# Patient Record
Sex: MALE | Race: WHITE | NOT HISPANIC OR LATINO | Employment: OTHER | URBAN - METROPOLITAN AREA
[De-identification: names, ages, dates, MRNs, and addresses within clinical notes are randomized per-mention and may not be internally consistent; named-entity substitution may affect disease eponyms.]

---

## 2017-02-06 ENCOUNTER — HOSPITAL ENCOUNTER (EMERGENCY)
Facility: HOSPITAL | Age: 82
Discharge: HOME | End: 2017-02-06
Attending: EMERGENCY MEDICINE | Admitting: EMERGENCY MEDICINE
Payer: MEDICARE

## 2017-02-06 VITALS
DIASTOLIC BLOOD PRESSURE: 65 MMHG | BODY MASS INDEX: 27.47 KG/M2 | HEIGHT: 67 IN | TEMPERATURE: 98.5 F | WEIGHT: 175 LBS | SYSTOLIC BLOOD PRESSURE: 129 MMHG | RESPIRATION RATE: 20 BRPM | HEART RATE: 82 BPM | OXYGEN SATURATION: 97 %

## 2017-02-06 DIAGNOSIS — J32.9 SINUSITIS: Primary | ICD-10-CM

## 2017-02-06 LAB
FLUAV AG SPEC QL IA: NEGATIVE
FLUAV AG SPEC QL: ABNORMAL
FLUBV AG SPEC QL IA: NEGATIVE
FLUBV AG SPEC QL: ABNORMAL
RSV B RNA SPEC QL NAA+PROBE: DETECTED

## 2017-02-06 PROCEDURE — 99283 EMERGENCY DEPT VISIT LOW MDM: CPT

## 2017-02-06 PROCEDURE — 87798 DETECT AGENT NOS DNA AMP: CPT | Performed by: EMERGENCY MEDICINE

## 2017-02-06 PROCEDURE — 87400 INFLUENZA A/B EACH AG IA: CPT | Performed by: EMERGENCY MEDICINE

## 2017-02-06 RX ORDER — AZITHROMYCIN 250 MG/1
TABLET, FILM COATED ORAL
Qty: 6 TABLET | Refills: 0 | Status: SHIPPED | OUTPATIENT
Start: 2017-02-06 | End: 2018-11-16

## 2017-02-06 RX ORDER — ATORVASTATIN CALCIUM 10 MG/1
10 TABLET, FILM COATED ORAL DAILY
COMMUNITY
End: 2019-04-04 | Stop reason: SDUPTHER

## 2017-02-06 RX ORDER — ACETAMINOPHEN 325 MG/1
650 TABLET ORAL ONCE
Status: DISCONTINUED | OUTPATIENT
Start: 2017-02-06 | End: 2017-02-06 | Stop reason: HOSPADM

## 2017-10-14 ENCOUNTER — LAB REQUISITION (OUTPATIENT)
Dept: LAB | Facility: HOSPITAL | Age: 82
End: 2017-10-14
Payer: MEDICARE

## 2017-10-14 DIAGNOSIS — Z13.220 ENCOUNTER FOR SCREENING FOR LIPOID DISORDERS: ICD-10-CM

## 2017-10-14 LAB — CHOLEST SERPL-MCNC: 152 MG/DL (ref 50–200)

## 2017-10-14 PROCEDURE — 82465 ASSAY BLD/SERUM CHOLESTEROL: CPT | Performed by: PREVENTIVE MEDICINE

## 2018-11-16 ENCOUNTER — HOSPITAL ENCOUNTER (INPATIENT)
Facility: HOSPITAL | Age: 83
LOS: 1 days | Discharge: HOME | DRG: 092 | End: 2018-11-17
Attending: EMERGENCY MEDICINE | Admitting: INTERNAL MEDICINE
Payer: MEDICARE

## 2018-11-16 ENCOUNTER — APPOINTMENT (EMERGENCY)
Dept: RADIOLOGY | Facility: HOSPITAL | Age: 83
DRG: 092 | End: 2018-11-16
Payer: MEDICARE

## 2018-11-16 ENCOUNTER — APPOINTMENT (EMERGENCY)
Dept: CT IMAGING | Facility: HOSPITAL | Age: 83
DRG: 092 | End: 2018-11-16
Payer: MEDICARE

## 2018-11-16 DIAGNOSIS — R26.2 AMBULATORY DYSFUNCTION: ICD-10-CM

## 2018-11-16 DIAGNOSIS — R45.851 SUICIDAL IDEATION: ICD-10-CM

## 2018-11-16 DIAGNOSIS — R45.89 DEPRESSED MOOD: ICD-10-CM

## 2018-11-16 DIAGNOSIS — R91.1 PULMONARY NODULE: ICD-10-CM

## 2018-11-16 DIAGNOSIS — R26.81 UNSTEADINESS: Primary | ICD-10-CM

## 2018-11-16 PROBLEM — R27.0 ATAXIA: Status: ACTIVE | Noted: 2018-11-16

## 2018-11-16 PROBLEM — R29.6 FREQUENT FALLS: Status: ACTIVE | Noted: 2018-11-16

## 2018-11-16 LAB
ALBUMIN SERPL BCP-MCNC: 3.5 G/DL (ref 3.5–5)
ALP SERPL-CCNC: 64 U/L (ref 46–116)
ALT SERPL W P-5'-P-CCNC: 39 U/L (ref 12–78)
ANION GAP SERPL CALCULATED.3IONS-SCNC: 12 MMOL/L (ref 4–13)
APAP SERPL-MCNC: <2 UG/ML (ref 10–30)
APTT PPP: 42 SECONDS (ref 26–38)
AST SERPL W P-5'-P-CCNC: 34 U/L (ref 5–45)
BASOPHILS # BLD AUTO: 0.03 THOUSANDS/ΜL (ref 0–0.1)
BASOPHILS NFR BLD AUTO: 1 % (ref 0–1)
BILIRUB SERPL-MCNC: 0.4 MG/DL (ref 0.2–1)
BUN SERPL-MCNC: 22 MG/DL (ref 5–25)
CALCIUM SERPL-MCNC: 8.9 MG/DL (ref 8.3–10.1)
CHLORIDE SERPL-SCNC: 105 MMOL/L (ref 100–108)
CO2 SERPL-SCNC: 26 MMOL/L (ref 21–32)
CREAT SERPL-MCNC: 1.28 MG/DL (ref 0.6–1.3)
EOSINOPHIL # BLD AUTO: 0.18 THOUSAND/ΜL (ref 0–0.61)
EOSINOPHIL NFR BLD AUTO: 5 % (ref 0–6)
ERYTHROCYTE [DISTWIDTH] IN BLOOD BY AUTOMATED COUNT: 12.4 % (ref 11.6–15.1)
ETHANOL SERPL-MCNC: <3 MG/DL (ref 0–3)
GFR SERPL CREATININE-BSD FRML MDRD: 52 ML/MIN/1.73SQ M
GLUCOSE SERPL-MCNC: 109 MG/DL (ref 65–140)
GLUCOSE SERPL-MCNC: 117 MG/DL (ref 65–140)
HCT VFR BLD AUTO: 38.5 % (ref 36.5–49.3)
HGB BLD-MCNC: 12.7 G/DL (ref 12–17)
IMM GRANULOCYTES # BLD AUTO: 0 THOUSAND/UL (ref 0–0.2)
IMM GRANULOCYTES NFR BLD AUTO: 0 % (ref 0–2)
INR PPP: 1.25 (ref 0.86–1.17)
LYMPHOCYTES # BLD AUTO: 1.16 THOUSANDS/ΜL (ref 0.6–4.47)
LYMPHOCYTES NFR BLD AUTO: 29 % (ref 14–44)
MCH RBC QN AUTO: 30.2 PG (ref 26.8–34.3)
MCHC RBC AUTO-ENTMCNC: 33 G/DL (ref 31.4–37.4)
MCV RBC AUTO: 92 FL (ref 82–98)
MONOCYTES # BLD AUTO: 0.53 THOUSAND/ΜL (ref 0.17–1.22)
MONOCYTES NFR BLD AUTO: 13 % (ref 4–12)
NEUTROPHILS # BLD AUTO: 2.09 THOUSANDS/ΜL (ref 1.85–7.62)
NEUTS SEG NFR BLD AUTO: 52 % (ref 43–75)
NRBC BLD AUTO-RTO: 0 /100 WBCS
PLATELET # BLD AUTO: 74 THOUSANDS/UL (ref 149–390)
PMV BLD AUTO: 9.4 FL (ref 8.9–12.7)
POTASSIUM SERPL-SCNC: 4 MMOL/L (ref 3.5–5.3)
PROT SERPL-MCNC: 7.2 G/DL (ref 6.4–8.2)
PROTHROMBIN TIME: 15 SECONDS (ref 11.8–14.2)
RBC # BLD AUTO: 4.2 MILLION/UL (ref 3.88–5.62)
SALICYLATES SERPL-MCNC: <3 MG/DL (ref 3–20)
SODIUM SERPL-SCNC: 143 MMOL/L (ref 136–145)
WBC # BLD AUTO: 3.99 THOUSAND/UL (ref 4.31–10.16)

## 2018-11-16 PROCEDURE — 80329 ANALGESICS NON-OPIOID 1 OR 2: CPT | Performed by: EMERGENCY MEDICINE

## 2018-11-16 PROCEDURE — 82948 REAGENT STRIP/BLOOD GLUCOSE: CPT

## 2018-11-16 PROCEDURE — 36415 COLL VENOUS BLD VENIPUNCTURE: CPT | Performed by: EMERGENCY MEDICINE

## 2018-11-16 PROCEDURE — 99223 1ST HOSP IP/OBS HIGH 75: CPT | Performed by: INTERNAL MEDICINE

## 2018-11-16 PROCEDURE — 1123F ACP DISCUSS/DSCN MKR DOCD: CPT | Performed by: INTERNAL MEDICINE

## 2018-11-16 PROCEDURE — 93005 ELECTROCARDIOGRAM TRACING: CPT

## 2018-11-16 PROCEDURE — 80320 DRUG SCREEN QUANTALCOHOLS: CPT | Performed by: EMERGENCY MEDICINE

## 2018-11-16 PROCEDURE — 85730 THROMBOPLASTIN TIME PARTIAL: CPT | Performed by: EMERGENCY MEDICINE

## 2018-11-16 PROCEDURE — 85025 COMPLETE CBC W/AUTO DIFF WBC: CPT | Performed by: EMERGENCY MEDICINE

## 2018-11-16 PROCEDURE — 71046 X-RAY EXAM CHEST 2 VIEWS: CPT

## 2018-11-16 PROCEDURE — 85610 PROTHROMBIN TIME: CPT | Performed by: EMERGENCY MEDICINE

## 2018-11-16 PROCEDURE — 70496 CT ANGIOGRAPHY HEAD: CPT

## 2018-11-16 PROCEDURE — 99285 EMERGENCY DEPT VISIT HI MDM: CPT

## 2018-11-16 PROCEDURE — 70498 CT ANGIOGRAPHY NECK: CPT

## 2018-11-16 PROCEDURE — 80053 COMPREHEN METABOLIC PANEL: CPT | Performed by: EMERGENCY MEDICINE

## 2018-11-16 RX ORDER — ACETAMINOPHEN 325 MG/1
650 TABLET ORAL EVERY 4 HOURS PRN
Status: DISCONTINUED | OUTPATIENT
Start: 2018-11-16 | End: 2018-11-17 | Stop reason: HOSPADM

## 2018-11-16 RX ORDER — ASPIRIN 81 MG/1
324 TABLET, CHEWABLE ORAL ONCE
Status: COMPLETED | OUTPATIENT
Start: 2018-11-16 | End: 2018-11-16

## 2018-11-16 RX ORDER — ONDANSETRON 2 MG/ML
4 INJECTION INTRAMUSCULAR; INTRAVENOUS EVERY 6 HOURS PRN
Status: DISCONTINUED | OUTPATIENT
Start: 2018-11-16 | End: 2018-11-17 | Stop reason: HOSPADM

## 2018-11-16 RX ORDER — ASPIRIN 81 MG/1
81 TABLET, CHEWABLE ORAL DAILY
Status: DISCONTINUED | OUTPATIENT
Start: 2018-11-17 | End: 2018-11-17 | Stop reason: HOSPADM

## 2018-11-16 RX ORDER — ATORVASTATIN CALCIUM 10 MG/1
10 TABLET, FILM COATED ORAL
Status: DISCONTINUED | OUTPATIENT
Start: 2018-11-17 | End: 2018-11-17 | Stop reason: HOSPADM

## 2018-11-16 RX ADMIN — IOHEXOL 100 ML: 350 INJECTION, SOLUTION INTRAVENOUS at 20:10

## 2018-11-16 RX ADMIN — ASPIRIN 81 MG 324 MG: 81 TABLET ORAL at 20:40

## 2018-11-17 ENCOUNTER — APPOINTMENT (INPATIENT)
Dept: MRI IMAGING | Facility: HOSPITAL | Age: 83
DRG: 092 | End: 2018-11-17
Payer: MEDICARE

## 2018-11-17 VITALS
WEIGHT: 167.77 LBS | SYSTOLIC BLOOD PRESSURE: 105 MMHG | BODY MASS INDEX: 26.33 KG/M2 | HEART RATE: 67 BPM | TEMPERATURE: 97.9 F | DIASTOLIC BLOOD PRESSURE: 57 MMHG | OXYGEN SATURATION: 94 % | HEIGHT: 67 IN | RESPIRATION RATE: 18 BRPM

## 2018-11-17 PROBLEM — F32.A DEPRESSION (EMOTION): Status: ACTIVE | Noted: 2018-11-16

## 2018-11-17 PROBLEM — R27.0 ATAXIA: Status: RESOLVED | Noted: 2018-11-16 | Resolved: 2018-11-17

## 2018-11-17 PROBLEM — R26.2 AMBULATORY DYSFUNCTION: Status: RESOLVED | Noted: 2018-11-16 | Resolved: 2018-11-17

## 2018-11-17 LAB
ANION GAP SERPL CALCULATED.3IONS-SCNC: 12 MMOL/L (ref 4–13)
ATRIAL RATE: 83 BPM
BASOPHILS # BLD AUTO: 0.02 THOUSANDS/ΜL (ref 0–0.1)
BASOPHILS NFR BLD AUTO: 1 % (ref 0–1)
BUN SERPL-MCNC: 21 MG/DL (ref 5–25)
CALCIUM SERPL-MCNC: 8.9 MG/DL (ref 8.3–10.1)
CHLORIDE SERPL-SCNC: 107 MMOL/L (ref 100–108)
CHOLEST SERPL-MCNC: 126 MG/DL (ref 50–200)
CO2 SERPL-SCNC: 24 MMOL/L (ref 21–32)
CREAT SERPL-MCNC: 1.17 MG/DL (ref 0.6–1.3)
EOSINOPHIL # BLD AUTO: 0.17 THOUSAND/ΜL (ref 0–0.61)
EOSINOPHIL NFR BLD AUTO: 5 % (ref 0–6)
ERYTHROCYTE [DISTWIDTH] IN BLOOD BY AUTOMATED COUNT: 12.3 % (ref 11.6–15.1)
EST. AVERAGE GLUCOSE BLD GHB EST-MCNC: 137 MG/DL
GFR SERPL CREATININE-BSD FRML MDRD: 58 ML/MIN/1.73SQ M
GLUCOSE SERPL-MCNC: 127 MG/DL (ref 65–140)
HBA1C MFR BLD: 6.4 % (ref 4.2–6.3)
HCT VFR BLD AUTO: 36.3 % (ref 36.5–49.3)
HDLC SERPL-MCNC: 38 MG/DL (ref 40–60)
HGB BLD-MCNC: 12.2 G/DL (ref 12–17)
IMM GRANULOCYTES # BLD AUTO: 0.01 THOUSAND/UL (ref 0–0.2)
IMM GRANULOCYTES NFR BLD AUTO: 0 % (ref 0–2)
LDLC SERPL CALC-MCNC: 73 MG/DL (ref 0–100)
LYMPHOCYTES # BLD AUTO: 0.71 THOUSANDS/ΜL (ref 0.6–4.47)
LYMPHOCYTES NFR BLD AUTO: 22 % (ref 14–44)
MCH RBC QN AUTO: 31 PG (ref 26.8–34.3)
MCHC RBC AUTO-ENTMCNC: 33.6 G/DL (ref 31.4–37.4)
MCV RBC AUTO: 92 FL (ref 82–98)
MONOCYTES # BLD AUTO: 0.41 THOUSAND/ΜL (ref 0.17–1.22)
MONOCYTES NFR BLD AUTO: 13 % (ref 4–12)
NEUTROPHILS # BLD AUTO: 1.92 THOUSANDS/ΜL (ref 1.85–7.62)
NEUTS SEG NFR BLD AUTO: 59 % (ref 43–75)
NRBC BLD AUTO-RTO: 0 /100 WBCS
P AXIS: 65 DEGREES
PLATELET # BLD AUTO: 67 THOUSANDS/UL (ref 149–390)
PLATELET # BLD AUTO: 73 THOUSANDS/UL (ref 149–390)
PMV BLD AUTO: 10.4 FL (ref 8.9–12.7)
PMV BLD AUTO: 9.7 FL (ref 8.9–12.7)
POTASSIUM SERPL-SCNC: 3.9 MMOL/L (ref 3.5–5.3)
PR INTERVAL: 162 MS
QRS AXIS: -18 DEGREES
QRSD INTERVAL: 86 MS
QT INTERVAL: 380 MS
QTC INTERVAL: 446 MS
RBC # BLD AUTO: 3.93 MILLION/UL (ref 3.88–5.62)
SODIUM SERPL-SCNC: 143 MMOL/L (ref 136–145)
T WAVE AXIS: 35 DEGREES
TRIGL SERPL-MCNC: 77 MG/DL
VENTRICULAR RATE: 83 BPM
WBC # BLD AUTO: 3.24 THOUSAND/UL (ref 4.31–10.16)

## 2018-11-17 PROCEDURE — 93010 ELECTROCARDIOGRAM REPORT: CPT | Performed by: INTERNAL MEDICINE

## 2018-11-17 PROCEDURE — 80048 BASIC METABOLIC PNL TOTAL CA: CPT | Performed by: NURSE PRACTITIONER

## 2018-11-17 PROCEDURE — 85049 AUTOMATED PLATELET COUNT: CPT | Performed by: NURSE PRACTITIONER

## 2018-11-17 PROCEDURE — 85025 COMPLETE CBC W/AUTO DIFF WBC: CPT | Performed by: NURSE PRACTITIONER

## 2018-11-17 PROCEDURE — 99221 1ST HOSP IP/OBS SF/LOW 40: CPT | Performed by: PSYCHIATRY & NEUROLOGY

## 2018-11-17 PROCEDURE — 99223 1ST HOSP IP/OBS HIGH 75: CPT | Performed by: PSYCHIATRY & NEUROLOGY

## 2018-11-17 PROCEDURE — 83036 HEMOGLOBIN GLYCOSYLATED A1C: CPT | Performed by: NURSE PRACTITIONER

## 2018-11-17 PROCEDURE — 99239 HOSP IP/OBS DSCHRG MGMT >30: CPT | Performed by: INTERNAL MEDICINE

## 2018-11-17 PROCEDURE — 80061 LIPID PANEL: CPT | Performed by: NURSE PRACTITIONER

## 2018-11-17 RX ORDER — ASPIRIN 81 MG/1
81 TABLET, CHEWABLE ORAL DAILY
Qty: 30 TABLET | Refills: 0 | Status: SHIPPED | OUTPATIENT
Start: 2018-11-18 | End: 2019-01-10 | Stop reason: ALTCHOICE

## 2018-11-17 RX ADMIN — ENOXAPARIN SODIUM 40 MG: 40 INJECTION SUBCUTANEOUS at 08:57

## 2018-11-17 RX ADMIN — ASPIRIN 81 MG 81 MG: 81 TABLET ORAL at 08:57

## 2018-11-17 NOTE — H&P
H&P- Britt Rater 1935, 81 y o  male MRN: [id number]    Unit/Bed#: [address] [place] [address] Encounter: [id number]    Primary Care Provider: Lennie Marrero MD   Date and time admitted to hospital: 2018  7:33 PM        * Ataxia   Assessment & Plan    CTA of head and neck shows no evidence of acute intracranial hemorrhage  Moderate atherosclerotic plaque bilateral cervical ICA and carotid siphon  No significant hemodynamics stenosis, aneurysm, or dissection  Monitor neuro checks  Initiate aspirin 81 milligrams p o  Daily  Inpatient consult to Neurology  [facility] Hypertension   Assessment & Plan    BP controlled off medication  Will continue to monitor BP per nursing unit  Ambulatory dysfunction   Assessment & Plan    Out of bed with assist   PT OT consult  Call bell in reach  Pt lives alone and may require STR  Frequent falls   Assessment & Plan    Patient fell 3 times today  Complains of feeling dizzy and unsteady  Out of bed with assist   Will consult PT/OT  Depression (emotion)   Assessment & Plan    Patient's wife  1 year ago yesterday  Patient very tearful  Will consult Psychiatry  Pulmonary nodule   Assessment & Plan    CT of chest shows 6 millimeter left upper lobe lung nodule  Patient unaware of when and by whom it was discovered  Radiology recommending non emergent outpatient CT to follow up  VTE Prophylaxis: Enoxaparin (Lovenox)  / sequential compression device   Code Status: Full code  POLST: There is no POLST form on file for this patient (pre-hospital)  Discussion with family: No family present    Anticipated Length of Stay:  Patient will be admitted on an Inpatient basis with an anticipated length of stay of  > 2 midnights  Justification for Hospital Stay: Close neurological monitoring  Total Time for Visit, including Counseling / Coordination of Care: 30 minutes    Greater than 50% of this total time spent on direct patient counseling and coordination of care  Chief Complaint:   Ataxia    History of Present Illness:    Marsha Simons is a 81 y o  male with history of HTN who presents with c/o weakness, dizziness,  and unsteady gait  Pt admits to falling 3 times today  Patient states he felt like he was beat up today and the room was spinning  Denies hitting head or loss of consciousness  He feels like he over did it as he recently moved and had to do a lot of physical activity  Patient is very tearful during interview as yesterday was the 1 year anniversary of the death of his wife  Denies suicidal ideations  WBC count 3 99, platelets 74  CTA of head and neck shows moderate atherosclerotic plaque in the bilateral cervical ICA and carotid siphon, fluid in esophagus and thoracic inlet suggesting reflux and aspiration risk, and 6 millimeter low left upper lobe lung nodule  Review of Systems:    Review of Systems   Constitutional: Positive for fatigue  Negative for appetite change, chills and fever  Respiratory: Negative for apnea, cough and shortness of breath  Cardiovascular: Negative for chest pain, palpitations and leg swelling  Gastrointestinal: Negative for abdominal distention, abdominal pain, constipation, diarrhea, nausea and vomiting  Genitourinary: Negative for dysuria  Musculoskeletal: Positive for gait problem  Neurological: Positive for dizziness and weakness  Negative for seizures, syncope, facial asymmetry, light-headedness, numbness and headaches  Psychiatric/Behavioral: Negative for agitation, confusion and suicidal ideas  The patient is not nervous/anxious  All other systems reviewed and are negative        Past Medical and Surgical History:     Past Medical History:   Diagnosis Date    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 Authorizing Provider   atorvastatin (LIPITOR) 10 mg tablet Take 10 mg by mouth daily    Historical Provider, MD     I have reviewed home medications with patient personally  Allergies: No Known Allergies    Social History:     Marital Status:    Occupation: Retired  Patient Pre-hospital Living Situation: Lives alone  Patient Pre-hospital Level of Mobility: Independent  Patient Pre-hospital Diet Restrictions: None  Substance Use History:   History   Alcohol Use No     History   Smoking Status    Former Smoker   Smokeless Tobacco    Former User    Quit date: 1/1/1976     History   Drug Use No       Family History:    non-contributory    Physical Exam:     Vitals:   Blood Pressure: 117/58 (11/16/18 2240)  Pulse: 56 (11/16/18 2240)  Temperature: (!) 97 3 °F (36 3 °C) (11/16/18 2240)  Temp Source: Oral (11/16/18 2240)  Respirations: 20 (11/16/18 2240)  Height: 5' 7" (170 2 cm) (11/16/18 2242)  Weight - Scale: 76 kg (167 lb 8 8 oz) (11/16/18 2242)  SpO2: 97 % (11/16/18 2240)    Physical Exam   Constitutional: He is oriented to person, place, and time  He appears well-developed and well-nourished  He is cooperative  No distress  HENT:   Head: Normocephalic and atraumatic  Eyes: Pupils are equal, round, and reactive to light  EOM are normal    Neck: Normal range of motion  Neck supple  Cardiovascular: Normal rate, regular rhythm, normal heart sounds and intact distal pulses  Exam reveals no gallop and no friction rub  No murmur heard  Pulmonary/Chest: Effort normal and breath sounds normal  No respiratory distress  He has no wheezes  He has no rales  Abdominal: Soft  Bowel sounds are normal  He exhibits no distension  There is no tenderness  Musculoskeletal: Normal range of motion  He exhibits no edema  Neurological: He is alert and oriented to person, place, and time  He has normal strength   No cranial nerve deficit or sensory deficit  GCS eye subscore is 4  GCS verbal subscore is 5  GCS motor subscore is 6  Skin: Skin is warm and dry  Psychiatric: His speech is normal and behavior is normal  Judgment and thought content normal  Cognition and memory are normal  He exhibits a depressed mood  Nursing note and vitals reviewed  Additional Data:     Lab Results: I have personally reviewed pertinent reports  Results from last 7 days  Lab Units 11/17/18  0029 11/16/18 1950   WBC Thousand/uL  --  3 99*   HEMOGLOBIN g/dL  --  12 7   HEMATOCRIT %  --  38 5   PLATELETS Thousands/uL 73* 74*   NEUTROS PCT %  --  52   LYMPHS PCT %  --  29   MONOS PCT %  --  13*   EOS PCT %  --  5       Results from last 7 days  Lab Units 11/16/18  1950   SODIUM mmol/L 143   POTASSIUM mmol/L 4 0   CHLORIDE mmol/L 105   CO2 mmol/L 26   BUN mg/dL 22   CREATININE mg/dL 1 28   ANION GAP mmol/L 12   CALCIUM mg/dL 8 9   ALBUMIN g/dL 3 5   TOTAL BILIRUBIN mg/dL 0 40   ALK PHOS U/L 64   ALT U/L 39   AST U/L 34   GLUCOSE RANDOM mg/dL 117       Results from last 7 days  Lab Units 11/16/18  1950   INR  1 25*       Results from last 7 days  Lab Units 11/16/18  1948   POC GLUCOSE mg/dl 109               Imaging: I have personally reviewed pertinent reports  CTA head and neck with and without contrast   Final Result by Kami Engel MD (11/16 2035)      No evidence of acute intracranial hemorrhage  Microangiopathy  Moderate atherosclerotic plaque bilateral cervical ICA and carotid siphon  No significant hemodynamic stenosis, aneurysm or dissection  Fluid in the esophagus at the thoracic inlet suggesting reflux and aspirational risk  6 mm left upper lobe lung nodule near the apex  Mediastinal adenopathy   Follow-up outpatient nonemergent unenhanced CT chest       Based on current Fleischner Society 2017 Guidelines on incidental pulmonary nodule, follow-up outpatient nonemergent unenhanced CT chest                         Workstation performed: [id number]         XR chest 2 views   ED Interpretation by Miguel Galindo MD (11/16 2025)   No acute pulmonary pathology      Final Result by Gaby Daily MD (11/16 2023)      No acute cardiopulmonary disease  Workstation performed: [id number]         MRI Inpatient Order    (Results Pending)       EKG, Pathology, and Other Studies Reviewed on Admission:   · EKG: SR    Allscripts / Epic Records Reviewed: Yes     ** Please Note: This note has been constructed using a voice recognition system   **

## 2018-11-17 NOTE — UTILIZATION REVIEW
Initial Clinical Review    Admission: Date/Time/Statement: 11/16/2018 @ 2310  11/16/18 2310  Inpatient Admission Once     Transfer Service: General Medicine       Question Answer Comment   Admitting Physician Orville Renteria S    Level of Care Med Surg    Estimated length of stay More than 2 Midnights    Certification I certify that inpatient services are medically necessary for this patient for a duration of greater than two midnights  See H&P and MD Progress Notes for additional information about the patient's course of treatment  ED: Date/Time/Mode of Arrival:   ED Arrival Information     Expected Arrival Acuity Means of Arrival Escorted By Service Admission Type    - 11/16/2018 19:31 Urgent Walk-In Spouse General Medicine Urgent    Arrival Complaint    change in mental status          Chief Complaint:   Chief Complaint   Patient presents with    Fall     Pt reports frequent falls and leg cramping since 630pm  He states it is difficult to maneuver around  He felt dizzy and unsteady at home  He reports it is worse when he gets up  History of Illness:   Wilfredo Queen is a 81 y o  male with history of HTN who presents with c/o weakness, dizziness,  and unsteady gait  Pt admits to falling 3 times today  Patient states he felt like he was beat up today and the room was spinning  Denies hitting head or loss of consciousness  He feels like he over did it as he recently moved and had to do a lot of physical activity  Patient is very tearful during interview as yesterday was the 1 year anniversary of the death of his wife      ED Vital Signs:   ED Triage Vitals [11/16/18 1935]   Temperature Pulse Respirations Blood Pressure SpO2   98 3 °F (36 8 °C) 68 22 163/72 98 %      Temp Source Heart Rate Source Patient Position - Orthostatic VS BP Location FiO2 (%)   Temporal -- -- -- --      Pain Score       --        Wt Readings from Last 1 Encounters:   11/16/18 74 8 kg (165 lb)     GCS 15    Abnormal Labs/Diagnostic Test Results:   WBC 3 99  Hgl 12 7 / Hct 38 5  Platelets 74  SODIUM mmol/L 143   POTASSIUM mmol/L 4 0   CHLORIDE mmol/L 105   CO2 mmol/L 26   BUN mg/dL 22   CREATININE mg/dL 1 28   ANION GAP mmol/L 12   CALCIUM mg/dL 8 9   ALBUMIN g/dL 3 5   TOTAL BILIRUBIN mg/dL 0 40   ALK PHOS U/L 64   ALT U/L 39   AST U/L 34   GLUCOSE RANDOM mg/dL 117   INR 1 25    CTa head/neck:    No evidence of acute intracranial hemorrhage        Microangiopathy        Moderate atherosclerotic plaque bilateral cervical ICA and carotid siphon  No significant hemodynamic stenosis, aneurysm or dissection          Fluid in the esophagus at the thoracic inlet suggesting reflux and aspirational risk        6 mm left upper lobe lung nodule near the apex  Mediastinal adenopathy  Follow-up outpatient nonemergent unenhanced CT chest      Chest X:  No acute cardiopulmonary disease  ECG: SR    MRI: pending    ED Treatment:   Medication Administration from 11/16/2018 1931 to 11/16/2018 2223       Date/Time Order Dose Route Action Action by Comments     11/16/2018 2010 iohexol (OMNIPAQUE) 350 MG/ML injection (MULTI-DOSE) 100 mL 100 mL Intravenous Given Moe Avila      11/16/2018 2040 aspirin chewable tablet 324 mg 324 mg Oral Given Micaela Nguyen RN           Past Medical/Surgical History:   Past Medical History:   Diagnosis Date    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     Hyperlipidemia     Hypertension     Shingles        Admitting Diagnosis: Altered mental status [R41 82]    Age/Sex: 81 y o  male    Assessment/Plan:   * Ataxia   Assessment & Plan     CTA of head and neck shows no evidence of acute intracranial hemorrhage  Moderate atherosclerotic plaque bilateral cervical ICA and carotid siphon  No significant hemodynamics stenosis, aneurysm, or dissection  Monitor neuro checks  Initiate aspirin 81 milligrams p o  Daily  Inpatient consult to Neurology  MRI in a m         Hypertension   Assessment & Plan     BP controlled off medication  Will continue to monitor BP per nursing unit       Ambulatory dysfunction   Assessment & Plan     Out of bed with assist   PT OT consult  Call bell in reach  Pt lives alone and may require STR        Frequent falls   Assessment & Plan     Patient fell 3 times today  Complains of feeling dizzy and unsteady  Out of bed with assist   Will consult PT/OT       Depression (emotion)   Assessment & Plan     Patient's wife  1 year ago yesterday  Patient very tearful  Will consult Psychiatry       Pulmonary nodule   Assessment & Plan     CT of chest shows 6 millimeter left upper lobe lung nodule  Patient unaware of when and by whom it was discovered  Radiology recommending non emergent outpatient CT to follow up          VTE Prophylaxis: Enoxaparin (Lovenox)  / sequential compression device   Anticipated Length of Stay:  Patient will be admitted on an Inpatient basis with an anticipated length of stay of  > 2 midnights  Justification for Hospital Stay: Close neurological monitoring       Admission Orders:  Scheduled Meds:  Current Facility-Administered Medications:  acetaminophen 650 mg Oral Q4H PRN ADALBERTO Johnson   aspirin 81 mg Oral Daily ADALBERTO Arias   atorvastatin 10 mg Oral Daily With [employer] ADALBERTO Yu   enoxaparin 40 mg Subcutaneous Daily ADALBERTO Arias   ondansetron 4 mg Intravenous Q6H PRN ADALBERTO Johnson     Dysphagia Eval > Regular diet  Up with assistance  Neuro checks q1hx4, q2hx4 then q4h  Consult PT/OT  Consult neurology  Consult psych

## 2018-11-17 NOTE — ASSESSMENT & PLAN NOTE
Patient fell 3 times today  Complains of feeling dizzy and unsteady  Out of bed with assist   Will consult PT/OT

## 2018-11-17 NOTE — ED PROVIDER NOTES
History  Chief Complaint   Patient presents with    Fall     Pt reports frequent falls and leg cramping since 630pm  He states it is difficult to maneuver around  He felt dizzy and unsteady at home  He reports it is worse when he gets up  81year old male presents for evaluation of unsteadiness beginning at 6:30 pm this evening  Patient states that he had gotten up to get a cup of coffee and dropped it  He states that he fell while trying to get back to the couch and is having trouble ambulating due to these symptoms  He denies any injuries during the fall  No head strike or LOC  Patient reports severe depression with suicidal ideation stating that he thought about jumping out of the window but that as he has a 1 story home, he thought he would only break his leg  Patient's wife had passed away approximately a year ago and yesterday was their anniversary  He states that he had been feeling to weak and tired to engage in his normal activities  History provided by:  Patient  Dizziness   Quality:  Imbalance  Severity:  Severe  Onset quality:  Sudden  Duration:  1 hour  Timing:  Constant  Progression:  Unchanged  Chronicity:  New  Context: standing up    Context: not with head movement and not with loss of consciousness    Relieved by:  Nothing  Worsened by: Movement  Ineffective treatments:  None tried  Associated symptoms: weakness    Associated symptoms: no chest pain, no diarrhea, no headaches, no nausea, no palpitations, no shortness of breath and no vomiting    Weakness:     Severity:  Moderate (generalized)    Duration:  2 days    Onset quality:  Gradual    Timing:  Constant    Progression:  Worsening    Chronicity:  New  Risk factors: no hx of stroke, no hx of vertigo and no new medications        Prior to Admission Medications   Prescriptions Last Dose Informant Patient Reported?  Taking?   atorvastatin (LIPITOR) 10 mg tablet   Yes No   Sig: Take 10 mg by mouth daily      Facility-Administered Medications: None       Past Medical History:   Diagnosis Date    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     Hyperlipidemia     Hypertension     Shingles        Past Surgical History:   Procedure Laterality Date    CHOLECYSTECTOMY      ROTATOR CUFF REPAIR         No family history on file  I have reviewed and agree with the history as documented  Social History   Substance Use Topics    Smoking status: Former Smoker    Smokeless tobacco: Never Used    Alcohol use No        Review of Systems   Constitutional: Positive for fatigue  Negative for appetite change, diaphoresis and fever  HENT: Negative for congestion, rhinorrhea and sore throat  Respiratory: Negative for cough, chest tightness and shortness of breath  Cardiovascular: Negative for chest pain, palpitations and leg swelling  Gastrointestinal: Negative for abdominal pain, constipation, diarrhea, nausea and vomiting  Genitourinary: Negative for difficulty urinating, dysuria, frequency and hematuria  Musculoskeletal: Negative for myalgias, neck pain and neck stiffness  Skin: Negative for pallor  Neurological: Positive for dizziness and weakness  Negative for syncope and headaches  Psychiatric/Behavioral: Positive for dysphoric mood and suicidal ideas  All other systems reviewed and are negative  Physical Exam  Physical Exam   Constitutional: He appears well-developed and well-nourished  Non-toxic appearance  No distress  HENT:   Head: Normocephalic and atraumatic  Eyes: Pupils are equal, round, and reactive to light  EOM are normal    Neck: Normal range of motion  No tracheal deviation present  No thyromegaly present  Cardiovascular: Normal rate, regular rhythm, normal heart sounds and intact distal pulses  Pulmonary/Chest: Effort normal and breath sounds normal    Abdominal: Soft  Bowel sounds are normal  He exhibits no distension  There is no tenderness  Lymphadenopathy:     He has no cervical adenopathy  Neurological: He displays tremor (mild, bilateral upper and lower extremities)  No cranial nerve deficit or sensory deficit  He exhibits normal muscle tone  Gait (hesitant, unsteady) abnormal  GCS eye subscore is 4  GCS verbal subscore is 5  GCS motor subscore is 6    4/5 strength throughout   Skin: Skin is warm and dry  He is not diaphoretic  Nursing note and vitals reviewed        Vital Signs  ED Triage Vitals [11/16/18 1935]   Temperature Pulse Respirations Blood Pressure SpO2   98 3 °F (36 8 °C) 68 22 163/72 98 %      Temp Source Heart Rate Source Patient Position - Orthostatic VS BP Location FiO2 (%)   Temporal -- -- -- --      Pain Score       --           Vitals:    11/16/18 1935 11/16/18 2030   BP: 163/72 134/63   Pulse: 68 67       Visual Acuity  Visual Acuity      Most Recent Value   L Pupil Size (mm)  3   R Pupil Size (mm)  3          ED Medications  Medications   iohexol (OMNIPAQUE) 350 MG/ML injection (MULTI-DOSE) 100 mL (100 mL Intravenous Given 11/16/18 2010)   aspirin chewable tablet 324 mg (324 mg Oral Given 11/16/18 2040)       Diagnostic Studies  Results Reviewed     Procedure Component Value Units Date/Time    Acetaminophen level [[id number]]  (Abnormal) Collected:  11/16/18 1950    Lab Status:  Final result Specimen:  Blood from Line, Venous Updated:  11/16/18 2132     Acetaminophen Level <2 (L) ug/mL     Ethanol [[id number]]  (Normal) Collected:  11/16/18 1950    Lab Status:  Final result Specimen:  Blood from Line, Venous Updated:  11/16/18 2114     Ethanol Lvl <3 mg/dL     Comprehensive metabolic panel [[id number]] Collected:  11/16/18 1950    Lab Status:  Final result Specimen:  Blood from Line, Venous Updated:  11/16/18 2104     Sodium 143 mmol/L      Potassium 4 0 mmol/L      Chloride 105 mmol/L      CO2 26 mmol/L      ANION GAP 12 mmol/L      BUN 22 mg/dL      Creatinine 1 28 mg/dL      Glucose 117 mg/dL      Calcium 8 9 mg/dL      AST 34 U/L      ALT 39 U/L      Alkaline Phosphatase 64 U/L Total Protein 7 2 g/dL      Albumin 3 5 g/dL      Total Bilirubin 0 40 mg/dL      eGFR 52 ml/min/1 73sq m     Narrative:         National Kidney Disease Education Program recommendations are as follows:  GFR calculation is accurate only with a steady state creatinine  Chronic Kidney disease less than 60 ml/min/1 73 sq  meters  Kidney failure less than 15 ml/min/1 73 sq  meters      Salicylate level [[id number]]  (Abnormal) Collected:  11/16/18 1950    Lab Status:  Final result Specimen:  Blood from Line, Venous Updated:  21/97/88 6441     Salicylate Lvl <3 (L) mg/dL     APTT [[id number]]  (Abnormal) Collected:  11/16/18 1950    Lab Status:  Final result Specimen:  Blood from Line, Venous Updated:  11/16/18 2048     PTT 42 (H) seconds     CBC and differential [[id number]]  (Abnormal) Collected:  11/16/18 1950    Lab Status:  Final result Specimen:  Blood from Line, Venous Updated:  11/16/18 2045     WBC 3 99 (L) Thousand/uL      RBC 4 20 Million/uL      Hemoglobin 12 7 g/dL      Hematocrit 38 5 %      MCV 92 fL      MCH 30 2 pg      MCHC 33 0 g/dL      RDW 12 4 %      MPV 9 4 fL      Platelets 74 (L) Thousands/uL      nRBC 0 /100 WBCs      Neutrophils Relative 52 %      Immat GRANS % 0 %      Lymphocytes Relative 29 %      Monocytes Relative 13 (H) %      Eosinophils Relative 5 %      Basophils Relative 1 %      Neutrophils Absolute 2 09 Thousands/µL      Immature Grans Absolute 0 00 Thousand/uL      Lymphocytes Absolute 1 16 Thousands/µL      Monocytes Absolute 0 53 Thousand/µL      Eosinophils Absolute 0 18 Thousand/µL      Basophils Absolute 0 03 Thousands/µL     Narrative:        No Clots    Protime-INR [[id number]]  (Abnormal) Collected:  11/16/18 1950    Lab Status:  Final result Specimen:  Blood from Line, Venous Updated:  11/16/18 2039     Protime 15 0 (H) seconds      INR 1 25 (H)    Fingerstick Glucose (POCT) [[id number]]  (Normal) Collected:  11/16/18 1948    Lab Status:  Final result Updated:  11/16/18 1952 POC Glucose 109 mg/dl                  CTA head and neck with and without contrast   Final Result by Magdalena Wade MD (11/16 2035)      No evidence of acute intracranial hemorrhage  Microangiopathy  Moderate atherosclerotic plaque bilateral cervical ICA and carotid siphon  No significant hemodynamic stenosis, aneurysm or dissection  Fluid in the esophagus at the thoracic inlet suggesting reflux and aspirational risk  6 mm left upper lobe lung nodule near the apex  Mediastinal adenopathy  Follow-up outpatient nonemergent unenhanced CT chest       Based on current Fleischner Society 2017 Guidelines on incidental pulmonary nodule, follow-up outpatient nonemergent unenhanced CT chest                         Workstation performed: [id number]         XR chest 2 views   ED Interpretation by Trinity Hopkins MD (11/16 2025)   No acute pulmonary pathology      Final Result by Dulce Quintana MD (11/16 2023)      No acute cardiopulmonary disease  Workstation performed: [id number]                    Procedures  ECG 12 Lead Documentation  Date/Time: 11/16/2018 7:38 PM  Performed by: Gertrudis Johnston by: Layton Miller     Indications / Diagnosis:  Unsteadiness  ECG reviewed by me, the ED Provider: yes    Patient location:  ED  Previous ECG:     Previous ECG:  Unavailable  Interpretation:     Interpretation: normal    Rate:     ECG rate:  83    ECG rate assessment: normal    Rhythm:     Rhythm: sinus rhythm    Ectopy:     Ectopy: none    QRS:     QRS axis:  Normal    QRS intervals:  Normal  Conduction:     Conduction: normal    ST segments:     ST segments:  Normal  T waves:     T waves: normal             Phone Contacts  ED Phone Contact    ED Course  ED Course as of Nov 16 2134 Fri Nov 16, 2018 2031 Discussed case with Dr Ray Cruz from Neurology  Patient with low NIHSS and nondisabling symptoms  Unclear if potential for basilar pathology; however, no large lesions on CTA   Risk exceeds potential benefit of tPA at this time  Will start aspirin therapy and admission for MRI  Stroke Assessment     Row Name 11/16/18 1944             NIH Stroke Scale    Interval Baseline      Level of Consciousness (1a ) 0      LOC Questions (1b ) 0      LOC Commands (1c ) 0      Best Gaze (2 ) 0      Visual (3 ) 0      Facial Palsy (4 ) 0      Motor Arm, Left (5a ) 0      Motor Arm, Right (5b ) 0      Motor Leg, Left (6a ) 0      Motor Leg, Right (6b ) 0      Limb Ataxia (7 ) 0      Sensory (8 ) 0      Best Language (9 ) 0      Dysarthria (10 ) 0      Extinction and Inattention (11 ) (Formerly Neglect) 0      Total 0                First Filed Value   TPA Decision  Patient not a TPA candidate  Patient is not a candidate options  Symptoms resolved/clearly non disabling  MDM  Number of Diagnoses or Management Options  Ambulatory dysfunction: new and requires workup  Depressed mood: new and requires workup  Pulmonary nodule: new and requires workup  Suicidal ideation: new and requires workup  Unsteadiness: new and requires workup  Diagnosis management comments: 81year old male presents for evaluation of sudden onset of unsteadiness at 6:30 pm  Patient nonfocal neurologic exam; however, unsteady gait  No nystagmus  Patient brought to CT urgently with no ICH  CTA negative for large occlusion  Discussed case with neurology  Due to low NIHSS, likely risk exceeds benefit with tPA  Patient started on aspirin therapy  Admission for MRI  Patient had also admitted to suicidal ideation during his evaluation and will require psychiatric evaluation during his admission          Amount and/or Complexity of Data Reviewed  Clinical lab tests: ordered and reviewed  Tests in the radiology section of CPT®: ordered and reviewed  Discuss the patient with other providers: yes  Independent visualization of images, tracings, or specimens: yes    Patient Progress  Patient progress: stable    CritCare Time    Disposition  Final diagnoses:   Depressed mood   Ambulatory dysfunction   Suicidal ideation   Pulmonary nodule - 6 mm left upper lobe   Unsteadiness     Time reflects when diagnosis was documented in both MDM as applicable and the Disposition within this note     Time User Action Codes Description Comment    11/16/2018  7:59 PM Leonila Corns [F32 9] Depressed mood     11/16/2018  7:59 PM Deboraha Cornea Add [R26 2] Ambulatory dysfunction     11/16/2018  7:59 PM Deboraha Cornea Add [R45 851] Suicidal ideation     11/16/2018  9:22 PM Deboraha Cornea Add [R91 1] Pulmonary nodule     11/16/2018  9:23 PM Deboraha Cornea Modify [R91 1] Pulmonary nodule 6 mm left upper lobe    11/16/2018  9:23 PM Deboraha Cornea Modify [F32 9] Depressed mood     11/16/2018  9:23 PM Deboraha Cornea Modify [R26 2] Ambulatory dysfunction     11/16/2018  9:23 PM Garrison Meek J Add [R26 81] Unsteadiness     11/16/2018  9:23 PM Deboraha Cornea Modify [R26 2] Ambulatory dysfunction     11/16/2018  9:23 PM Deboraha Cornea Modify [R91 1] Pulmonary nodule 6 mm left upper lobe    11/16/2018  9:23 PM Garrison Meek J Modify [R91 1] Pulmonary nodule 6 mm left upper lobe    11/16/2018  9:23 PM Mendez Kinney Cousins Modify [R26 81] Unsteadiness       ED Disposition     ED Disposition Condition Comment    Admit  Case was discussed with MAURICIO and the patient's admission status was agreed to be Admission Status: observation status to the service of Dr Stuart Goldsmith   Follow-up Information    None         Patient's Medications   Discharge Prescriptions    No medications on file     No discharge procedures on file      ED Provider  Electronically Signed by           Leann Hardin MD  11/16/18 7741

## 2018-11-17 NOTE — CONSULTS
Consultation - Bertha Bela Cherokee 81 y o  male MRN: [id number]  Unit/Bed#: 2 [address] 201-02 Encounter: [id number]    Assessment/Plan     Assessment:  Unspecified mood    Plan:   1  No indication for initiation of medications  2  Antidepressant initiation carries risk for patient have low WBC and platelets  3  Refer to outpatient psychotherapy on discharge  Psychiatry singing off  Risks, benefits and possible side effects of Medications:   Risks, benefits, and possible side effects of medications explained to patient and patient verbalizes understanding  Chief Complaint: "I was weak"    History of Present Illness   Physician Requesting Consult: Indiana Owen MD  Reason for Consult / Principal Problem: depression    Patient is a 81 y o  male presents with recent falls at home  Psychiatry is consulted for depression evaluation  Patient reports yesterday was 2 years death anniversary of his wife  Patient reports doing counseling after her death 2 years ago  He is reports depression but denies endorsing suicidal or homicidal ideations  Patient not meeting criteria for medication initiation  Discussed the importance of psychotherapy  Patient not interested in psychotherapy at this time  Inpatient consult to Psychiatry  Consult performed by: [facility], [address] [place] [address] ordered by: Sera Hernandez, [address]:  sleep: no  appetite changes: no  weight changes: no  energy/anergy: yes  interest/pleasure/anhedonia: no  somatic symptoms: no  anxiety/panic: yes  katie: no  guilty/hopeless: no  self injurious behavior/risky behavior: no    Historical Information   Past Psychiatric History:   No prior inpatient psychiatry admissions  Currently in treatment with none  Past Suicide attempts: no  Past Violent behavior: no  Past Psychiatric medication trial: no    Substance Abuse History:  denies    Family Psychiatric History:   none    Social History  Lives alone    Sister lives close- good support       Past Medical History:   Diagnosis Date    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     Hyperlipidemia     Hypertension     Shingles        Medical Review Of Systems:  Review of Systems    Meds/Allergies   all current active meds have been reviewed  No Known Allergies    Objective   Vital signs in last 24 hours:  Temp:  [97 3 °F (36 3 °C)-98 3 °F (36 8 °C)] 97 9 °F (36 6 °C)  HR:  [56-68] 67  Resp:  [18-31] 18  BP: (105-163)/(57-72) 105/57      Intake/Output Summary (Last 24 hours) at 11/17/18 1316  Last data filed at 11/17/18 0800   Gross per 24 hour   Intake                0 ml   Output              300 ml   Net             -300 ml       Mental Status Evaluation:  Appearance:  casually dressed   Behavior:  guarded   Speech:  normal volume   Mood:  anxious   Affect:  normal   Language: naming objects   Thought Process:  normal   Thought Content:  normal   Perceptual Disturbances: None   Risk Potential: Suicidal Ideations none, Homicidal Ideations none and Potential for Aggression No   Sensorium:  person, place and time/date   Cognition:  grossly intact   Consciousness:  alert    Attention: attention span and concentration were age appropriate   Intellect: normal   Fund of Knowledge: awareness of current events: fair   Insight:  fair   Judgment: fair   Muscle Strength and Tone: arm(s): bilateral   Gait/Station: in bed   Motor Activity: no abnormal movements     Lab Results: reviewed

## 2018-11-17 NOTE — ASSESSMENT & PLAN NOTE
CT of chest shows 6 millimeter left upper lobe lung nodule  Patient unaware of when and by whom it was discovered  Radiology recommending non emergent outpatient CT to follow up

## 2018-11-17 NOTE — PROGRESS NOTES
Progress Note - Vanleer Retort 1935, 81 y o  male MRN: [id number]    Unit/Bed#: [address] [place] [address] Encounter: [id number]    Primary Care Provider: Angelica Oliva MD   Date and time admitted to hospital: 2018  7:33 PM        Frequent falls   Assessment & Plan    Patient fell 3 times today  Complains of feeling dizzy and unsteady  Out of bed with assist   Will consult PT/OT  Pulmonary nodule   Assessment & Plan    CT of chest shows 6 millimeter left upper lobe lung nodule  Patient unaware of when and by whom it was discovered  Radiology recommending non emergent outpatient CT to follow up  Depression (emotion)   Assessment & Plan    Patient's wife  1 year ago yesterday  Patient very tearful  Will consult Psychiatry  Ambulatory dysfunction   Assessment & Plan    This might be due to ataxia, will wait for MRI of brain at this time  Out of bed with assist     PT OT evaluation and recommendations  Pt lives alone and may require STR  Hypertension   Assessment & Plan    BP controlled off medication  Will continue to monitor BP per nursing unit  * Ataxia   Assessment & Plan    CTA of head and neck shows no evidence of acute intracranial hemorrhage  Moderate atherosclerotic plaque bilateral cervical ICA and carotid siphon  No significant hemodynamics stenosis, aneurysm, or dissection  Monitor neuro checks  Initiate aspirin 81 milligrams p o  Daily  Inpatient consult to Neurology  MRI in recommended for cerebellar lesion  VTE Pharmacologic Prophylaxis:   Pharmacologic: Enoxaparin (Lovenox)  Mechanical VTE Prophylaxis in Place: Yes    Patient Centered Rounds: I have performed bedside rounds with nursing staff today  Discussions with Specialists or Other Care Team Provider: neurology    Education and Discussions with Family / Patient: patient     Time Spent for Care: 45 minutes    More than 50% of total time spent on counseling and coordination of care as described above  Current Length of Stay: 1 day(s)    Current Patient Status: Inpatient   Certification Statement: The patient will continue to require additional inpatient hospital stay due to ataxia    Discharge Plan: need MRI of brain, PT/OT evaluation    Code Status: Level 1 - Full Code      Subjective:     Objective:     Vitals:   Temp (24hrs), Av °F (36 7 °C), Min:97 3 °F (36 3 °C), Max:98 3 °F (36 8 °C)    Temp:  [97 3 °F (36 3 °C)-98 3 °F (36 8 °C)] 97 9 °F (36 6 °C)  HR:  [56-68] 67  Resp:  [18-31] 18  BP: (105-163)/(57-72) 105/57  SpO2:  [94 %-98 %] 94 %  Body mass index is 26 28 kg/m²  Input and Output Summary (last 24 hours): Intake/Output Summary (Last 24 hours) at 18 1223  Last data filed at 18 0800   Gross per 24 hour   Intake                0 ml   Output              300 ml   Net             -300 ml       Physical Exam:     Physical Exam   Constitutional: He is oriented to person, place, and time  He appears well-developed  HENT:   Head: Normocephalic and atraumatic  Right Ear: External ear normal    Left Ear: External ear normal    Nose: Nose normal    Mouth/Throat: Oropharynx is clear and moist  No oropharyngeal exudate  Eyes: Pupils are equal, round, and reactive to light  Conjunctivae and EOM are normal  Right eye exhibits no discharge  Left eye exhibits no discharge  No scleral icterus  Neck: Normal range of motion  Neck supple  JVD present  No tracheal deviation present  No thyromegaly present  Cardiovascular: Normal rate, regular rhythm and intact distal pulses  Exam reveals friction rub  Exam reveals no gallop  No murmur heard  Pulmonary/Chest: He is in respiratory distress  He has wheezes  He has rales  He exhibits no tenderness  Abdominal: Bowel sounds are normal  He exhibits no distension  There is no tenderness  There is no guarding  Musculoskeletal: Normal range of motion  He exhibits edema  He exhibits no tenderness or deformity  Neurological: He is alert and oriented to person, place, and time  He has normal reflexes  He displays normal reflexes  No cranial nerve deficit  He exhibits normal muscle tone  Coordination normal    Skin: No rash noted  No erythema  No pallor  Psychiatric: He has a normal mood and affect  His behavior is normal  Judgment and thought content normal    Nursing note and vitals reviewed  GEN: alert and oriented x 3, no acute distress  HEENT:MM, pink and moist, no lymphadenopathy  CHEST:Celar breath sounds, no focal deficits  CVS:S1, S2, RRR  ABD:Soft, nt, nd, bs+  EXT:No edema, gait abnormality, DTR 2+ in all limbs  CNS: no focal deficits  Additional Data:     Labs:      Results from last 7 days  Lab Units 11/17/18  0450   WBC Thousand/uL 3 24*   HEMOGLOBIN g/dL 12 2   HEMATOCRIT % 36 3*   PLATELETS Thousands/uL 67*   NEUTROS PCT % 59   LYMPHS PCT % 22   MONOS PCT % 13*   EOS PCT % 5       Results from last 7 days  Lab Units 11/17/18  0450 11/16/18  1950   POTASSIUM mmol/L 3 9 4 0   CHLORIDE mmol/L 107 105   CO2 mmol/L 24 26   BUN mg/dL 21 22   CREATININE mg/dL 1 17 1 28   CALCIUM mg/dL 8 9 8 9   ALK PHOS U/L  --  64   ALT U/L  --  39   AST U/L  --  34       Results from last 7 days  Lab Units 11/16/18  1950   INR  1 25*       * I Have Reviewed All Lab Data Listed Above  * Additional Pertinent Lab Tests Reviewed:  Jeff[facility] 67 Admission Reviewed    Imaging:    Imaging Reports Reviewed Today Include:    Imaging Personally Reviewed by Myself Includes:      Recent Cultures (last 7 days):           Last 24 Hours Medication List:     Current Facility-Administered Medications:  acetaminophen 650 mg Oral Q4H PRN ADALBERTO Oswald   aspirin 81 mg Oral Daily ADALBERTO Arias   atorvastatin 10 mg Oral Daily With [employer] ADALBERTO Yu   enoxaparin 40 mg Subcutaneous Daily ADALBERTO Arias   ondansetron 4 mg Intravenous Q6H PRN ADALBERTO Oswald        Today, Patient Was Seen By: Jorge Marshall MD    ** Please Note: Dictation voice to text software may have been used in the creation of this document   **

## 2018-11-17 NOTE — DISCHARGE SUMMARY
Discharge Summary - Dwight Sheldon 81 y o  male MRN: [id number]    Unit/Bed#: [address] [place] [address] 201-02 Encounter: [id number]    Admission Date:   Admission Orders     Ordered        11/16/18 2310  Inpatient Admission  Once         11/16/18 2155  Place in Observation (expected length of stay for this patient is less than two midnights)  Once         11/16/18 2130  Place in Observation (expected length of stay for this patient is less than two midnights)  Once               Admitting Diagnosis: Suicidal ideation [R45 851]  Altered mental status [R41 82]  Pulmonary nodule [R91 1]  Unsteadiness [R26 81]  Depressed mood [F32 9]  Ambulatory dysfunction [R26 2]    HPI:  "Dwight Sheldon is a 81 y o  male with history of HTN who presents with c/o weakness, dizziness,  and unsteady gait  Pt admits to falling 3 times today  Patient states he felt like he was beat up today and the room was spinning  Denies hitting head or loss of consciousness  He feels like he over did it as he recently moved and had to do a lot of physical activity  Patient is very tearful during interview as yesterday was the 1 year anniversary of the death of his wife  Denies suicidal ideations  WBC count 3 99, platelets 74  CTA of head and neck shows moderate atherosclerotic plaque in the bilateral cervical ICA and carotid siphon, fluid in esophagus and thoracic inlet suggesting reflux and aspiration risk, and 6 millimeter low left upper lobe lung nodule " From admission noted    Procedures Performed:   Orders Placed This Encounter   Procedures    ED ECG Documentation Only       Summary of Hospital Course:  Patient has frequent falls, he falls about 3 times before admission  He was feeling dizzy and unsteady on his feet  He needed help to move out of bed  He was working to move out of his house since his wife's death recently  He has moved all major furniture and equipment by himself, however, he still has some more items he needed move   He says he has been working to exhaustion  Work up including physical exam at admission has gait issues and not able to get up without assistance  He was noted to have gait issues at home according to sister as well  Upon neurology evaluation, patient did display gait issues and did not show neurological evidence at that time  He was recommended to get MRI of the Brain to see if he may have cerebellar lesions or abnormalities not picked up by with CT  Patient insists he wanted to go home without MRI  He understands the consequences of not following directions  He also understand he will return to ED if there is any change in the mental status and would be following up with neurology outpatient  Sister at bedside informed me she will take care of him  He was given strict instruction to relax and drink plenty of fluids when working again, always be careful  Significant Findings, Care, Treatment and Services Provided: as above    Complications: none    Discharge Diagnosis: ataxia    Resolved Problems  Date Reviewed: 11/17/2018          Resolved    Ambulatory dysfunction 11/17/2018     Resolved by  Mathew Pratt MD    * (Principal)Ataxia 11/17/2018     Resolved by  Mathew Pratt MD          Condition at Discharge: stable         Discharge instructions/Information to patient and family:   See after visit summary for information provided to patient and family  Provisions for Follow-Up Care:  See after visit summary for information related to follow-up care and any pertinent home health orders  PCP: Osiel Morrison MD    Disposition: Home    Planned Readmission: No    Discharge Statement   I spent 45 minutes discharging the patient  This time was spent on the day of discharge  I had direct contact with the patient on the day of discharge  Additional documentation is required if more than 30 minutes were spent on discharge       Discharge Medications:  See after visit summary for reconciled discharge medications provided to patient and family

## 2018-11-17 NOTE — ASSESSMENT & PLAN NOTE
This might be due to ataxia, will wait for MRI of brain at this time  Out of bed with assist     PT OT evaluation and recommendations  Pt lives alone and may require STR

## 2018-11-17 NOTE — ASSESSMENT & PLAN NOTE
CTA of head and neck shows no evidence of acute intracranial hemorrhage  Moderate atherosclerotic plaque bilateral cervical ICA and carotid siphon  No significant hemodynamics stenosis, aneurysm, or dissection  Monitor neuro checks  Initiate aspirin 81 milligrams p o  Daily  Inpatient consult to Neurology  MRI in recommended for cerebellar lesion

## 2018-11-17 NOTE — ASSESSMENT & PLAN NOTE
CTA of head and neck shows no evidence of acute intracranial hemorrhage  Moderate atherosclerotic plaque bilateral cervical ICA and carotid siphon  No significant hemodynamics stenosis, aneurysm, or dissection  Monitor neuro checks  Initiate aspirin 81 milligrams p o  Daily  Inpatient consult to Neurology  MRI in [facility]

## 2018-11-17 NOTE — CONSULTS
Consultation - Neurology   Jose Daniel Jain 81 y o  male MRN: [id number]  Unit/Bed#: [address] [place] [address] 201-02 Encounter: [id number]      Assessment/Plan   Assessment:  Unsteady gait  No additional focality is noted on his neuro exam other than minor vibratory sensory discrepancy between the feet  The patient postulate, that he had just overdone it over the past couple of weeks moving out of his home into a trailer  This may well be correct, coupled with the stress associated with 1 year anniversary of his wife's death  We cannot entirely exclude a small cerebellar infarction, but he has no associated headache, nystagmus, dysarthria, or limb ataxia  Further he has only minimal risk factors for stroke including age and dyslipidemia  No history of hypertension or diabetes  DX could include a peripheral vestibulopathy, but less likely    Plan:  MRI of the brain to exclude cerebellar infarct  Continue on aspirin and statin for the time being  If MRI is negative, he need not be maintained on aspirin or higher dose statin  Physical therapy  Only if his MRI confirms acute ischemia would he require further cardiac workup including echo and telemetry    Physician Requesting Consult: Abhay Nieves MD  Reason for Consult / Principal Problem:  Gait ataxia    HPI: Jose Daniel Jain is a 81y o  year old male who presents with unsteadiness  He reports that over the past couple of weeks he has been working daily moving his belongings from his house in to a trailer home  Reports for 5 days he has noted some mild unsteadiness, but this seemed to accelerate on the day of admission where he lost his balance, needing to catch himself on the wall to keep from falling on 2 occasions  He denied any lateralized weakness or numbness, no diplopia or dysarthria  He did report 1 episode of an actual spinning sensation, not recurrent       Inpatient consult to Neurology  Consult performed by: Jose Carlos Garcia ordered by: Nichelle Baca, FLY          Review of Systems   Constitutional: Positive for activity change  Negative for chills, diaphoresis and fever  HENT: Negative for congestion, drooling, ear pain, hearing loss, sinus pain, sinus pressure, sore throat, trouble swallowing and voice change  Eyes: Negative  Respiratory: Negative  Cardiovascular: Negative  Gastrointestinal: Negative for constipation, diarrhea, nausea and vomiting  Reports not eating or drinking well recently   Endocrine: Negative  Genitourinary: Negative  Musculoskeletal: Positive for gait problem  Negative for arthralgias, joint swelling, neck pain and neck stiffness  Skin: Negative  Allergic/Immunologic: Negative  Neurological: Positive for dizziness and numbness  Negative for tremors, seizures, syncope, speech difficulty, weakness, light-headedness and headaches  Reports some numbness in the left great toe   Hematological: Negative  Psychiatric/Behavioral: Positive for dysphoric mood and sleep disturbance  Reports he is still significantly depressed over the loss of his wife       Historical Information   Past Medical History:   Diagnosis Date    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     Hyperlipidemia     Hypertension     Shingles      Past Surgical History:   Procedure Laterality Date    CHOLECYSTECTOMY      ROTATOR CUFF REPAIR       Social History   History   Alcohol Use No     History   Drug Use No     History   Smoking Status    Former Smoker   Smokeless Tobacco    Former User    Quit date: 1/1/1976     Family History: non-contributory    Review of previous medical records was  completed       Meds/Allergies   current meds:   Current Facility-Administered Medications   Medication Dose Route Frequency    acetaminophen (TYLENOL) tablet 650 mg  650 mg Oral Q4H PRN    aspirin chewable tablet 81 mg  81 mg Oral Daily    atorvastatin (LIPITOR) tablet 10 mg  10 mg Oral Daily With Dinner    enoxaparin (LOVENOX) subcutaneous injection 40 mg  40 mg Subcutaneous Daily    ondansetron (ZOFRAN) injection 4 mg  4 mg Intravenous Q6H PRN    and PTA meds:   Prior to Admission Medications   Prescriptions Last Dose Informant Patient Reported? Taking?   atorvastatin (LIPITOR) 10 mg tablet   Yes No   Sig: Take 10 mg by mouth daily      Facility-Administered Medications: None       No Known Allergies    Objective   Vitals:Blood pressure 105/57, pulse 67, temperature 97 9 °F (36 6 °C), temperature source Oral, resp  rate 18, height 5' 7" (1 702 m), weight 76 1 kg (167 lb 12 3 oz), SpO2 94 %  ,Body mass index is 26 28 kg/m²  No intake or output data in the 24 hours ending 11/17/18 0754    Invasive Devices: Invasive Devices     Peripheral Intravenous Line            Peripheral IV 11/16/18 Right Antecubital less than 1 day                Physical Exam   Constitutional: He appears well-developed and well-nourished  No distress  HENT:   Head: Normocephalic and atraumatic  Mouth/Throat: No oropharyngeal exudate  Eyes: Conjunctivae are normal  Right eye exhibits no discharge  Left eye exhibits no discharge  No scleral icterus  Neck: Normal range of motion  Cardiovascular: Normal rate and regular rhythm  Exam reveals no friction rub  No murmur heard  Pulmonary/Chest: Effort normal and breath sounds normal  No respiratory distress  He has no wheezes  He has no rales  Abdominal: Soft  Bowel sounds are normal  He exhibits no distension  There is no tenderness  Musculoskeletal: Normal range of motion  He exhibits no edema or deformity  Lymphadenopathy:     He has no cervical adenopathy  Neurological: He has an abnormal Tandem Gait Test  He has a normal Finger-Nose-Finger Test and a normal Heel to [employer]    Reflex Scores:       Achilles reflexes are 1+ on the right side and 1+ on the left side  Skin: Skin is warm and dry  No rash noted  He is not diaphoretic  No erythema  No pallor     Psychiatric:   Occasionally tearful Vitals reviewed  Neurologic Exam     Mental Status   Patient is awake and alert  There is no evident difficulty with language, memory, orientation, or higher cognitive function  Cranial Nerves   Pupils are equal and reactive to light  Visual fields are full  Extraocular movements were intact  There was no pathologic nystagmus  Facial sensation and movements are symmetric  There is no jaw, lingual, palatal weakness  Head turning and shoulder shrugs were full  Hearing is grossly intact  Motor Exam   Right arm pronator drift: absent  Left arm pronator drift: absentPatient has normal  strength and tone in all proximal and distal muscle groups  Sensory Exam   Right arm vibration: normal  Left arm vibration: normal  Right leg vibration: decreased from ankle  Left leg vibration: normal  Pinprick normal      Gait, Coordination, and Reflexes     Gait  Gait: wide-based    Coordination   Finger to nose coordination: normal  Heel to shin coordination: normal  Tandem walking coordination: abnormal    Reflexes   Reflexes 2+ except as noted  Right achilles: 1+  Left achilles: 1+  Right plantar: normal  Left plantar: normalGait was somewhat wide-based and cautious, with loss of balance on turning  Lab Results: I have personally reviewed pertinent reports  Imaging Studies: I have personally reviewed pertinent films in PACS  EKG, Pathology, and Other Studies: I have personally reviewed pertinent reports          Code Status: Level 1 - Full Code  Advance Directive and Living Will:      Power of :    POLST:

## 2018-12-10 PROBLEM — N40.0 BENIGN PROSTATIC HYPERPLASIA WITHOUT LOWER URINARY TRACT SYMPTOMS: Status: ACTIVE | Noted: 2018-12-10

## 2018-12-10 PROBLEM — E78.00 PURE HYPERCHOLESTEROLEMIA: Status: ACTIVE | Noted: 2018-12-10

## 2018-12-10 PROBLEM — I65.23 BILATERAL CAROTID ARTERY STENOSIS: Status: ACTIVE | Noted: 2018-12-10

## 2018-12-10 PROBLEM — E78.5 HYPERLIPIDEMIA: Status: ACTIVE | Noted: 2018-12-10

## 2018-12-10 PROBLEM — E11.9 TYPE 2 DIABETES MELLITUS WITHOUT COMPLICATION, WITHOUT LONG-TERM CURRENT USE OF INSULIN (HCC): Status: ACTIVE | Noted: 2018-12-10

## 2019-01-10 ENCOUNTER — OFFICE VISIT (OUTPATIENT)
Dept: FAMILY MEDICINE CLINIC | Facility: HOSPITAL | Age: 84
End: 2019-01-10
Payer: MEDICARE

## 2019-01-10 VITALS
HEIGHT: 66 IN | DIASTOLIC BLOOD PRESSURE: 80 MMHG | HEART RATE: 69 BPM | SYSTOLIC BLOOD PRESSURE: 130 MMHG | OXYGEN SATURATION: 98 % | WEIGHT: 169.5 LBS | BODY MASS INDEX: 27.24 KG/M2

## 2019-01-10 DIAGNOSIS — N40.0 BENIGN PROSTATIC HYPERPLASIA WITHOUT LOWER URINARY TRACT SYMPTOMS: ICD-10-CM

## 2019-01-10 DIAGNOSIS — R73.01 IMPAIRED FASTING GLUCOSE: ICD-10-CM

## 2019-01-10 DIAGNOSIS — L98.9 SKIN LESION OF LEFT LEG: ICD-10-CM

## 2019-01-10 DIAGNOSIS — R91.1 PULMONARY NODULE: Primary | ICD-10-CM

## 2019-01-10 DIAGNOSIS — F32.A DEPRESSION, UNSPECIFIED DEPRESSION TYPE: ICD-10-CM

## 2019-01-10 DIAGNOSIS — I65.23 BILATERAL CAROTID ARTERY STENOSIS: ICD-10-CM

## 2019-01-10 DIAGNOSIS — R01.1 CARDIAC MURMUR: ICD-10-CM

## 2019-01-10 PROBLEM — R29.6 FREQUENT FALLS: Status: RESOLVED | Noted: 2018-11-16 | Resolved: 2019-01-10

## 2019-01-10 PROBLEM — E11.9 TYPE 2 DIABETES MELLITUS WITHOUT COMPLICATION, WITHOUT LONG-TERM CURRENT USE OF INSULIN (HCC): Status: RESOLVED | Noted: 2018-12-10 | Resolved: 2019-01-10

## 2019-01-10 PROBLEM — E78.00 PURE HYPERCHOLESTEROLEMIA: Status: RESOLVED | Noted: 2018-12-10 | Resolved: 2019-01-10

## 2019-01-10 PROBLEM — M65.341 TRIGGER RING FINGER OF RIGHT HAND: Status: ACTIVE | Noted: 2019-01-10

## 2019-01-10 PROCEDURE — 99204 OFFICE O/P NEW MOD 45 MIN: CPT | Performed by: INTERNAL MEDICINE

## 2019-01-10 NOTE — PROGRESS NOTES
Assessment/Plan:       Diagnoses and all orders for this visit:    Pulmonary nodule  -     CT chest wo contrast; Future    Depression, unspecified depression type    Bilateral carotid artery stenosis  -     VAS carotid complete study; Future    Benign prostatic hyperplasia without lower urinary tract symptoms    Skin lesion of left leg  -     Ambulatory referral to Dermatology; Future    Cardiac murmur  -     Echo complete with contrast if indicated; Future    Impaired fasting glucose  -     Hemoglobin A1C; Future    Other orders  -     KRILL OIL PO; Take 1 capsule by mouth daily  -     Liniments (BLUE-EMU SUPER STRENGTH EX); Apply 1 application topically          All of the above diagnoses have been assessed  Additional COMMENTS/PLAN:  Patient does have I think some depression related to the death of his wife  I have offered antidepressant at this time he like to hold off  We can consider this in the future  I gave him a Dermatology referral for his left leg lesion  The in a carotid ultrasound of pelvis carotid occlusive disease    A get a CT scan of the chest to follow up on his pulmonary nodule  The patient is her daughter [facility]     Patient will get an echocardiogram to evaluate his systolic murmur  I will see him back in 3 months with a Medicare wellness exam at that time  Subjective:      Patient ID: Leida Aquino is a 81 y o  male  HPI     Pt seen after discharge from the hospital  Pt does admit to doing being depressed  Also has a lesion on left leg that needs to be checked  Also has a pulm nodule that had been followed at [facility] before  Did smoke remotely  The following portions of the patient's history were revised and updated as appropriate: Problem list, allergies, med list, FH, SH, Past medical and surgical histories      Current Outpatient Prescriptions   Medication Sig Dispense Refill    atorvastatin (LIPITOR) 10 mg tablet Take 10 mg by mouth daily      KRILL OIL PO Take 1 capsule by mouth daily      Liniments (BLUE-EMU SUPER STRENGTH EX) Apply 1 application topically       No current facility-administered medications for this visit  Review of Systems   Genitourinary: Positive for difficulty urinating  Bph sxs  Musculoskeletal:        Left leg lesion         Objective:    /80 (BP Location: Left arm, Patient Position: Sitting, Cuff Size: Standard)   Pulse 69   Ht 5' 6" (1 676 m)   Wt 76 9 kg (169 lb 8 oz)   SpO2 98%   BMI 27 36 kg/m²      Physical Exam   Neck: No thyromegaly present  Bruit Right   Cardiovascular: Normal rate and regular rhythm  Murmur heard  Pulmonary/Chest: Effort normal and breath sounds normal    Abdominal: Soft  Bowel sounds are normal    Musculoskeletal: He exhibits no edema  Skin:   Skin lesion left leg   Vitals reviewed  No visits with results within 2 Week(s) from this visit     Latest known visit with results is:   Admission on 11/16/2018, Discharged on 11/17/2018   Component Date Value Ref Range Status    WBC 11/16/2018 3 99* 4 31 - 10 16 Thousand/uL Final    RBC 11/16/2018 4 20  3 88 - 5 62 Million/uL Final    Hemoglobin 11/16/2018 12 7  12 0 - 17 0 g/dL Final    Hematocrit 11/16/2018 38 5  36 5 - 49 3 % Final    MCV 11/16/2018 92  82 - 98 fL Final    MCH 11/16/2018 30 2  26 8 - 34 3 pg Final    MCHC 11/16/2018 33 0  31 4 - 37 4 g/dL Final    RDW 11/16/2018 12 4  11 6 - 15 1 % Final    MPV 11/16/2018 9 4  8 9 - 12 7 fL Final    Platelets 05/43/2121 74* 149 - 390 Thousands/uL Final    Manual Review of Smear Performed    nRBC 11/16/2018 0  /100 WBCs Final    Neutrophils Relative 11/16/2018 52  43 - 75 % Final    Immat GRANS % 11/16/2018 0  0 - 2 % Final    Lymphocytes Relative 11/16/2018 29  14 - 44 % Final    Monocytes Relative 11/16/2018 13* 4 - 12 % Final    Eosinophils Relative 11/16/2018 5  0 - 6 % Final    Basophils Relative 11/16/2018 1  0 - 1 % Final    Neutrophils Absolute 11/16/2018 2 09  1 85 - 7 62 Thousands/µL Final    Immature Grans Absolute 11/16/2018 0 00  0 00 - 0 20 Thousand/uL Final    Lymphocytes Absolute 11/16/2018 1 16  0 60 - 4 47 Thousands/µL Final    Monocytes Absolute 11/16/2018 0 53  0 17 - 1 22 Thousand/µL Final    Eosinophils Absolute 11/16/2018 0 18  0 00 - 0 61 Thousand/µL Final    Basophils Absolute 11/16/2018 0 03  0 00 - 0 10 Thousands/µL Final    Protime 11/16/2018 15 0* 11 8 - 14 2 seconds Final    INR 11/16/2018 1 25* 0 86 - 1 17 Final    PTT 11/16/2018 42* 26 - 38 seconds Final    Therapeutic Heparin Range =  60-90 seconds    Sodium 11/16/2018 143  136 - 145 mmol/L Final    Potassium 11/16/2018 4 0  3 5 - 5 3 mmol/L Final    Chloride 11/16/2018 105  100 - 108 mmol/L Final    CO2 11/16/2018 26  21 - 32 mmol/L Final    ANION GAP 11/16/2018 12  4 - 13 mmol/L Final    BUN 11/16/2018 22  5 - 25 mg/dL Final    Creatinine 11/16/2018 1 28  0 60 - 1 30 mg/dL Final    Standardized to IDMS reference method    Glucose 11/16/2018 117  65 - 140 mg/dL Final      If the patient is fasting, the ADA then defines impaired fasting glucose as > 100 mg/dL and diabetes as > or equal to 123 mg/dL  Specimen collection should occur prior to Sulfasalazine administration due to the potential for falsely depressed results  Specimen collection should occur prior to Sulfapyridine administration due to the potential for falsely elevated results   Calcium 11/16/2018 8 9  8 3 - 10 1 mg/dL Final    AST 11/16/2018 34  5 - 45 U/L Final      Specimen collection should occur prior to Sulfasalazine administration due to the potential for falsely depressed results   ALT 11/16/2018 39  12 - 78 U/L Final      Specimen collection should occur prior to Sulfasalazine administration due to the potential for falsely depressed results       Alkaline Phosphatase 11/16/2018 64  46 - 116 U/L Final    Total Protein 11/16/2018 7 2  6 4 - 8 2 g/dL Final    Albumin 11/16/2018 3 5  3 5 - 5 0 g/dL Final    Total Bilirubin 11/16/2018 0 40  0 20 - 1 00 mg/dL Final    eGFR 11/16/2018 52  ml/min/1 73sq m Final    Ethanol Lvl 11/16/2018 <3  0 - 3 mg/dL Final    Salicylate Lvl 72/64/5400 <3* 3 - 20 mg/dL Final    Acetaminophen Level 11/16/2018 <2* 10 - 30 ug/mL Final    POC Glucose 11/16/2018 109  65 - 140 mg/dl Final    Platelets 50/85/3877 73* 149 - 390 Thousands/uL Final    MPV 11/17/2018 9 7  8 9 - 12 7 fL Final    Cholesterol 11/17/2018 126  50 - 200 mg/dL Final      Cholesterol:       Desirable         <200 mg/dl       Borderline         200-239 mg/dl       High              >239           Triglycerides 11/17/2018 77  <=150 mg/dL Final      Triglyceride:     Normal          <150 mg/dl     Borderline High 150-199 mg/dl     High            200-499 mg/dl        Very High       >499 mg/dl    Specimen collection should occur prior to N-Acetylcysteine or Metamizole administration due to the potential for falsely depressed results   HDL, Direct 11/17/2018 38* 40 - 60 mg/dL Final      HDL Cholesterol:       High    >60 mg/dL       Low     <41 mg/dL  Specimen collection should occur prior to Metamizole administration due to the potential for falsley depressed results   LDL Calculated 11/17/2018 73  0 - 100 mg/dL Final      LDL Cholesterol:     Optimal           <100 mg/dl     Near Optimal      100-129 mg/dl     Above Optimal       Borderline High 130-159 mg/dl       High            160-189 mg/dl       Very High       >189 mg/dl         This screening LDL is a calculated result  It does not have the accuracy of the Direct Measured LDL in the monitoring of patients with hyperlipidemia and/or statin therapy  Direct Measure LDL (VRP352) must be ordered separately in these patients      Hemoglobin A1C 11/17/2018 6 4* 4 2 - 6 3 % Final    EAG 11/17/2018 137  mg/dl Final    Sodium 11/17/2018 143  136 - 145 mmol/L Final    Potassium 11/17/2018 3 9  3 5 - 5 3 mmol/L Final    Chloride 11/17/2018 107  100 - 108 mmol/L Final    CO2 11/17/2018 24  21 - 32 mmol/L Final    ANION GAP 11/17/2018 12  4 - 13 mmol/L Final    BUN 11/17/2018 21  5 - 25 mg/dL Final    Creatinine 11/17/2018 1 17  0 60 - 1 30 mg/dL Final    Standardized to IDMS reference method    Glucose 11/17/2018 127  65 - 140 mg/dL Final      If the patient is fasting, the ADA then defines impaired fasting glucose as > 100 mg/dL and diabetes as > or equal to 123 mg/dL  Specimen collection should occur prior to Sulfasalazine administration due to the potential for falsely depressed results  Specimen collection should occur prior to Sulfapyridine administration due to the potential for falsely elevated results      Calcium 11/17/2018 8 9  8 3 - 10 1 mg/dL Final    eGFR 11/17/2018 58  ml/min/1 73sq m Final    WBC 11/17/2018 3 24* 4 31 - 10 16 Thousand/uL Final    RBC 11/17/2018 3 93  3 88 - 5 62 Million/uL Final    Hemoglobin 11/17/2018 12 2  12 0 - 17 0 g/dL Final    Hematocrit 11/17/2018 36 3* 36 5 - 49 3 % Final    MCV 11/17/2018 92  82 - 98 fL Final    MCH 11/17/2018 31 0  26 8 - 34 3 pg Final    MCHC 11/17/2018 33 6  31 4 - 37 4 g/dL Final    RDW 11/17/2018 12 3  11 6 - 15 1 % Final    MPV 11/17/2018 10 4  8 9 - 12 7 fL Final    Platelets 59/64/5748 67* 149 - 390 Thousands/uL Final    Manual Review of Smear Performed    nRBC 11/17/2018 0  /100 WBCs Final    Neutrophils Relative 11/17/2018 59  43 - 75 % Final    Immat GRANS % 11/17/2018 0  0 - 2 % Final    Lymphocytes Relative 11/17/2018 22  14 - 44 % Final    Monocytes Relative 11/17/2018 13* 4 - 12 % Final    Eosinophils Relative 11/17/2018 5  0 - 6 % Final    Basophils Relative 11/17/2018 1  0 - 1 % Final    Neutrophils Absolute 11/17/2018 1 92  1 85 - 7 62 Thousands/µL Final    Immature Grans Absolute 11/17/2018 0 01  0 00 - 0 20 Thousand/uL Final    Lymphocytes Absolute 11/17/2018 0 71  0 60 - 4 47 Thousands/µL Final    Monocytes Absolute 11/17/2018 0 41 0 17 - 1 22 Thousand/µL Final    Eosinophils Absolute 11/17/2018 0 17  0 00 - 0 61 Thousand/µL Final    Basophils Absolute 11/17/2018 0 02  0 00 - 0 10 Thousands/µL Final    Ventricular Rate 11/16/2018 83  BPM Final    Atrial Rate 11/16/2018 83  BPM Final    LA Interval 11/16/2018 162  ms Final    QRSD Interval 11/16/2018 86  ms Final    QT Interval 11/16/2018 380  ms Final    QTC Interval 11/16/2018 446  ms Final    P Axis 11/16/2018 65  degrees Final    QRS Axis 11/16/2018 -18  degrees Final    T Wave Axis 11/16/2018 35  degrees Final         Sj Abbasi MD

## 2019-01-11 ENCOUNTER — APPOINTMENT (OUTPATIENT)
Dept: LAB | Facility: HOSPITAL | Age: 84
End: 2019-01-11
Attending: INTERNAL MEDICINE
Payer: MEDICARE

## 2019-01-11 DIAGNOSIS — R73.01 IMPAIRED FASTING GLUCOSE: ICD-10-CM

## 2019-01-11 LAB
EST. AVERAGE GLUCOSE BLD GHB EST-MCNC: 146 MG/DL
HBA1C MFR BLD: 6.7 % (ref 4.2–6.3)

## 2019-01-11 PROCEDURE — 36415 COLL VENOUS BLD VENIPUNCTURE: CPT

## 2019-01-11 PROCEDURE — 83036 HEMOGLOBIN GLYCOSYLATED A1C: CPT

## 2019-01-14 ENCOUNTER — HOSPITAL ENCOUNTER (OUTPATIENT)
Dept: NON INVASIVE DIAGNOSTICS | Facility: HOSPITAL | Age: 84
Discharge: HOME | End: 2019-01-14
Attending: INTERNAL MEDICINE
Payer: MEDICARE

## 2019-01-14 DIAGNOSIS — I65.23 BILATERAL CAROTID ARTERY STENOSIS: ICD-10-CM

## 2019-01-14 PROCEDURE — 93880 EXTRACRANIAL BILAT STUDY: CPT

## 2019-01-14 PROCEDURE — 93880 EXTRACRANIAL BILAT STUDY: CPT | Performed by: INTERNAL MEDICINE

## 2019-01-16 ENCOUNTER — HOSPITAL ENCOUNTER (OUTPATIENT)
Dept: CT IMAGING | Facility: HOSPITAL | Age: 84
Discharge: HOME | End: 2019-01-16
Attending: INTERNAL MEDICINE
Payer: MEDICARE

## 2019-01-16 DIAGNOSIS — R91.1 PULMONARY NODULE: ICD-10-CM

## 2019-01-16 PROCEDURE — 71250 CT THORAX DX C-: CPT

## 2019-02-04 ENCOUNTER — HOSPITAL ENCOUNTER (OUTPATIENT)
Dept: NON INVASIVE DIAGNOSTICS | Facility: HOSPITAL | Age: 84
Discharge: HOME | End: 2019-02-04
Attending: INTERNAL MEDICINE
Payer: MEDICARE

## 2019-02-04 DIAGNOSIS — R01.1 CARDIAC MURMUR: ICD-10-CM

## 2019-02-04 PROCEDURE — 93306 TTE W/DOPPLER COMPLETE: CPT

## 2019-02-04 PROCEDURE — 93306 TTE W/DOPPLER COMPLETE: CPT | Performed by: INTERNAL MEDICINE

## 2019-03-05 ENCOUNTER — OFFICE VISIT (OUTPATIENT)
Dept: FAMILY MEDICINE CLINIC | Facility: HOSPITAL | Age: 84
End: 2019-03-05
Payer: MEDICARE

## 2019-03-05 VITALS
DIASTOLIC BLOOD PRESSURE: 70 MMHG | HEART RATE: 61 BPM | HEIGHT: 66 IN | WEIGHT: 171 LBS | SYSTOLIC BLOOD PRESSURE: 120 MMHG | BODY MASS INDEX: 27.48 KG/M2

## 2019-03-05 DIAGNOSIS — L30.9 ECZEMA, UNSPECIFIED TYPE: ICD-10-CM

## 2019-03-05 DIAGNOSIS — M18.12 ARTHRITIS OF CARPOMETACARPAL (CMC) JOINT OF LEFT THUMB: Primary | ICD-10-CM

## 2019-03-05 PROCEDURE — 20605 DRAIN/INJ JOINT/BURSA W/O US: CPT

## 2019-03-05 PROCEDURE — 99213 OFFICE O/P EST LOW 20 MIN: CPT | Performed by: INTERNAL MEDICINE

## 2019-03-05 RX ORDER — METHYLPREDNISOLONE ACETATE 40 MG/ML
10 INJECTION, SUSPENSION INTRA-ARTICULAR; INTRALESIONAL; INTRAMUSCULAR; SOFT TISSUE ONCE
Status: COMPLETED | OUTPATIENT
Start: 2019-03-05 | End: 2019-03-05

## 2019-03-05 RX ADMIN — METHYLPREDNISOLONE ACETATE 10 MG: 40 INJECTION, SUSPENSION INTRA-ARTICULAR; INTRALESIONAL; INTRAMUSCULAR; SOFT TISSUE at 11:48

## 2019-03-05 NOTE — PROGRESS NOTES
Assessment/Plan:       Diagnoses and all orders for this visit:    Arthritis of carpometacarpal [place]) joint of left thumb  -     hydrocortisone 2 5 % cream; Apply topically daily Use in ear on Q-tip  -     Small joint arthrocentesis; Future  -     methylPREDNISolone acetate (DEPO-MEDROL) injection 10 mg    Eczema, unspecified type          All of the above diagnoses have been assessed  Additional COMMENTS/PLAN: has apt next month      Subjective:      Patient ID: Nolvia Sims is a 81 y o  male  HPI     Here with 2 days of arthritis pain in the base of the left thumb  No injury to the hand  No h/o gout  Also c/o itching of the ears    The following portions of the patient's history were revised and updated as appropriate: Problem list, allergies, med list, FH, SH, Past medical and surgical histories  Current Outpatient Medications   Medication Sig Dispense Refill    atorvastatin (LIPITOR) 10 mg tablet Take 10 mg by mouth daily      KRILL OIL PO Take 1 capsule by mouth daily      Liniments (BLUE-EMU SUPER STRENGTH EX) Apply 1 application topically      hydrocortisone 2 5 % cream Apply topically daily Use in ear on Q-tip 15 g 3     Current Facility-Administered Medications   Medication Dose Route Frequency Provider Last Rate Last Dose    methylPREDNISolone acetate (DEPO-MEDROL) injection 10 mg  10 mg Intra-articular Once Britany Cartagena MD           Review of Systems      Objective:    /70 (BP Location: Left arm, Patient Position: Sitting, Cuff Size: Standard)   Pulse 61   Ht 5' 6" (1 676 m)   Wt 77 6 kg (171 lb)   BMI 27 60 kg/m²      Physical Exam   HENT:   Both ears have eczema  Musculoskeletal:   Patient has base of thumb arthritis  Under sterile conditions and xylocaine  I injected 10 mg of Depo-Medrol into the [facility] joint  Vitals reviewed  No visits with results within 2 Week(s) from this visit     Latest known visit with results is:   Appointment on 01/11/2019   Component Date Value Ref Range Status    Hemoglobin A1C 01/11/2019 6 7* 4 2 - 6 3 % Final    EAG 01/11/2019 146  mg/dl Final         Tenisha Cesar MD

## 2019-04-04 ENCOUNTER — OFFICE VISIT (OUTPATIENT)
Dept: FAMILY MEDICINE CLINIC | Facility: HOSPITAL | Age: 84
End: 2019-04-04
Payer: MEDICARE

## 2019-04-04 VITALS
OXYGEN SATURATION: 95 % | WEIGHT: 171.5 LBS | SYSTOLIC BLOOD PRESSURE: 125 MMHG | BODY MASS INDEX: 27.56 KG/M2 | HEART RATE: 76 BPM | DIASTOLIC BLOOD PRESSURE: 70 MMHG | HEIGHT: 66 IN

## 2019-04-04 DIAGNOSIS — E78.00 PURE HYPERCHOLESTEROLEMIA: Primary | ICD-10-CM

## 2019-04-04 DIAGNOSIS — F32.A DEPRESSION, UNSPECIFIED DEPRESSION TYPE: ICD-10-CM

## 2019-04-04 DIAGNOSIS — R73.01 IMPAIRED FASTING GLUCOSE: ICD-10-CM

## 2019-04-04 DIAGNOSIS — M65.341 TRIGGER RING FINGER OF RIGHT HAND: ICD-10-CM

## 2019-04-04 PROBLEM — R01.1 CARDIAC MURMUR: Status: RESOLVED | Noted: 2019-01-10 | Resolved: 2019-04-04

## 2019-04-04 PROBLEM — I35.0 MODERATE AORTIC STENOSIS: Status: ACTIVE | Noted: 2019-04-04

## 2019-04-04 PROCEDURE — 99214 OFFICE O/P EST MOD 30 MIN: CPT | Performed by: INTERNAL MEDICINE

## 2019-04-04 PROCEDURE — G0439 PPPS, SUBSEQ VISIT: HCPCS | Performed by: INTERNAL MEDICINE

## 2019-04-04 RX ORDER — ATORVASTATIN CALCIUM 10 MG/1
10 TABLET, FILM COATED ORAL DAILY
Qty: 90 TABLET | Refills: 3 | Status: SHIPPED | OUTPATIENT
Start: 2019-04-04 | End: 2020-03-31

## 2019-04-08 ENCOUNTER — HOSPITAL ENCOUNTER (EMERGENCY)
Facility: HOSPITAL | Age: 84
Discharge: HOME | End: 2019-04-08
Attending: EMERGENCY MEDICINE
Payer: MEDICARE

## 2019-04-08 VITALS
DIASTOLIC BLOOD PRESSURE: 59 MMHG | SYSTOLIC BLOOD PRESSURE: 115 MMHG | TEMPERATURE: 98.7 F | WEIGHT: 165 LBS | RESPIRATION RATE: 18 BRPM | HEART RATE: 81 BPM | BODY MASS INDEX: 26.52 KG/M2 | OXYGEN SATURATION: 96 % | HEIGHT: 66 IN

## 2019-04-08 DIAGNOSIS — J06.9 URI (UPPER RESPIRATORY INFECTION): Primary | ICD-10-CM

## 2019-04-08 PROCEDURE — 99283 EMERGENCY DEPT VISIT LOW MDM: CPT | Performed by: PHYSICIAN ASSISTANT

## 2019-04-08 PROCEDURE — 99283 EMERGENCY DEPT VISIT LOW MDM: CPT

## 2019-04-08 RX ORDER — BENZONATATE 100 MG/1
100 CAPSULE ORAL 3 TIMES DAILY PRN
Qty: 20 CAPSULE | Refills: 0 | Status: SHIPPED | OUTPATIENT
Start: 2019-04-08 | End: 2019-05-10 | Stop reason: ALTCHOICE

## 2019-04-09 ENCOUNTER — HOSPITAL ENCOUNTER (EMERGENCY)
Facility: HOSPITAL | Age: 84
Discharge: HOME | End: 2019-04-09
Attending: EMERGENCY MEDICINE | Admitting: EMERGENCY MEDICINE
Payer: MEDICARE

## 2019-04-09 ENCOUNTER — APPOINTMENT (EMERGENCY)
Dept: CT IMAGING | Facility: HOSPITAL | Age: 84
End: 2019-04-09
Payer: MEDICARE

## 2019-04-09 ENCOUNTER — APPOINTMENT (EMERGENCY)
Dept: RADIOLOGY | Facility: HOSPITAL | Age: 84
End: 2019-04-09
Payer: MEDICARE

## 2019-04-09 VITALS
DIASTOLIC BLOOD PRESSURE: 63 MMHG | TEMPERATURE: 99.8 F | RESPIRATION RATE: 15 BRPM | BODY MASS INDEX: 26.5 KG/M2 | HEART RATE: 68 BPM | OXYGEN SATURATION: 95 % | HEIGHT: 66 IN | SYSTOLIC BLOOD PRESSURE: 133 MMHG | WEIGHT: 164.9 LBS

## 2019-04-09 DIAGNOSIS — B34.9 VIRAL SYNDROME: ICD-10-CM

## 2019-04-09 DIAGNOSIS — J01.90 ACUTE SINUSITIS: Primary | ICD-10-CM

## 2019-04-09 LAB
ALBUMIN SERPL BCP-MCNC: 3.6 G/DL (ref 3.5–5)
ALP SERPL-CCNC: 67 U/L (ref 46–116)
ALT SERPL W P-5'-P-CCNC: 43 U/L (ref 12–78)
ANION GAP SERPL CALCULATED.3IONS-SCNC: 12 MMOL/L (ref 4–13)
APPEARANCE CSF: CLEAR
AST SERPL W P-5'-P-CCNC: 24 U/L (ref 5–45)
BACTERIA UR QL AUTO: ABNORMAL /HPF
BASOPHILS # BLD AUTO: 0.03 THOUSANDS/ΜL (ref 0–0.1)
BASOPHILS NFR BLD AUTO: 0 % (ref 0–1)
BILIRUB DIRECT SERPL-MCNC: 0.34 MG/DL (ref 0–0.2)
BILIRUB SERPL-MCNC: 1 MG/DL (ref 0.2–1)
BILIRUB UR QL STRIP: NEGATIVE
BUN SERPL-MCNC: 18 MG/DL (ref 5–25)
CALCIUM SERPL-MCNC: 9.5 MG/DL (ref 8.3–10.1)
CHLORIDE SERPL-SCNC: 98 MMOL/L (ref 100–108)
CLARITY UR: CLEAR
CO2 SERPL-SCNC: 25 MMOL/L (ref 21–32)
COLOR UR: YELLOW
CREAT SERPL-MCNC: 1.21 MG/DL (ref 0.6–1.3)
EOSINOPHIL # BLD AUTO: 0.07 THOUSAND/ΜL (ref 0–0.61)
EOSINOPHIL NFR BLD AUTO: 1 % (ref 0–6)
ERYTHROCYTE [DISTWIDTH] IN BLOOD BY AUTOMATED COUNT: 12.8 % (ref 11.6–15.1)
GFR SERPL CREATININE-BSD FRML MDRD: 55 ML/MIN/1.73SQ M
GLUCOSE CSF-MCNC: 86 MG/DL (ref 50–80)
GLUCOSE SERPL-MCNC: 124 MG/DL (ref 65–140)
GLUCOSE UR STRIP-MCNC: NEGATIVE MG/DL
GRAM STN SPEC: NORMAL
HCT VFR BLD AUTO: 40.1 % (ref 36.5–49.3)
HGB BLD-MCNC: 13.7 G/DL (ref 12–17)
HGB UR QL STRIP.AUTO: ABNORMAL
IMM GRANULOCYTES # BLD AUTO: 0.03 THOUSAND/UL (ref 0–0.2)
IMM GRANULOCYTES NFR BLD AUTO: 0 % (ref 0–2)
KETONES UR STRIP-MCNC: NEGATIVE MG/DL
LACTATE SERPL-SCNC: 1.5 MMOL/L (ref 0.5–2)
LEUKOCYTE ESTERASE UR QL STRIP: NEGATIVE
LYMPHOCYTES # BLD AUTO: 1.04 THOUSANDS/ΜL (ref 0.6–4.47)
LYMPHOCYTES NFR BLD AUTO: 16 % (ref 14–44)
LYMPHOCYTES NFR CSF MANUAL: 100 %
MAGNESIUM SERPL-MCNC: 1.7 MG/DL (ref 1.6–2.6)
MCH RBC QN AUTO: 31.2 PG (ref 26.8–34.3)
MCHC RBC AUTO-ENTMCNC: 34.2 G/DL (ref 31.4–37.4)
MCV RBC AUTO: 91 FL (ref 82–98)
MONOCYTES # BLD AUTO: 0.96 THOUSAND/ΜL (ref 0.17–1.22)
MONOCYTES NFR BLD AUTO: 14 % (ref 4–12)
NEUTROPHILS # BLD AUTO: 4.54 THOUSANDS/ΜL (ref 1.85–7.62)
NEUTS SEG NFR BLD AUTO: 69 % (ref 43–75)
NITRITE UR QL STRIP: NEGATIVE
NON-SQ EPI CELLS URNS QL MICRO: ABNORMAL /HPF
NRBC BLD AUTO-RTO: 0 /100 WBCS
PH UR STRIP.AUTO: 6 [PH]
PLATELET # BLD AUTO: 94 THOUSANDS/UL (ref 149–390)
PMV BLD AUTO: 9.4 FL (ref 8.9–12.7)
POTASSIUM SERPL-SCNC: 4 MMOL/L (ref 3.5–5.3)
PROCALCITONIN SERPL-MCNC: 0.17 NG/ML
PROT CSF-MCNC: 45 MG/DL (ref 15–45)
PROT SERPL-MCNC: 7.6 G/DL (ref 6.4–8.2)
PROT UR STRIP-MCNC: NEGATIVE MG/DL
RBC # BLD AUTO: 4.39 MILLION/UL (ref 3.88–5.62)
RBC # CSF MANUAL: 1804 UL (ref 0–10)
RBC # CSF MANUAL: 5 UL (ref 0–10)
RBC #/AREA URNS AUTO: ABNORMAL /HPF
SODIUM SERPL-SCNC: 135 MMOL/L (ref 136–145)
SP GR UR STRIP.AUTO: 1.02 (ref 1–1.03)
TOTAL CELLS COUNTED BLD: NO
TOTAL CELLS COUNTED SPEC: 3
TROPONIN I SERPL-MCNC: <0.02 NG/ML
TUBE # CSF: 4
UROBILINOGEN UR QL STRIP.AUTO: 1 E.U./DL
WBC # BLD AUTO: 6.67 THOUSAND/UL (ref 4.31–10.16)
WBC # CSF AUTO: 1 /UL (ref 0–5)
WBC #/AREA URNS AUTO: ABNORMAL /HPF

## 2019-04-09 PROCEDURE — 84484 ASSAY OF TROPONIN QUANT: CPT | Performed by: PHYSICIAN ASSISTANT

## 2019-04-09 PROCEDURE — 80048 BASIC METABOLIC PNL TOTAL CA: CPT | Performed by: PHYSICIAN ASSISTANT

## 2019-04-09 PROCEDURE — 96361 HYDRATE IV INFUSION ADD-ON: CPT

## 2019-04-09 PROCEDURE — 87070 CULTURE OTHR SPECIMN AEROBIC: CPT | Performed by: PHYSICIAN ASSISTANT

## 2019-04-09 PROCEDURE — 36415 COLL VENOUS BLD VENIPUNCTURE: CPT | Performed by: PHYSICIAN ASSISTANT

## 2019-04-09 PROCEDURE — 62270 DX LMBR SPI PNXR: CPT | Performed by: PHYSICIAN ASSISTANT

## 2019-04-09 PROCEDURE — 83735 ASSAY OF MAGNESIUM: CPT | Performed by: PHYSICIAN ASSISTANT

## 2019-04-09 PROCEDURE — 84157 ASSAY OF PROTEIN OTHER: CPT | Performed by: PHYSICIAN ASSISTANT

## 2019-04-09 PROCEDURE — 81001 URINALYSIS AUTO W/SCOPE: CPT | Performed by: PHYSICIAN ASSISTANT

## 2019-04-09 PROCEDURE — 83605 ASSAY OF LACTIC ACID: CPT | Performed by: PHYSICIAN ASSISTANT

## 2019-04-09 PROCEDURE — 71046 X-RAY EXAM CHEST 2 VIEWS: CPT

## 2019-04-09 PROCEDURE — 99284 EMERGENCY DEPT VISIT MOD MDM: CPT | Performed by: PHYSICIAN ASSISTANT

## 2019-04-09 PROCEDURE — 70450 CT HEAD/BRAIN W/O DYE: CPT

## 2019-04-09 PROCEDURE — 85025 COMPLETE CBC W/AUTO DIFF WBC: CPT | Performed by: PHYSICIAN ASSISTANT

## 2019-04-09 PROCEDURE — 84145 PROCALCITONIN (PCT): CPT | Performed by: PHYSICIAN ASSISTANT

## 2019-04-09 PROCEDURE — 89050 BODY FLUID CELL COUNT: CPT | Performed by: PHYSICIAN ASSISTANT

## 2019-04-09 PROCEDURE — 87483 CNS DNA AMP PROBE TYPE 12-25: CPT | Performed by: PHYSICIAN ASSISTANT

## 2019-04-09 PROCEDURE — 82945 GLUCOSE OTHER FLUID: CPT | Performed by: PHYSICIAN ASSISTANT

## 2019-04-09 PROCEDURE — 80076 HEPATIC FUNCTION PANEL: CPT | Performed by: PHYSICIAN ASSISTANT

## 2019-04-09 PROCEDURE — 87040 BLOOD CULTURE FOR BACTERIA: CPT | Performed by: PHYSICIAN ASSISTANT

## 2019-04-09 PROCEDURE — 89051 BODY FLUID CELL COUNT: CPT | Performed by: PHYSICIAN ASSISTANT

## 2019-04-09 PROCEDURE — 96374 THER/PROPH/DIAG INJ IV PUSH: CPT

## 2019-04-09 PROCEDURE — 99284 EMERGENCY DEPT VISIT MOD MDM: CPT

## 2019-04-09 RX ORDER — AMOXICILLIN 875 MG/1
875 TABLET, COATED ORAL 2 TIMES DAILY
Qty: 10 TABLET | Refills: 0 | Status: SHIPPED | OUTPATIENT
Start: 2019-04-09 | End: 2019-04-14

## 2019-04-09 RX ORDER — LIDOCAINE HYDROCHLORIDE 10 MG/ML
10 INJECTION, SOLUTION EPIDURAL; INFILTRATION; INTRACAUDAL; PERINEURAL ONCE
Status: COMPLETED | OUTPATIENT
Start: 2019-04-09 | End: 2019-04-09

## 2019-04-09 RX ORDER — AMOXICILLIN 250 MG/1
500 CAPSULE ORAL ONCE
Status: COMPLETED | OUTPATIENT
Start: 2019-04-09 | End: 2019-04-09

## 2019-04-09 RX ORDER — AMOXICILLIN 875 MG/1
875 TABLET, COATED ORAL ONCE
Status: DISCONTINUED | OUTPATIENT
Start: 2019-04-09 | End: 2019-04-09

## 2019-04-09 RX ORDER — KETOROLAC TROMETHAMINE 30 MG/ML
7.5 INJECTION, SOLUTION INTRAMUSCULAR; INTRAVENOUS ONCE
Status: COMPLETED | OUTPATIENT
Start: 2019-04-09 | End: 2019-04-09

## 2019-04-09 RX ADMIN — AMOXICILLIN 500 MG: 250 CAPSULE ORAL at 23:05

## 2019-04-09 RX ADMIN — KETOROLAC TROMETHAMINE 7.5 MG: 30 INJECTION, SOLUTION INTRAMUSCULAR; INTRAVENOUS at 19:12

## 2019-04-09 RX ADMIN — SODIUM CHLORIDE 1000 ML: 0.9 INJECTION, SOLUTION INTRAVENOUS at 17:49

## 2019-04-09 RX ADMIN — LIDOCAINE HYDROCHLORIDE 10 ML: 10 INJECTION, SOLUTION EPIDURAL; INFILTRATION; INTRACAUDAL; PERINEURAL at 20:02

## 2019-04-10 LAB
C GATTII+NEOFOR DNA CSF QL NAA+NON-PROBE: NOT DETECTED
CMV DNA CSF QL NAA+NON-PROBE: NOT DETECTED
E COLI K1 DNA CSF QL NAA+NON-PROBE: NOT DETECTED
EV RNA CSF QL NAA+NON-PROBE: NOT DETECTED
GP B STREP DNA CSF QL NAA+NON-PROBE: NOT DETECTED
HAEM INFLU DNA CSF QL NAA+NON-PROBE: NOT DETECTED
HHV6 DNA CSF QL NAA+NON-PROBE: NOT DETECTED
HSV1 DNA CSF QL NAA+NON-PROBE: NOT DETECTED
HSV2 DNA CSF QL NAA+NON-PROBE: NOT DETECTED
L MONOCYTOG DNA CSF QL NAA+NON-PROBE: NOT DETECTED
N MEN DNA CSF QL NAA+NON-PROBE: NOT DETECTED
PARECHOVIRUS A RNA CSF QL NAA+NON-PROBE: NOT DETECTED
S PNEUM DNA CSF QL NAA+NON-PROBE: NOT DETECTED
VZV DNA CSF QL NAA+NON-PROBE: NOT DETECTED

## 2019-04-11 ENCOUNTER — VBI (OUTPATIENT)
Dept: ADMINISTRATIVE | Facility: OTHER | Age: 84
End: 2019-04-11

## 2019-04-13 LAB — BACTERIA CSF CULT: NO GROWTH

## 2019-04-14 LAB
BACTERIA BLD CULT: NORMAL
BACTERIA BLD CULT: NORMAL

## 2019-04-16 ENCOUNTER — TELEPHONE (OUTPATIENT)
Dept: FAMILY MEDICINE CLINIC | Facility: HOSPITAL | Age: 84
End: 2019-04-16

## 2019-04-16 DIAGNOSIS — J01.81 OTHER ACUTE RECURRENT SINUSITIS: Primary | ICD-10-CM

## 2019-04-16 RX ORDER — CEFUROXIME AXETIL 500 MG/1
500 TABLET ORAL EVERY 12 HOURS SCHEDULED
Qty: 14 TABLET | Refills: 0 | Status: CANCELLED | OUTPATIENT
Start: 2019-04-16 | End: 2019-04-23

## 2019-05-08 ENCOUNTER — OFFICE VISIT (OUTPATIENT)
Dept: URGENT CARE | Facility: CLINIC | Age: 84
End: 2019-05-08
Payer: MEDICARE

## 2019-05-08 VITALS
DIASTOLIC BLOOD PRESSURE: 62 MMHG | OXYGEN SATURATION: 98 % | HEART RATE: 80 BPM | RESPIRATION RATE: 18 BRPM | SYSTOLIC BLOOD PRESSURE: 116 MMHG | TEMPERATURE: 97.3 F

## 2019-05-08 DIAGNOSIS — F43.21 ADJUSTMENT DISORDER WITH DEPRESSED MOOD: Primary | ICD-10-CM

## 2019-05-08 PROCEDURE — G0463 HOSPITAL OUTPT CLINIC VISIT: HCPCS | Performed by: FAMILY MEDICINE

## 2019-05-08 PROCEDURE — 99203 OFFICE O/P NEW LOW 30 MIN: CPT | Performed by: FAMILY MEDICINE

## 2019-05-10 ENCOUNTER — OFFICE VISIT (OUTPATIENT)
Dept: FAMILY MEDICINE CLINIC | Facility: HOSPITAL | Age: 84
End: 2019-05-10
Payer: MEDICARE

## 2019-05-10 ENCOUNTER — LAB (OUTPATIENT)
Dept: LAB | Facility: HOSPITAL | Age: 84
End: 2019-05-10
Attending: INTERNAL MEDICINE
Payer: MEDICARE

## 2019-05-10 VITALS
HEIGHT: 66 IN | BODY MASS INDEX: 27.32 KG/M2 | SYSTOLIC BLOOD PRESSURE: 122 MMHG | WEIGHT: 170 LBS | HEART RATE: 48 BPM | OXYGEN SATURATION: 97 % | DIASTOLIC BLOOD PRESSURE: 60 MMHG

## 2019-05-10 DIAGNOSIS — R06.00 DOE (DYSPNEA ON EXERTION): ICD-10-CM

## 2019-05-10 DIAGNOSIS — E78.00 PURE HYPERCHOLESTEROLEMIA: ICD-10-CM

## 2019-05-10 DIAGNOSIS — E11.9 CONTROLLED TYPE 2 DIABETES MELLITUS WITHOUT COMPLICATION, WITHOUT LONG-TERM CURRENT USE OF INSULIN (HCC): Primary | ICD-10-CM

## 2019-05-10 DIAGNOSIS — E11.9 CONTROLLED TYPE 2 DIABETES MELLITUS WITHOUT COMPLICATION, WITHOUT LONG-TERM CURRENT USE OF INSULIN (HCC): ICD-10-CM

## 2019-05-10 DIAGNOSIS — I49.9 IRREGULARLY IRREGULAR PULSE RHYTHM: ICD-10-CM

## 2019-05-10 DIAGNOSIS — I35.0 MODERATE AORTIC STENOSIS: ICD-10-CM

## 2019-05-10 PROBLEM — R73.01 IMPAIRED FASTING GLUCOSE: Status: RESOLVED | Noted: 2019-01-10 | Resolved: 2019-05-10

## 2019-05-10 LAB
EST. AVERAGE GLUCOSE BLD GHB EST-MCNC: 151 MG/DL
HBA1C MFR BLD: 6.9 % (ref 4.2–6.3)

## 2019-05-10 PROCEDURE — 83036 HEMOGLOBIN GLYCOSYLATED A1C: CPT

## 2019-05-10 PROCEDURE — 36415 COLL VENOUS BLD VENIPUNCTURE: CPT

## 2019-05-10 PROCEDURE — 93000 ELECTROCARDIOGRAM COMPLETE: CPT | Performed by: INTERNAL MEDICINE

## 2019-05-10 PROCEDURE — 99214 OFFICE O/P EST MOD 30 MIN: CPT | Performed by: INTERNAL MEDICINE

## 2019-05-17 ENCOUNTER — OFFICE VISIT (OUTPATIENT)
Dept: FAMILY MEDICINE CLINIC | Facility: HOSPITAL | Age: 84
End: 2019-05-17
Payer: MEDICARE

## 2019-05-17 VITALS
HEIGHT: 66 IN | DIASTOLIC BLOOD PRESSURE: 78 MMHG | WEIGHT: 168.5 LBS | OXYGEN SATURATION: 97 % | HEART RATE: 72 BPM | BODY MASS INDEX: 27.08 KG/M2 | SYSTOLIC BLOOD PRESSURE: 130 MMHG

## 2019-05-17 DIAGNOSIS — R07.89 OTHER CHEST PAIN: ICD-10-CM

## 2019-05-17 DIAGNOSIS — E11.9 CONTROLLED TYPE 2 DIABETES MELLITUS WITHOUT COMPLICATION, WITHOUT LONG-TERM CURRENT USE OF INSULIN (HCC): Primary | ICD-10-CM

## 2019-05-17 PROBLEM — R06.00 DOE (DYSPNEA ON EXERTION): Status: RESOLVED | Noted: 2019-05-10 | Resolved: 2019-05-17

## 2019-05-17 PROCEDURE — 99213 OFFICE O/P EST LOW 20 MIN: CPT | Performed by: INTERNAL MEDICINE

## 2019-05-17 RX ORDER — ASPIRIN 81 MG/1
81 TABLET ORAL DAILY
Start: 2019-05-17 | End: 2020-11-09 | Stop reason: ALTCHOICE

## 2019-05-21 ENCOUNTER — HOSPITAL ENCOUNTER (OUTPATIENT)
Dept: NON INVASIVE DIAGNOSTICS | Facility: CLINIC | Age: 84
Discharge: HOME | End: 2019-05-21
Payer: MEDICARE

## 2019-05-21 DIAGNOSIS — R06.00 DOE (DYSPNEA ON EXERTION): ICD-10-CM

## 2019-05-21 PROCEDURE — A9502 TC99M TETROFOSMIN: HCPCS

## 2019-05-21 PROCEDURE — 93017 CV STRESS TEST TRACING ONLY: CPT

## 2019-05-21 PROCEDURE — 93016 CV STRESS TEST SUPVJ ONLY: CPT | Performed by: INTERNAL MEDICINE

## 2019-05-21 PROCEDURE — 78452 HT MUSCLE IMAGE SPECT MULT: CPT

## 2019-05-21 PROCEDURE — 93018 CV STRESS TEST I&R ONLY: CPT | Performed by: INTERNAL MEDICINE

## 2019-05-21 PROCEDURE — 78452 HT MUSCLE IMAGE SPECT MULT: CPT | Performed by: INTERNAL MEDICINE

## 2019-05-21 RX ADMIN — REGADENOSON 0.4 MG: 0.08 INJECTION, SOLUTION INTRAVENOUS at 10:59

## 2019-05-22 ENCOUNTER — TELEPHONE (OUTPATIENT)
Dept: FAMILY MEDICINE CLINIC | Facility: HOSPITAL | Age: 84
End: 2019-05-22

## 2019-05-22 LAB
CHEST PAIN STATEMENT: NORMAL
MAX DIASTOLIC BP: 64 MMHG
MAX HEART RATE: 85 BPM
MAX PREDICTED HEART RATE: 137 BPM
MAX. SYSTOLIC BP: 122 MMHG
PROTOCOL NAME: NORMAL
REASON FOR TERMINATION: NORMAL
TARGET HR FORMULA: NORMAL
TEST INDICATION: NORMAL
TIME IN EXERCISE PHASE: NORMAL

## 2019-05-23 ENCOUNTER — DOCUMENTATION (OUTPATIENT)
Dept: FAMILY MEDICINE CLINIC | Facility: HOSPITAL | Age: 84
End: 2019-05-23

## 2019-05-23 DIAGNOSIS — E11.9 CONTROLLED TYPE 2 DIABETES MELLITUS WITHOUT COMPLICATION, WITHOUT LONG-TERM CURRENT USE OF INSULIN (HCC): Primary | ICD-10-CM

## 2019-05-23 RX ORDER — GLIMEPIRIDE 2 MG/1
2 TABLET ORAL
Qty: 30 TABLET | Refills: 3 | Status: SHIPPED | OUTPATIENT
Start: 2019-05-23 | End: 2019-06-10

## 2019-05-31 DIAGNOSIS — E11.9 CONTROLLED TYPE 2 DIABETES MELLITUS WITHOUT COMPLICATION, WITHOUT LONG-TERM CURRENT USE OF INSULIN (HCC): Primary | ICD-10-CM

## 2019-06-03 LAB
LEFT EYE DIABETIC RETINOPATHY: NORMAL
RIGHT EYE DIABETIC RETINOPATHY: NORMAL

## 2019-06-10 ENCOUNTER — OFFICE VISIT (OUTPATIENT)
Dept: FAMILY MEDICINE CLINIC | Facility: HOSPITAL | Age: 84
End: 2019-06-10
Payer: MEDICARE

## 2019-06-10 VITALS
HEIGHT: 66 IN | WEIGHT: 173 LBS | SYSTOLIC BLOOD PRESSURE: 138 MMHG | DIASTOLIC BLOOD PRESSURE: 80 MMHG | BODY MASS INDEX: 27.8 KG/M2 | HEART RATE: 63 BPM

## 2019-06-10 DIAGNOSIS — E11.9 CONTROLLED TYPE 2 DIABETES MELLITUS WITHOUT COMPLICATION, WITHOUT LONG-TERM CURRENT USE OF INSULIN (HCC): ICD-10-CM

## 2019-06-10 PROCEDURE — 99213 OFFICE O/P EST LOW 20 MIN: CPT | Performed by: INTERNAL MEDICINE

## 2019-06-13 ENCOUNTER — OFFICE VISIT (OUTPATIENT)
Dept: OBGYN CLINIC | Facility: CLINIC | Age: 84
End: 2019-06-13
Payer: MEDICARE

## 2019-06-13 VITALS
BODY MASS INDEX: 27.8 KG/M2 | WEIGHT: 173 LBS | SYSTOLIC BLOOD PRESSURE: 122 MMHG | HEART RATE: 80 BPM | HEIGHT: 66 IN | DIASTOLIC BLOOD PRESSURE: 72 MMHG

## 2019-06-13 DIAGNOSIS — M65.341 TRIGGER RING FINGER OF RIGHT HAND: ICD-10-CM

## 2019-06-13 DIAGNOSIS — M65.331 TRIGGER MIDDLE FINGER OF RIGHT HAND: Primary | ICD-10-CM

## 2019-06-13 PROCEDURE — 99203 OFFICE O/P NEW LOW 30 MIN: CPT | Performed by: ORTHOPAEDIC SURGERY

## 2019-06-13 PROCEDURE — 20550 NJX 1 TENDON SHEATH/LIGAMENT: CPT | Performed by: ORTHOPAEDIC SURGERY

## 2019-06-13 RX ORDER — BETAMETHASONE SODIUM PHOSPHATE AND BETAMETHASONE ACETATE 3; 3 MG/ML; MG/ML
3 INJECTION, SUSPENSION INTRA-ARTICULAR; INTRALESIONAL; INTRAMUSCULAR; SOFT TISSUE
Status: COMPLETED | OUTPATIENT
Start: 2019-06-13 | End: 2019-06-13

## 2019-06-13 RX ORDER — LIDOCAINE HYDROCHLORIDE 10 MG/ML
1 INJECTION, SOLUTION INFILTRATION; PERINEURAL
Status: COMPLETED | OUTPATIENT
Start: 2019-06-13 | End: 2019-06-13

## 2019-06-13 RX ADMIN — BETAMETHASONE SODIUM PHOSPHATE AND BETAMETHASONE ACETATE 3 MG: 3; 3 INJECTION, SUSPENSION INTRA-ARTICULAR; INTRALESIONAL; INTRAMUSCULAR; SOFT TISSUE at 09:40

## 2019-06-13 RX ADMIN — BETAMETHASONE SODIUM PHOSPHATE AND BETAMETHASONE ACETATE 3 MG: 3; 3 INJECTION, SUSPENSION INTRA-ARTICULAR; INTRALESIONAL; INTRAMUSCULAR; SOFT TISSUE at 09:41

## 2019-06-13 RX ADMIN — LIDOCAINE HYDROCHLORIDE 1 ML: 10 INJECTION, SOLUTION INFILTRATION; PERINEURAL at 09:40

## 2019-06-13 RX ADMIN — LIDOCAINE HYDROCHLORIDE 1 ML: 10 INJECTION, SOLUTION INFILTRATION; PERINEURAL at 09:41

## 2019-06-25 ENCOUNTER — OFFICE VISIT (OUTPATIENT)
Dept: FAMILY MEDICINE CLINIC | Facility: HOSPITAL | Age: 84
End: 2019-06-25
Payer: MEDICARE

## 2019-06-25 VITALS
BODY MASS INDEX: 27.32 KG/M2 | HEIGHT: 66 IN | HEART RATE: 76 BPM | SYSTOLIC BLOOD PRESSURE: 120 MMHG | WEIGHT: 170 LBS | DIASTOLIC BLOOD PRESSURE: 60 MMHG

## 2019-06-25 DIAGNOSIS — E11.9 CONTROLLED TYPE 2 DIABETES MELLITUS WITHOUT COMPLICATION, WITHOUT LONG-TERM CURRENT USE OF INSULIN (HCC): Primary | ICD-10-CM

## 2019-06-25 PROBLEM — R07.89 OTHER CHEST PAIN: Status: RESOLVED | Noted: 2019-05-17 | Resolved: 2019-06-25

## 2019-06-25 PROCEDURE — 99213 OFFICE O/P EST LOW 20 MIN: CPT | Performed by: INTERNAL MEDICINE

## 2019-06-25 RX ORDER — GLIMEPIRIDE 2 MG/1
1 TABLET ORAL
Start: 2019-06-25 | End: 2019-08-26

## 2019-07-01 ENCOUNTER — TELEPHONE (OUTPATIENT)
Dept: FAMILY MEDICINE CLINIC | Facility: HOSPITAL | Age: 84
End: 2019-07-01

## 2019-07-01 DIAGNOSIS — E11.9 CONTROLLED TYPE 2 DIABETES MELLITUS WITHOUT COMPLICATION, WITHOUT LONG-TERM CURRENT USE OF INSULIN (HCC): ICD-10-CM

## 2019-07-11 ENCOUNTER — OFFICE VISIT (OUTPATIENT)
Dept: GASTROENTEROLOGY | Facility: CLINIC | Age: 84
End: 2019-07-11
Payer: MEDICARE

## 2019-07-11 VITALS
BODY MASS INDEX: 27.48 KG/M2 | DIASTOLIC BLOOD PRESSURE: 68 MMHG | HEART RATE: 62 BPM | SYSTOLIC BLOOD PRESSURE: 116 MMHG | HEIGHT: 66 IN | WEIGHT: 171 LBS

## 2019-07-11 DIAGNOSIS — F32.A DEPRESSION, UNSPECIFIED DEPRESSION TYPE: ICD-10-CM

## 2019-07-11 DIAGNOSIS — K60.0 ACUTE POSTERIOR ANAL FISSURE: Primary | ICD-10-CM

## 2019-07-11 DIAGNOSIS — L30.9 DERMATITIS: ICD-10-CM

## 2019-07-11 PROCEDURE — 99214 OFFICE O/P EST MOD 30 MIN: CPT | Performed by: INTERNAL MEDICINE

## 2019-07-11 PROCEDURE — 1123F ACP DISCUSS/DSCN MKR DOCD: CPT | Performed by: INTERNAL MEDICINE

## 2019-07-11 NOTE — PROGRESS NOTES
[facility] - Outpatient Follow-up Note  Favio Freitas 81 y o  male MRN: [id number]  Encounter: [id number]    ASSESSMENT AND PLAN:      1  Acute posterior anal fissure  -- patient with 2 week onset of anal discomfort particularly with defecation  He has some anal leakage  He had sphincterotomy in the past for a nonhealing anal fissure  -advise nitroglycerin ointment 0 125% pea-sized amount apply to the anal area 4 times a day- script written   -Sitz baths or warm bath twice a day  -Metamucil 1 tbsp with 8 oz of water or juice daily so that there is no straining at the stool    -if not improved will refer to colorectal surgery for further evaluation either [facility] or [facility] depending on patient's prep for    2  Dermatitis    Gentle washing no stringent  If this gets worse we could:  Some Lotrisone her hydrocortisone cream      3  Depression, unspecified depression type    Still grieving after loss of wife - 3 years ago        Followup Appointment:  SYDNIE hooper n   ______________________________________________________________________    Chief Complaint   Patient presents with    Fissure for past 5 months     HPI:  Patient comes to the office for follow-up visit because of issues with anal pain  The patient had an anal fissure over year ago  He was treated conservatively but had ongoing symptoms  He eventually underwent a sphincterotomy and this helped the situation immensely  Was well until about 2 weeks ago he began having problems again  He did have some straining at the stool at the onset of this  He has trouble with hygiene now we and leaking and he feels as though it is very uncomfortable with defecation  Have a colonoscopy in January of this year and a sigmoid polyp was removed  Otherwise exam was essentially unremarkable    He has very scant bleeding with a bowel movement on occasion    Historical Information   Past Medical History:   Diagnosis Date    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     Hyperlipidemia     Hypertension     Shingles      Past Surgical History:   Procedure Laterality Date    CHOLECYSTECTOMY      ROTATOR CUFF REPAIR       Social History     Substance and Sexual Activity   Alcohol Use No     Social History     Substance and Sexual Activity   Drug Use No     Social History     Tobacco Use   Smoking Status Former Smoker   Smokeless Tobacco Former User    Quit date: 1/1/1976     Family History   Problem Relation Age of Onset    Depression Mother     Hyperlipidemia Mother     Substance Abuse Neg Hx     Colon cancer Neg Hx     Colon polyps Neg Hx          Current Outpatient Medications:     aspirin (ECOTRIN LOW STRENGTH) 81 mg EC tablet    atorvastatin (LIPITOR) 10 mg tablet    glimepiride (AMARYL) 2 mg tablet    glucose blood (TRUE METRIX BLOOD GLUCOSE TEST) test strip    Liniments (BLUE-EMU SUPER STRENGTH EX)    MEGARED OMEGA-3 KRILL OIL PO    Saw [place], Serenoa repens, (SAW PALMETTO PO)  Allergies   Allergen Reactions    Metformin Diarrhea       10 Point REVIEW OF SYSTEMS   General positive for fatigue  Respiratory positive for shortness of breath with exertion  GI please see HPI also positive for heartburn  Psychiatric positive for depression  Rest her the review is negative    PHYSICAL EXAM:    Blood pressure 116/68, pulse 62, height 5' 6" (1 676 m), weight 77 6 kg (171 lb)  Body mass index is 27 6 kg/m²  General Appearance:  Alert, cooperative, no distress  HEENT:  Normocephalic, atraumatic, anicteric      Neck: Supple, symmetrical, trachea midline  Lungs: Clear to auscultation bilaterally; no rales, rhonchi or wheezing; respirations unlabored   Heart: Regular rate and rhythm; 9-7/2 systolic murmur at the base,   Abdomen:   Soft, non-tender, non-distended; normal bowel sounds; no masses, no organomegaly   Rectal:   posterior anal fissure, some irritation around the skin  Extremities:  No cyanosis, clubbing or edema Skin:  No jaundice, rashes, or lesions   Lymph nodes: No palpable cervical lymphadenopathy     Lab Results:   Lab Results   Component Value Date    WBC 6 67 04/09/2019    HGB 13 7 04/09/2019    HCT 40 1 04/09/2019    MCV 91 04/09/2019    PLT 94 (L) 04/09/2019     Lab Results   Component Value Date     04/07/2014    K 4 0 04/09/2019    CL 98 (L) 04/09/2019    CO2 25 04/09/2019    ANIONGAP 8 04/07/2014    BUN 18 04/09/2019    CREATININE 1 21 04/09/2019    GLUCOSE 150 (H) 04/07/2014    CALCIUM 9 5 04/09/2019    AST 24 04/09/2019    ALT 43 04/09/2019    ALKPHOS 67 04/09/2019    PROT 7 0 04/07/2014    BILITOT 0 4 04/07/2014    EGFR 55 04/09/2019     d

## 2019-07-11 NOTE — PATIENT INSTRUCTIONS
Stephy Glez 81 y o  male MRN: [id number]  Encounter: [id number]    ASSESSMENT AND PLAN:      1  Acute posterior anal fissure  -- patient with 2 week onset of anal discomfort particularly with defecation  He has some anal leakage  He had sphincterotomy in the past for a nonhealing anal fissure  -advise nitroglycerin ointment 0 125% pea-sized amount apply to the anal area 4 times a day- script written   -Sitz baths or warm bath twice a day  -Metamucil 1 tbsp with 8 oz of water or juice daily so that there is no straining at the stool    -if not improved will refer to colorectal surgery for further evaluation either [facility] or [facility] depending on patient's prep for    2  Dermatitis    Gentle washing no stringent  If this gets worse we could:  Some Lotrisone her hydrocortisone cream      3  Depression, unspecified depression type    Still grieving after loss of wife - 3 years ago        Followup Appointment:  SYDNIE hooper

## 2019-07-11 NOTE — LETTER
July 11, 2019     MD Vida Morgan  [address] [address]    Patient: Nehemias Armas   YOB: 1935   Date of Visit: 7/11/2019       Dear Dr Vogel Look:    Thank you for referring Alis Bess to me for evaluation  Below are my notes for this consultation  If you have questions, please do not hesitate to call me  I look forward to following your patient along with you  Sincerely,        Madison Del Angel MD        CC: No Recipients  Madison Del Angel MD  7/11/2019  7:06 PM  Incomplete  [address] [facility] - Outpatient Follow-up Note  Nehemias Armas 81 y o  male MRN: [id number]  Encounter: [id number]    ASSESSMENT AND PLAN:      1  Acute posterior anal fissure  -- patient with 2 week onset of anal discomfort particularly with defecation  He has some anal leakage  He had sphincterotomy in the past for a nonhealing anal fissure  -advise nitroglycerin ointment 0 125% pea-sized amount apply to the anal area 4 times a day- script written   -Sitz baths or warm bath twice a day  -Metamucil 1 tbsp with 8 oz of water or juice daily so that there is no straining at the stool    -if not improved will refer to colorectal surgery for further evaluation either [facility] depending on patient's prep for    2  Dermatitis    Gentle washing no stringent  If this gets worse we could:  Some Lotrisone her hydrocortisone cream      3  Depression, unspecified depression type    Still grieving after loss of wife - 3 years ago        Followup Appointment:  P sandie n   ______________________________________________________________________    Chief Complaint   Patient presents with    Fissure for past 5 months     HPI:  Patient comes to the office for follow-up visit because of issues with anal pain  The patient had an anal fissure over year ago  He was treated conservatively but had ongoing symptoms    He eventually underwent a sphincterotomy and this helped the situation immensely  Was well until about 2 weeks ago he began having problems again  He did have some straining at the stool at the onset of this  He has trouble with hygiene now we and leaking and he feels as though it is very uncomfortable with defecation  Have a colonoscopy in January of this year and a sigmoid polyp was removed  Otherwise exam was essentially unremarkable  He has very scant bleeding with a bowel movement on occasion    Historical Information   Past Medical History:   Diagnosis Date    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     Hyperlipidemia     Hypertension     Shingles      Past Surgical History:   Procedure Laterality Date    CHOLECYSTECTOMY      ROTATOR CUFF REPAIR       Social History     Substance and Sexual Activity   Alcohol Use No     Social History     Substance and Sexual Activity   Drug Use No     Social History     Tobacco Use   Smoking Status Former Smoker   Smokeless Tobacco Former User    Quit date: 1/1/1976     Family History   Problem Relation Age of Onset    Depression Mother     Hyperlipidemia Mother     Substance Abuse Neg Hx     Colon cancer Neg Hx     Colon polyps Neg Hx          Current Outpatient Medications:     aspirin (ECOTRIN LOW STRENGTH) 81 mg EC tablet    atorvastatin (LIPITOR) 10 mg tablet    glimepiride (AMARYL) 2 mg tablet    glucose blood (TRUE METRIX BLOOD GLUCOSE TEST) test strip    Liniments (BLUE-EMU SUPER STRENGTH EX)    MEGARED OMEGA-3 KRILL OIL PO    Saw [place], Serenoa repens, (SAW PALMETTO PO)  Allergies   Allergen Reactions    Metformin Diarrhea       10 Point REVIEW OF SYSTEMS   General positive for fatigue  Respiratory positive for shortness of breath with exertion  GI please see HPI also positive for heartburn  Psychiatric positive for depression  Rest her the review is negative    PHYSICAL EXAM:    Blood pressure 116/68, pulse 62, height 5' 6" (1 676 m), weight 77 6 kg (171 lb)   Body mass index is 27 6 kg/m²  General Appearance:  Alert, cooperative, no distress  HEENT:  Normocephalic, atraumatic, anicteric  Neck: Supple, symmetrical, trachea midline  Lungs: Clear to auscultation bilaterally; no rales, rhonchi or wheezing; respirations unlabored   Heart: Regular rate and rhythm; 2-4/6 systolic murmur at the base,   Abdomen:   Soft, non-tender, non-distended; normal bowel sounds; no masses, no organomegaly   Rectal:    posterior anal fissure, some irritation around the skin  Extremities:  No cyanosis, clubbing or edema   Skin:  No jaundice, rashes, or lesions   Lymph nodes: No palpable cervical lymphadenopathy     Lab Results:   Lab Results   Component Value Date    WBC 6 67 04/09/2019    HGB 13 7 04/09/2019    HCT 40 1 04/09/2019    MCV 91 04/09/2019    PLT 94 (L) 04/09/2019     Lab Results   Component Value Date     04/07/2014    K 4 0 04/09/2019    CL 98 (L) 04/09/2019    CO2 25 04/09/2019    ANIONGAP 8 04/07/2014    BUN 18 04/09/2019    CREATININE 1 21 04/09/2019    GLUCOSE 150 (H) 04/07/2014    CALCIUM 9 5 04/09/2019    AST 24 04/09/2019    ALT 43 04/09/2019    ALKPHOS 67 04/09/2019    PROT 7 0 04/07/2014    BILITOT 0 4 04/07/2014    EGFR 55 04/09/2019     bibi Jones MD  7/11/2019  5:08 PM  Sign at close encounter  [facility] - Outpatient Follow-up Note  Siddhartha Mar 81 y o  male MRN: [id number]  Encounter: [id number]    ASSESSMENT AND PLAN:      1  Acute posterior anal fissure  -- patient with 2 week onset of anal discomfort particularly with defecation  He has some anal leakage    He had sphincterotomy in the past for a nonhealing anal fissure  -advise nitroglycerin ointment 0 125% pea-sized amount apply to the anal area 4 times a day- script written   -Sitz baths or warm bath twice a day  -Metamucil 1 tbsp with 8 oz of water or juice daily so that there is no straining at the stool    -if not improved will refer to colorectal surgery for further evaluation either [facility] or [facility] depending on patient's prep for    2  Dermatitis    Gentle washing no stringent  If this gets worse we could:  Some Lotrisone her hydrocortisone cream      3  Depression, unspecified depression type    Still grieving after loss of wife - 3 years ago        Followup Appointment:  SYDNIE hooper n   ______________________________________________________________________    Chief Complaint   Patient presents with    Fissure for past 5 months     HPI:  Patient comes to the office for follow-up visit because of issues with anal pain  The patient had an anal fissure over year ago  He was treated conservatively but had ongoing symptoms  He eventually underwent a sphincterotomy and this helped the situation immensely  Was well until about 2 weeks ago he began having problems again  He did have some straining at the stool at the onset of this  He has trouble with hygiene now we and leaking and he feels as though it is very uncomfortable with defecation  Have a colonoscopy in January of this year and a sigmoid polyp was removed  Otherwise exam was essentially unremarkable    He has very scant bleeding with a bowel movement on occasion    Historical Information   Past Medical History:   Diagnosis Date    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     Hyperlipidemia     Hypertension     Shingles      Past Surgical History:   Procedure Laterality Date    CHOLECYSTECTOMY      ROTATOR CUFF REPAIR       Social History     Substance and Sexual Activity   Alcohol Use No     Social History     Substance and Sexual Activity   Drug Use No     Social History     Tobacco Use   Smoking Status Former Smoker   Smokeless Tobacco Former User    Quit date: 1/1/1976     Family History   Problem Relation Age of Onset    Depression Mother     Hyperlipidemia Mother     Substance Abuse Neg Hx     Colon cancer Neg Hx     Colon polyps Neg Hx          Current Outpatient Medications:    aspirin (ECOTRIN LOW STRENGTH) 81 mg EC tablet    atorvastatin (LIPITOR) 10 mg tablet    glimepiride (AMARYL) 2 mg tablet    glucose blood (TRUE METRIX BLOOD GLUCOSE TEST) test strip    Liniments (BLUE-EMU SUPER STRENGTH EX)    MEGARED OMEGA-3 KRILL OIL PO    Saw [place], Serenoa repens, (SAW PALMETTO PO)  Allergies   Allergen Reactions    Metformin Diarrhea       10 Point REVIEW OF SYSTEMS IS OTHERWISE NEGATIVE  PHYSICAL EXAM:    Blood pressure 116/68, pulse 62, height 5' 6" (1 676 m), weight 77 6 kg (171 lb)  Body mass index is 27 6 kg/m²  General Appearance:  Alert, cooperative, no distress  HEENT:  Normocephalic, atraumatic, anicteric  Neck: Supple, symmetrical, trachea midline  Lungs: Clear to auscultation bilaterally; no rales, rhonchi or wheezing; respirations unlabored   Heart: Regular rate and rhythm; 7-5/2 systolic murmur at the base,   Abdomen:   Soft, non-tender, non-distended; normal bowel sounds; no masses, no organomegaly   Rectal:    posterior anal fissure, some irritation around the skin  Extremities:  No cyanosis, clubbing or edema   Skin:  No jaundice, rashes, or lesions   Lymph nodes: No palpable cervical lymphadenopathy     Lab Results:   Lab Results   Component Value Date    WBC 6 67 04/09/2019    HGB 13 7 04/09/2019    HCT 40 1 04/09/2019    MCV 91 04/09/2019    PLT 94 (L) 04/09/2019     Lab Results   Component Value Date     04/07/2014    K 4 0 04/09/2019    CL 98 (L) 04/09/2019    CO2 25 04/09/2019    ANIONGAP 8 04/07/2014    BUN 18 04/09/2019    CREATININE 1 21 04/09/2019    GLUCOSE 150 (H) 04/07/2014    CALCIUM 9 5 04/09/2019    AST 24 04/09/2019    ALT 43 04/09/2019    ALKPHOS 67 04/09/2019    PROT 7 0 04/07/2014    BILITOT 0 4 04/07/2014    EGFR 55 04/09/2019     No results found for: IRON, TIBC, FERRITIN  No results found for: LIPASE    Radiology Results:   No results found

## 2019-07-15 ENCOUNTER — TELEPHONE (OUTPATIENT)
Dept: GASTROENTEROLOGY | Facility: CLINIC | Age: 84
End: 2019-07-15

## 2019-07-15 NOTE — TELEPHONE ENCOUNTER
I spoke with patient, he reports great relief since starting the Nitro  Yes her reports HA's, but he's used it now about 12 times and the headache's seem to be improving  I recommended he use a glove to apply, he could also try using a lesser amount than the pea size  He used it last at 8 am this morning and he reports his headache resolved quickly  He feels the benefits out weigh the headaches, so for now he will continue to take

## 2019-08-06 ENCOUNTER — TELEPHONE (OUTPATIENT)
Dept: GASTROENTEROLOGY | Facility: CLINIC | Age: 84
End: 2019-08-06

## 2019-08-06 NOTE — TELEPHONE ENCOUNTER
Pt also seen by Dr Muriel Cyr in the past, routing to [facility]

## 2019-08-06 NOTE — TELEPHONE ENCOUNTER
Pt states he went to [facility] to refill Nitroglycerine ointment and mentioned to the pharmacist that it helped but wasn't quite doing the trick  Pharmacist told him to call the Dr to see if he could get a stronger compound  Pt reports he feels pain form the fissure "like he's passing a bowling ball w/ barbed wire" and is now all out of ointment; asks if Dr Norma Lima can order him a stronger compound? Dr Norma Lima on Vac/Pt advised will review w/ another provider

## 2019-08-06 NOTE — TELEPHONE ENCOUNTER
Spoke with patient  States for the $45 00 he needs to spend for the ointment he is not sure it is worth buying it again  He again described the bowling ball with barbed wire  He is doing the sitz baths as per DRE and using stool softeners  He is not sure he wants to come back here again  Per DRE's note referral to colo rectal surgeon was being considered as next step  Patient is willing to do this but somewhat upset that Dr Rob Antunez is retired and he did not like the other surgeon he saw for this after that  I placed call to Dr Vega Older office that I had patient to refer

## 2019-08-06 NOTE — TELEPHONE ENCOUNTER
Continue current dose of nitroglycerin  Follow up with Dr Jose Hernández in office at her earliest convenience    If has persistent fissure may need to see colorectal surgery

## 2019-08-06 NOTE — TELEPHONE ENCOUNTER
When I spoke with patient he prefers going [facility] for [employer] as he is not familiar with [facility]

## 2019-08-07 PROBLEM — K60.2 ANAL FISSURE: Status: ACTIVE | Noted: 2019-08-07

## 2019-08-18 DIAGNOSIS — E11.9 CONTROLLED TYPE 2 DIABETES MELLITUS WITHOUT COMPLICATION, WITHOUT LONG-TERM CURRENT USE OF INSULIN (HCC): ICD-10-CM

## 2019-08-18 RX ORDER — GLIMEPIRIDE 2 MG/1
TABLET ORAL
Qty: 90 TABLET | Refills: 3 | Status: SHIPPED | OUTPATIENT
Start: 2019-08-18 | End: 2019-08-26 | Stop reason: SDUPTHER

## 2019-08-23 ENCOUNTER — APPOINTMENT (OUTPATIENT)
Dept: LAB | Facility: HOSPITAL | Age: 84
End: 2019-08-23
Attending: INTERNAL MEDICINE
Payer: MEDICARE

## 2019-08-23 DIAGNOSIS — E11.9 CONTROLLED TYPE 2 DIABETES MELLITUS WITHOUT COMPLICATION, WITHOUT LONG-TERM CURRENT USE OF INSULIN (HCC): ICD-10-CM

## 2019-08-23 LAB
EST. AVERAGE GLUCOSE BLD GHB EST-MCNC: 120 MG/DL
HBA1C MFR BLD: 5.8 % (ref 4.2–6.3)

## 2019-08-23 PROCEDURE — 83036 HEMOGLOBIN GLYCOSYLATED A1C: CPT

## 2019-08-23 PROCEDURE — 36415 COLL VENOUS BLD VENIPUNCTURE: CPT

## 2019-08-26 ENCOUNTER — OFFICE VISIT (OUTPATIENT)
Dept: FAMILY MEDICINE CLINIC | Facility: HOSPITAL | Age: 84
End: 2019-08-26
Payer: MEDICARE

## 2019-08-26 VITALS
BODY MASS INDEX: 27.77 KG/M2 | HEART RATE: 65 BPM | DIASTOLIC BLOOD PRESSURE: 70 MMHG | SYSTOLIC BLOOD PRESSURE: 122 MMHG | HEIGHT: 66 IN | WEIGHT: 172.8 LBS

## 2019-08-26 DIAGNOSIS — E78.00 PURE HYPERCHOLESTEROLEMIA: ICD-10-CM

## 2019-08-26 DIAGNOSIS — F32.9 REACTIVE DEPRESSION: ICD-10-CM

## 2019-08-26 DIAGNOSIS — E11.9 CONTROLLED TYPE 2 DIABETES MELLITUS WITHOUT COMPLICATION, WITHOUT LONG-TERM CURRENT USE OF INSULIN (HCC): Primary | ICD-10-CM

## 2019-08-26 DIAGNOSIS — R13.14 PHARYNGOESOPHAGEAL DYSPHAGIA: ICD-10-CM

## 2019-08-26 PROBLEM — F32.A DEPRESSION (EMOTION): Status: RESOLVED | Noted: 2018-11-16 | Resolved: 2019-08-26

## 2019-08-26 PROCEDURE — 99213 OFFICE O/P EST LOW 20 MIN: CPT | Performed by: INTERNAL MEDICINE

## 2019-08-26 NOTE — PROGRESS NOTES
Assessment/Plan:       Diagnoses and all orders for this visit:    Controlled type 2 diabetes mellitus without complication, without long-term current use of insulin (HCC)  -     Hemoglobin A1C; Future  -     CBC; Future  -     Comprehensive metabolic panel; Future  -     Microalbumin / creatinine urine ratio  -     Lipid Panel with Direct LDL reflex; Future    Reactive depression    Pharyngoesophageal dysphagia  -     FL barium swallow; Future    Pure hypercholesterolemia          All of the above diagnoses have been assessed  Additional COMMENTS/PLAN: doing well    Stop Amaryl  See how he does off of diabetic treatment  Will get esophagram for this dysphagia  When he is back in 3 months full both lower be done attention is hemoglobin A1c off the amaryl      Subjective:      Patient ID: Lewis Hendrickson is a 81 y o  male  HPI     DM-this is stable  BGm done daily  About 110  Only taking 1mg of amaryl   Rectal fissure-seeing rectal surgeon  Has occaisional dysphagia with solid foods  This has been for over 3 years  The following portions of the patient's history were revised and updated as appropriate: Problem list, allergies, med list, FH, SH, Past medical and surgical histories      Current Outpatient Medications   Medication Sig Dispense Refill    aspirin (ECOTRIN LOW STRENGTH) 81 mg EC tablet Take 1 tablet (81 mg total) by mouth daily      atorvastatin (LIPITOR) 10 mg tablet Take 1 tablet (10 mg total) by mouth daily 90 tablet 3    glucose blood (TRUE METRIX BLOOD GLUCOSE TEST) test strip Test 2 times daily 100 each 5    glucose blood (TRUE METRIX BLOOD GLUCOSE TEST) test strip Use one new strip daily to test - vary times of testing  DX: E11 9  True Metrix 100 each 5    Liniments (BLUE-EMU SUPER STRENGTH EX) Apply 1 application topically      MEGARED OMEGA-3 KRILL OIL PO Take by mouth      nitroglycerin (NITRO-BID) 2 % ointment Nitroglycerin Ointment 0 125% To Rectum Sig:  Apply Pea sized amount inside rectum 4 times a day 30 g 1     No current facility-administered medications for this visit  Review of Systems   All other systems reviewed and are negative  Objective:    /70 (BP Location: Left arm, Patient Position: Sitting, Cuff Size: Standard)   Pulse 65   Ht 5' 6" (1 676 m)   Wt 78 4 kg (172 lb 12 8 oz)   BMI 27 89 kg/m²     BP Readings from Last 3 Encounters:   08/26/19 122/70   07/11/19 116/68   06/25/19 120/60                  Wt Readings from Last 3 Encounters:   08/26/19 78 4 kg (172 lb 12 8 oz)   08/07/19 78 5 kg (173 lb)   07/11/19 77 6 kg (171 lb)         Physical Exam   Constitutional: He appears well-developed and well-nourished  Neck: No thyromegaly present  Cardiovascular: Normal rate  Pulmonary/Chest: Effort normal and breath sounds normal    Musculoskeletal: He exhibits no edema  Vitals reviewed          Appointment on 08/23/2019   Component Date Value Ref Range Status    Hemoglobin A1C 08/23/2019 5 8  4 2 - 6 3 % Final    EAG 08/23/2019 120  mg/dl Final         David Franks MD

## 2019-09-05 ENCOUNTER — TELEPHONE (OUTPATIENT)
Dept: FAMILY MEDICINE CLINIC | Facility: HOSPITAL | Age: 84
End: 2019-09-05

## 2019-09-05 ENCOUNTER — HOSPITAL ENCOUNTER (OUTPATIENT)
Dept: RADIOLOGY | Facility: HOSPITAL | Age: 84
Discharge: HOME | End: 2019-09-05
Attending: INTERNAL MEDICINE
Payer: MEDICARE

## 2019-09-05 DIAGNOSIS — R13.14 PHARYNGOESOPHAGEAL DYSPHAGIA: ICD-10-CM

## 2019-09-05 PROCEDURE — 74220 X-RAY XM ESOPHAGUS 1CNTRST: CPT

## 2019-09-06 ENCOUNTER — TELEPHONE (OUTPATIENT)
Dept: FAMILY MEDICINE CLINIC | Facility: HOSPITAL | Age: 84
End: 2019-09-06

## 2019-09-23 ENCOUNTER — OFFICE VISIT (OUTPATIENT)
Dept: OBGYN CLINIC | Facility: CLINIC | Age: 84
End: 2019-09-23
Payer: MEDICARE

## 2019-09-23 VITALS
BODY MASS INDEX: 27.64 KG/M2 | WEIGHT: 172 LBS | HEIGHT: 66 IN | DIASTOLIC BLOOD PRESSURE: 68 MMHG | SYSTOLIC BLOOD PRESSURE: 110 MMHG

## 2019-09-23 DIAGNOSIS — M65.341 TRIGGER RING FINGER OF RIGHT HAND: ICD-10-CM

## 2019-09-23 DIAGNOSIS — M18.0 ARTHRITIS OF CARPOMETACARPAL (CMC) JOINT OF BOTH THUMBS: Primary | ICD-10-CM

## 2019-09-23 PROCEDURE — 20550 NJX 1 TENDON SHEATH/LIGAMENT: CPT | Performed by: ORTHOPAEDIC SURGERY

## 2019-09-23 PROCEDURE — 20600 DRAIN/INJ JOINT/BURSA W/O US: CPT | Performed by: ORTHOPAEDIC SURGERY

## 2019-09-23 PROCEDURE — 99214 OFFICE O/P EST MOD 30 MIN: CPT | Performed by: ORTHOPAEDIC SURGERY

## 2019-09-23 RX ORDER — BETAMETHASONE SODIUM PHOSPHATE AND BETAMETHASONE ACETATE 3; 3 MG/ML; MG/ML
3 INJECTION, SUSPENSION INTRA-ARTICULAR; INTRALESIONAL; INTRAMUSCULAR; SOFT TISSUE
Status: COMPLETED | OUTPATIENT
Start: 2019-09-23 | End: 2019-09-23

## 2019-09-23 RX ORDER — LIDOCAINE HYDROCHLORIDE 10 MG/ML
1 INJECTION, SOLUTION INFILTRATION; PERINEURAL
Status: COMPLETED | OUTPATIENT
Start: 2019-09-23 | End: 2019-09-23

## 2019-09-23 RX ORDER — LIDOCAINE HYDROCHLORIDE 10 MG/ML
0.5 INJECTION, SOLUTION INFILTRATION; PERINEURAL
Status: COMPLETED | OUTPATIENT
Start: 2019-09-23 | End: 2019-09-23

## 2019-09-23 RX ORDER — BETAMETHASONE SODIUM PHOSPHATE AND BETAMETHASONE ACETATE 3; 3 MG/ML; MG/ML
6 INJECTION, SUSPENSION INTRA-ARTICULAR; INTRALESIONAL; INTRAMUSCULAR; SOFT TISSUE
Status: COMPLETED | OUTPATIENT
Start: 2019-09-23 | End: 2019-09-23

## 2019-09-23 RX ADMIN — BETAMETHASONE SODIUM PHOSPHATE AND BETAMETHASONE ACETATE 3 MG: 3; 3 INJECTION, SUSPENSION INTRA-ARTICULAR; INTRALESIONAL; INTRAMUSCULAR; SOFT TISSUE at 11:25

## 2019-09-23 RX ADMIN — LIDOCAINE HYDROCHLORIDE 1 ML: 10 INJECTION, SOLUTION INFILTRATION; PERINEURAL at 11:26

## 2019-09-23 RX ADMIN — BETAMETHASONE SODIUM PHOSPHATE AND BETAMETHASONE ACETATE 6 MG: 3; 3 INJECTION, SUSPENSION INTRA-ARTICULAR; INTRALESIONAL; INTRAMUSCULAR; SOFT TISSUE at 11:26

## 2019-09-23 RX ADMIN — LIDOCAINE HYDROCHLORIDE 0.5 ML: 10 INJECTION, SOLUTION INFILTRATION; PERINEURAL at 11:25

## 2019-09-23 NOTE — PATIENT INSTRUCTIONS
What is it [facility] Arthritis? In a normal joint, cartilage covers the end of the bones and serves as a shock absorber to allow smooth, pain-free movement  In osteoarthritis (OA, or degenerative arthritis) the cartilage layer wears out, resulting in direct contact between the bones and producing pain and deformity  One of the most common joints to develop OA in the hand is the base of the thumb  The thumb basal joint, also called the carpometacarpal Dare) joint, is a specialized saddle shaped joint that is formed by a small bone of the wrist (trapezium) and the first bone of the thumb (metacarpal)  The saddle shaped joint allows the thumb to have a wide range of motions, including up, down, across the palm, and the ability to pinch (see Figure 1)  Who gets it? OA at the base of the thumb is more commonly seen in women over the age of 36  The exact cause is unknown, but genetics, previous injuries such as fractures or dislocations, and generalized joint laxity may predispose towards development of this type of arthritis  What are the symptoms and signs? The most common symptom is pain at the base of the thumb  The pain can be aggravated by activities that require pinching, such as opening jars, turning door knobs or keys, and writing  Severity can also progress to pain at rest and pain at night  In more severe cases, progressive destruction and mal-alignment of the joint occurs, and a bump develops at the base of the thumb as the metacarpal moves out of the saddle joint  This shift in the joint can cause limited motion and weakness, making pinch difficult (see Figure 2)  The next joint above the [facility] may compensate by loosening, causing it to bend further back (hyperextension)  How is the diagnosis made? The diagnosis is made by history and physical evaluation  Pressure and movement such as twisting will produce pain at the joint  A grinding sensation may also be present at the joint (see Figure 3)  X-rays are used to confirm the diagnosis, although symptom severity often does not correlate with x-ray findings  What are the treatment options? Less severe thumb arthritis will usually respond to non-surgical care  Arthritis medication, splinting and limited cortisone injections may help alleviate pain  A hand therapist might provide a variety of rigid and non-rigid splints which can be used while sleeping or during activities  Patients with advanced disease or who fail non-surgical treatment may be candidates for surgical reconstruction  A variety of surgical techniques are available that can successfully reduce or eliminate pain  Surgical procedures include removal of arthritic bone and joint reconstruction (arthroplasty), joint fusion, bone realignment, and even arthroscopy in select cases  A consultation with your hand surgeon can help decide the best option for you (see Figure 4)  © 2012 [employer] Hand  www handcare  org      What is it TRIGGER FINGER? Stenosing tenosynovitis, commonly known as trigger finger or trigger thumb, involves the pulleys and tendons in the hand that bend the fingers  The tendons work like long ropes connecting the muscles of the forearm with the bones of the fingers and thumb  In the finger, the pulleys are a series of rings that form a tunnel through which the tendons must glide, much like the guides on a fishing santhosh through which the line (or tendon) must pass  These pulleys hold the tendons close against the bone  The tendons and the tunnel have a slick lining that allows easy gliding of the tendon through the pulleys (see Figure 1)  Trigger finger/thumb occurs when the pulley at the base of the finger becomes too thick and constricting around the tendon, making it hard for the tendon to move freely through the pulley  Sometimes the tendon develops a nodule (knot) or swelling of its lining   Because of the increased resistance to the gliding of the tendon through the  pulley, one may feel pain, popping, or a catching feeling in the finger or thumb (see Figure 2)  When the tendon catches, it produces irritation and more swelling  This causes a vicious cycle of triggering, irritation, and swelling  Sometimes the finger becomes stuck or locked, and is hard to straighten or bend  What causes it? Causes for this condition are not always clear  Some trigger fingers are associated with medical conditions such as rheumatoid arthritis, gout, and diabetes  Local trauma to the palm/base of the finger may be a factor on occasion, but in most cases there is not a clear cause  Signs and symptoms   Trigger finger/thumb may start with discomfort felt at the base of the finger or thumb, where they join the palm  This area is often tender to local pressure  A nodule may sometimes be found in this area  When the finger begins to trigger or lock, the patient may think the  problem is at the middle knuckle of the finger or the tip knuckle of the thumb, since the tendon that is sticking is the one that moves these joints  Treatment  The goal of treatment in trigger finger/thumb is to eliminate the catching or locking and allow full movement of the finger or thumb without discomfort  Swelling around the flexor tendon and tendon sheath must be reduced to allow smooth gliding of the tendon  The wearing of a splint or taking an oral anti-inflammatory medication may sometimes  help  Treatment may also include changing activities to reduce swelling  An injection of steroid into the area around the tendon and pulley is often effective in relieving the trigger finger/thumb  If non-surgical forms of treatment do not relieve the symptoms, surgery may be recommended  This surgery is performed as an outpatient, usually with simple local anesthesia   The goal of surgery is to open the pulley at the base of the finger so that the tendon can glide more freely (see Figure 3)  Active motion of the finger generally begins immediately after surgery  Normal use of the hand can usually be resumed once comfort permits  Some patients may feel tenderness, discomfort, and swelling about the area of their surgery longer than others  Occasionally, hand therapy is required after surgery to regain  better use  © 2012 [employer] Hand  www handcare  org

## 2019-09-23 NOTE — PROGRESS NOTES
ASSESSMENT/PLAN:    Assessment:   Bilateral thumb CMC arthritis  Right ring trigger finger    Plan:   Corticosteroid injection bilateral thumb CMC arthritis as outlined below  Corticosteroid injection right ring trigger finger as outlined below  Comfort cool braces bilaterally  Hand therapy for home exercise program  Follow up in 3 months    To Do Next Visit:  Re-evaluation of current symptoms    General Discussions:     [facility] Arthritis: The anatomy and physiology of carpometacarpal joint arthritis was discussed with the patient today in the office  Deterioration of the articular cartilage eventually leads to hypermobility at the thumb [facility] joint, resulting in joint subluxation, osteophyte formation, cystic changes within the trapezium and base of the first metacarpal, as well as subchondral sclerosis  Eventually, pain, limited mobility, and compensatory hyperextension at the metacarpophalangeal joint may develop  While normal activity and usage of the thumb joint may provide a painful experience to the patient, this typically does not result in damage to the thumb or hand  Treatment options include resting thumb spica splints to decreased joint edema, pain, and inflammation  Therapy exercises to strengthen the thenar musculature may relieve pain, but do not alter the overall continued development of osteoarthritis  Oral medications, topical medications, corticosteroid injections may decrease pain and increase overall function  Eventually, approximately 5% of patients may require surgical intervention  Trigger FInger: The anatomy and physiology of trigger finger was discussed with the patient today in the office  Edema and increased contact pressure within the flexor tendons at the A1 pulley can cause pain, crepitation, and limitation of function    Treatment options include resting MP blocking splints to decrease edema, oral anti-inflammatory medications, home or formal therapy exercises, up to 2 steroid injections within the tendon sheath, or surgical release  While majority of patients do respond to conservative treatment, up to 20% may require surgical release      _____________________________________________________  CHIEF COMPLAINT:  Chief Complaint   Patient presents with    Right Hand - Pain         SUBJECTIVE:  Samaria Bolaños is a 81 y o  male who presents for evaluation of right ring finger locking and catching  He previously had long finger locking and catching that improved with corticosteroid injection  He also has pain in bilateral base of the thumb that is worse with opening jars and lifting objects  Denies numbness or tingling      PAST MEDICAL HISTORY:  Past Medical History:   Diagnosis Date    Aortic stenosis     Bacterial infection due to Helicobacter pylori     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     Colon polyp     Depression (emotion) 11/16/2018    GERD (gastroesophageal reflux disease)     Hyperlipidemia     Hypertension     Post herpetic neuralgia     Shingles        PAST SURGICAL HISTORY:  Past Surgical History:   Procedure Laterality Date    ANAL SPHINCTEROTOMY      BASAL CELL CARCINOMA EXCISION      CHOLECYSTECTOMY      OPEN REDUCTION SHOULDER DISLOCATION      ROTATOR CUFF REPAIR         FAMILY HISTORY:  Family History   Problem Relation Age of Onset    Depression Mother     Hyperlipidemia Mother     Substance Abuse Neg Hx     Colon cancer Neg Hx     Colon polyps Neg Hx        SOCIAL HISTORY:  Social History     Tobacco Use    Smoking status: Former Smoker    Smokeless tobacco: Former User     Quit date: 1/1/1976   Substance Use Topics    Alcohol use: No    Drug use: No       MEDICATIONS:    Current Outpatient Medications:     aspirin (ECOTRIN LOW STRENGTH) 81 mg EC tablet, Take 1 tablet (81 mg total) by mouth daily, Disp: , Rfl:     atorvastatin (LIPITOR) 10 mg tablet, Take 1 tablet (10 mg total) by mouth daily, Disp: 90 tablet, Rfl: 3    [address] OIL PO, Take by mouth, Disp: , Rfl:     nitroglycerin (NITRO-BID) 2 % ointment, Nitroglycerin Ointment 0 125% To Rectum Sig:  Apply Pea sized amount inside rectum 4 times a day, Disp: 30 g, Rfl: 1    glucose blood (TRUE METRIX BLOOD GLUCOSE TEST) test strip, Test 2 times daily (Patient not taking: Reported on 9/23/2019), Disp: 100 each, Rfl: 5    glucose blood (TRUE METRIX BLOOD GLUCOSE TEST) test strip, Use one new strip daily to test - vary times of testing  DX: E11 9  True Metrix (Patient not taking: Reported on 9/23/2019), Disp: 100 each, Rfl: 5    Liniments (BLUE-EMU SUPER STRENGTH EX), Apply 1 application topically, Disp: , Rfl:     ALLERGIES:  Allergies   Allergen Reactions    Metformin Diarrhea       REVIEW OF SYSTEMS:  Pertinent items are noted in HPI  A comprehensive review of systems was negative  LABS:  HgA1c:   Lab Results   Component Value Date    HGBA1C 5 8 08/23/2019     BMP:   Lab Results   Component Value Date    GLUCOSE 150 (H) 04/07/2014    CALCIUM 9 5 04/09/2019     04/07/2014    K 4 0 04/09/2019    CO2 25 04/09/2019    CL 98 (L) 04/09/2019    BUN 18 04/09/2019    CREATININE 1 21 04/09/2019       _____________________________________________________  PHYSICAL EXAMINATION:  Vital signs: /68   Ht 5' 6" (1 676 m)   Wt 78 kg (172 lb)   BMI 27 76 kg/m²   General: well developed and well nourished, alert, oriented times 3 and appears comfortable  Psychiatric: Normal  HEENT: Trachea Midline, No torticollis  Cardiovascular: No discernable arrhythmia  Pulmonary: No wheezing or stridor  Skin: No masses, erythema, lacerations, fluctation, ulcerations  Neurovascular: Sensation Intact to the Median, Ulnar, Radial Nerve, Motor Intact to the Median, Ulnar, Radial Nerve and Pulses Intact    MUSCULOSKELETAL EXAMINATION:  Extremities:    RUE: TTP right ring A1 pulley region with palpable trigger nodule   TTP thumb CMC joint, positive grind, no thumb MP hyperextension    LUE: TTP thumb CMC joint, positive grind test, no thumb MP hyperextension      _____________________________________________________  STUDIES REVIEWED:  No Studies to review      PROCEDURES PERFORMED:  Small joint arthrocentesis: R thumb CMC  Date/Time: 9/23/2019 11:25 AM  Consent given by: patient  Site marked: site marked  Supporting Documentation  Indications: pain   Procedure Details  Location: thumb - R thumb CMC  Needle size: 25 G  Ultrasound guidance: no  Approach: volar  Medications administered: 0 5 mL lidocaine 1 %; 3 mg betamethasone acetate-betamethasone sodium phosphate 6 (3-3) mg/mL    Patient tolerance: patient tolerated the procedure well with no immediate complications  Dressing:  Sterile dressing applied    Small joint arthrocentesis: L thumb CMC  Date/Time: 9/23/2019 11:25 AM  Consent given by: patient  Site marked: site marked  Supporting Documentation  Indications: pain   Procedure Details  Location: thumb - L thumb CMC  Needle size: 25 G  Ultrasound guidance: no  Approach: volar  Medications administered: 0 5 mL lidocaine 1 %; 3 mg betamethasone acetate-betamethasone sodium phosphate 6 (3-3) mg/mL    Patient tolerance: patient tolerated the procedure well with no immediate complications  Dressing:  Sterile dressing applied    Hand/upper extremity injection  Date/Time: 9/23/2019 11:26 AM  Consent given by: patient  Site marked: site marked  Supporting Documentation  Indications: therapeutic   Procedure Details  Condition:trigger finger Location: ring finger -   Needle size: 25 G  Ultrasound guidance: no  Approach: volar  Medications administered: 1 mL lidocaine 1 %; 6 mg betamethasone acetate-betamethasone sodium phosphate 6 (3-3) mg/mL    Patient tolerance: patient tolerated the procedure well with no immediate complications  Dressing:  Sterile dressing applied             I interviewed, took the history and examined the patient  I discuss the case with the resident and reviewed the resident's note    I supervised the resident and I agree with the resident management plan as it was presented to me  I was present in the clinic and examined the patient

## 2019-10-10 ENCOUNTER — IMMUNIZATIONS (OUTPATIENT)
Dept: FAMILY MEDICINE CLINIC | Facility: HOSPITAL | Age: 84
End: 2019-10-10
Payer: MEDICARE

## 2019-10-10 DIAGNOSIS — Z23 ENCOUNTER FOR IMMUNIZATION: ICD-10-CM

## 2019-10-10 PROCEDURE — 90662 IIV NO PRSV INCREASED AG IM: CPT

## 2019-10-10 PROCEDURE — G0008 ADMIN INFLUENZA VIRUS VAC: HCPCS

## 2019-11-04 ENCOUNTER — OFFICE VISIT (OUTPATIENT)
Dept: FAMILY MEDICINE CLINIC | Facility: HOSPITAL | Age: 84
End: 2019-11-04
Payer: MEDICARE

## 2019-11-04 VITALS
HEART RATE: 75 BPM | DIASTOLIC BLOOD PRESSURE: 78 MMHG | WEIGHT: 173.4 LBS | BODY MASS INDEX: 27.87 KG/M2 | SYSTOLIC BLOOD PRESSURE: 130 MMHG | HEIGHT: 66 IN

## 2019-11-04 DIAGNOSIS — M54.2 CERVICALGIA: Primary | ICD-10-CM

## 2019-11-04 PROCEDURE — 99213 OFFICE O/P EST LOW 20 MIN: CPT | Performed by: INTERNAL MEDICINE

## 2019-11-04 RX ORDER — METHOCARBAMOL 500 MG/1
500 TABLET, FILM COATED ORAL 2 TIMES DAILY PRN
Qty: 30 TABLET | Refills: 0 | Status: SHIPPED | OUTPATIENT
Start: 2019-11-04 | End: 2020-03-05 | Stop reason: ALTCHOICE

## 2019-11-04 RX ORDER — DICLOFENAC POTASSIUM 50 MG/1
50 TABLET, FILM COATED ORAL 2 TIMES DAILY PRN
Qty: 30 TABLET | Refills: 0 | Status: SHIPPED | OUTPATIENT
Start: 2019-11-04 | End: 2020-03-05 | Stop reason: ALTCHOICE

## 2019-11-04 NOTE — PROGRESS NOTES
Assessment/Plan:       Diagnoses and all orders for this visit:    Cervicalgia  -     Ambulatory referral to Physical Therapy; Future  -     diclofenac potassium (CATAFLAM) 50 mg tablet; Take 1 tablet (50 mg total) by mouth 2 (two) times a day as needed (Neck spasm)  -     methocarbamol (ROBAXIN) 500 mg tablet; Take 1 tablet (500 mg total) by mouth 2 (two) times a day as needed for muscle spasms          All of the above diagnoses have been assessed  Additional COMMENTS/PLAN: needs PT      Subjective:      Patient ID: Del Bhat is a 81 y o  male  HPI     For last 10 days patient has had significant cervical pain  Woke up that way  Has no this is not a chronic problem  It goes up the back of his head into his upper thoracic area  Her no discomfort in the arms  The following portions of the patient's history were revised and updated as appropriate: Problem list, allergies, med list, FH, SH, Past medical and surgical histories  Current Outpatient Medications   Medication Sig Dispense Refill    aspirin (ECOTRIN LOW STRENGTH) 81 mg EC tablet Take 1 tablet (81 mg total) by mouth daily      atorvastatin (LIPITOR) 10 mg tablet Take 1 tablet (10 mg total) by mouth daily 90 tablet 3    diclofenac potassium (CATAFLAM) 50 mg tablet Take 1 tablet (50 mg total) by mouth 2 (two) times a day as needed (Neck spasm) 30 tablet 0    methocarbamol (ROBAXIN) 500 mg tablet Take 1 tablet (500 mg total) by mouth 2 (two) times a day as needed for muscle spasms 30 tablet 0     No current facility-administered medications for this visit  Review of Systems   All other systems reviewed and are negative          Objective:    /78 (BP Location: Left arm, Patient Position: Sitting, Cuff Size: Standard)   Pulse 75   Ht 5' 6" (1 676 m)   Wt 78 7 kg (173 lb 6 4 oz)   BMI 27 99 kg/m²     BP Readings from Last 3 Encounters:   11/04/19 130/78   09/23/19 110/68   08/26/19 122/70                  Wt Readings from Last 3 Encounters:   11/04/19 78 7 kg (173 lb 6 4 oz)   10/28/19 77 6 kg (171 lb)   09/23/19 78 kg (172 lb)         Physical Exam   Constitutional: He appears well-developed and well-nourished  He appears distressed  Neck:   Examination reveals significant cervical muscle spasm  There is significant right decreased range of motion to the right greater than the left  No focal neurologic deficits  Vitals reviewed  No visits with results within 2 Week(s) from this visit     Latest known visit with results is:   Appointment on 08/23/2019   Component Date Value Ref Range Status    Hemoglobin A1C 08/23/2019 5 8  4 2 - 6 3 % Final    EAG 08/23/2019 120  mg/dl Final         Ryanne Vela MD

## 2019-11-22 ENCOUNTER — APPOINTMENT (OUTPATIENT)
Dept: LAB | Facility: HOSPITAL | Age: 84
End: 2019-11-22
Attending: INTERNAL MEDICINE
Payer: MEDICARE

## 2019-11-22 DIAGNOSIS — R73.01 IMPAIRED FASTING GLUCOSE: ICD-10-CM

## 2019-11-22 DIAGNOSIS — E11.9 CONTROLLED TYPE 2 DIABETES MELLITUS WITHOUT COMPLICATION, WITHOUT LONG-TERM CURRENT USE OF INSULIN (HCC): ICD-10-CM

## 2019-11-22 LAB
ALBUMIN SERPL BCP-MCNC: 3.6 G/DL (ref 3.5–5)
ALP SERPL-CCNC: 58 U/L (ref 46–116)
ALT SERPL W P-5'-P-CCNC: 37 U/L (ref 12–78)
ANION GAP SERPL CALCULATED.3IONS-SCNC: 5 MMOL/L (ref 4–13)
AST SERPL W P-5'-P-CCNC: 28 U/L (ref 5–45)
BILIRUB SERPL-MCNC: 0.91 MG/DL (ref 0.2–1)
BUN SERPL-MCNC: 25 MG/DL (ref 5–25)
CALCIUM SERPL-MCNC: 9.2 MG/DL (ref 8.3–10.1)
CHLORIDE SERPL-SCNC: 110 MMOL/L (ref 100–108)
CHOLEST SERPL-MCNC: 157 MG/DL (ref 50–200)
CO2 SERPL-SCNC: 28 MMOL/L (ref 21–32)
CREAT SERPL-MCNC: 1.27 MG/DL (ref 0.6–1.3)
CREAT UR-MCNC: 195 MG/DL
ERYTHROCYTE [DISTWIDTH] IN BLOOD BY AUTOMATED COUNT: 12.8 % (ref 11.6–15.1)
EST. AVERAGE GLUCOSE BLD GHB EST-MCNC: 151 MG/DL
GFR SERPL CREATININE-BSD FRML MDRD: 52 ML/MIN/1.73SQ M
GLUCOSE P FAST SERPL-MCNC: 134 MG/DL (ref 65–99)
HBA1C MFR BLD: 6.9 % (ref 4.2–6.3)
HCT VFR BLD AUTO: 38.6 % (ref 36.5–49.3)
HDLC SERPL-MCNC: 47 MG/DL
HGB BLD-MCNC: 12.5 G/DL (ref 12–17)
LDLC SERPL CALC-MCNC: 89 MG/DL (ref 0–100)
MCH RBC QN AUTO: 30.7 PG (ref 26.8–34.3)
MCHC RBC AUTO-ENTMCNC: 32.4 G/DL (ref 31.4–37.4)
MCV RBC AUTO: 95 FL (ref 82–98)
MICROALBUMIN UR-MCNC: 11.3 MG/L (ref 0–20)
MICROALBUMIN/CREAT 24H UR: 6 MG/G CREATININE (ref 0–30)
PLATELET # BLD AUTO: 75 THOUSANDS/UL (ref 149–390)
PMV BLD AUTO: 10.1 FL (ref 8.9–12.7)
POTASSIUM SERPL-SCNC: 3.9 MMOL/L (ref 3.5–5.3)
PROT SERPL-MCNC: 7.1 G/DL (ref 6.4–8.2)
RBC # BLD AUTO: 4.07 MILLION/UL (ref 3.88–5.62)
SODIUM SERPL-SCNC: 143 MMOL/L (ref 136–145)
TRIGL SERPL-MCNC: 104 MG/DL
WBC # BLD AUTO: 3.67 THOUSAND/UL (ref 4.31–10.16)

## 2019-11-22 PROCEDURE — 82570 ASSAY OF URINE CREATININE: CPT | Performed by: INTERNAL MEDICINE

## 2019-11-22 PROCEDURE — 80061 LIPID PANEL: CPT

## 2019-11-22 PROCEDURE — 85027 COMPLETE CBC AUTOMATED: CPT

## 2019-11-22 PROCEDURE — 36415 COLL VENOUS BLD VENIPUNCTURE: CPT

## 2019-11-22 PROCEDURE — 82043 UR ALBUMIN QUANTITATIVE: CPT | Performed by: INTERNAL MEDICINE

## 2019-11-22 PROCEDURE — 83036 HEMOGLOBIN GLYCOSYLATED A1C: CPT

## 2019-11-22 PROCEDURE — 80053 COMPREHEN METABOLIC PANEL: CPT

## 2019-11-25 ENCOUNTER — OFFICE VISIT (OUTPATIENT)
Dept: FAMILY MEDICINE CLINIC | Facility: HOSPITAL | Age: 84
End: 2019-11-25
Payer: MEDICARE

## 2019-11-25 VITALS
WEIGHT: 171.4 LBS | DIASTOLIC BLOOD PRESSURE: 72 MMHG | SYSTOLIC BLOOD PRESSURE: 120 MMHG | BODY MASS INDEX: 27.55 KG/M2 | HEIGHT: 66 IN | HEART RATE: 75 BPM

## 2019-11-25 DIAGNOSIS — E11.9 CONTROLLED TYPE 2 DIABETES MELLITUS WITHOUT COMPLICATION, WITHOUT LONG-TERM CURRENT USE OF INSULIN (HCC): Primary | ICD-10-CM

## 2019-11-25 DIAGNOSIS — F32.9 REACTIVE DEPRESSION: ICD-10-CM

## 2019-11-25 PROBLEM — K60.2 ANAL FISSURE: Status: RESOLVED | Noted: 2019-08-07 | Resolved: 2019-11-25

## 2019-11-25 PROCEDURE — 99213 OFFICE O/P EST LOW 20 MIN: CPT | Performed by: INTERNAL MEDICINE

## 2019-11-25 RX ORDER — ESCITALOPRAM OXALATE 10 MG/1
10 TABLET ORAL DAILY
Qty: 30 TABLET | Refills: 3 | Status: SHIPPED | OUTPATIENT
Start: 2019-11-25 | End: 2020-03-05 | Stop reason: ALTCHOICE

## 2019-11-25 NOTE — PROGRESS NOTES
Assessment/Plan:       Diagnoses and all orders for this visit:    Controlled type 2 diabetes mellitus without complication, without long-term current use of insulin (HCC)    Reactive depression  -     escitalopram (LEXAPRO) 10 mg tablet; Take 1 tablet (10 mg total) by mouth daily          All of the above diagnoses have been assessed  Additional COMMENTS/PLAN: will see back in 6 weeks with attn to depression on lexapro      Subjective:      Patient ID: Ros Garcia is a 81 y o  male  HPI     DM-this is good with diet-does not want to go on meds  Depression-still active over wife  Cries a lot  Is willing to try med  The following portions of the patient's history were revised and updated as appropriate: Problem list, allergies, med list, FH, SH, Past medical and surgical histories  Current Outpatient Medications   Medication Sig Dispense Refill    aspirin (ECOTRIN LOW STRENGTH) 81 mg EC tablet Take 1 tablet (81 mg total) by mouth daily      atorvastatin (LIPITOR) 10 mg tablet Take 1 tablet (10 mg total) by mouth daily 90 tablet 3    diclofenac potassium (CATAFLAM) 50 mg tablet Take 1 tablet (50 mg total) by mouth 2 (two) times a day as needed (Neck spasm) 30 tablet 0    methocarbamol (ROBAXIN) 500 mg tablet Take 1 tablet (500 mg total) by mouth 2 (two) times a day as needed for muscle spasms 30 tablet 0    escitalopram (LEXAPRO) 10 mg tablet Take 1 tablet (10 mg total) by mouth daily 30 tablet 3     No current facility-administered medications for this visit          Review of Systems      Objective:    /72 (BP Location: Left arm, Patient Position: Sitting, Cuff Size: Standard)   Pulse 75   Ht 5' 6" (1 676 m)   Wt 77 7 kg (171 lb 6 4 oz)   BMI 27 66 kg/m²     BP Readings from Last 3 Encounters:   11/25/19 120/72   11/04/19 130/78   09/23/19 110/68                  Wt Readings from Last 3 Encounters:   11/25/19 77 7 kg (171 lb 6 4 oz)   11/04/19 78 7 kg (173 lb 6 4 oz)   10/28/19 77 6 kg (171 lb)         Physical Exam   Constitutional: He appears well-developed and well-nourished  Skin:   Seborrheic keratosis  Vitals reviewed  Appointment on 11/22/2019   Component Date Value Ref Range Status    WBC 11/22/2019 3 67* 4 31 - 10 16 Thousand/uL Final    RBC 11/22/2019 4 07  3 88 - 5 62 Million/uL Final    Hemoglobin 11/22/2019 12 5  12 0 - 17 0 g/dL Final    Hematocrit 11/22/2019 38 6  36 5 - 49 3 % Final    MCV 11/22/2019 95  82 - 98 fL Final    MCH 11/22/2019 30 7  26 8 - 34 3 pg Final    MCHC 11/22/2019 32 4  31 4 - 37 4 g/dL Final    RDW 11/22/2019 12 8  11 6 - 15 1 % Final    Platelets 23/75/2009 75* 149 - 390 Thousands/uL Final    Manual Review of Smear Performed    MPV 11/22/2019 10 1  8 9 - 12 7 fL Final    Sodium 11/22/2019 143  136 - 145 mmol/L Final    Potassium 11/22/2019 3 9  3 5 - 5 3 mmol/L Final    Chloride 11/22/2019 110* 100 - 108 mmol/L Final    CO2 11/22/2019 28  21 - 32 mmol/L Final    ANION GAP 11/22/2019 5  4 - 13 mmol/L Final    BUN 11/22/2019 25  5 - 25 mg/dL Final    Creatinine 11/22/2019 1 27  0 60 - 1 30 mg/dL Final    Standardized to IDMS reference method    Glucose, Fasting 11/22/2019 134* 65 - 99 mg/dL Final      Specimen collection should occur prior to Sulfasalazine administration due to the potential for falsely depressed results  Specimen collection should occur prior to Sulfapyridine administration due to the potential for falsely elevated results   Calcium 11/22/2019 9 2  8 3 - 10 1 mg/dL Final    AST 11/22/2019 28  5 - 45 U/L Final      Specimen collection should occur prior to Sulfasalazine administration due to the potential for falsely depressed results   ALT 11/22/2019 37  12 - 78 U/L Final      Specimen collection should occur prior to Sulfasalazine and/or Sulfapyridine administration due to the potential for falsely depressed results       Alkaline Phosphatase 11/22/2019 58  46 - 116 U/L Final    Total Protein 11/22/2019 7 1  6 4 - 8 2 g/dL Final    Albumin 11/22/2019 3 6  3 5 - 5 0 g/dL Final    Total Bilirubin 11/22/2019 0 91  0 20 - 1 00 mg/dL Final    eGFR 11/22/2019 52  ml/min/1 73sq m Final    Cholesterol 11/22/2019 157  50 - 200 mg/dL Final      Cholesterol:       Desirable         <200 mg/dl       Borderline         200-239 mg/dl       High              >239           Triglycerides 11/22/2019 104  <=150 mg/dL Final      Triglyceride:     Normal          <150 mg/dl     Borderline High 150-199 mg/dl     High            200-499 mg/dl        Very High       >499 mg/dl    Specimen collection should occur prior to N-Acetylcysteine or Metamizole administration due to the potential for falsely depressed results   HDL, Direct 11/22/2019 47  >=40 mg/dL Final      HDL Cholesterol:       Low     <41 mg/dL  Specimen collection should occur prior to Metamizole administration due to the potential for falsley depressed results   LDL Calculated 11/22/2019 89  0 - 100 mg/dL Final      LDL Cholesterol:     Optimal           <100 mg/dl     Near Optimal      100-129 mg/dl     Above Optimal       Borderline High 130-159 mg/dl       High            160-189 mg/dl       Very High       >189 mg/dl         This screening LDL is a calculated result  It does not have the accuracy of the Direct Measured LDL in the monitoring of patients with hyperlipidemia and/or statin therapy  Direct Measure LDL (SKE502) must be ordered separately in these patients      Hemoglobin A1C 11/22/2019 6 9* 4 2 - 6 3 % Final    EAG 11/22/2019 151  mg/dl Final         Adilson Leblanc MD

## 2019-12-05 ENCOUNTER — TELEPHONE (OUTPATIENT)
Dept: FAMILY MEDICINE CLINIC | Facility: HOSPITAL | Age: 84
End: 2019-12-05

## 2019-12-05 DIAGNOSIS — E11.9 CONTROLLED TYPE 2 DIABETES MELLITUS WITHOUT COMPLICATION, WITHOUT LONG-TERM CURRENT USE OF INSULIN (HCC): Primary | ICD-10-CM

## 2019-12-05 NOTE — TELEPHONE ENCOUNTER
Patient does not want to try another medication  He made an appointment for March and I entered the blood work order  He will get A1C done prior to appt

## 2020-01-01 ENCOUNTER — OFFICE VISIT (OUTPATIENT)
Dept: OBGYN CLINIC | Facility: CLINIC | Age: 85
End: 2020-01-01
Payer: MEDICARE

## 2020-01-01 VITALS
SYSTOLIC BLOOD PRESSURE: 118 MMHG | WEIGHT: 175.4 LBS | DIASTOLIC BLOOD PRESSURE: 70 MMHG | BODY MASS INDEX: 28.19 KG/M2 | HEIGHT: 66 IN

## 2020-01-01 DIAGNOSIS — M65.322 TRIGGER FINGER, LEFT INDEX FINGER: ICD-10-CM

## 2020-01-01 DIAGNOSIS — M18.0 ARTHRITIS OF CARPOMETACARPAL (CMC) JOINT OF BOTH THUMBS: Primary | ICD-10-CM

## 2020-01-01 PROCEDURE — 99214 OFFICE O/P EST MOD 30 MIN: CPT | Performed by: ORTHOPAEDIC SURGERY

## 2020-01-01 PROCEDURE — 20600 DRAIN/INJ JOINT/BURSA W/O US: CPT | Performed by: ORTHOPAEDIC SURGERY

## 2020-01-01 PROCEDURE — 20550 NJX 1 TENDON SHEATH/LIGAMENT: CPT | Performed by: ORTHOPAEDIC SURGERY

## 2020-01-01 RX ORDER — LIDOCAINE HYDROCHLORIDE 10 MG/ML
1 INJECTION, SOLUTION INFILTRATION; PERINEURAL
Status: COMPLETED | OUTPATIENT
Start: 2020-01-01 | End: 2020-01-01

## 2020-01-01 RX ORDER — BETAMETHASONE SODIUM PHOSPHATE AND BETAMETHASONE ACETATE 3; 3 MG/ML; MG/ML
3 INJECTION, SUSPENSION INTRA-ARTICULAR; INTRALESIONAL; INTRAMUSCULAR; SOFT TISSUE
Status: COMPLETED | OUTPATIENT
Start: 2020-01-01 | End: 2020-01-01

## 2020-01-01 RX ADMIN — BETAMETHASONE SODIUM PHOSPHATE AND BETAMETHASONE ACETATE 3 MG: 3; 3 INJECTION, SUSPENSION INTRA-ARTICULAR; INTRALESIONAL; INTRAMUSCULAR; SOFT TISSUE at 13:11

## 2020-01-01 RX ADMIN — BETAMETHASONE SODIUM PHOSPHATE AND BETAMETHASONE ACETATE 3 MG: 3; 3 INJECTION, SUSPENSION INTRA-ARTICULAR; INTRALESIONAL; INTRAMUSCULAR; SOFT TISSUE at 13:10

## 2020-01-01 RX ADMIN — LIDOCAINE HYDROCHLORIDE 1 ML: 10 INJECTION, SOLUTION INFILTRATION; PERINEURAL at 13:11

## 2020-01-01 RX ADMIN — LIDOCAINE HYDROCHLORIDE 1 ML: 10 INJECTION, SOLUTION INFILTRATION; PERINEURAL at 13:10

## 2020-01-06 ENCOUNTER — OFFICE VISIT (OUTPATIENT)
Dept: OBGYN CLINIC | Facility: CLINIC | Age: 85
End: 2020-01-06
Payer: MEDICARE

## 2020-01-06 VITALS
DIASTOLIC BLOOD PRESSURE: 72 MMHG | BODY MASS INDEX: 28.45 KG/M2 | SYSTOLIC BLOOD PRESSURE: 136 MMHG | WEIGHT: 177 LBS | HEIGHT: 66 IN

## 2020-01-06 DIAGNOSIS — M18.10 ARTHRITIS OF CARPOMETACARPAL (CMC) JOINT OF THUMB: Primary | ICD-10-CM

## 2020-01-06 PROCEDURE — 99213 OFFICE O/P EST LOW 20 MIN: CPT | Performed by: ORTHOPAEDIC SURGERY

## 2020-01-06 PROCEDURE — 20600 DRAIN/INJ JOINT/BURSA W/O US: CPT | Performed by: ORTHOPAEDIC SURGERY

## 2020-01-06 RX ORDER — METHYLPREDNISOLONE ACETATE 40 MG/ML
0.05 INJECTION, SUSPENSION INTRA-ARTICULAR; INTRALESIONAL; INTRAMUSCULAR; SOFT TISSUE
Status: COMPLETED | OUTPATIENT
Start: 2020-01-06 | End: 2020-01-06

## 2020-01-06 RX ORDER — LIDOCAINE HYDROCHLORIDE 10 MG/ML
0.5 INJECTION, SOLUTION INFILTRATION; PERINEURAL
Status: COMPLETED | OUTPATIENT
Start: 2020-01-06 | End: 2020-01-06

## 2020-01-06 RX ADMIN — METHYLPREDNISOLONE ACETATE 0.05 ML: 40 INJECTION, SUSPENSION INTRA-ARTICULAR; INTRALESIONAL; INTRAMUSCULAR; SOFT TISSUE at 11:09

## 2020-01-06 RX ADMIN — LIDOCAINE HYDROCHLORIDE 0.5 ML: 10 INJECTION, SOLUTION INFILTRATION; PERINEURAL at 11:09

## 2020-01-16 PROBLEM — F32.A DEPRESSION (EMOTION): Status: ACTIVE | Noted: 2018-11-16

## 2020-03-02 ENCOUNTER — APPOINTMENT (OUTPATIENT)
Dept: LAB | Facility: HOSPITAL | Age: 85
End: 2020-03-02
Attending: INTERNAL MEDICINE
Payer: MEDICARE

## 2020-03-02 DIAGNOSIS — E11.9 CONTROLLED TYPE 2 DIABETES MELLITUS WITHOUT COMPLICATION, WITHOUT LONG-TERM CURRENT USE OF INSULIN (HCC): ICD-10-CM

## 2020-03-02 LAB
EST. AVERAGE GLUCOSE BLD GHB EST-MCNC: 126 MG/DL
HBA1C MFR BLD: 6 %

## 2020-03-02 PROCEDURE — 36415 COLL VENOUS BLD VENIPUNCTURE: CPT

## 2020-03-02 PROCEDURE — 83036 HEMOGLOBIN GLYCOSYLATED A1C: CPT

## 2020-03-04 RX ORDER — CALCIUM CITRATE/VITAMIN D3 200MG-6.25
TABLET ORAL
COMMUNITY
Start: 2019-12-20 | End: 2020-03-05 | Stop reason: SDUPTHER

## 2020-03-05 ENCOUNTER — OFFICE VISIT (OUTPATIENT)
Dept: FAMILY MEDICINE CLINIC | Facility: HOSPITAL | Age: 85
End: 2020-03-05
Payer: MEDICARE

## 2020-03-05 ENCOUNTER — HOSPITAL ENCOUNTER (OUTPATIENT)
Dept: RADIOLOGY | Facility: HOSPITAL | Age: 85
Discharge: HOME | End: 2020-03-05
Attending: INTERNAL MEDICINE
Payer: MEDICARE

## 2020-03-05 VITALS
HEART RATE: 65 BPM | BODY MASS INDEX: 27.8 KG/M2 | DIASTOLIC BLOOD PRESSURE: 78 MMHG | WEIGHT: 173 LBS | HEIGHT: 66 IN | SYSTOLIC BLOOD PRESSURE: 122 MMHG

## 2020-03-05 DIAGNOSIS — E11.9 CONTROLLED TYPE 2 DIABETES MELLITUS WITHOUT COMPLICATION, WITHOUT LONG-TERM CURRENT USE OF INSULIN (HCC): Primary | ICD-10-CM

## 2020-03-05 DIAGNOSIS — E78.00 PURE HYPERCHOLESTEROLEMIA: ICD-10-CM

## 2020-03-05 DIAGNOSIS — B35.6 TINEA CRURIS: ICD-10-CM

## 2020-03-05 DIAGNOSIS — M17.11 ARTHROPATHY OF RIGHT KNEE: ICD-10-CM

## 2020-03-05 PROBLEM — R91.1 PULMONARY NODULE: Status: RESOLVED | Noted: 2018-11-16 | Resolved: 2020-03-05

## 2020-03-05 PROBLEM — L98.9 SKIN LESION OF LEFT LEG: Status: RESOLVED | Noted: 2019-01-10 | Resolved: 2020-03-05

## 2020-03-05 PROCEDURE — 1036F TOBACCO NON-USER: CPT | Performed by: INTERNAL MEDICINE

## 2020-03-05 PROCEDURE — 3078F DIAST BP <80 MM HG: CPT | Performed by: INTERNAL MEDICINE

## 2020-03-05 PROCEDURE — 3008F BODY MASS INDEX DOCD: CPT | Performed by: INTERNAL MEDICINE

## 2020-03-05 PROCEDURE — 2022F DILAT RTA XM EVC RTNOPTHY: CPT | Performed by: INTERNAL MEDICINE

## 2020-03-05 PROCEDURE — 4040F PNEUMOC VAC/ADMIN/RCVD: CPT | Performed by: INTERNAL MEDICINE

## 2020-03-05 PROCEDURE — 73562 X-RAY EXAM OF KNEE 3: CPT

## 2020-03-05 PROCEDURE — 3044F HG A1C LEVEL LT 7.0%: CPT | Performed by: INTERNAL MEDICINE

## 2020-03-05 PROCEDURE — 3074F SYST BP LT 130 MM HG: CPT | Performed by: INTERNAL MEDICINE

## 2020-03-05 PROCEDURE — 99214 OFFICE O/P EST MOD 30 MIN: CPT | Performed by: INTERNAL MEDICINE

## 2020-03-05 PROCEDURE — 1160F RVW MEDS BY RX/DR IN RCRD: CPT | Performed by: INTERNAL MEDICINE

## 2020-03-05 RX ORDER — CALCIUM CITRATE/VITAMIN D3 200MG-6.25
TABLET ORAL
Qty: 100 EACH | Refills: 5 | Status: SHIPPED | OUTPATIENT
Start: 2020-03-05 | End: 2020-03-06 | Stop reason: SDUPTHER

## 2020-03-05 RX ORDER — KETOCONAZOLE 20 MG/G
CREAM TOPICAL DAILY
Qty: 15 G | Refills: 4 | Status: SHIPPED | OUTPATIENT
Start: 2020-03-05 | End: 2020-11-09 | Stop reason: ALTCHOICE

## 2020-03-05 NOTE — PROGRESS NOTES
BMI Counseling: Body mass index is 27 92 kg/m²  The BMI is above normal  Nutrition recommendations include reducing portion sizes

## 2020-03-05 NOTE — PROGRESS NOTES
Assessment/Plan:       Diagnoses and all orders for this visit:    Controlled type 2 diabetes mellitus without complication, without long-term current use of insulin (HCC)  -     TRUE METRIX BLOOD GLUCOSE TEST test strip; Use two new strip daily DX:E11 9    Pure hypercholesterolemia    Other orders  -     Discontinue: TRUE METRIX BLOOD GLUCOSE TEST test strip; USE ONE NEW STRIP DAILY TO TEST - VARY TIMES OF TESTING DX: E11 9 TRUE METRIX          All of the above diagnoses have been assessed  Additional COMMENTS/PLAN: seem compensated  Subjective:      Patient ID: Wanita Gaucher is a 81 y o  male  HPI     Patient taken Amaryl about 3 to 4 times a week pending the results of his sugar  Hemoglobin A1c has been excellent  Will actually hold the medicine completely for the next 3 months and reassess  Hyperlipidemia- The patient is compliant with diet and taking meds  The patient understands the efficacy of the treatment in vascular prevention  Depression-this is still active  Looking for alt living arrangement     The following portions of the patient's history were revised and updated as appropriate: Problem list, allergies, med list, FH, SH, Past medical and surgical histories  Current Outpatient Medications   Medication Sig Dispense Refill    aspirin (ECOTRIN LOW STRENGTH) 81 mg EC tablet Take 1 tablet (81 mg total) by mouth daily      atorvastatin (LIPITOR) 10 mg tablet Take 1 tablet (10 mg total) by mouth daily 90 tablet 3    TRUE METRIX BLOOD GLUCOSE TEST test strip Use two new strip daily DX:E11 9 100 each 5     No current facility-administered medications for this visit  Review of Systems   Musculoskeletal:        Having right knee pain  Did give out  He is using a cane  All other systems reviewed and are negative          Objective:    /80 (BP Location: Left arm, Patient Position: Sitting, Cuff Size: Standard)   Pulse 65   Ht 5' 6" (1 676 m)   Wt 78 5 kg (173 lb) BMI 27 92 kg/m²     BP Readings from Last 3 Encounters:   03/05/20 140/80   01/06/20 136/72   11/25/19 120/72                  Wt Readings from Last 3 Encounters:   03/05/20 78 5 kg (173 lb)   01/06/20 80 3 kg (177 lb)   11/25/19 77 7 kg (171 lb 6 4 oz)         Physical Exam   Constitutional: He appears well-developed and well-nourished  Neck: No JVD present  Cardiovascular: Normal rate and regular rhythm  Pulses:       Dorsalis pedis pulses are 1+ on the right side, and 1+ on the left side  Posterior tibial pulses are 1+ on the right side, and 1+ on the left side  Pulmonary/Chest: Effort normal and breath sounds normal    Musculoskeletal: He exhibits no edema  DJD changes of the R knee with some instability   Feet:   Right Foot:   Skin Integrity: Negative for ulcer, skin breakdown, erythema, warmth, callus or dry skin  Left Foot:   Skin Integrity: Negative for ulcer, skin breakdown, erythema, warmth, callus or dry skin  Vitals reviewed  Patient's shoes and socks removed  Right Foot/Ankle   Right Foot Inspection  Skin Exam: skin normal and skin intact no dry skin, no warmth, no callus, no erythema, no maceration, no abnormal color, no pre-ulcer, no ulcer and no callus                          Toe Exam: ROM and strength within normal limits  Sensory       Monofilament testing: intact  Vascular    The right DP pulse is 1+  The right PT pulse is 1+  Left Foot/Ankle  Left Foot Inspection  Skin Exam: skin normal and skin intactno dry skin, no warmth, no erythema, no maceration, normal color, no pre-ulcer, no ulcer and no callus                         Toe Exam: ROM and strength within normal limits                   Sensory       Monofilament: intact  Vascular    The left DP pulse is 1+  The left PT pulse is 1+  Assign Risk Category:  ; ;        Risk: 0      Appointment on 03/02/2020   Component Date Value Ref Range Status    Hemoglobin A1C 03/02/2020 6 0* Normal 3 8-5 6%;  PreDiabetic 5 7-6 4%;  Diabetic >=6 5%; Glycemic control for adults with diabetes <7 0% % Final    EAG 03/02/2020 126  mg/dl Final         Argelia Boone MD

## 2020-03-06 DIAGNOSIS — E11.9 CONTROLLED TYPE 2 DIABETES MELLITUS WITHOUT COMPLICATION, WITHOUT LONG-TERM CURRENT USE OF INSULIN (HCC): ICD-10-CM

## 2020-03-06 RX ORDER — CALCIUM CITRATE/VITAMIN D3 200MG-6.25
TABLET ORAL
Qty: 100 EACH | Refills: 5 | Status: SHIPPED | OUTPATIENT
Start: 2020-03-06 | End: 2021-01-01 | Stop reason: SDUPTHER

## 2020-03-10 NOTE — RESULT ENCOUNTER NOTE
Call    Knee xray shows degenerative arthritis, not severe  If desired could see ortho for injections or discuss further with dr Jori Nova next visit

## 2020-03-31 DIAGNOSIS — E78.00 PURE HYPERCHOLESTEROLEMIA: ICD-10-CM

## 2020-03-31 RX ORDER — ATORVASTATIN CALCIUM 10 MG/1
TABLET, FILM COATED ORAL
Qty: 90 TABLET | Refills: 3 | Status: SHIPPED | OUTPATIENT
Start: 2020-03-31 | End: 2021-01-01 | Stop reason: SDUPTHER

## 2020-04-20 ENCOUNTER — OFFICE VISIT (OUTPATIENT)
Dept: OBGYN CLINIC | Facility: CLINIC | Age: 85
End: 2020-04-20
Payer: MEDICARE

## 2020-04-20 VITALS
BODY MASS INDEX: 28.12 KG/M2 | DIASTOLIC BLOOD PRESSURE: 68 MMHG | WEIGHT: 175 LBS | HEIGHT: 66 IN | SYSTOLIC BLOOD PRESSURE: 122 MMHG

## 2020-04-20 DIAGNOSIS — M18.0 ARTHRITIS OF CARPOMETACARPAL (CMC) JOINT OF BOTH THUMBS: Primary | ICD-10-CM

## 2020-04-20 DIAGNOSIS — R20.2 PARESTHESIA OF HAND, BILATERAL: ICD-10-CM

## 2020-04-20 DIAGNOSIS — M65.322 TRIGGER FINGER, LEFT INDEX FINGER: ICD-10-CM

## 2020-04-20 DIAGNOSIS — G56.03 CARPAL TUNNEL SYNDROME, BILATERAL: ICD-10-CM

## 2020-04-20 PROCEDURE — 3074F SYST BP LT 130 MM HG: CPT | Performed by: ORTHOPAEDIC SURGERY

## 2020-04-20 PROCEDURE — 1160F RVW MEDS BY RX/DR IN RCRD: CPT | Performed by: ORTHOPAEDIC SURGERY

## 2020-04-20 PROCEDURE — 4040F PNEUMOC VAC/ADMIN/RCVD: CPT | Performed by: ORTHOPAEDIC SURGERY

## 2020-04-20 PROCEDURE — 3044F HG A1C LEVEL LT 7.0%: CPT | Performed by: ORTHOPAEDIC SURGERY

## 2020-04-20 PROCEDURE — 20550 NJX 1 TENDON SHEATH/LIGAMENT: CPT | Performed by: ORTHOPAEDIC SURGERY

## 2020-04-20 PROCEDURE — 99214 OFFICE O/P EST MOD 30 MIN: CPT | Performed by: ORTHOPAEDIC SURGERY

## 2020-04-20 PROCEDURE — 3008F BODY MASS INDEX DOCD: CPT | Performed by: ORTHOPAEDIC SURGERY

## 2020-04-20 PROCEDURE — 2022F DILAT RTA XM EVC RTNOPTHY: CPT | Performed by: ORTHOPAEDIC SURGERY

## 2020-04-20 PROCEDURE — 20600 DRAIN/INJ JOINT/BURSA W/O US: CPT | Performed by: ORTHOPAEDIC SURGERY

## 2020-04-20 PROCEDURE — 1036F TOBACCO NON-USER: CPT | Performed by: ORTHOPAEDIC SURGERY

## 2020-04-20 PROCEDURE — 3078F DIAST BP <80 MM HG: CPT | Performed by: ORTHOPAEDIC SURGERY

## 2020-04-20 RX ORDER — BETAMETHASONE SODIUM PHOSPHATE AND BETAMETHASONE ACETATE 3; 3 MG/ML; MG/ML
3 INJECTION, SUSPENSION INTRA-ARTICULAR; INTRALESIONAL; INTRAMUSCULAR; SOFT TISSUE
Status: COMPLETED | OUTPATIENT
Start: 2020-04-20 | End: 2020-04-20

## 2020-04-20 RX ORDER — BETAMETHASONE SODIUM PHOSPHATE AND BETAMETHASONE ACETATE 3; 3 MG/ML; MG/ML
6 INJECTION, SUSPENSION INTRA-ARTICULAR; INTRALESIONAL; INTRAMUSCULAR; SOFT TISSUE
Status: COMPLETED | OUTPATIENT
Start: 2020-04-20 | End: 2020-04-20

## 2020-04-20 RX ORDER — LIDOCAINE HYDROCHLORIDE 10 MG/ML
1 INJECTION, SOLUTION INFILTRATION; PERINEURAL
Status: COMPLETED | OUTPATIENT
Start: 2020-04-20 | End: 2020-04-20

## 2020-04-20 RX ORDER — LIDOCAINE HYDROCHLORIDE 10 MG/ML
0.5 INJECTION, SOLUTION INFILTRATION; PERINEURAL
Status: COMPLETED | OUTPATIENT
Start: 2020-04-20 | End: 2020-04-20

## 2020-04-20 RX ADMIN — LIDOCAINE HYDROCHLORIDE 1 ML: 10 INJECTION, SOLUTION INFILTRATION; PERINEURAL at 09:48

## 2020-04-20 RX ADMIN — LIDOCAINE HYDROCHLORIDE 0.5 ML: 10 INJECTION, SOLUTION INFILTRATION; PERINEURAL at 09:49

## 2020-04-20 RX ADMIN — BETAMETHASONE SODIUM PHOSPHATE AND BETAMETHASONE ACETATE 3 MG: 3; 3 INJECTION, SUSPENSION INTRA-ARTICULAR; INTRALESIONAL; INTRAMUSCULAR; SOFT TISSUE at 09:49

## 2020-04-20 RX ADMIN — BETAMETHASONE SODIUM PHOSPHATE AND BETAMETHASONE ACETATE 6 MG: 3; 3 INJECTION, SUSPENSION INTRA-ARTICULAR; INTRALESIONAL; INTRAMUSCULAR; SOFT TISSUE at 09:48

## 2020-06-09 ENCOUNTER — OFFICE VISIT (OUTPATIENT)
Dept: FAMILY MEDICINE CLINIC | Facility: HOSPITAL | Age: 85
End: 2020-06-09
Payer: MEDICARE

## 2020-06-09 VITALS
DIASTOLIC BLOOD PRESSURE: 80 MMHG | HEIGHT: 66 IN | HEART RATE: 83 BPM | WEIGHT: 171 LBS | BODY MASS INDEX: 27.48 KG/M2 | SYSTOLIC BLOOD PRESSURE: 130 MMHG | TEMPERATURE: 99.3 F

## 2020-06-09 DIAGNOSIS — R26.9 NEUROLOGIC GAIT DYSFUNCTION: Primary | ICD-10-CM

## 2020-06-09 DIAGNOSIS — R07.9 RECURRENT CHEST PAIN: ICD-10-CM

## 2020-06-09 DIAGNOSIS — R27.0 ATAXIA: ICD-10-CM

## 2020-06-09 PROCEDURE — 99214 OFFICE O/P EST MOD 30 MIN: CPT | Performed by: INTERNAL MEDICINE

## 2020-06-09 PROCEDURE — 4040F PNEUMOC VAC/ADMIN/RCVD: CPT | Performed by: INTERNAL MEDICINE

## 2020-06-09 PROCEDURE — 2022F DILAT RTA XM EVC RTNOPTHY: CPT | Performed by: INTERNAL MEDICINE

## 2020-06-09 PROCEDURE — 1160F RVW MEDS BY RX/DR IN RCRD: CPT | Performed by: INTERNAL MEDICINE

## 2020-06-09 PROCEDURE — 3075F SYST BP GE 130 - 139MM HG: CPT | Performed by: INTERNAL MEDICINE

## 2020-06-09 PROCEDURE — 3008F BODY MASS INDEX DOCD: CPT | Performed by: INTERNAL MEDICINE

## 2020-06-09 PROCEDURE — 3079F DIAST BP 80-89 MM HG: CPT | Performed by: INTERNAL MEDICINE

## 2020-06-09 PROCEDURE — 1036F TOBACCO NON-USER: CPT | Performed by: INTERNAL MEDICINE

## 2020-06-09 PROCEDURE — 3044F HG A1C LEVEL LT 7.0%: CPT | Performed by: INTERNAL MEDICINE

## 2020-06-10 ENCOUNTER — APPOINTMENT (OUTPATIENT)
Dept: LAB | Facility: HOSPITAL | Age: 85
End: 2020-06-10
Attending: INTERNAL MEDICINE
Payer: MEDICARE

## 2020-06-10 DIAGNOSIS — R26.9 NEUROLOGIC GAIT DYSFUNCTION: ICD-10-CM

## 2020-06-10 LAB
ALBUMIN SERPL BCP-MCNC: 3.8 G/DL (ref 3.5–5)
ALP SERPL-CCNC: 60 U/L (ref 46–116)
ALT SERPL W P-5'-P-CCNC: 38 U/L (ref 12–78)
ANION GAP SERPL CALCULATED.3IONS-SCNC: 5 MMOL/L (ref 4–13)
AST SERPL W P-5'-P-CCNC: 31 U/L (ref 5–45)
BILIRUB SERPL-MCNC: 1.12 MG/DL (ref 0.2–1)
BUN SERPL-MCNC: 15 MG/DL (ref 5–25)
CALCIUM SERPL-MCNC: 9 MG/DL (ref 8.3–10.1)
CHLORIDE SERPL-SCNC: 107 MMOL/L (ref 100–108)
CO2 SERPL-SCNC: 27 MMOL/L (ref 21–32)
CREAT SERPL-MCNC: 1.13 MG/DL (ref 0.6–1.3)
ERYTHROCYTE [DISTWIDTH] IN BLOOD BY AUTOMATED COUNT: 12.4 % (ref 11.6–15.1)
ERYTHROCYTE [SEDIMENTATION RATE] IN BLOOD: 29 MM/HOUR (ref 0–10)
GFR SERPL CREATININE-BSD FRML MDRD: 59 ML/MIN/1.73SQ M
GLUCOSE P FAST SERPL-MCNC: 145 MG/DL (ref 65–99)
HCT VFR BLD AUTO: 39.7 % (ref 36.5–49.3)
HGB BLD-MCNC: 13.3 G/DL (ref 12–17)
MCH RBC QN AUTO: 31 PG (ref 26.8–34.3)
MCHC RBC AUTO-ENTMCNC: 33.5 G/DL (ref 31.4–37.4)
MCV RBC AUTO: 93 FL (ref 82–98)
PLATELET # BLD AUTO: 74 THOUSANDS/UL (ref 149–390)
PMV BLD AUTO: 10 FL (ref 8.9–12.7)
POTASSIUM SERPL-SCNC: 3.9 MMOL/L (ref 3.5–5.3)
PROT SERPL-MCNC: 7.4 G/DL (ref 6.4–8.2)
RBC # BLD AUTO: 4.29 MILLION/UL (ref 3.88–5.62)
SODIUM SERPL-SCNC: 139 MMOL/L (ref 136–145)
WBC # BLD AUTO: 4.38 THOUSAND/UL (ref 4.31–10.16)

## 2020-06-10 PROCEDURE — 85652 RBC SED RATE AUTOMATED: CPT

## 2020-06-10 PROCEDURE — 85027 COMPLETE CBC AUTOMATED: CPT

## 2020-06-10 PROCEDURE — 80053 COMPREHEN METABOLIC PANEL: CPT

## 2020-06-10 PROCEDURE — 36415 COLL VENOUS BLD VENIPUNCTURE: CPT

## 2020-06-11 ENCOUNTER — EVALUATION (OUTPATIENT)
Dept: PHYSICAL THERAPY | Facility: CLINIC | Age: 85
End: 2020-06-11
Payer: MEDICARE

## 2020-06-11 ENCOUNTER — HOSPITAL ENCOUNTER (OUTPATIENT)
Dept: RADIOLOGY | Facility: HOSPITAL | Age: 85
Discharge: HOME | End: 2020-06-11
Attending: INTERNAL MEDICINE
Payer: MEDICARE

## 2020-06-11 DIAGNOSIS — R26.9 NEUROLOGIC GAIT DYSFUNCTION: ICD-10-CM

## 2020-06-11 DIAGNOSIS — R27.0 ATAXIA: ICD-10-CM

## 2020-06-11 DIAGNOSIS — R07.9 RECURRENT CHEST PAIN: ICD-10-CM

## 2020-06-11 PROCEDURE — 71046 X-RAY EXAM CHEST 2 VIEWS: CPT

## 2020-06-11 PROCEDURE — 97163 PT EVAL HIGH COMPLEX 45 MIN: CPT

## 2020-06-12 PROBLEM — J84.10 PULMONARY FIBROSIS (HCC): Status: ACTIVE | Noted: 2020-06-12

## 2020-06-15 ENCOUNTER — OFFICE VISIT (OUTPATIENT)
Dept: OBGYN CLINIC | Facility: CLINIC | Age: 85
End: 2020-06-15
Payer: MEDICARE

## 2020-06-15 VITALS
TEMPERATURE: 98.6 F | DIASTOLIC BLOOD PRESSURE: 78 MMHG | SYSTOLIC BLOOD PRESSURE: 120 MMHG | HEIGHT: 66 IN | WEIGHT: 172 LBS | BODY MASS INDEX: 27.64 KG/M2

## 2020-06-15 DIAGNOSIS — M18.0 ARTHRITIS OF CARPOMETACARPAL (CMC) JOINT OF BOTH THUMBS: Primary | ICD-10-CM

## 2020-06-15 PROCEDURE — 3008F BODY MASS INDEX DOCD: CPT | Performed by: ORTHOPAEDIC SURGERY

## 2020-06-15 PROCEDURE — 1160F RVW MEDS BY RX/DR IN RCRD: CPT | Performed by: ORTHOPAEDIC SURGERY

## 2020-06-15 PROCEDURE — 20600 DRAIN/INJ JOINT/BURSA W/O US: CPT | Performed by: ORTHOPAEDIC SURGERY

## 2020-06-15 PROCEDURE — 4040F PNEUMOC VAC/ADMIN/RCVD: CPT | Performed by: ORTHOPAEDIC SURGERY

## 2020-06-15 PROCEDURE — 3074F SYST BP LT 130 MM HG: CPT | Performed by: ORTHOPAEDIC SURGERY

## 2020-06-15 PROCEDURE — 99213 OFFICE O/P EST LOW 20 MIN: CPT | Performed by: ORTHOPAEDIC SURGERY

## 2020-06-15 PROCEDURE — 1036F TOBACCO NON-USER: CPT | Performed by: ORTHOPAEDIC SURGERY

## 2020-06-15 PROCEDURE — 3044F HG A1C LEVEL LT 7.0%: CPT | Performed by: ORTHOPAEDIC SURGERY

## 2020-06-15 PROCEDURE — 2022F DILAT RTA XM EVC RTNOPTHY: CPT | Performed by: ORTHOPAEDIC SURGERY

## 2020-06-15 PROCEDURE — 3078F DIAST BP <80 MM HG: CPT | Performed by: ORTHOPAEDIC SURGERY

## 2020-06-15 RX ORDER — METHYLPREDNISOLONE ACETATE 40 MG/ML
1 INJECTION, SUSPENSION INTRA-ARTICULAR; INTRALESIONAL; INTRAMUSCULAR; SOFT TISSUE
Status: COMPLETED | OUTPATIENT
Start: 2020-06-15 | End: 2020-06-15

## 2020-06-15 RX ORDER — LIDOCAINE HYDROCHLORIDE 10 MG/ML
0.5 INJECTION, SOLUTION INFILTRATION; PERINEURAL
Status: COMPLETED | OUTPATIENT
Start: 2020-06-15 | End: 2020-06-15

## 2020-06-15 RX ADMIN — METHYLPREDNISOLONE ACETATE 1 ML: 40 INJECTION, SUSPENSION INTRA-ARTICULAR; INTRALESIONAL; INTRAMUSCULAR; SOFT TISSUE at 18:17

## 2020-06-15 RX ADMIN — LIDOCAINE HYDROCHLORIDE 0.5 ML: 10 INJECTION, SOLUTION INFILTRATION; PERINEURAL at 18:17

## 2020-06-19 ENCOUNTER — HOSPITAL ENCOUNTER (OUTPATIENT)
Dept: MRI IMAGING | Facility: HOSPITAL | Age: 85
Discharge: HOME | End: 2020-06-19
Attending: INTERNAL MEDICINE
Payer: MEDICARE

## 2020-06-19 DIAGNOSIS — R27.0 ATAXIA: ICD-10-CM

## 2020-06-19 PROCEDURE — 70551 MRI BRAIN STEM W/O DYE: CPT

## 2020-06-22 ENCOUNTER — OFFICE VISIT (OUTPATIENT)
Dept: PHYSICAL THERAPY | Facility: CLINIC | Age: 85
End: 2020-06-22
Payer: MEDICARE

## 2020-06-22 DIAGNOSIS — R26.9 NEUROLOGIC GAIT DYSFUNCTION: Primary | ICD-10-CM

## 2020-06-22 DIAGNOSIS — R27.0 ATAXIA: ICD-10-CM

## 2020-06-22 PROCEDURE — 97110 THERAPEUTIC EXERCISES: CPT

## 2020-06-22 PROCEDURE — 97112 NEUROMUSCULAR REEDUCATION: CPT

## 2020-06-25 ENCOUNTER — OFFICE VISIT (OUTPATIENT)
Dept: PHYSICAL THERAPY | Facility: CLINIC | Age: 85
End: 2020-06-25
Payer: MEDICARE

## 2020-06-25 DIAGNOSIS — R26.9 NEUROLOGIC GAIT DYSFUNCTION: Primary | ICD-10-CM

## 2020-06-25 DIAGNOSIS — R27.0 ATAXIA: ICD-10-CM

## 2020-06-25 PROCEDURE — 97112 NEUROMUSCULAR REEDUCATION: CPT

## 2020-06-25 PROCEDURE — 97110 THERAPEUTIC EXERCISES: CPT

## 2020-06-30 ENCOUNTER — OFFICE VISIT (OUTPATIENT)
Dept: PHYSICAL THERAPY | Facility: CLINIC | Age: 85
End: 2020-06-30
Payer: MEDICARE

## 2020-06-30 DIAGNOSIS — R27.0 ATAXIA: ICD-10-CM

## 2020-06-30 DIAGNOSIS — R26.9 NEUROLOGIC GAIT DYSFUNCTION: Primary | ICD-10-CM

## 2020-06-30 PROCEDURE — 97112 NEUROMUSCULAR REEDUCATION: CPT

## 2020-06-30 PROCEDURE — 97110 THERAPEUTIC EXERCISES: CPT

## 2020-07-02 ENCOUNTER — OFFICE VISIT (OUTPATIENT)
Dept: PHYSICAL THERAPY | Facility: CLINIC | Age: 85
End: 2020-07-02
Payer: MEDICARE

## 2020-07-02 DIAGNOSIS — R27.0 ATAXIA: ICD-10-CM

## 2020-07-02 DIAGNOSIS — R26.9 NEUROLOGIC GAIT DYSFUNCTION: Primary | ICD-10-CM

## 2020-07-02 PROCEDURE — 97110 THERAPEUTIC EXERCISES: CPT

## 2020-07-02 PROCEDURE — 97112 NEUROMUSCULAR REEDUCATION: CPT

## 2020-07-02 NOTE — PROGRESS NOTES
Daily Note     Today's date: 2020  Patient name: Brent Knox  : 1935  MRN: [id number]  Referring provider: Jennifer Sawyer MD  Dx:   Encounter Diagnosis     ICD-10-CM    1  Neurologic gait dysfunction R26 9    2  Ataxia R27 0        Start Time: 50  Stop Time: 915  Total time in clinic (min): 42 minutes     Subjective:   Patient states he has no longer been feeling leg spasms  No falls noted  No current complaints  Objective: See treatment diary below      Assessment:  Patient able to progress partial tandem stance without loss of balance today  Patient has been performing unilateral stance in corner due to confusion, added EO/EC with instructions  Unilateral stance encouraged with protection for safety  Increased stability noted in gait with decreased hesitation  Encouraged ambulation with backpack with light weight as needed to encouraged joint compression  Patient voiced understanding  Pacing stressed to avoid fatigue  Plan: Continue PT per POC  No complaints post treatment       Precautions:  H/o DM, h/o depression, loss of wife 4 yrs ago  EPOC:  2020    TESTING  7/2      TUG 15 7          5x sit to stand 22 6          6 min walk test 560'          MARTIN 37/56          Neuro Re-Ed           Step taps 4x2 4# weights LE/ 15# weighted vest 10ea 4#, 15# vest 10ea 4#, 20# vest  20ea 4# 20# vest 20ea      Step ups   3 rise steps, 10ea no UE 4#, 15# vest        Foam beams   sidestepping 4# LE, 15# vest 4 laps sidestepping 4#, 20# weighted vest sidestepping 20@ vest, 4# LE 4 reps      Foam HT    FT HT 10ea FT HE 10ea      A/P sway on foam   30reps 30reps 30reps 30reps      sidestepping  30' x3  No UE weights 4 laps With cone taps, CTG needed 2 laps, 5 cone falls      EO/EC tandem     10sec [id number] 10sec x 10reps      Ther Ex           Sit to stand 5x2 10 , 15# weighted vest 10, 15# vest 10, 20 # vest 10, 20# vest, repeat with 11 # ball 10reps      Hip flex/ext/abd 10ea on foam 10ea 15# vest, 4# LE 10ea 20#, 4# LE 10ea 20# vest, 4# LE      squats  10 on foam 10 on foam 10 on foam 10                                                             Ther Activity                                 Gait Training                                 Modalities

## 2020-07-07 ENCOUNTER — OFFICE VISIT (OUTPATIENT)
Dept: PHYSICAL THERAPY | Facility: CLINIC | Age: 85
End: 2020-07-07
Payer: MEDICARE

## 2020-07-07 ENCOUNTER — OFFICE VISIT (OUTPATIENT)
Dept: FAMILY MEDICINE CLINIC | Facility: HOSPITAL | Age: 85
End: 2020-07-07
Payer: MEDICARE

## 2020-07-07 VITALS
HEIGHT: 66 IN | SYSTOLIC BLOOD PRESSURE: 120 MMHG | HEART RATE: 74 BPM | DIASTOLIC BLOOD PRESSURE: 78 MMHG | WEIGHT: 171 LBS | BODY MASS INDEX: 27.48 KG/M2 | TEMPERATURE: 97.2 F

## 2020-07-07 DIAGNOSIS — F32.A MINOR DEPRESSION: ICD-10-CM

## 2020-07-07 DIAGNOSIS — E11.9 CONTROLLED TYPE 2 DIABETES MELLITUS WITHOUT COMPLICATION, WITHOUT LONG-TERM CURRENT USE OF INSULIN (HCC): Primary | ICD-10-CM

## 2020-07-07 DIAGNOSIS — K22.2 ESOPHAGEAL STRICTURE: ICD-10-CM

## 2020-07-07 DIAGNOSIS — D69.6 THROMBOCYTOPENIA (HCC): ICD-10-CM

## 2020-07-07 DIAGNOSIS — R27.0 ATAXIA: ICD-10-CM

## 2020-07-07 DIAGNOSIS — I35.0 MODERATE AORTIC STENOSIS: ICD-10-CM

## 2020-07-07 DIAGNOSIS — R26.9 NEUROLOGIC GAIT DYSFUNCTION: Primary | ICD-10-CM

## 2020-07-07 DIAGNOSIS — E78.00 PURE HYPERCHOLESTEROLEMIA: ICD-10-CM

## 2020-07-07 PROCEDURE — 1036F TOBACCO NON-USER: CPT | Performed by: INTERNAL MEDICINE

## 2020-07-07 PROCEDURE — 1123F ACP DISCUSS/DSCN MKR DOCD: CPT | Performed by: INTERNAL MEDICINE

## 2020-07-07 PROCEDURE — 4040F PNEUMOC VAC/ADMIN/RCVD: CPT | Performed by: INTERNAL MEDICINE

## 2020-07-07 PROCEDURE — 1125F AMNT PAIN NOTED PAIN PRSNT: CPT | Performed by: INTERNAL MEDICINE

## 2020-07-07 PROCEDURE — 1170F FXNL STATUS ASSESSED: CPT | Performed by: INTERNAL MEDICINE

## 2020-07-07 PROCEDURE — 97112 NEUROMUSCULAR REEDUCATION: CPT

## 2020-07-07 PROCEDURE — 99214 OFFICE O/P EST MOD 30 MIN: CPT | Performed by: INTERNAL MEDICINE

## 2020-07-07 PROCEDURE — G0439 PPPS, SUBSEQ VISIT: HCPCS | Performed by: INTERNAL MEDICINE

## 2020-07-07 PROCEDURE — 3078F DIAST BP <80 MM HG: CPT | Performed by: INTERNAL MEDICINE

## 2020-07-07 PROCEDURE — 3044F HG A1C LEVEL LT 7.0%: CPT | Performed by: INTERNAL MEDICINE

## 2020-07-07 PROCEDURE — 97110 THERAPEUTIC EXERCISES: CPT

## 2020-07-07 PROCEDURE — 1160F RVW MEDS BY RX/DR IN RCRD: CPT | Performed by: INTERNAL MEDICINE

## 2020-07-07 PROCEDURE — 3008F BODY MASS INDEX DOCD: CPT | Performed by: INTERNAL MEDICINE

## 2020-07-07 PROCEDURE — 2022F DILAT RTA XM EVC RTNOPTHY: CPT | Performed by: INTERNAL MEDICINE

## 2020-07-07 PROCEDURE — 3074F SYST BP LT 130 MM HG: CPT | Performed by: INTERNAL MEDICINE

## 2020-07-07 NOTE — PROGRESS NOTES
Assessment and Plan:     Problem List Items Addressed This Visit     None      Visit Diagnoses     Medicare annual wellness visit, subsequent    -  Primary           Preventive health issues were discussed with patient, and age appropriate screening tests were ordered as noted in patient's After Visit Summary  Personalized health advice and appropriate referrals for health education or preventive services given if needed, as noted in patient's After Visit Summary       History of Present Illness:     Patient presents for Medicare Annual Wellness visit    Patient Care Team:  Vaibhav Chu MD as PCP - General (Internal Medicine)  Lisa Min MD (Colon and Rectal Surgery)     Problem List:     Patient Active Problem List   Diagnosis    Benign prostatic hyperplasia without lower urinary tract symptoms    Bilateral carotid artery stenosis    Pure hypercholesterolemia    Trigger middle finger of right hand    Moderate aortic stenosis    Controlled type 2 diabetes mellitus without complication, without long-term current use of insulin (HCC)    Trigger ring finger of right hand    Pharyngoesophageal dysphagia    Depression (emotion)    Arthropathy of right knee    Pulmonary fibrosis ([address] )      Past Medical and Surgical History:     Past Medical History:   Diagnosis Date    Aortic stenosis     Bacterial infection due to Helicobacter pylori     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     Colon polyp     Depression (emotion) 11/16/2018    GERD (gastroesophageal reflux disease)     Hyperlipidemia     Hypertension     Post herpetic neuralgia     Shingles      Past Surgical History:   Procedure Laterality Date    ANAL SPHINCTEROTOMY      BASAL CELL CARCINOMA EXCISION      CHOLECYSTECTOMY      OPEN REDUCTION SHOULDER DISLOCATION      ROTATOR CUFF REPAIR        Family History:     Family History   Problem Relation Age of Onset    Depression Mother     Hyperlipidemia Mother     Substance Abuse Neg Hx  Colon cancer Neg Hx     Colon polyps Neg Hx       Social History:        Social History     Socioeconomic History    Marital status:      Spouse name: None    Number of children: None    Years of education: None    Highest education level: None   Occupational History    Occupation: retired   Social Needs    Financial resource strain: None    Food insecurity:     Worry: None     Inability: None    Transportation needs:     Medical: None     Non-medical: None   Tobacco Use    Smoking status: Former Smoker    Smokeless tobacco: Former User     Quit date: 1/1/1976   Substance and Sexual Activity    Alcohol use: No    Drug use: No    Sexual activity: Not Currently   Lifestyle    Physical activity:     Days per week: None     Minutes per session: None    Stress: None   Relationships    Social connections:     Talks on phone: None     Gets together: None     Attends Gnosticism service: None     Active member of club or organization: None     Attends meetings of clubs or organizations: None     Relationship status: None    Intimate partner violence:     Fear of current or ex partner: None     Emotionally abused: None     Physically abused: None     Forced sexual activity: None   Other Topics Concern    None   Social History Narrative    None      Medications and Allergies:     Current Outpatient Medications   Medication Sig Dispense Refill    aspirin (ECOTRIN LOW STRENGTH) 81 mg EC tablet Take 1 tablet (81 mg total) by mouth daily      atorvastatin (LIPITOR) 10 mg tablet TAKE 1 TABLET BY MOUTH EVERY DAY 90 tablet 3    ketoconazole (NIZORAL) 2 % cream Apply topically daily 15 g 4    Multiple Vitamins-Minerals (MULTIVITAMIN ADULT PO) Take 1 capsule by mouth daily      TRUE METRIX BLOOD GLUCOSE TEST test strip Testing 1 time daily DX:E11 9 100 each 5     No current facility-administered medications for this visit        Allergies   Allergen Reactions    Metformin Diarrhea      Immunizations: Immunization History   Administered Date(s) Administered    INFLUENZA 08/22/2011, 10/08/2012, 09/24/2013, 10/10/2014, 08/18/2015, 08/15/2016, 08/28/2017, 08/27/2018    Influenza, high dose seasonal 0 5 mL 10/10/2019    Pneumococcal Conjugate 13-Valent 02/06/2015    Pneumococcal Polysaccharide PPV23 07/18/2007    Td (adult), Unspecified 07/18/2001, 06/09/2015    Zoster 09/03/2016, 10/02/2016      Health Maintenance:         Topic Date Due    CRC Screening: Colonoscopy  01/17/2022         Topic Date Due    DTaP,Tdap,and Td Vaccines (1 - Tdap) 05/21/1946    Influenza Vaccine  07/01/2020      Medicare Health Risk Assessment:     /60 (BP Location: Left arm, Patient Position: Sitting, Cuff Size: Standard)   Pulse 74   Temp (!) 97 2 °F (36 2 °C) (Tympanic)   Ht 5' 6" (1 676 m)   Wt 77 6 kg (171 lb)   BMI 27 60 kg/m²      Cristel Allen is here for his Subsequent Wellness visit  Health Risk Assessment:   Patient rates overall health as very good  Patient feels that their physical health rating is same  Eyesight was rated as same  Hearing was rated as same  Patient feels that their emotional and mental health rating is same  Pain experienced in the last 7 days has been none  Patient states that he has experienced no weight loss or gain in last 6 months  Depression Screening:   PHQ-2 Score: 0  PHQ-9 Score: 0      Fall Risk Screening: In the past year, patient has experienced: history of falling in past year    Number of falls: 2 or more  Injured during fall?: No    Feels unsteady when standing or walking?: Yes    Worried about falling?: No      Home Safety:  Patient does not have trouble with stairs inside or outside of their home  Patient has working smoke alarms and has working carbon monoxide detector  Home safety hazards include: none  Nutrition:   Current diet is Regular  Medications:   Patient is currently taking over-the-counter supplements   OTC medications include: see medication list  Patient is able to manage medications  Activities of Daily Living (ADLs)/Instrumental Activities of Daily Living (IADLs):   Walk and transfer into and out of bed and chair?: Yes  Dress and groom yourself?: Yes    Bathe or shower yourself?: Yes    Feed yourself? Yes  Do your laundry/housekeeping?: Yes  Manage your money, pay your bills and track your expenses?: Yes  Make your own meals?: Yes    Do your own shopping?: Yes    Previous Hospitalizations:   Any hospitalizations or ED visits within the last 12 months?: No      Advance Care Planning:   Living will: Yes    Durable POA for healthcare:  Yes    Advanced directive: Yes      Comments: Exercise-yes  Tobacco-no  ETOH-none    Immun-up to date    PREVENTIVE SCREENINGS      Cardiovascular Screening:    General: Screening Not Indicated and History Lipid Disorder      Diabetes Screening:     General: Screening Not Indicated and History Diabetes      Colorectal Cancer Screening:     General: Screening Current      Prostate Cancer Screening:    General: Screening Not Indicated      Osteoporosis Screening:    General: Screening Not Indicated      Abdominal Aortic Aneurysm (AAA) Screening:    Risk factors include: tobacco use        Lung Cancer Screening:     General: Screening Not Indicated      Hepatitis C Screening:    General: Screening Not Indicated      Adali Whyte MD

## 2020-07-07 NOTE — PROGRESS NOTES
Daily Note     Today's date: 2020  Patient name: Brent Knox  : 1935  MRN: [id number]  Referring provider: Jennifer Sawyer MD  Dx:   Encounter Diagnosis     ICD-10-CM    1  Neurologic gait dysfunction R26 9    2  Ataxia R27 0        Start Time: 1050  Stop Time: 1135  Total time in clinic (min): 45 minutes     Subjective:   Patient states he feels sluggish, the heat outside has had an impact  Objective: See treatment diary below      Assessment:  Nice gains noted with cone taps on foam beam, increased stability evident  Added BOSU squats with nice gains in balance  Unilateral stance improving with ability to hold 10sec  Continue to progress gait challenges to tolerance  Plan: Continue PT per POC  No complaints post treatment       Precautions:  H/o DM, h/o depression, loss of wife 4 yrs ago  EPOC:  2020    TESTING      TUG 15 7          5x sit to stand 22 6          6 min walk test 560'          MARTIN 37/56          Neuro Re-Ed           Step taps 4x2 4# weights LE/ 15# weighted vest 10ea 4#, 15# vest 10ea 4#, 20# vest  20ea 4# 20# vest 20ea 4#, 20# vest 20ea- FWD/LAT     Step ups   3 rise steps, 10ea no UE 4#, 15# vest   20# vest 4# L# 10ea     Foam beams   sidestepping 4# LE, 15# vest 4 laps sidestepping 4#, 20# weighted vest sidestepping 20@ vest, 4# LE 4 reps sidestepping 20# vest, 4# LE 2 laps, cone taps with only 2 cone falls     Foam HT    FT HT 10ea FT HE 10ea FT HT 10ea,      A/P sway on foam   30reps 30reps 30reps 30reps 30reps     sidestepping  30' x3  No UE weights 4 laps With cone taps, CTG needed 2 laps, 5 cone falls BOSU squats 10reps     EO/EC tandem     10sec [id number] 10sec x 10reps 10sec [id number]     Ther Ex           Sit to stand 5x2 10 , 15# weighted vest 10, 15# vest 10, 20 # vest 10, 20# vest, repeat with 11 # ball 10reps 10 x2 20# vest, 4# L#      Hip flex/ext/abd  10ea on foam 10ea 15# vest, 4# LE 10ea 20#, 4# LE 10ea 20# vest, 4# LE 10ea 20# vest, 4# LE     squats  10 on foam 10 on foam 10 on foam 10                                                             Ther Activity                                 Gait Training                                 Modalities

## 2020-07-07 NOTE — PROGRESS NOTES
Assessment/Plan:       Diagnoses and all orders for this visit:    Controlled type 2 diabetes mellitus without complication, without long-term current use of insulin (HCC)  -     Hemoglobin A1C; Future    Minor depression    Moderate aortic stenosis    Pure hypercholesterolemia    Thrombocytopenia (HCC)    Esophageal stricture  -     Ambulatory referral to Gastroenterology; Future          All of the above diagnoses have been assessed  Additional COMMENTS/PLAN: doing better      Subjective:      Patient ID: Nolvia Sims is a 81 y o  male  HPI       DM-using glimeperide 1/2 tab 3x per week  Hyperlipidemia- The patient is compliant with diet and taking meds  The patient understands the efficacy of the treatment in vascular prevention  Ataxia-this has improved with PT  Thrombocytopenia-this has been stable    GERD-was noted on UGI to have stricture-never had EGD- will refer      The following portions of the patient's history were revised and updated as appropriate: Problem list, allergies, med list, FH, SH, Past medical and surgical histories  Current Outpatient Medications   Medication Sig Dispense Refill    aspirin (ECOTRIN LOW STRENGTH) 81 mg EC tablet Take 1 tablet (81 mg total) by mouth daily      atorvastatin (LIPITOR) 10 mg tablet TAKE 1 TABLET BY MOUTH EVERY DAY 90 tablet 3    ketoconazole (NIZORAL) 2 % cream Apply topically daily 15 g 4    Multiple Vitamins-Minerals (MULTIVITAMIN ADULT PO) Take 1 capsule by mouth daily      TRUE METRIX BLOOD GLUCOSE TEST test strip Testing 1 time daily DX:E11 9 100 each 5     No current facility-administered medications for this visit  Review of Systems   HENT:        Has constant post nasal drip  All other systems reviewed and are negative          Objective:    /78 (BP Location: Left arm, Patient Position: Sitting, Cuff Size: Standard)   Pulse 74   Temp (!) 97 2 °F (36 2 °C) (Tympanic)   Ht 5' 6" (1 676 m)   Wt 77 6 kg (171 lb)   BMI 27 60 kg/m²     BP Readings from Last 3 Encounters:   07/07/20 120/78   06/15/20 120/78   06/09/20 130/80                  Wt Readings from Last 3 Encounters:   07/07/20 77 6 kg (171 lb)   06/15/20 78 kg (172 lb)   06/09/20 77 6 kg (171 lb)         Physical Exam   Constitutional: He appears well-developed and well-nourished  Neck: No JVD present  No thyromegaly present  Cardiovascular: Normal rate and regular rhythm  Murmur heard  Pulmonary/Chest: Effort normal and breath sounds normal    Abdominal: Soft  Bowel sounds are normal  There is no tenderness  Musculoskeletal: He exhibits no edema  Good pulses   Vitals reviewed  No visits with results within 2 Week(s) from this visit  Latest known visit with results is:   Appointment on 06/10/2020   Component Date Value Ref Range Status    WBC 06/10/2020 4 38  4 31 - 10 16 Thousand/uL Final    RBC 06/10/2020 4 29  3 88 - 5 62 Million/uL Final    Hemoglobin 06/10/2020 13 3  12 0 - 17 0 g/dL Final    Hematocrit 06/10/2020 39 7  36 5 - 49 3 % Final    MCV 06/10/2020 93  82 - 98 fL Final    MCH 06/10/2020 31 0  26 8 - 34 3 pg Final    MCHC 06/10/2020 33 5  31 4 - 37 4 g/dL Final    RDW 06/10/2020 12 4  11 6 - 15 1 % Final    Platelets 09/02/9440 74* 149 - 390 Thousands/uL Final    PREVIOUS MANUAL REVIEW    MPV 06/10/2020 10 0  8 9 - 12 7 fL Final    Sodium 06/10/2020 139  136 - 145 mmol/L Final    Potassium 06/10/2020 3 9  3 5 - 5 3 mmol/L Final    Chloride 06/10/2020 107  100 - 108 mmol/L Final    CO2 06/10/2020 27  21 - 32 mmol/L Final    ANION GAP 06/10/2020 5  4 - 13 mmol/L Final    BUN 06/10/2020 15  5 - 25 mg/dL Final    Creatinine 06/10/2020 1 13  0 60 - 1 30 mg/dL Final    Standardized to IDMS reference method    Glucose, Fasting 06/10/2020 145* 65 - 99 mg/dL Final      Specimen collection should occur prior to Sulfasalazine administration due to the potential for falsely depressed results   Specimen collection should occur prior to Sulfapyridine administration due to the potential for falsely elevated results   Calcium 06/10/2020 9 0  8 3 - 10 1 mg/dL Final    AST 06/10/2020 31  5 - 45 U/L Final      Specimen collection should occur prior to Sulfasalazine administration due to the potential for falsely depressed results   ALT 06/10/2020 38  12 - 78 U/L Final      Specimen collection should occur prior to Sulfasalazine and/or Sulfapyridine administration due to the potential for falsely depressed results   Alkaline Phosphatase 06/10/2020 60  46 - 116 U/L Final    Total Protein 06/10/2020 7 4  6 4 - 8 2 g/dL Final    Albumin 06/10/2020 3 8  3 5 - 5 0 g/dL Final    Total Bilirubin 06/10/2020 1 12* 0 20 - 1 00 mg/dL Final      Use of this assay is not recommended for patients undergoing treatment with eltrombopag due to the potential for falsely elevated results      eGFR 06/10/2020 59  ml/min/1 73sq m Final    Sed Rate 06/10/2020 29* 0 - 10 mm/hour Final         Genia Cooper MD

## 2020-07-07 NOTE — PATIENT INSTRUCTIONS
Medicare Preventive Visit Patient Instructions  Thank you for completing your Welcome to Medicare Visit or Medicare Annual Wellness Visit today  Your next wellness visit will be due in one year (7/7/2021)  The screening/preventive services that you may require over the next 5-10 years are detailed below  Some tests may not apply to you based off risk factors and/or age  Screening tests ordered at today's visit but not completed yet may show as past due  Also, please note that scanned in results may not display below  Preventive Screenings:  Service Recommendations Previous Testing/Comments   Colorectal Cancer Screening  · Colonoscopy    · Fecal Occult Blood Test (FOBT)/Fecal Immunochemical Test (FIT)  · Fecal DNA/Cologuard Test  · Flexible Sigmoidoscopy Age: 54-65 years old   Colonoscopy: every 10 years (May be performed more frequently if at higher risk)  OR  FOBT/FIT: every 1 year  OR  Cologuard: every 3 years  OR  Sigmoidoscopy: every 5 years  Screening may be recommended earlier than age 48 if at higher risk for colorectal cancer  Also, an individualized decision between you and your healthcare provider will decide whether screening between the ages of 74-80 would be appropriate   Colonoscopy: 01/17/2019  FOBT/FIT: Not on file  Cologuard: Not on file  Sigmoidoscopy: Not on file    Screening Current     Prostate Cancer Screening Individualized decision between patient and health care provider in men between ages of 53-78   Medicare will cover every 12 months beginning on the day after your 50th birthday PSA: No results in last 5 years     Screening Not Indicated     Hepatitis C Screening Once for adults born between [birth year] and 1965  More frequently in patients at high risk for Hepatitis C Hep C Antibody: Not on file       Diabetes Screening 1-2 times per year if you're at risk for diabetes or have pre-diabetes Fasting glucose: 145 mg/dL   A1C: 6 0 %    Screening Not Indicated  History Diabetes   Cholesterol Screening Once every 5 years if you don't have a lipid disorder  May order more often based on risk factors  Lipid panel: 11/22/2019    Screening Not Indicated  History Lipid Disorder      Other Preventive Screenings Covered by Medicare:  1  Abdominal Aortic Aneurysm (AAA) Screening: covered once if your at risk  You're considered to be at risk if you have a family history of AAA or a male between the age of 73-68 who smoking at least 100 cigarettes in your lifetime  2  Lung Cancer Screening: covers low dose CT scan once per year if you meet all of the following conditions: (1) Age 50-69; (2) No signs or symptoms of lung cancer; (3) Current smoker or have quit smoking within the last 15 years; (4) You have a tobacco smoking history of at least 30 pack years (packs per day x number of years you smoked); (5) You get a written order from a healthcare provider  3  Glaucoma Screening: covered annually if you're considered high risk: (1) You have diabetes OR (2) Family history of glaucoma OR (3)  aged 48 and older OR (3)  American aged 72 and older  3  Osteoporosis Screening: covered every 2 years if you meet one of the following conditions: (1) Have a vertebral abnormality; (2) On glucocorticoid therapy for more than 3 months; (3) Have primary hyperparathyroidism; (4) On osteoporosis medications and need to assess response to drug therapy  5  HIV Screening: covered annually if you're between the age of 12-76  Also covered annually if you are younger than 13 and older than 72 with risk factors for HIV infection  For pregnant patients, it is covered up to 3 times per pregnancy      Immunizations:  Immunization Recommendations   Influenza Vaccine Annual influenza vaccination during flu season is recommended for all persons aged >= 6 months who do not have contraindications   Pneumococcal Vaccine (Prevnar and Pneumovax)  * Prevnar = PCV13  * Pneumovax = PPSV23 Adults 25-60 years old: 1-3 doses may be recommended based on certain risk factors  Adults 72 years old: Prevnar (PCV13) vaccine recommended followed by Pneumovax (PPSV23) vaccine  If already received PPSV23 since turning 65, then PCV13 recommended at least one year after PPSV23 dose  Hepatitis B Vaccine 3 dose series if at intermediate or high risk (ex: diabetes, end stage renal disease, liver disease)   Tetanus (Td) Vaccine - COST NOT COVERED BY MEDICARE PART B Following completion of primary series, a booster dose should be given every 10 years to maintain immunity against tetanus  Td may also be given as tetanus wound prophylaxis  Tdap Vaccine - COST NOT COVERED BY MEDICARE PART B Recommended at least once for all adults  For pregnant patients, recommended with each pregnancy  Shingles Vaccine (Shingrix) - COST NOT COVERED BY MEDICARE PART B  2 shot series recommended in those aged 48 and above     Health Maintenance Due:      Topic Date Due    CRC Screening: Colonoscopy  01/17/2022     Immunizations Due:      Topic Date Due    DTaP,Tdap,and Td Vaccines (1 - Tdap) 05/21/1946    Influenza Vaccine  07/01/2020     Advance Directives   What are advance directives? Advance directives are legal documents that state your wishes and plans for medical care  These plans are made ahead of time in case you lose your ability to make decisions for yourself  Advance directives can apply to any medical decision, such as the treatments you want, and if you want to donate organs  What are the types of advance directives? There are many types of advance directives, and each state has rules about how to use them  You may choose a combination of any of the following:  · Living will: This is a written record of the treatment you want  You can also choose which treatments you do not want, which to limit, and which to stop at a certain time  This includes surgery, medicine, IV fluid, and tube feedings     · Durable power of  for healthcare [facility]): This is a written record that states who you want to make healthcare choices for you when you are unable to make them for yourself  This person, called a proxy, is usually a family member or a friend  You may choose more than 1 proxy  · Do not resuscitate (DNR) order:  A DNR order is used in case your heart stops beating or you stop breathing  It is a request not to have certain forms of treatment, such as CPR  A DNR order may be included in other types of advance directives  · Medical directive: This covers the care that you want if you are in a coma, near death, or unable to make decisions for yourself  You can list the treatments you want for each condition  Treatment may include pain medicine, surgery, blood transfusions, dialysis, IV or tube feedings, and a ventilator (breathing machine)  · Values history: This document has questions about your views, beliefs, and how you feel and think about life  This information can help others choose the care that you would choose  Why are advance directives important? An advance directive helps you control your care  Although spoken wishes may be used, it is better to have your wishes written down  Spoken wishes can be misunderstood, or not followed  Treatments may be given even if you do not want them  An advance directive may make it easier for your family to make difficult choices about your care  Fall Prevention    Fall prevention  includes ways to make your home and other areas safer  It also includes ways you can move more carefully to prevent a fall  Health conditions that cause changes in your blood pressure, vision, or muscle strength and coordination may increase your risk for falls  Medicines may also increase your risk for falls if they make you dizzy, weak, or sleepy  Fall prevention tips:   · Stand or sit up slowly  · Use assistive devices as directed  · Wear shoes that fit well and have soles that   · Wear a personal alarm      · Stay active  · Manage your medical conditions  Home Safety Tips:  · Add items to prevent falls in the bathroom  · Keep paths clear  · Install bright lights in your home  · Keep items you use often on shelves within reach  · Paint or place reflective tape on the edges of your stairs  Weight Management   Why it is important to manage your weight:  Being overweight increases your risk of health conditions such as heart disease, high blood pressure, type 2 diabetes, and certain types of cancer  It can also increase your risk for osteoarthritis, sleep apnea, and other respiratory problems  Aim for a slow, steady weight loss  Even a small amount of weight loss can lower your risk of health problems  How to lose weight safely:  A safe and healthy way to lose weight is to eat fewer calories and get regular exercise  You can lose up about 1 pound a week by decreasing the number of calories you eat by 500 calories each day  Healthy meal plan for weight management:  A healthy meal plan includes a variety of foods, contains fewer calories, and helps you stay healthy  A healthy meal plan includes the following:  · Eat whole-grain foods more often  A healthy meal plan should contain fiber  Fiber is the part of grains, fruits, and vegetables that is not broken down by your body  Whole-grain foods are healthy and provide extra fiber in your diet  Some examples of whole-grain foods are whole-wheat breads and pastas, oatmeal, brown rice, and bulgur  · Eat a variety of vegetables every day  Include dark, leafy greens such as spinach, kale, rodney greens, and mustard greens  Eat yellow and orange vegetables such as carrots, sweet potatoes, and winter squash  · Eat a variety of fruits every day  Choose fresh or canned fruit (canned in its own juice or light syrup) instead of juice  Fruit juice has very little or no fiber  · Eat low-fat dairy foods  Drink fat-free (skim) milk or 1% milk   Eat fat-free yogurt and low-fat cottage cheese  Try low-fat cheeses such as mozzarella and other reduced-fat cheeses  · Choose meat and other protein foods that are low in fat  Choose beans or other legumes such as split peas or lentils  Choose fish, skinless poultry (chicken or turkey), or lean cuts of red meat (beef or pork)  Before you cook meat or poultry, cut off any visible fat  · Use less fat and oil  Try baking foods instead of frying them  Add less fat, such as margarine, sour cream, regular salad dressing and mayonnaise to foods  Eat fewer high-fat foods  Some examples of high-fat foods include french fries, doughnuts, ice cream, and cakes  · Eat fewer sweets  Limit foods and drinks that are high in sugar  This includes candy, cookies, regular soda, and sweetened drinks  Exercise:  Exercise at least 30 minutes per day on most days of the week  Some examples of exercise include walking, biking, dancing, and swimming  You can also fit in more physical activity by taking the stairs instead of the elevator or parking farther away from stores  Ask your healthcare provider about the best exercise plan for you  © Copyright [employer] 2018 Information is for End User's use only and may not be sold, redistributed or otherwise used for commercial purposes   All illustrations and images included in CareNotes® are the copyrighted property of A D A M , Inc  or [address] [place] [address] [employer]

## 2020-07-09 ENCOUNTER — OFFICE VISIT (OUTPATIENT)
Dept: PHYSICAL THERAPY | Facility: CLINIC | Age: 85
End: 2020-07-09
Payer: MEDICARE

## 2020-07-09 DIAGNOSIS — R27.0 ATAXIA: ICD-10-CM

## 2020-07-09 DIAGNOSIS — R26.9 NEUROLOGIC GAIT DYSFUNCTION: Primary | ICD-10-CM

## 2020-07-09 PROCEDURE — 97112 NEUROMUSCULAR REEDUCATION: CPT

## 2020-07-09 PROCEDURE — 97110 THERAPEUTIC EXERCISES: CPT

## 2020-07-09 NOTE — PROGRESS NOTES
Daily Note     Today's date: 2020  Patient name: Jose Daniel Jain  : 1935  MRN: [id number]  Referring provider: Luann Gaucher, MD  Dx:   Encounter Diagnosis     ICD-10-CM    1  Neurologic gait dysfunction R26 9    2  Ataxia R27 0        Start Time: 60  Stop Time: 1150  Total time in clinic (min): 45 minutes     Subjective:   Patient states he met with the MD and is pleased with how his strength is improving  Patient indicates he mowed his front lawn without incident and feels he is moving better  Objective: See treatment diary below      Assessment:   20# weighted vest and 4 #LE weights t/o session  Introduced foam beam with heel toe with hurdles  Tandem stance with increased difficulty with right lead  Nice gains noted in cone taps with unilateral stance on foam laterally with nice pacing  Patient instructed to continue tandem stance in corner for safety alternating LE, patient voiced understanding  Plan: Continue PT per POC  No complaints post treatment       Precautions:  H/o DM, h/o depression, loss of wife 4 yrs ago  EPOC:  2020    TESTING     TUG 15 7          5x sit to stand 22 6          6 min walk test 560'          MARTIN 37/56          Neuro Re-Ed           Step taps 4x2 4# weights LE/ 15# weighted vest 10ea 4#, 15# vest 10ea 4#, 20# vest  20ea 4# 20# vest 20ea 4#, 20# vest 20ea- FWD/LAT Foam beam heel to toe over hurdles x4 laps, CTG    Step ups   3 rise steps, 10ea no UE 4#, 15# vest   20# vest 4# L# 10ea     Foam beams   sidestepping 4# LE, 15# vest 4 laps sidestepping 4#, 20# weighted vest sidestepping 20@ vest, 4# LE 4 reps sidestepping 20# vest, 4# LE 2 laps, cone taps with only 2 cone falls sidestepping 2 laps, 2 with cone taps, 2 cone falls, increased unilateral hold    Foam HT    FT HT 10ea FT HE 10ea FT HT 10ea,  FT HT 10ea    A/P sway on foam   30reps 30reps 30reps 30reps 30reps     sidestepping  30' x3  No UE weights 4 laps With cone taps, CTG needed 2 laps, 5 cone falls BOSU squats 10reps BOSU sqats 10reps    EO/EC tandem     10sec [id number] 10sec x 10reps 10sec [id number] 10sec x5    Ther Ex           Sit to stand 5x2 10 , 15# weighted vest 10, 15# vest 10, 20 # vest 10, 20# vest, repeat with 11 # ball 10reps 10 x2 20# vest, 4# L#  10x2 20# vest, 4# LE, 7 # ball    Hip flex/ext/abd  10ea on foam 10ea 15# vest, 4# LE 10ea 20#, 4# LE 10ea 20# vest, 4# LE 10ea 20# vest, 4# LE 10ea20# vest, 4# LE    squats  10 on foam 10 on foam 10 on foam 10                                                             Ther Activity                                 Gait Training                                 Modalities

## 2020-07-14 ENCOUNTER — OFFICE VISIT (OUTPATIENT)
Dept: PHYSICAL THERAPY | Facility: CLINIC | Age: 85
End: 2020-07-14
Payer: MEDICARE

## 2020-07-14 DIAGNOSIS — R27.0 ATAXIA: ICD-10-CM

## 2020-07-14 DIAGNOSIS — R26.9 NEUROLOGIC GAIT DYSFUNCTION: Primary | ICD-10-CM

## 2020-07-14 PROCEDURE — 97112 NEUROMUSCULAR REEDUCATION: CPT

## 2020-07-14 PROCEDURE — 97110 THERAPEUTIC EXERCISES: CPT

## 2020-07-14 NOTE — PROGRESS NOTES
PT PROGRESS NOTE / DAILY NOTE    Today's date: 2020  Patient name: Dariel Hamlin  : 1935  MRN: [id number]  Referring provider: Nelli Henriquez MD  Dx:   Encounter Diagnosis     ICD-10-CM    1  Neurologic gait dysfunction R26 9    2  Ataxia R27 0        Start Time: 920  Stop Time: 1005  Total time in clinic (min): 45 minutes    Assessment  Assessment details: Patient presents to skilled PT due to increased difficulty moving with history of recent falls  Patient reports near falls occurring five times a Day Patient displays Normal muscle strength with overall grade of 5/5  Patient sensation is Normal throughout lower extremities  Patient coordination is Abnormal per alterate toe tapping and heel to shin test    Patient balance scores are as follows: 37/56 MARTIN, 15 7 seconds TUG without Assistive Device, with overall results noting high risk of falls  Patient endurance scores are as follows; 560 feet with  6 minute walk test without device 22 6  seconds with 5 x sit to stand test with overall results noting decrease functional endurance and cardio capacity  Patient displays overall reduction in somatosensory/ proprioception awareness secondary to increased LOB with FT position, (+) Romberg EC 15sec  Patient subjective report notes the following functional limitation with difficulty getting out of a chair and difficulty walking  Patient will benefit from skilled PT to address noted impairments and functional limitations they are causing with overall goal to return patient to highest level possible with reduced risk for falls  Please contact me if you have any questions or recommendations  Thank you for the referral and the opportunity to share in Alma's care    Patient has made substantial gains with PT improving TUG to 9 3 sec a 6 3sec improvement from initial eval   Endurance has substantially improved with ability to perform 5x sit to  6 4sec a 16 2sec improvement    6 minute walk test with 140' improvement to 700' with improved bradykinesia and no longer demonstrating shuffling gait pattern  MARTIN scoring has also substantially improved to 52/56, a 15 point improvement from previous  Patient continues to benefit from skilled intervention to facilitate maximal functional stability and strength  Patient verbalized understanding of POC              Impairments: abnormal coordination, abnormal gait, abnormal movement, activity intolerance, impaired balance, impaired physical strength, safety issue and poor posture   Understanding of Dx/Px/POC: good   Prognosis: good    Goals  Goals  STG 30 days    Patient will improve static balance with feet together Eyes closed to 30sec- MET  Patient will achieve 100 feet improvement with overall distance achieved of 650 feet with 6 minute walk test - Exceeded  Patient will display 2 3  second improvement with overall score of 13 4 seconds  with TUG test or lower with noted improvement being Minimal Detectable Change pre current research standards with fall risk- Exceeded    Patient will achieve 43/56 MARTIN score with minimal improve by 6 points or more demonstrating Minimal Detectable change per current research standards for this objective test which assess fall risk- Exceeded  Patient will perform  5 x sit to stand test with overall reduction by 3 seconds to 19 6 sec score indicating improvement with functional endurance- Exceeded  Patient will be independent in [facility] for generalized balance and endurance training- MET    LT days   Patient will score low risk for falls with 3/4 fall risk measures   Patient will display 2 3  second improvement with overall score of 13 4 seconds  with TUG test or lower with noted improvement being Minimal Detectable Change pre current research standards with fall risk    Patient will achieve 190 feet improvement with overall distance achieved of 750 feet with 6 minute walk test which is Minimal Detectable Change pre current research standards with endurance to demonstrate enhance functional capacity   Patient will be able to perform floor transfer without physical assistance   Patient will be able to carry objects without loss of balance  Patient will be able to ambulate outdoors without any loss of balance  Patient will be independent in walking program to allow for maximal mobility and safety in all community distances    Cut off score   All date taken from [facility] Neuro Section or Rehab Measures    MARTIN test: 46/56                                              5 x STS Test:  MDC: 6 points                                                  MDC: 2 3 seconds   age norms                                                                 Age Norms   61-76 year old = M: 54, F: 54                        61-76 year old: 11 4 seconds   66-77 year old = M 47,  F: 48                       66-77 year old: 12 6 seconds    80-80 year old = M46,   F: 48                       80-80 year old: 14 8 seconds     TUG test:                                                                     10 Meter Walk Test:  MDC: 4 14 seconds       MDC:  59 ft/sec  Cut off score for Falls                                                  Age Norms  > 13 5 seconds community dwelling adults                20-29; M: 4 56 ft/sec F: 4 62 ft/sec  > 32 2 Frail Elderly                                                     30-39: M 4 76 ft/sec  F: 4 68 ft/sec          40-49: M: 4 79 ft/sec  F: 4 62 ft/sec  6 Minute Walk Test      50-59: M: 4 76 ft/sec  F: 4 56 ft/sec  MDC: 190 feet       60-69: M: 4 56 ft/sec  F: 4 26 ft/sec  Age Norms       70-+    M: 4 36 ft/sec  F: 4 16 ft/sec  60-69:    M: 1876 F: 1765  70-79:    M: 1729 F: 1545  80-89 +: M: 80 F; 1286     Plan  Patient would benefit from: skilled physical therapy  Planned modality interventions: biofeedback  Planned therapy interventions: manual therapy, motor coordination training, neuromuscular re-education, patient education, postural training, sensory integrative techniques, strengthening, stretching, therapeutic activities, therapeutic exercise, gait training, home exercise program, coordination and balance  Frequency: 2x week  Duration in weeks: 6  Treatment plan discussed with: patient        Subjective Evaluation    History of Present Illness  Mechanism of injury: Patient indicates he has near falls occurring five times a day  Patient tends to fall forward  No current complaints of pain    No current complaints  No falls noted  Pain  Current pain ratin  At best pain ratin  At worst pain ratin    Social Support  Steps to enter house: yes  3  Stairs in house: no   Lives in: [employer] [facility]  Lives with: alone    Employment status: not working    Diagnostic Tests    FCE comments: CT pending per patientPatient Goals  Patient goals for therapy: improved balance  Patient goal: moving more easily without fear of falls        Objective   Neuro Exam:     Sensation   Light touch LE: left WNL and right WNL    Coordination   Finger to nose: left WNL and right WNL  Rapid alternating movements: UE impaired and LE impaired    Transfers Sit to stand: independent Sit to supine: independent Supine to sit: independent   Roll: independent      NEURO:  Negative drift, slight increase in tone noted with occasional intentional clonus LEs  Finger to nose intact, NUSRAT slightly arrhythmical with ataxia evident  Strength all 4 extremities 5/5, ROM WFL all 4 extremities  BALANCE:  Romberg EO (-), EC (+) 15 sec,  Sharpened romberg EO assist to hold position (+) 8sec  Unilateral stance 3sec each  Slightly delayed ankle and hip strategies with increase in LOB posteriorly with perturbation    Patient is now able to perform Romberg EC 30 sec, now (-)  Sharpened romberg EO hold 25sec  GAIT:  Decreased dilshad and stride length, shuffling pattern with bradykinetic initiation noted  Increased ELO noted    Increased hip pain noted left with prolonged ambulation  7/14  No hip pain noted with prolonged ambulation  Improved gait pattern with no shuffling noted, decreased bradykinesia noted  POSTURE:  Increased forward flexion , downward gaze in gait  7/14  Corrected eye position in gait             Precautions:  H/o DM, h/o depression, loss of wife 4 yrs ago  EPOC:  8/20/2020    TESTING 6/11 7/14           TUG 15 7 9 38sec           5x sit to stand 22 6 6 4sec           6 min walk test 560' 700'           MARTIN 37/56 52/56           Neuro Re-Ed             Step taps 4x2 10ea           Step ups             Foam beams  2 laps           Foam HT                                                    Ther Ex             Sit to stand 5x2 10 w/ 20# vest and 4# LE           Hip flex/ext/abd  20# vest/ 4# LE 10ea           squats                                                                              Ther Activity                                       Gait Training                                       Modalities

## 2020-07-16 ENCOUNTER — APPOINTMENT (OUTPATIENT)
Dept: PHYSICAL THERAPY | Facility: CLINIC | Age: 85
End: 2020-07-16
Payer: MEDICARE

## 2020-07-21 ENCOUNTER — OFFICE VISIT (OUTPATIENT)
Dept: PHYSICAL THERAPY | Facility: CLINIC | Age: 85
End: 2020-07-21
Payer: MEDICARE

## 2020-07-21 DIAGNOSIS — R27.0 ATAXIA: ICD-10-CM

## 2020-07-21 DIAGNOSIS — R26.9 NEUROLOGIC GAIT DYSFUNCTION: Primary | ICD-10-CM

## 2020-07-21 PROCEDURE — 97112 NEUROMUSCULAR REEDUCATION: CPT

## 2020-07-21 PROCEDURE — 97110 THERAPEUTIC EXERCISES: CPT

## 2020-07-21 NOTE — PROGRESS NOTES
Daily Note     Today's date: 2020  Patient name: Marcela Holliday  : 1935  MRN: [id number]  Referring provider: Cristóbal Dailey MD  Dx:   Encounter Diagnosis     ICD-10-CM    1  Neurologic gait dysfunction R26 9    2  Ataxia R27 0        Start Time: 915  Stop Time: 1005  Total time in clinic (min): 50 minutes     Subjective:   Patient states he had several teeth pulled with abscess noted  Jaw pain continues to be noted  He has not been able to do his exercises and has lost weight due to not eating  Objective: See treatment diary below      Assessment:    20# vest and 4# LE weights worn t/o session  Despite recent oral surgery, patient continues to progress nicely with improved stability in tandem stance  Introduced unilateral stance on TB wedge with cone taps  BOSU balance with improved stability  Encouraged continued exercises at home advancing tandem stance with EO HT  Patient voiced understanding to all instructions  Plan: Continue PT per POC  No complaints post treatment       Precautions:  H/o DM, h/o depression, loss of wife 4 yrs ago  EPOC:  2020    TESTING  7 7   TUG          5x sit to stand          6 min walk test          MARTIN          Neuro Re-Ed          Step taps 4# weights LE/ 15# weighted vest 10ea 4#, 15# vest 10ea 4#, 20# vest  20ea 4# 20# vest 20ea 4#, 20# vest 20ea- FWD/LAT Foam beam heel to toe over hurdles x4 laps, CTG TB unilateral stance cone taps, 4x5ea   Step ups  3 rise steps, 10ea no UE 4#, 15# vest   20# vest 4# L# 10ea  10ea FWD/LAT   Foam beams  sidestepping 4# LE, 15# vest 4 laps sidestepping 4#, 20# weighted vest sidestepping 20@ vest, 4# LE 4 reps sidestepping 20# vest, 4# LE 2 laps, cone taps with only 2 cone falls sidestepping 2 laps, 2 with cone taps, 2 cone falls, increased unilateral hold Tandem gait 2reps   Foam HT   FT HT 10ea FT HE 10ea FT HT 10ea,  FT HT 10ea Tandem hold on foam 30sec x4   A/P sway on foam  30reps 30reps 30reps 30reps 30reps  On BOSU 30reps   sidestepping 30' x3  No UE weights 4 laps With cone taps, CTG needed 2 laps, 5 cone falls BOSU squats 10reps BOSU sqats 10reps BOSU squats 10reps   EO/EC tandem    10sec [id number] 10sec x 10reps 10sec [id number] 10sec x5 10sec x10   Ther Ex          Sit to stand 10 , 15# weighted vest 10, 15# vest 10, 20 # vest 10, 20# vest, repeat with 11 # ball 10reps 10 x2 20# vest, 4# L#  10x2 20# vest, 4# LE, 7 # ball 10x2 w/ 7# ball   Hip flex/ext/abd 10ea on foam 10ea 15# vest, 4# LE 10ea 20#, 4# LE 10ea 20# vest, 4# LE 10ea 20# vest, 4# LE 10ea20# vest, 4# LE 10ea   squats 10 on foam 10 on foam 10 on foam 10                                                        Ther Activity                              Gait Training                              Modalities

## 2020-07-23 ENCOUNTER — OFFICE VISIT (OUTPATIENT)
Dept: PHYSICAL THERAPY | Facility: CLINIC | Age: 85
End: 2020-07-23
Payer: MEDICARE

## 2020-07-23 DIAGNOSIS — R27.0 ATAXIA: ICD-10-CM

## 2020-07-23 DIAGNOSIS — R26.9 NEUROLOGIC GAIT DYSFUNCTION: Primary | ICD-10-CM

## 2020-07-23 PROCEDURE — 97110 THERAPEUTIC EXERCISES: CPT

## 2020-07-23 PROCEDURE — 97112 NEUROMUSCULAR REEDUCATION: CPT

## 2020-07-23 NOTE — PROGRESS NOTES
Daily Note     Today's date: 2020  Patient name: Ambar Cornell  : 1935  MRN: [id number]  Referring provider: Astrid Valencia MD  Dx:   Encounter Diagnosis     ICD-10-CM    1  Neurologic gait dysfunction R26 9    2  Ataxia R27 0        Start Time: 915  Stop Time: 1000  Total time in clinic (min): 45 minutes     Subjective:   Patient has no current complaints of pain  Continues to be limited in his eating due to teeth issues  Objective: See treatment diary below      Assessment:    20# vest and 4# LE weights worn t/o session  Introduced color taps on foam with sequencing with good tolerance  Occasional posterior LOB noted with sidestepping on foam beam  Patient progressing nicely with tandem stance with increased stability and decreased LOB noted  Plan: Continue PT per POC  No complaints post treatment       Precautions:  H/o DM, h/o depression, loss of wife 4 yrs ago  EPOC:  2020    TESTING    TUG          5x sit to stand          6 min walk test          MARTIN          Neuro Re-Ed          Step taps 4#, 15# vest 10ea 4#, 20# vest  20ea 4# 20# vest 20ea 4#, 20# vest 20ea- FWD/LAT Foam beam heel to toe over hurdles x4 laps, CTG TB unilateral stance cone taps, 4x5ea Foam unilateral stance color taps 4x6 with sequencing   Step ups 3 rise steps, 10ea no UE 4#, 15# vest   20# vest 4# L# 10ea  10ea FWD/LAT 10ea FWD/ LAT   Foam beams sidestepping 4# LE, 15# vest 4 laps sidestepping 4#, 20# weighted vest sidestepping 20@ vest, 4# LE 4 reps sidestepping 20# vest, 4# LE 2 laps, cone taps with only 2 cone falls sidestepping 2 laps, 2 with cone taps, 2 cone falls, increased unilateral hold Tandem gait 2reps tandem gait 2 reps;  sidestepping on foam beam 2 laps, cone taps 7cones x2   Foam HT  FT HT 10ea FT HE 10ea FT HT 10ea,  FT HT 10ea Tandem hold on foam 30sec x4 tandem hold on foam 30 sec x4   A/P sway on foam  30reps 30reps 30reps 30reps  On BOSU 30reps On BOSU 30reps   sidestepping  No UE weights 4 laps With cone taps, CTG needed 2 laps, 5 cone falls BOSU squats 10reps BOSU sqats 10reps BOSU squats 10reps BOSU squats 10reps   EO/EC tandem   10sec [id number] 10sec x 10reps 10sec [id number] 10sec x5 10sec x10 10sec x10 full tandem   Ther Ex          Sit to stand 10, 15# vest 10, 20 # vest 10, 20# vest, repeat with 11 # ball 10reps 10 x2 20# vest, 4# L#  10x2 20# vest, 4# LE, 7 # ball 10x2 w/ 7# ball 10x2 w/ 7# ball   Hip flex/ext/abd 10ea 15# vest, 4# LE 10ea 20#, 4# LE 10ea 20# vest, 4# LE 10ea 20# vest, 4# LE 10ea20# vest, 4# LE 10ea 10ea   squats 10 on foam 10 on foam 10                                                         Ther Activity                              Gait Training                              Modalities

## 2020-07-24 ENCOUNTER — OFFICE VISIT (OUTPATIENT)
Dept: GASTROENTEROLOGY | Facility: CLINIC | Age: 85
End: 2020-07-24
Payer: MEDICARE

## 2020-07-24 VITALS
WEIGHT: 167 LBS | DIASTOLIC BLOOD PRESSURE: 56 MMHG | HEART RATE: 72 BPM | SYSTOLIC BLOOD PRESSURE: 116 MMHG | BODY MASS INDEX: 26.84 KG/M2 | TEMPERATURE: 98.4 F | HEIGHT: 66 IN

## 2020-07-24 DIAGNOSIS — K21.9 GASTROESOPHAGEAL REFLUX DISEASE, ESOPHAGITIS PRESENCE NOT SPECIFIED: ICD-10-CM

## 2020-07-24 DIAGNOSIS — R13.14 PHARYNGOESOPHAGEAL DYSPHAGIA: Primary | ICD-10-CM

## 2020-07-24 DIAGNOSIS — K22.2 ESOPHAGEAL STRICTURE: ICD-10-CM

## 2020-07-24 PROCEDURE — 3044F HG A1C LEVEL LT 7.0%: CPT | Performed by: INTERNAL MEDICINE

## 2020-07-24 PROCEDURE — 99214 OFFICE O/P EST MOD 30 MIN: CPT | Performed by: INTERNAL MEDICINE

## 2020-07-24 RX ORDER — PANTOPRAZOLE SODIUM 40 MG/1
40 TABLET, DELAYED RELEASE ORAL DAILY
Qty: 30 TABLET | Refills: 2 | Status: SHIPPED | OUTPATIENT
Start: 2020-07-24 | End: 2020-11-09 | Stop reason: ALTCHOICE

## 2020-07-24 NOTE — LETTER
July 24, 2020     MD Vida Matute  [address] [address]    Patient: Kevin Baxter   YOB: 1935   Date of Visit: 7/24/2020       Dear Dr Janes Burrows:    Thank you for referring Galileo Nguyễn to me for evaluation  Below are my notes for this consultation  If you have questions, please do not hesitate to call me  I look forward to following your patient along with you  Sincerely,        Cecelia Marshall MD        CC: No Recipients  Cecelia Marshall MD  7/24/2020  5:15 PM  Sign at close encounter    [id number] [facility] - Outpatient Consultation  Kevin Baxter 81 y o  male MRN: [id number]  Encounter: [id number]    ASSESSMENT AND PLAN:      1  Pharyngoesophageal dysphagia  Although patient feels that his symptoms stem mostly from sinus congestion and inability to clear it a try to explain that he likely has a combination of sinus drainage along with reflux and poor esophageal clearance due to the esophageal stricture  The stricture is most likely a peptic stricture from chronic reflux  Neoplasm or Obrien's should be excluded  Advise acid suppression and planned endoscopy with dilatation  · Reflux diet and precautions  · Start PPI daily  · Schedule EGD with dilatation  - pantoprazole (PROTONIX) 40 mg tablet; Take 1 tablet (40 mg total) by mouth daily  Dispense: 30 tablet; Refill: 2    2  Esophageal stricture  Documented on barium swallow  Differential diagnosis and plan as outlined above  - pantoprazole (PROTONIX) 40 mg tablet; Take 1 tablet (40 mg total) by mouth daily  Dispense: 30 tablet; Refill: 2    3   Gastroesophageal reflux disease, esophagitis presence not specified  As above      Followup Appointment:  Pending EGD with dilatation  ______________________________________________________________________    Chief Complaint   Patient presents with    mucus in throat       HPI:   Kevin Baxter is a 81y o  year old male who presents with dysphagia and regurgitation  Twenty ears ago constant hoarseness  Constant congestion and drainage  Incomplete relief with antihistamines  No clear diagnosis with ENT evaluation  Recently with dysphagia, regurgitation and incomplete emptying of the esophagus  When eating meat, can only eat small pieces, a little at a time  Constantly spitting up  No odynophagia  Acknowledges weight loss over the last few years since the death of his wife      Historical Information   Past Medical History:   Diagnosis Date    Aortic stenosis     Bacterial infection due to Helicobacter pylori     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     Colon polyp     Depression (emotion) 11/16/2018    GERD (gastroesophageal reflux disease)     Hyperlipidemia     Hypertension     Post herpetic neuralgia     Shingles      Past Surgical History:   Procedure Laterality Date    ANAL SPHINCTEROTOMY      BASAL CELL CARCINOMA EXCISION      CHOLECYSTECTOMY      OPEN REDUCTION SHOULDER DISLOCATION      ROTATOR CUFF REPAIR       Social History     Substance and Sexual Activity   Alcohol Use No     Social History     Substance and Sexual Activity   Drug Use No     Social History     Tobacco Use   Smoking Status Former Smoker   Smokeless Tobacco Former User    Quit date: 1/1/1976     Family History   Problem Relation Age of Onset    Depression Mother     Hyperlipidemia Mother     Substance Abuse Neg Hx     Colon cancer Neg Hx     Colon polyps Neg Hx        Meds/Allergies     Current Outpatient Medications:     atorvastatin (LIPITOR) 10 mg tablet    ketoconazole (NIZORAL) 2 % cream    Multiple Vitamins-Minerals (MULTIVITAMIN ADULT PO)    TRUE METRIX BLOOD GLUCOSE TEST test strip    aspirin (ECOTRIN LOW STRENGTH) 81 mg EC tablet    pantoprazole (PROTONIX) 40 mg tablet    Allergies   Allergen Reactions    Metformin Diarrhea       PHYSICAL EXAM:    Blood pressure 116/56, pulse 72, temperature 98 4 °F (36 9 °C), height 5' 6" (1 676 m), weight 75 8 kg (167 lb)  Body mass index is 26 95 kg/m²  General Appearance: NAD, cooperative, alert  Eyes: Anicteric, PERRLA, EOMI  ENT:  Normocephalic, atraumatic, normal mucosa  Neck:  Supple, symmetrical, trachea midline,   Resp:  Clear to auscultation bilaterally; no rales, rhonchi or wheezing; respirations unlabored   CV:  S1 S2, Regular rate and rhythm; no murmur, rub, or gallop  GI:  Soft, non-tender, non-distended; normal bowel sounds; no masses, no organomegaly   Rectal: Deferred  Musculoskeletal: No cyanosis, clubbing or edema  Normal ROM  Skin:  No jaundice, rashes, or lesions   Heme/Lymph: No palpable cervical lymphadenopathy  Psych: Normal affect, good eye contact  Neuro: No gross deficits, AAOx3    Lab Results:   Lab Results   Component Value Date    WBC 4 38 06/10/2020    HGB 13 3 06/10/2020    HCT 39 7 06/10/2020    MCV 93 06/10/2020    PLT 74 (L) 06/10/2020     Lab Results   Component Value Date     04/07/2014    K 3 9 06/10/2020     06/10/2020    CO2 27 06/10/2020    ANIONGAP 8 04/07/2014    BUN 15 06/10/2020    CREATININE 1 13 06/10/2020    GLUCOSE 150 (H) 04/07/2014    GLUF 145 (H) 06/10/2020    CALCIUM 9 0 06/10/2020    AST 31 06/10/2020    ALT 38 06/10/2020    ALKPHOS 60 06/10/2020    PROT 7 0 04/07/2014    BILITOT 0 4 04/07/2014    EGFR 59 06/10/2020     No results found for: IRON, TIBC, FERRITIN  No results found for: LIPASE    Radiology Results:   No results found  REVIEW OF SYSTEMS:    CONSTITUTIONAL: Denies any fever, chills, rigors, but positive for weight loss  HEENT: No earache or tinnitus  Denies hearing loss or visual disturbances  CARDIOVASCULAR: No chest pain or palpitations  RESPIRATORY: Denies any cough, hemoptysis, shortness of breath or dyspnea on exertion  GASTROINTESTINAL: As noted in the History of Present Illness  GENITOURINARY: No problems with urination  Denies any hematuria or dysuria    NEUROLOGIC: No dizziness or vertigo, denies headaches  MUSCULOSKELETAL:  Multiple back and musculoskeletal complaints  SKIN: Denies skin rashes or itching  ENDOCRINE: Denies excessive thirst  Denies intolerance to heat or cold  PSYCHOSOCIAL: Denies depression or anxiety  Denies any recent memory loss

## 2020-07-24 NOTE — PROGRESS NOTES
[id number] - Outpatient Consultation  Sheri Esparza 81 y o  male MRN: [id number]  Encounter: [id number]    ASSESSMENT AND PLAN:      1  Pharyngoesophageal dysphagia  Although patient feels that his symptoms stem mostly from sinus congestion and inability to clear it a try to explain that he likely has a combination of sinus drainage along with reflux and poor esophageal clearance due to the esophageal stricture  The stricture is most likely a peptic stricture from chronic reflux  Neoplasm or Obrien's should be excluded  Advise acid suppression and planned endoscopy with dilatation  · Reflux diet and precautions  · Start PPI daily  · Schedule EGD with dilatation  - pantoprazole (PROTONIX) 40 mg tablet; Take 1 tablet (40 mg total) by mouth daily  Dispense: 30 tablet; Refill: 2    2  Esophageal stricture  Documented on barium swallow  Differential diagnosis and plan as outlined above  - pantoprazole (PROTONIX) 40 mg tablet; Take 1 tablet (40 mg total) by mouth daily  Dispense: 30 tablet; Refill: 2    3  Gastroesophageal reflux disease, esophagitis presence not specified  As above      Followup Appointment:  Pending EGD with dilatation  ______________________________________________________________________    Chief Complaint   Patient presents with    mucus in throat       HPI:   Sheri Esparza is a 81y o  year old male who presents with dysphagia and regurgitation  Twenty ears ago constant hoarseness  Constant congestion and drainage  Incomplete relief with antihistamines  No clear diagnosis with ENT evaluation  Recently with dysphagia, regurgitation and incomplete emptying of the esophagus  When eating meat, can only eat small pieces, a little at a time  Constantly spitting up  No odynophagia  Acknowledges weight loss over the last few years since the death of his wife      Historical Information   Past Medical History:   Diagnosis Date    Aortic stenosis     Bacterial infection due to Helicobacter pylori     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     Colon polyp     Depression (emotion) 11/16/2018    GERD (gastroesophageal reflux disease)     Hyperlipidemia     Hypertension     Post herpetic neuralgia     Shingles      Past Surgical History:   Procedure Laterality Date    ANAL SPHINCTEROTOMY      BASAL CELL CARCINOMA EXCISION      CHOLECYSTECTOMY      OPEN REDUCTION SHOULDER DISLOCATION      ROTATOR CUFF REPAIR       Social History     Substance and Sexual Activity   Alcohol Use No     Social History     Substance and Sexual Activity   Drug Use No     Social History     Tobacco Use   Smoking Status Former Smoker   Smokeless Tobacco Former User    Quit date: 1/1/1976     Family History   Problem Relation Age of Onset    Depression Mother     Hyperlipidemia Mother     Substance Abuse Neg Hx     Colon cancer Neg Hx     Colon polyps Neg Hx        Meds/Allergies     Current Outpatient Medications:     atorvastatin (LIPITOR) 10 mg tablet    ketoconazole (NIZORAL) 2 % cream    Multiple Vitamins-Minerals (MULTIVITAMIN ADULT PO)    TRUE METRIX BLOOD GLUCOSE TEST test strip    aspirin (ECOTRIN LOW STRENGTH) 81 mg EC tablet    pantoprazole (PROTONIX) 40 mg tablet    Allergies   Allergen Reactions    Metformin Diarrhea       PHYSICAL EXAM:    Blood pressure 116/56, pulse 72, temperature 98 4 °F (36 9 °C), height 5' 6" (1 676 m), weight 75 8 kg (167 lb)  Body mass index is 26 95 kg/m²  General Appearance: NAD, cooperative, alert  Eyes: Anicteric, PERRLA, EOMI  ENT:  Normocephalic, atraumatic, normal mucosa  Neck:  Supple, symmetrical, trachea midline,   Resp:  Clear to auscultation bilaterally; no rales, rhonchi or wheezing; respirations unlabored   CV:  S1 S2, Regular rate and rhythm; no murmur, rub, or gallop    GI:  Soft, non-tender, non-distended; normal bowel sounds; no masses, no organomegaly   Rectal: Deferred  Musculoskeletal: No cyanosis, clubbing or edema  Normal ROM  Skin:  No jaundice, rashes, or lesions   Heme/Lymph: No palpable cervical lymphadenopathy  Psych: Normal affect, good eye contact  Neuro: No gross deficits, AAOx3    Lab Results:   Lab Results   Component Value Date    WBC 4 38 06/10/2020    HGB 13 3 06/10/2020    HCT 39 7 06/10/2020    MCV 93 06/10/2020    PLT 74 (L) 06/10/2020     Lab Results   Component Value Date     04/07/2014    K 3 9 06/10/2020     06/10/2020    CO2 27 06/10/2020    ANIONGAP 8 04/07/2014    BUN 15 06/10/2020    CREATININE 1 13 06/10/2020    GLUCOSE 150 (H) 04/07/2014    GLUF 145 (H) 06/10/2020    CALCIUM 9 0 06/10/2020    AST 31 06/10/2020    ALT 38 06/10/2020    ALKPHOS 60 06/10/2020    PROT 7 0 04/07/2014    BILITOT 0 4 04/07/2014    EGFR 59 06/10/2020     No results found for: IRON, TIBC, FERRITIN  No results found for: LIPASE    Radiology Results:   No results found  REVIEW OF SYSTEMS:    CONSTITUTIONAL: Denies any fever, chills, rigors, but positive for weight loss  HEENT: No earache or tinnitus  Denies hearing loss or visual disturbances  CARDIOVASCULAR: No chest pain or palpitations  RESPIRATORY: Denies any cough, hemoptysis, shortness of breath or dyspnea on exertion  GASTROINTESTINAL: As noted in the History of Present Illness  GENITOURINARY: No problems with urination  Denies any hematuria or dysuria  NEUROLOGIC: No dizziness or vertigo, denies headaches  MUSCULOSKELETAL:  Multiple back and musculoskeletal complaints  SKIN: Denies skin rashes or itching  ENDOCRINE: Denies excessive thirst  Denies intolerance to heat or cold  PSYCHOSOCIAL: Denies depression or anxiety  Denies any recent memory loss

## 2020-07-24 NOTE — PATIENT INSTRUCTIONS
Reflux diet and precautions  Take pantoprazole 1 tablet daily  Schedule EGD/upper endoscopy with dilatation at [facility]

## 2020-07-28 ENCOUNTER — OFFICE VISIT (OUTPATIENT)
Dept: PHYSICAL THERAPY | Facility: CLINIC | Age: 85
End: 2020-07-28
Payer: MEDICARE

## 2020-07-28 DIAGNOSIS — R26.9 NEUROLOGIC GAIT DYSFUNCTION: Primary | ICD-10-CM

## 2020-07-28 DIAGNOSIS — R27.0 ATAXIA: ICD-10-CM

## 2020-07-28 PROCEDURE — 97110 THERAPEUTIC EXERCISES: CPT

## 2020-07-28 PROCEDURE — 97112 NEUROMUSCULAR REEDUCATION: CPT

## 2020-07-28 NOTE — PROGRESS NOTES
Daily Note     Today's date: 2020  Patient name: Dawit Min  : 1935  MRN: [id number]  Referring provider: Yris Lama MD  Dx:   Encounter Diagnosis     ICD-10-CM    1  Neurologic gait dysfunction R26 9    2  Ataxia R27 0        Start Time: 915  Stop Time: 1000  Total time in clinic (min): 45 minutes     Subjective:   Patient has no current complaints of pain  Objective: See treatment diary below      Assessment:    20# vest and 4# LE weights worn t/o session  HT added to tandem stance for home with good demonstration  Patient instructed to continue at home and voiced understanding  Nice gains noted in gait challenges  Plan: Continue PT per POC  No complaints post treatment       Precautions:  H/o DM, h/o depression, loss of wife 4 yrs ago  EPOC:  2020    TESTING    TUG          5x sit to stand          6 min walk test          MARTIN          Neuro Re-Ed          Step taps 4#, 20# vest  20ea 4# 20# vest 20ea 4#, 20# vest 20ea- FWD/LAT Foam beam heel to toe over hurdles x4 laps, CTG TB unilateral stance cone taps, 4x5ea Foam unilateral stance color taps 4x6 with sequencing Foam unilateral stance 10reps   Step ups   20# vest 4# L# 10ea  10ea FWD/LAT 10ea FWD/ LAT    Foam beams sidestepping 4#, 20# weighted vest sidestepping 20@ vest, 4# LE 4 reps sidestepping 20# vest, 4# LE 2 laps, cone taps with only 2 cone falls sidestepping 2 laps, 2 with cone taps, 2 cone falls, increased unilateral hold Tandem gait 2reps tandem gait 2 reps;  sidestepping on foam beam 2 laps, cone taps 7cones x2 tandem gait 2 reps, sidestepping on foam beam 4 reps with cone taps, no cone falls   Foam HT FT HT 10ea FT HE 10ea FT HT 10ea,  FT HT 10ea Tandem hold on foam 30sec x4 tandem hold on foam 30 sec x4 tandem hold on foam 30 sec x4   A/P sway on foam  30reps 30reps 30reps  On BOSU 30reps On BOSU 30reps On BOSU 30reps   sidestepping No UE weights 4 laps With cone taps, CTG needed 2 laps, 5 cone falls BOSU squats 10reps BOSU sqats 10reps BOSU squats 10reps BOSU squats 10reps BOSU squats 10reps   EO/EC tandem  10sec [id number] 10sec x 10reps 10sec [id number] 10sec x5 10sec x10 10sec x10 full tandem 10sec x10 full tandem   Ther Ex          Sit to stand 10, 20 # vest 10, 20# vest, repeat with 11 # ball 10reps 10 x2 20# vest, 4# L#  10x2 20# vest, 4# LE, 7 # ball 10x2 w/ 7# ball 10x2 w/ 7# ball 10x2    Hip flex/ext/abd 10ea 20#, 4# LE 10ea 20# vest, 4# LE 10ea 20# vest, 4# LE 10ea20# vest, 4# LE 10ea 10ea On foam 10reps ea   squats 10 on foam 10     On foam 10                                                     Ther Activity                              Gait Training                              Modalities

## 2020-07-30 ENCOUNTER — OFFICE VISIT (OUTPATIENT)
Dept: PHYSICAL THERAPY | Facility: CLINIC | Age: 85
End: 2020-07-30
Payer: MEDICARE

## 2020-07-30 DIAGNOSIS — R26.9 NEUROLOGIC GAIT DYSFUNCTION: Primary | ICD-10-CM

## 2020-07-30 DIAGNOSIS — R27.0 ATAXIA: ICD-10-CM

## 2020-07-30 PROCEDURE — 97110 THERAPEUTIC EXERCISES: CPT

## 2020-07-30 PROCEDURE — 97112 NEUROMUSCULAR REEDUCATION: CPT

## 2020-07-30 NOTE — PROGRESS NOTES
Daily Note     Today's date: 2020  Patient name: Bea Keith  : 1935  MRN: [id number]  Referring provider: Lj Redman MD  Dx:   Encounter Diagnosis     ICD-10-CM    1  Neurologic gait dysfunction R26 9    2  Ataxia R27 0        Start Time: 915  Stop Time: 1000  Total time in clinic (min): 45 minutes     Subjective:   Patient has no current complaints of pain  Objective: See treatment diary below      Assessment:    20# vest and 4# LE weights worn t/o session  Patient able to perform sit to stand from floor with mat  Some increase in ataxia with initiation of sideling to quadriped  Half kneel to stance with difficulty with initiation  Sit to stand with weighted ball with increased ease  Consider additional core strengthening to promote increased ease in transitions  Plan: Continue PT per POC  No complaints post treatment  Precautions:  H/o DM, h/o depression, loss of wife 4 yrs ago  EPOC:  2020    TESTING    TUG          5x sit to stand          6 min walk test          MARTIN          Neuro Re-Ed          Step taps 4# 20# vest 20ea 4#, 20# vest 20ea- FWD/LAT Foam beam heel to toe over hurdles x4 laps, CTG TB unilateral stance cone taps, 4x5ea Foam unilateral stance color taps 4x6 with sequencing Foam unilateral stance 10reps Foam 20ea    Step ups  20# vest 4# L# 10ea  10ea FWD/LAT 10ea FWD/ LAT  10ea FWD   Foam beams sidestepping 20@ vest, 4# LE 4 reps sidestepping 20# vest, 4# LE 2 laps, cone taps with only 2 cone falls sidestepping 2 laps, 2 with cone taps, 2 cone falls, increased unilateral hold Tandem gait 2reps tandem gait 2 reps;  sidestepping on foam beam 2 laps, cone taps 7cones x2 tandem gait 2 reps, sidestepping on foam beam 4 reps with cone taps, no cone falls sidestepping no UE 4 laps no LOB;    Foam HT FT HE 10ea FT HT 10ea,  FT HT 10ea Tandem hold on foam 30sec x4 tandem hold on foam 30 sec x4 tandem hold on foam 30 sec x4 Floor to half kneel to stance x4   A/P sway on foam  30reps 30reps  On BOSU 30reps On BOSU 30reps On BOSU 30reps On BOSU 30reps   sidestepping With cone taps, CTG needed 2 laps, 5 cone falls BOSU squats 10reps BOSU sqats 10reps BOSU squats 10reps BOSU squats 10reps BOSU squats 10reps BOSU squats 10reps   EO/EC tandem  10sec x 10reps 10sec [id number] 10sec x5 10sec x10 10sec x10 full tandem 10sec x10 full tandem    Ther Ex          Sit to stand 10, 20# vest, repeat with 11 # ball 10reps 10 x2 20# vest, 4# L#  10x2 20# vest, 4# LE, 7 # ball 10x2 w/ 7# ball 10x2 w/ 7# ball 10x2  10   Hip flex/ext/abd 10ea 20# vest, 4# LE 10ea 20# vest, 4# LE 10ea20# vest, 4# LE 10ea 10ea On foam 10reps ea On foam 10ea   squats 10     On foam 10 On foam 10                                                     Ther Activity                              Gait Training                              Modalities

## 2020-08-04 ENCOUNTER — APPOINTMENT (OUTPATIENT)
Dept: PHYSICAL THERAPY | Facility: CLINIC | Age: 85
End: 2020-08-04
Payer: MEDICARE

## 2020-08-06 ENCOUNTER — OFFICE VISIT (OUTPATIENT)
Dept: PHYSICAL THERAPY | Facility: CLINIC | Age: 85
End: 2020-08-06
Payer: MEDICARE

## 2020-08-06 DIAGNOSIS — R27.0 ATAXIA: ICD-10-CM

## 2020-08-06 DIAGNOSIS — R26.9 NEUROLOGIC GAIT DYSFUNCTION: Primary | ICD-10-CM

## 2020-08-06 PROCEDURE — 97112 NEUROMUSCULAR REEDUCATION: CPT

## 2020-08-06 PROCEDURE — 97110 THERAPEUTIC EXERCISES: CPT
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
## 2020-08-11 ENCOUNTER — OFFICE VISIT (OUTPATIENT)
Dept: PHYSICAL THERAPY | Facility: CLINIC | Age: 85
End: 2020-08-11
Payer: MEDICARE

## 2020-08-11 DIAGNOSIS — R26.9 NEUROLOGIC GAIT DYSFUNCTION: Primary | ICD-10-CM

## 2020-08-11 DIAGNOSIS — R27.0 ATAXIA: ICD-10-CM

## 2020-08-11 PROCEDURE — 97112 NEUROMUSCULAR REEDUCATION: CPT

## 2020-08-11 PROCEDURE — 97110 THERAPEUTIC EXERCISES: CPT

## 2020-08-11 NOTE — PROGRESS NOTES
Daily Note     Today's date: 2020  Patient name: Claudia Feliz  : 1935  MRN: [id number]  Referring provider: Debra Duarte MD  Dx:   Encounter Diagnosis     ICD-10-CM    1  Neurologic gait dysfunction  R26 9    2  Ataxia  R27 0        Start Time: 1005  Stop Time: 1050  Total time in clinic (min): 45 minutes     Subjective:   Patient has no current complaints of pain  Objective: See treatment diary below      Assessment:    20# vest and 4# LE weights worn t/o session  Patient able to perform Cone taps without falls today  Added hurdles on foam beam with occasional LOB noted  Introduced ball toss with mental challenges with good tolerance  Continue to progress advanced gait and encouraged EO HT in tandem for home  Plan: Continue PT per POC  No complaints post treatment       Precautions:  H/o DM, h/o depression, loss of wife 4 yrs ago  EPOC:  2020    TESTING    TUG           5x sit to stand           6 min walk test           MARTIN           Neuro Re-Ed       Foam color taps-w/ mental challenge 6x4    Step taps 4#, 20# vest 20ea- FWD/LAT Foam beam heel to toe over hurdles x4 laps, CTG TB unilateral stance cone taps, 4x5ea Foam unilateral stance color taps 4x6 with sequencing Foam unilateral stance 10reps Foam 20ea  Foam 20ea Foam 20ea   Step ups 20# vest 4# L# 10ea  10ea FWD/LAT 10ea FWD/ LAT  10ea FWD 10ea FWD/LAT 10ea FWD/LAT   Foam beams sidestepping 20# vest, 4# LE 2 laps, cone taps with only 2 cone falls sidestepping 2 laps, 2 with cone taps, 2 cone falls, increased unilateral hold Tandem gait 2reps tandem gait 2 reps;  sidestepping on foam beam 2 laps, cone taps 7cones x2 tandem gait 2 reps, sidestepping on foam beam 4 reps with cone taps, no cone falls sidestepping no UE 4 laps no LOB; sidestepping no UE 4 laps, cone taps 8x2 with 3 cone falls Sidestepping no UE 4 laps cone taps 8x2 with no cone falls   Foam HT FT HT 10ea,  FT HT 10ea Tandem hold on foam 30sec x4 tandem hold on foam 30 sec x4 tandem hold on foam 30 sec x4 Floor to half kneel to stance x4 FOAM EC HT 10x2 tandem stance Foam EC HT 10x2 tandem   A/P sway on foam  30reps  On BOSU 30reps On BOSU 30reps On BOSU 30reps On BOSU 30reps On BOSU HandUp PBC toss F/B with mental challenge 6 laps   sidestepping BOSU squats 10reps BOSU sqats 10reps BOSU squats 10reps BOSU squats 10reps BOSU squats 10reps BOSU squats 10reps BOSU squats 10reps  4ea   EO/EC tandem  10sec [id number] 10sec x5 10sec x10 10sec x10 full tandem 10sec x10 full tandem  10sec x10 full tandem 10sec x10 full tandem   Ther Ex       VOR Cxl FT foam CW/CCW 10ea    Sit to stand 10 x2 20# vest, 4# L#  10x2 20# vest, 4# LE, 7 # ball 10x2 w/ 7# ball 10x2 w/ 7# ball 10x2  10 10 10 w/ 7# ball   Hip flex/ext/abd 10ea 20# vest, 4# LE 10ea20# vest, 4# LE 10ea 10ea On foam 10reps ea On foam 10ea On [id number]    squats     On foam 10 On foam 10  On foam 10   High hurdles foot over foot       6x4 On foam beam 4laps ea FWD/LAT with occ LOB                                               Ther Activity                                 Gait Training                                 Modalities

## 2020-08-13 ENCOUNTER — OFFICE VISIT (OUTPATIENT)
Dept: PHYSICAL THERAPY | Facility: CLINIC | Age: 85
End: 2020-08-13
Payer: MEDICARE

## 2020-08-13 DIAGNOSIS — R26.9 NEUROLOGIC GAIT DYSFUNCTION: Primary | ICD-10-CM

## 2020-08-13 DIAGNOSIS — R27.0 ATAXIA: ICD-10-CM

## 2020-08-13 PROCEDURE — 97110 THERAPEUTIC EXERCISES: CPT

## 2020-08-13 PROCEDURE — 97112 NEUROMUSCULAR REEDUCATION: CPT

## 2020-08-13 NOTE — PROGRESS NOTES
Daily Note     Today's date: 2020  Patient name: Dino Cameron  : 1935  MRN: [id number]  Referring provider: Anh Colunga MD  Dx:   Encounter Diagnosis     ICD-10-CM    1  Neurologic gait dysfunction  R26 9    2  Ataxia  R27 0        Start Time: 915  Stop Time: 1000  Total time in clinic (min): 45 minutes     Subjective:   Patient has no current complaints of pain  Objective: See treatment diary below      Assessment:    20# vest and 4# LE weights worn t/o session  Nice gains noted with sidestepping on foam, color taps with increased LOB noted with right weight bearing  Plan: Continue PT per POC  No complaints post treatment       Precautions:  H/o DM, h/o depression, loss of wife 4 yrs ago  EPOC:  2020    TESTING    TUG           5x sit to stand           6 min walk test           MARTIN           Neuro Re-Ed      Foam color taps-w/ mental challenge 6x4  Foam color taps w/ mental challeng 6x6   Step taps Foam beam heel to toe over hurdles x4 laps, CTG TB unilateral stance cone taps, 4x5ea Foam unilateral stance color taps 4x6 with sequencing Foam unilateral stance 10reps Foam 20ea  Foam 20ea Foam 20ea Foam 20x2   Step ups  10ea FWD/LAT 10ea FWD/ LAT  10ea FWD 10ea FWD/LAT 10ea FWD/LAT 10ea FWD/LAT   Foam beams sidestepping 2 laps, 2 with cone taps, 2 cone falls, increased unilateral hold Tandem gait 2reps tandem gait 2 reps;  sidestepping on foam beam 2 laps, cone taps 7cones x2 tandem gait 2 reps, sidestepping on foam beam 4 reps with cone taps, no cone falls sidestepping no UE 4 laps no LOB; sidestepping no UE 4 laps, cone taps 8x2 with 3 cone falls Sidestepping no UE 4 laps cone taps 8x2 with no cone falls sidestepping no UE 4 laps cone taps 8x2 one cone fall   Foam HT FT HT 10ea Tandem hold on foam 30sec x4 tandem hold on foam 30 sec x4 tandem hold on foam 30 sec x4 Floor to half kneel to stance x4 FOAM EC HT 10x2 tandem stance Foam EC HT 10x2 tandem Foam HT EC 10x2 tandem   A/P sway on foam   On BOSU 30reps On BOSU 30reps On BOSU 30reps On BOSU 30reps On BOSU Wear My Tags toss F/B with mental challenge 6 laps    sidestepping BOSU sqats 10reps BOSU squats 10reps BOSU squats 10reps BOSU squats 10reps BOSU squats 10reps BOSU squats 10reps  4ea BOSU squats 10   EO/EC tandem  10sec x5 10sec x10 10sec x10 full tandem 10sec x10 full tandem  10sec x10 full tandem 10sec x10 full tandem 10sec x10 full tandem   Ther Ex      VOR Cxl FT foam CW/CCW 10ea     Sit to stand 10x2 20# vest, 4# LE, 7 # ball 10x2 w/ 7# ball 10x2 w/ 7# ball 10x2  10 10 10 w/ 7# ball 10 w/ 7# ball   Hip flex/ext/abd 10ea20# vest, 4# LE 10ea 10ea On foam 10reps ea On foam 10ea On [id number]  On foam 10ea   squats    On foam 10 On foam 10  On foam 10 Foam 10   High hurdles foot over foot      6x4 On foam beam 4laps ea FWD/LAT with occ LOB On foam beam 4 laps                                               Ther Activity                                 Gait Training                                 Modalities

## 2020-08-14 RX ORDER — SODIUM CHLORIDE 9 MG/ML
75 INJECTION, SOLUTION INTRAVENOUS CONTINUOUS
Status: CANCELLED | OUTPATIENT
Start: 2020-08-14

## 2020-08-17 ENCOUNTER — TELEPHONE (OUTPATIENT)
Dept: GASTROENTEROLOGY | Facility: CLINIC | Age: 85
End: 2020-08-17

## 2020-08-18 ENCOUNTER — OFFICE VISIT (OUTPATIENT)
Dept: PHYSICAL THERAPY | Facility: CLINIC | Age: 85
End: 2020-08-18
Payer: MEDICARE

## 2020-08-18 DIAGNOSIS — R26.9 NEUROLOGIC GAIT DYSFUNCTION: Primary | ICD-10-CM

## 2020-08-18 DIAGNOSIS — R27.0 ATAXIA: ICD-10-CM

## 2020-08-18 PROCEDURE — 97110 THERAPEUTIC EXERCISES: CPT

## 2020-08-18 PROCEDURE — 97112 NEUROMUSCULAR REEDUCATION: CPT

## 2020-08-18 NOTE — PROGRESS NOTES
Daily Note     Today's date: 2020  Patient name: Capri Wies  : 1935  MRN: [id number]  Referring provider: Jodi Cox MD  Dx:   Encounter Diagnosis     ICD-10-CM    1  Neurologic gait dysfunction  R26 9    2  Ataxia  R27 0        Start Time: 1000  Stop Time: 1045  Total time in clinic (min): 45 minutes     Subjective:   Patient has no current complaints of pain  Objective: See treatment diary below      Assessment:    20# vest and 4# LE weights worn t/o session  Nice gains with foam stepping with increased stability noted  Per patient he has noticed increased control and ease in home tasks  Patient progressing towards goals, anticipate discharge on next visit  Patient instructed to be mindful of anything that may be challenging to address on last session, details to follow  Plan: Continue PT per POC  No complaints post treatment       Precautions:  H/o DM, h/o depression, loss of wife 4 yrs ago  EPOC:  2020    TESTING    TUG           5x sit to stand           6 min walk test           MARTIN           Neuro Re-Ed     Foam color taps-w/ mental challenge 6x4  Foam color taps w/ mental challeng 6x6    Step taps TB unilateral stance cone taps, 4x5ea Foam unilateral stance color taps 4x6 with sequencing Foam unilateral stance 10reps Foam 20ea  Foam 20ea Foam 20ea Foam 20x2 Foam no UE 20x2   Step ups 10ea FWD/LAT 10ea FWD/ LAT  10ea FWD 10ea FWD/LAT 10ea FWD/LAT 10ea FWD/LAT 10ea FWD/LAT no UE   Foam beams Tandem gait 2reps tandem gait 2 reps;  sidestepping on foam beam 2 laps, cone taps 7cones x2 tandem gait 2 reps, sidestepping on foam beam 4 reps with cone taps, no cone falls sidestepping no UE 4 laps no LOB; sidestepping no UE 4 laps, cone taps 8x2 with 3 cone falls Sidestepping no UE 4 laps cone taps 8x2 with no cone falls sidestepping no UE 4 laps cone taps 8x2 one cone fall sidestepping no UE 4 laps, cone taps 6x2 no cone falls   Foam HT Tandem hold on foam 30sec x4 tandem hold on foam 30 sec x4 tandem hold on foam 30 sec x4 Floor to half kneel to stance x4 FOAM EC HT 10x2 tandem stance Foam EC HT 10x2 tandem Foam HT EC 10x2 tandem Foam HT EC 10x2 tandem   A/P sway on foam  On BOSU 30reps On BOSU 30reps On BOSU 30reps On BOSU 30reps On BOSU Anonymess toss F/B with mental challenge 6 laps  Hell to toe on foam beam 4 laps   sidestepping BOSU squats 10reps BOSU squats 10reps BOSU squats 10reps BOSU squats 10reps BOSU squats 10reps  4ea BOSU squats 10    EO/EC tandem  10sec x10 10sec x10 full tandem 10sec x10 full tandem  10sec x10 full tandem 10sec x10 full tandem 10sec x10 full tandem 10sec x10 full tandem   Ther Ex     VOR Cxl FT foam CW/CCW 10ea      Sit to stand 10x2 w/ 7# ball 10x2 w/ 7# ball 10x2  10 10 10 w/ 7# ball 10 w/ 7# ball 10 w/ 7# ball   Hip flex/ext/abd 10ea 10ea On foam 10reps ea On foam 10ea On [id number]  On foam 10ea On foam 10ea   squats   On foam 10 On foam 10  On foam 10 Foam 10 Foam 10   High hurdles foot over foot     6x4 On foam beam 4laps ea FWD/LAT with occ LOB On foam beam 4 laps On foam beam 4 laps                                               Ther Activity                                 Gait Training                                 Modalities

## 2020-08-20 ENCOUNTER — OFFICE VISIT (OUTPATIENT)
Dept: PHYSICAL THERAPY | Facility: CLINIC | Age: 85
End: 2020-08-20
Payer: MEDICARE

## 2020-08-20 DIAGNOSIS — R27.0 ATAXIA: ICD-10-CM

## 2020-08-20 DIAGNOSIS — R26.9 NEUROLOGIC GAIT DYSFUNCTION: Primary | ICD-10-CM

## 2020-08-20 PROCEDURE — 97112 NEUROMUSCULAR REEDUCATION: CPT

## 2020-08-20 PROCEDURE — 97110 THERAPEUTIC EXERCISES: CPT

## 2020-08-20 NOTE — PROGRESS NOTES
PT DISCHARGE/ DAILY NOTE    Today's date: 2020  Patient name: Bea Keith  : 1935  MRN: [id number]  Referring provider: Lj Redman MD  Dx:   Encounter Diagnosis     ICD-10-CM    1  Neurologic gait dysfunction  R26 9    2  Ataxia  R27 0        Start Time: 915  Stop Time: 1000  Total time in clinic (min): 45 minutes    Assessment  Assessment details: Patient presents to skilled PT due to increased difficulty moving with history of recent falls  Patient reports near falls occurring five times a Day Patient displays Normal muscle strength with overall grade of 5/5  Patient sensation is Normal throughout lower extremities  Patient coordination is Abnormal per alterate toe tapping and heel to shin test    Patient balance scores are as follows: 37/56 MARTIN, 15 7 seconds TUG without Assistive Device, with overall results noting high risk of falls  Patient endurance scores are as follows; 560 feet with  6 minute walk test without device 22 6  seconds with 5 x sit to stand test with overall results noting decrease functional endurance and cardio capacity  Patient displays overall reduction in somatosensory/ proprioception awareness secondary to increased LOB with FT position, (+) Romberg EC 15sec  Patient subjective report notes the following functional limitation with difficulty getting out of a chair and difficulty walking  Patient will benefit from skilled PT to address noted impairments and functional limitations they are causing with overall goal to return patient to highest level possible with reduced risk for falls  Please contact me if you have any questions or recommendations  Thank you for the referral and the opportunity to share in Omi's care    Patient has made substantial gains with PT improving TUG to 9 3 sec a 6 3sec improvement from initial eval   Endurance has substantially improved with ability to perform 5x sit to  6 4sec a 16 2sec improvement    6 minute walk test with 140' improvement to 700' with improved bradykinesia and no longer demonstrating shuffling gait pattern  MARTIN scoring has also substantially improved to 52/56, a 15 point improvement from previous  Patient continues to benefit from skilled intervention to facilitate maximal functional stability and strength  Patient verbalized understanding of POC    8/20/2020  Patient has made continued gains with PT improving TUG to 8 04sec, increasing overall safety in community mobility  Endurance has also substantially improved with 5x sit to stand increasing to 6 0sec and 6 min walk test improving to 840'  Patient scored 55/56 on AMRTIN substantially improved from scoring of 37/56 on initial eval   Patient is independent in HEP and has been encouraged to continue strengthening and walking program at home  Patient is in agreement with discharge plans and voiced understanding to all instructions  Patient has been encouraged to contact PT should any questions or problems arise                Understanding of Dx/Px/POC: good   Prognosis: good    Goals  Goals  STG 30 days    Patient will improve static balance with feet together Eyes closed to 30sec- MET  Patient will achieve 100 feet improvement with overall distance achieved of 650 feet with 6 minute walk test - Exceeded  Patient will display 2 3  second improvement with overall score of 13 4 seconds  with TUG test or lower with noted improvement being Minimal Detectable Change pre current research standards with fall risk- Exceeded    Patient will achieve 43/56 MARTIN score with minimal improve by 6 points or more demonstrating Minimal Detectable change per current research standards for this objective test which assess fall risk- Exceeded  Patient will perform  5 x sit to stand test with overall reduction by 3 seconds to 19 6 sec score indicating improvement with functional endurance- Exceeded  Patient will be independent in HEP for generalized balance and endurance training- MET    LT days   Patient will score low risk for falls with 3/4 fall risk measures - MET  Patient will display 2 3  second improvement with overall score of 13 4 seconds  with TUG test or lower with noted improvement being Minimal Detectable Change pre current research standards with fall risk - MET  Patient will achieve 190 feet improvement with overall distance achieved of 750 feet with 6 minute walk test which is Minimal Detectable Change pre current research standards with endurance to demonstrate enhance functional capacity - MET and exceeded  Patient will be able to perform floor transfer without physical assistance MET  Patient will be able to carry objects without loss of balance - MET  Patient will be able to ambulate outdoors without any loss of balance- MET  Patient will be independent in walking program to allow for maximal mobility and safety in all community distances - MET    Cut off score   All date taken from [facility] Neuro Section or Rehab Measures    MARTIN test: 46/56                                              5 x STS Test:  MDC: 6 points                                                  MDC: 2 3 seconds   age norms                                                                 Age Norms   61-76 year old = M: 54, F: 54                        61-76 year old: 11 4 seconds   66-77 year old = M 47,  F: 48                       66-77 year old: 12 6 seconds    80-80 year old = M46,   F: 48                       80-80 year old: 14 8 seconds     TUG test:                                                                     10 Meter Walk Test:  MDC: 4 14 seconds       MDC:  59 ft/sec  Cut off score for Falls                                                  Age Norms  > 13 5 seconds community dwelling adults                20-29; M: 4 56 ft/sec F: 4 62 ft/sec  > 32 2 Frail Elderly                                                     30-39: M 4 76 ft/sec  F: 4 68 ft/sec          40-49: M: 4 79 ft/sec  F: 4 62 ft/sec  6 Minute Walk Test      50-59: M: 4 76 ft/sec  F: 4 56 ft/sec  MDC: 190 feet       60-69: M: 4 56 ft/sec  F: 4 26 ft/sec  Age Norms       70-+    M: 4 36 ft/sec  F: 4 16 ft/sec  60-69:    M: 1876 F: 1058  62-94:    M: 1729 F: 2448  59-72 +: M: 2135 F; 1286     Plan  Treatment plan discussed with: patient        Subjective Evaluation    History of Present Illness  Mechanism of injury: Patient indicates he has near falls occurring five times a day  Patient tends to fall forward  No current complaints of pain    No current complaints  No falls noted    No current complaints  Pain  Current pain ratin  At best pain ratin  At worst pain ratin    Social Support  Steps to enter house: yes  3  Stairs in house: no   Lives in: [facility]  Lives with: alone    Employment status: not working    Diagnostic Tests    FCE comments: CT pending per patientPatient Goals  Patient goals for therapy: improved balance  Patient goal: moving more easily without fear of falls        Objective   Neuro Exam:     Sensation   Light touch LE: left WNL and right WNL    Coordination   Finger to nose: left WNL and right WNL  Rapid alternating movements: UE impaired and LE impaired    Transfers Sit to stand: independent Sit to supine: independent Supine to sit: independent   Roll: independent      Flowsheet Rows      Most Recent Value   PT/OT G-Codes   Current Score  84   Projected Score  60   FOTO information reviewed  Yes   Assessment Type  Discharge   G code set  Mobility: Walking & Moving Around      NEURO:  Negative drift, slight increase in tone noted with occasional intentional clonus LEs  Finger to nose intact, NUSRAT slightly arrhythmical with ataxia evident  Strength all 4 extremities 5/5, ROM WFL all 4 extremities  BALANCE:  Romberg EO (-), EC (+) 15 sec,  Sharpened romberg EO assist to hold position (+) 8sec  Unilateral stance 3sec each    Slightly delayed ankle and hip strategies with increase in LOB posteriorly with perturbation  7/14  Patient is now able to perform Romberg EC 30 sec, now (-)  Sharpened romberg EO hold 25sec  8/20  Sharpened romberg EC 10 sec, EO 25sec    GAIT:  Decreased dilshad and stride length, shuffling pattern with bradykinetic initiation noted  Increased LEO noted  Increased hip pain noted left with prolonged ambulation  7/14  No hip pain noted with prolonged ambulation  Improved gait pattern with no shuffling noted, decreased bradykinesia noted  8/20  Patient now able to complete 6 min without increased hip pain noted  POSTURE:  Increased forward flexion , downward gaze in gait  7/14  Corrected eye position in gait      8/20  Normalized gait           Precautions:  H/o DM, h/o depression, loss of wife 4 yrs ago  EPOC:  8/20/2020    TESTING 6/11 7/14 8/20          TUG 15 7 9 38sec 8 04sec          5x sit to stand 22 6 6 4sec 6 0sec          6 min walk test 560' 700' 840'          MARTIN 37/56 52/56 55/56          Neuro Re-Ed             Step taps 4x2 10ea 10ea          Step ups             Foam beams  2 laps 4 laps          Foam HT                                                    Ther Ex             Sit to stand 5x2 10 w/ 20# vest and 4# LE           Hip flex/ext/abd  20# vest/ 4# LE 10ea 20ea          squats   10                                                                           Ther Activity                                       Gait Training                                       Modalities

## 2020-08-26 ENCOUNTER — HOSPITAL ENCOUNTER (OUTPATIENT)
Dept: GASTROENTEROLOGY | Facility: HOSPITAL | Age: 85
Setting detail: OUTPATIENT SURGERY
Discharge: HOME | End: 2020-08-26
Attending: INTERNAL MEDICINE

## 2020-09-03 ENCOUNTER — TELEPHONE (OUTPATIENT)
Dept: URGENT CARE | Facility: CLINIC | Age: 85
End: 2020-09-03

## 2020-09-03 DIAGNOSIS — E11.9 CONTROLLED TYPE 2 DIABETES MELLITUS WITHOUT COMPLICATION, WITHOUT LONG-TERM CURRENT USE OF INSULIN (HCC): ICD-10-CM

## 2020-09-03 RX ORDER — GLIMEPIRIDE 2 MG/1
TABLET ORAL
Qty: 90 TABLET | Refills: 3 | Status: SHIPPED | OUTPATIENT
Start: 2020-09-03 | End: 2021-01-01

## 2020-09-21 ENCOUNTER — OFFICE VISIT (OUTPATIENT)
Dept: OBGYN CLINIC | Facility: CLINIC | Age: 85
End: 2020-09-21
Payer: MEDICARE

## 2020-09-21 VITALS
WEIGHT: 171 LBS | DIASTOLIC BLOOD PRESSURE: 70 MMHG | SYSTOLIC BLOOD PRESSURE: 124 MMHG | BODY MASS INDEX: 27.48 KG/M2 | HEIGHT: 66 IN

## 2020-09-21 DIAGNOSIS — M19.049 CMC DJD(CARPOMETACARPAL DEGENERATIVE JOINT DISEASE), LOCALIZED PRIMARY, UNSPECIFIED LATERALITY: Primary | ICD-10-CM

## 2020-09-21 PROCEDURE — 99213 OFFICE O/P EST LOW 20 MIN: CPT | Performed by: ORTHOPAEDIC SURGERY

## 2020-09-21 PROCEDURE — 20600 DRAIN/INJ JOINT/BURSA W/O US: CPT | Performed by: ORTHOPAEDIC SURGERY

## 2020-09-21 RX ORDER — LIDOCAINE HYDROCHLORIDE 10 MG/ML
0.5 INJECTION, SOLUTION INFILTRATION; PERINEURAL
Status: COMPLETED | OUTPATIENT
Start: 2020-09-21 | End: 2020-09-21

## 2020-09-21 RX ORDER — METHYLPREDNISOLONE ACETATE 40 MG/ML
0.05 INJECTION, SUSPENSION INTRA-ARTICULAR; INTRALESIONAL; INTRAMUSCULAR; SOFT TISSUE
Status: COMPLETED | OUTPATIENT
Start: 2020-09-21 | End: 2020-09-21

## 2020-09-21 RX ADMIN — METHYLPREDNISOLONE ACETATE 0.05 ML: 40 INJECTION, SUSPENSION INTRA-ARTICULAR; INTRALESIONAL; INTRAMUSCULAR; SOFT TISSUE at 13:28

## 2020-09-21 RX ADMIN — LIDOCAINE HYDROCHLORIDE 0.5 ML: 10 INJECTION, SOLUTION INFILTRATION; PERINEURAL at 13:28

## 2020-09-21 NOTE — PROGRESS NOTES
ASSESSMENT/PLAN:    Assessment:   L thumb CMC DJD    Plan:   L Depo Medrol injx provided today  C/W activities as tolerated    Follow Up:  3  month(s)      _____________________________________________________  CHIEF COMPLAINT:  Chief Complaint   Patient presents with    Left Thumb - Follow-up    Right Thumb - Follow-up         SUBJECTIVE:  Ambar Cornell is a 81 y o  male who presents for follow up regarding bl thumb CMC  Pt received a depo medrol injx into the L thumb at last visit and states this worked very well until just a couple weeks ago  States the pain is coming back at the base of the thumb      PAST MEDICAL HISTORY:  Past Medical History:   Diagnosis Date    Aortic stenosis     Bacterial infection due to Helicobacter pylori     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     Colon polyp     Depression (emotion) 11/16/2018    GERD (gastroesophageal reflux disease)     Hyperlipidemia     Hypertension     Post herpetic neuralgia     Shingles        PAST SURGICAL HISTORY:  Past Surgical History:   Procedure Laterality Date    ANAL SPHINCTEROTOMY      BASAL CELL CARCINOMA EXCISION      CHOLECYSTECTOMY      OPEN REDUCTION SHOULDER DISLOCATION      ROTATOR CUFF REPAIR         FAMILY HISTORY:  Family History   Problem Relation Age of Onset    Depression Mother     Hyperlipidemia Mother     Substance Abuse Neg Hx     Colon cancer Neg Hx     Colon polyps Neg Hx        SOCIAL HISTORY:  Social History     Tobacco Use    Smoking status: Former Smoker    Smokeless tobacco: Former User     Quit date: 1/1/1976   Substance Use Topics    Alcohol use: No    Drug use: No       MEDICATIONS:    Current Outpatient Medications:     atorvastatin (LIPITOR) 10 mg tablet, TAKE 1 TABLET BY MOUTH EVERY DAY, Disp: 90 tablet, Rfl: 3    Multiple Vitamins-Minerals (MULTIVITAMIN ADULT PO), Take 1 capsule by mouth daily, Disp: , Rfl:     aspirin (ECOTRIN LOW STRENGTH) 81 mg EC tablet, Take 1 tablet (81 mg total) by mouth daily (Patient not taking: Reported on 7/24/2020), Disp: , Rfl:     glimepiride (AMARYL) 2 mg tablet, TAKE 1 TABLET BY MOUTH DAILY WITH BREAKFAST (Patient not taking: Reported on 9/21/2020), Disp: 90 tablet, Rfl: 3    ketoconazole (NIZORAL) 2 % cream, Apply topically daily (Patient not taking: Reported on 9/21/2020), Disp: 15 g, Rfl: 4    pantoprazole (PROTONIX) 40 mg tablet, Take 1 tablet (40 mg total) by mouth daily (Patient not taking: Reported on 9/21/2020), Disp: 30 tablet, Rfl: 2    TRUE METRIX BLOOD GLUCOSE TEST test strip, Testing 1 time daily DX:E11 9 (Patient not taking: Reported on 9/21/2020), Disp: 100 each, Rfl: 5    ALLERGIES:  Allergies   Allergen Reactions    Metformin Diarrhea       REVIEW OF SYSTEMS:  Pertinent items are noted in HPI  A comprehensive review of systems was negative      LABS:  HgA1c:   Lab Results   Component Value Date    HGBA1C 6 0 (H) 03/02/2020     BMP:   Lab Results   Component Value Date    GLUCOSE 150 (H) 04/07/2014    CALCIUM 9 0 06/10/2020     04/07/2014    K 3 9 06/10/2020    CO2 27 06/10/2020     06/10/2020    BUN 15 06/10/2020    CREATININE 1 13 06/10/2020           _____________________________________________________  PHYSICAL EXAMINATION:  Vital signs: /70   Ht 5' 6" (1 676 m)   Wt 77 6 kg (171 lb)   BMI 27 60 kg/m²   General: well developed and well nourished, alert, oriented times 3 and appears comfortable  Psychiatric: Normal  HEENT: Trachea Midline, No torticollis  Cardiovascular: No discernable arrhythmia  Pulmonary: No wheezing or stridor  Skin: No masses, erythema, lacerations, fluctation, ulcerations  Neurovascular: Sensation Intact to the Median, Ulnar, Radial Nerve, Motor Intact to the Median, Ulnar, Radial Nerve and Pulses Intact    MUSCULOSKELETAL EXAMINATION:  LEFT SIDE:  CMC: No Instability, Positive grind, Positive tendnerness CMC and Positive Shoulder Sign    _____________________________________________________  STUDIES REVIEWED:  No Studies to review      PROCEDURES PERFORMED:  Small joint arthrocentesis: L thumb CMC  Date/Time: 9/21/2020 1:28 PM  Consent given by: patient  Timeout: Immediately prior to procedure a time out was called to verify the correct patient, procedure, equipment, support staff and site/side marked as required   Supporting Documentation  Indications: pain   Procedure Details  Location: thumb - L thumb CMC  Needle size: 25 G  Ultrasound guidance: no  Approach: volar  Medications administered: 0 5 mL lidocaine 1 %; 0 05 mL methylPREDNISolone acetate 40 mg/mL    Patient tolerance: patient tolerated the procedure well with no immediate complications  Dressing:  Sterile dressing applied            Scribe Attestation    I,:   Loan Lynch PA-C am acting as a scribe while in the presence of the attending physician :        I,:   Suzanne Hirsch MD personally performed the services described in this documentation    as scribed in my presence :

## 2020-09-24 ENCOUNTER — APPOINTMENT (EMERGENCY)
Dept: RADIOLOGY | Facility: HOSPITAL | Age: 85
End: 2020-09-24
Payer: MEDICARE

## 2020-09-24 ENCOUNTER — HOSPITAL ENCOUNTER (EMERGENCY)
Facility: HOSPITAL | Age: 85
Discharge: HOME | End: 2020-09-24
Attending: EMERGENCY MEDICINE | Admitting: EMERGENCY MEDICINE
Payer: MEDICARE

## 2020-09-24 VITALS
OXYGEN SATURATION: 96 % | BODY MASS INDEX: 26.03 KG/M2 | HEIGHT: 66 IN | DIASTOLIC BLOOD PRESSURE: 59 MMHG | RESPIRATION RATE: 18 BRPM | HEART RATE: 60 BPM | SYSTOLIC BLOOD PRESSURE: 126 MMHG | TEMPERATURE: 98.1 F | WEIGHT: 162 LBS

## 2020-09-24 DIAGNOSIS — R06.00 DYSPNEA ON EXERTION: Primary | ICD-10-CM

## 2020-09-24 DIAGNOSIS — I35.0 AORTIC STENOSIS: ICD-10-CM

## 2020-09-24 DIAGNOSIS — R19.7 DIARRHEA: ICD-10-CM

## 2020-09-24 LAB
ABO GROUP BLD: NORMAL
ABO GROUP BLD: NORMAL
ALBUMIN SERPL BCP-MCNC: 3.4 G/DL (ref 3.5–5)
ALP SERPL-CCNC: 78 U/L (ref 46–116)
ALT SERPL W P-5'-P-CCNC: 51 U/L (ref 12–78)
ANION GAP SERPL CALCULATED.3IONS-SCNC: 8 MMOL/L (ref 4–13)
AST SERPL W P-5'-P-CCNC: 41 U/L (ref 5–45)
ATRIAL RATE: 63 BPM
BASOPHILS # BLD AUTO: 0.03 THOUSANDS/ΜL (ref 0–0.1)
BASOPHILS NFR BLD AUTO: 1 % (ref 0–1)
BILIRUB SERPL-MCNC: 0.5 MG/DL (ref 0.2–1)
BLD GP AB SCN SERPL QL: NEGATIVE
BUN SERPL-MCNC: 18 MG/DL (ref 5–25)
CALCIUM ALBUM COR SERPL-MCNC: 9.6 MG/DL (ref 8.3–10.1)
CALCIUM SERPL-MCNC: 9.1 MG/DL (ref 8.3–10.1)
CHLORIDE SERPL-SCNC: 105 MMOL/L (ref 100–108)
CO2 SERPL-SCNC: 26 MMOL/L (ref 21–32)
CREAT SERPL-MCNC: 1.19 MG/DL (ref 0.6–1.3)
EOSINOPHIL # BLD AUTO: 0.19 THOUSAND/ΜL (ref 0–0.61)
EOSINOPHIL NFR BLD AUTO: 4 % (ref 0–6)
ERYTHROCYTE [DISTWIDTH] IN BLOOD BY AUTOMATED COUNT: 12.4 % (ref 11.6–15.1)
GFR SERPL CREATININE-BSD FRML MDRD: 56 ML/MIN/1.73SQ M
GLUCOSE SERPL-MCNC: 126 MG/DL (ref 65–140)
HCT VFR BLD AUTO: 36.3 % (ref 36.5–49.3)
HGB BLD-MCNC: 12.6 G/DL (ref 12–17)
IMM GRANULOCYTES # BLD AUTO: 0.01 THOUSAND/UL (ref 0–0.2)
IMM GRANULOCYTES NFR BLD AUTO: 0 % (ref 0–2)
LYMPHOCYTES # BLD AUTO: 0.91 THOUSANDS/ΜL (ref 0.6–4.47)
LYMPHOCYTES NFR BLD AUTO: 21 % (ref 14–44)
MCH RBC QN AUTO: 31.4 PG (ref 26.8–34.3)
MCHC RBC AUTO-ENTMCNC: 34.7 G/DL (ref 31.4–37.4)
MCV RBC AUTO: 91 FL (ref 82–98)
MONOCYTES # BLD AUTO: 0.46 THOUSAND/ΜL (ref 0.17–1.22)
MONOCYTES NFR BLD AUTO: 10 % (ref 4–12)
NEUTROPHILS # BLD AUTO: 2.81 THOUSANDS/ΜL (ref 1.85–7.62)
NEUTS SEG NFR BLD AUTO: 64 % (ref 43–75)
NRBC BLD AUTO-RTO: 0 /100 WBCS
NT-PROBNP SERPL-MCNC: 459 PG/ML
P AXIS: 68 DEGREES
PLATELET # BLD AUTO: 86 THOUSANDS/UL (ref 149–390)
PMV BLD AUTO: 9.1 FL (ref 8.9–12.7)
POTASSIUM SERPL-SCNC: 3.7 MMOL/L (ref 3.5–5.3)
PR INTERVAL: 184 MS
PROT SERPL-MCNC: 7.1 G/DL (ref 6.4–8.2)
QRS AXIS: -77 DEGREES
QRSD INTERVAL: 148 MS
QT INTERVAL: 456 MS
QTC INTERVAL: 466 MS
RBC # BLD AUTO: 4.01 MILLION/UL (ref 3.88–5.62)
RH BLD: POSITIVE
RH BLD: POSITIVE
SARS-COV-2 RNA RESP QL NAA+PROBE: NEGATIVE
SODIUM SERPL-SCNC: 139 MMOL/L (ref 136–145)
SPECIMEN EXPIRATION DATE: NORMAL
T WAVE AXIS: 81 DEGREES
TROPONIN I SERPL-MCNC: 0.02 NG/ML
VENTRICULAR RATE: 63 BPM
WBC # BLD AUTO: 4.41 THOUSAND/UL (ref 4.31–10.16)

## 2020-09-24 PROCEDURE — 87635 SARS-COV-2 COVID-19 AMP PRB: CPT | Performed by: EMERGENCY MEDICINE

## 2020-09-24 PROCEDURE — 86850 RBC ANTIBODY SCREEN: CPT | Performed by: EMERGENCY MEDICINE

## 2020-09-24 PROCEDURE — 71045 X-RAY EXAM CHEST 1 VIEW: CPT

## 2020-09-24 PROCEDURE — 86901 BLOOD TYPING SEROLOGIC RH(D): CPT | Performed by: EMERGENCY MEDICINE

## 2020-09-24 PROCEDURE — 99284 EMERGENCY DEPT VISIT MOD MDM: CPT | Performed by: EMERGENCY MEDICINE

## 2020-09-24 PROCEDURE — 84484 ASSAY OF TROPONIN QUANT: CPT | Performed by: EMERGENCY MEDICINE

## 2020-09-24 PROCEDURE — 86900 BLOOD TYPING SEROLOGIC ABO: CPT | Performed by: EMERGENCY MEDICINE

## 2020-09-24 PROCEDURE — 93005 ELECTROCARDIOGRAM TRACING: CPT

## 2020-09-24 PROCEDURE — 80053 COMPREHEN METABOLIC PANEL: CPT | Performed by: EMERGENCY MEDICINE

## 2020-09-24 PROCEDURE — 85025 COMPLETE CBC W/AUTO DIFF WBC: CPT | Performed by: EMERGENCY MEDICINE

## 2020-09-24 PROCEDURE — 99285 EMERGENCY DEPT VISIT HI MDM: CPT

## 2020-09-24 PROCEDURE — 36415 COLL VENOUS BLD VENIPUNCTURE: CPT | Performed by: EMERGENCY MEDICINE

## 2020-09-24 PROCEDURE — 83880 ASSAY OF NATRIURETIC PEPTIDE: CPT | Performed by: EMERGENCY MEDICINE

## 2020-09-24 PROCEDURE — 93010 ELECTROCARDIOGRAM REPORT: CPT | Performed by: INTERNAL MEDICINE

## 2020-09-24 NOTE — ED PROVIDER NOTES
History  Chief Complaint   Patient presents with    Shortness of Breath     Pt with sob x 24 hours  Denies fever, denies loss of taste   GI Bleeding     Pt reports having black diarrhea last night  45-year-old male presents for evaluation of shortness of breath beginning yesterday  The patient describes a constant shortness of breath which is worse with exertion  He denies any associated chest pain or pressure  The patient states that the onset of his symptoms he also began with diarrhea without abdominal pain or vomiting  The patient had some mild acid reflux symptoms and took Pepto-Bismol yesterday  He notes that his stools are black like cold  He denies history of GI bleed  The patient denies history of cardiac or pulmonary issues  Patient denies any recent travel or COVID-19 exposures  Shortness of Breath   Associated symptoms: no chest pain, no cough, no fever and no vomiting        Prior to Admission Medications   Prescriptions Last Dose Informant Patient Reported? Taking?    Multiple Vitamins-Minerals (MULTIVITAMIN ADULT PO)  Self Yes No   Sig: Take 1 capsule by mouth daily   TRUE METRIX BLOOD GLUCOSE TEST test strip  Self No No   Sig: Testing 1 time daily DX:E11 9   Patient not taking: Reported on 9/21/2020   aspirin (ECOTRIN LOW STRENGTH) 81 mg EC tablet  Self No No   Sig: Take 1 tablet (81 mg total) by mouth daily   Patient not taking: Reported on 7/24/2020   atorvastatin (LIPITOR) 10 mg tablet  Self No No   Sig: TAKE 1 TABLET BY MOUTH EVERY DAY   glimepiride (AMARYL) 2 mg tablet   No No   Sig: TAKE 1 TABLET BY MOUTH DAILY WITH BREAKFAST   Patient not taking: Reported on 9/21/2020   ketoconazole (NIZORAL) 2 % cream  Self No No   Sig: Apply topically daily   Patient not taking: Reported on 9/21/2020   pantoprazole (PROTONIX) 40 mg tablet   No No   Sig: Take 1 tablet (40 mg total) by mouth daily   Patient not taking: Reported on 9/21/2020      Facility-Administered Medications: None Past Medical History:   Diagnosis Date    Aortic stenosis     Bacterial infection due to Helicobacter pylori     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     Colon polyp     Depression (emotion) 11/16/2018    GERD (gastroesophageal reflux disease)     Hyperlipidemia     Hypertension     Post herpetic neuralgia     Shingles        Past Surgical History:   Procedure Laterality Date    ANAL SPHINCTEROTOMY      BASAL CELL CARCINOMA EXCISION      CHOLECYSTECTOMY      OPEN REDUCTION SHOULDER DISLOCATION      ROTATOR CUFF REPAIR         Family History   Problem Relation Age of Onset    Depression Mother     Hyperlipidemia Mother     Substance Abuse Neg Hx     Colon cancer Neg Hx     Colon polyps Neg Hx      I have reviewed and agree with the history as documented  E-Cigarette/Vaping    E-Cigarette Use Never User      E-Cigarette/Vaping Substances     Social History     Tobacco Use    Smoking status: Former Smoker    Smokeless tobacco: Former User     Quit date: 1/1/1976   Substance Use Topics    Alcohol use: No    Drug use: No       Review of Systems   Constitutional: Positive for fatigue  Negative for chills and fever  HENT:        No loss of taste or smell   Respiratory: Positive for shortness of breath  Negative for cough and chest tightness  Cardiovascular: Negative for chest pain and palpitations  Gastrointestinal: Positive for diarrhea ( Black)  Negative for nausea and vomiting  Neurological: Positive for weakness  Negative for light-headedness  Physical Exam  Physical Exam  Vitals signs and nursing note reviewed  Constitutional:       General: He is not in acute distress  Appearance: He is well-developed  HENT:      Head: Normocephalic and atraumatic  Right Ear: External ear normal       Left Ear: External ear normal       Mouth/Throat:      Pharynx: No oropharyngeal exudate  Eyes:      General: No scleral icterus       Pupils: Pupils are equal, round, and reactive to light  Neck:      Musculoskeletal: Normal range of motion and neck supple  Cardiovascular:      Rate and Rhythm: Normal rate and regular rhythm  Heart sounds: Normal heart sounds  Pulmonary:      Effort: Pulmonary effort is normal  No respiratory distress  Breath sounds: Normal breath sounds  Abdominal:      General: Bowel sounds are increased  Palpations: Abdomen is soft  Tenderness: There is no abdominal tenderness  There is no guarding or rebound  Genitourinary:     Rectum: Guaiac result negative  Musculoskeletal: Normal range of motion  Right lower leg: No edema  Skin:     General: Skin is warm and dry  Findings: No rash  Neurological:      Mental Status: He is alert and oriented to person, place, and time  Vital Signs  ED Triage Vitals   Temperature Pulse Respirations Blood Pressure SpO2   09/24/20 1616 09/24/20 1616 09/24/20 1616 09/24/20 1619 09/24/20 1616   98 1 °F (36 7 °C) 87 (!) 28 (!) 176/74 97 %      Temp Source Heart Rate Source Patient Position - Orthostatic VS BP Location FiO2 (%)   09/24/20 1616 09/24/20 1616 09/24/20 1616 09/24/20 1616 --   Temporal Monitor Sitting Right arm       Pain Score       --                  Vitals:    09/24/20 1616 09/24/20 1619 09/24/20 1800   BP:  (!) 176/74 126/59   Pulse: 87  60   Patient Position - Orthostatic VS: Sitting  Sitting         Visual Acuity      ED Medications  Medications - No data to display    Diagnostic Studies  Results Reviewed     Procedure Component Value Units Date/Time    [facility] [[id number]]  (Normal) Collected:  09/24/20 1632    Lab Status:  Final result Specimen:  Nares from Nose Updated:  09/24/20 1737     SARS-CoV-2 Negative    Narrative:        The specimen collection materials, transport medium, and/or testing methodology utilized in the production of these test results have been proven to be reliable in a limited validation with an abbreviated program under the Emergency Utilization Authorization provided by the FDA  Testing reported as "Presumptive positive" will be confirmed with secondary testing with a reference laboratory to ensure result accuracy  Clinical caution and judgement should be used with the interpretation of these results with consideration of the clinical impression and other laboratory testing  Testing reported as "Positive" or "Negative" has been proven to be accurate according to standard laboratory validation requirements  All testing is performed with control materials showing appropriate reactivity at standard intervals        NT-BNP PRO [[id number]]  (Abnormal) Collected:  09/24/20 1632    Lab Status:  Final result Specimen:  Blood from Arm, Right Updated:  09/24/20 1707     NT-proBNP 459 pg/mL     Troponin I [[id number]]  (Normal) Collected:  09/24/20 1632    Lab Status:  Final result Specimen:  Blood from Arm, Right Updated:  09/24/20 1703     Troponin I 0 02 ng/mL     Comprehensive metabolic panel [[id number]]  (Abnormal) Collected:  09/24/20 1632    Lab Status:  Final result Specimen:  Blood from Arm, Right Updated:  09/24/20 1701     Sodium 139 mmol/L      Potassium 3 7 mmol/L      Chloride 105 mmol/L      CO2 26 mmol/L      ANION GAP 8 mmol/L      BUN 18 mg/dL      Creatinine 1 19 mg/dL      Glucose 126 mg/dL      Calcium 9 1 mg/dL      Corrected Calcium 9 6 mg/dL      AST 41 U/L      ALT 51 U/L      Alkaline Phosphatase 78 U/L      Total Protein 7 1 g/dL      Albumin 3 4 g/dL      Total Bilirubin 0 50 mg/dL      eGFR 56 ml/min/1 73sq m     Narrative:       [place] guidelines for Chronic Kidney Disease (CKD):     Stage 1 with normal or high GFR (GFR > 90 mL/min/1 73 square meters)    Stage 2 Mild CKD (GFR = 60-89 mL/min/1 73 square meters)    Stage 3A Moderate CKD (GFR = 45-59 mL/min/1 73 square meters)    Stage 3B Moderate CKD (GFR = 30-44 mL/min/1 73 square meters)    Stage 4 Severe CKD (GFR = 15-29 mL/min/1 73 square meters)    Stage 5 End Stage CKD (GFR <15 mL/min/1 73 square meters)  Note: GFR calculation is accurate only with a steady state creatinine    CBC and differential [[id number]]  (Abnormal) Collected:  09/24/20 1632    Lab Status:  Final result Specimen:  Blood from Arm, Right Updated:  09/24/20 1647     WBC 4 41 Thousand/uL      RBC 4 01 Million/uL      Hemoglobin 12 6 g/dL      Hematocrit 36 3 %      MCV 91 fL      MCH 31 4 pg      MCHC 34 7 g/dL      RDW 12 4 %      MPV 9 1 fL      Platelets 86 Thousands/uL      nRBC 0 /100 WBCs      Neutrophils Relative 64 %      Immat GRANS % 0 %      Lymphocytes Relative 21 %      Monocytes Relative 10 %      Eosinophils Relative 4 %      Basophils Relative 1 %      Neutrophils Absolute 2 81 Thousands/µL      Immature Grans Absolute 0 01 Thousand/uL      Lymphocytes Absolute 0 91 Thousands/µL      Monocytes Absolute 0 46 Thousand/µL      Eosinophils Absolute 0 19 Thousand/µL      Basophils Absolute 0 03 Thousands/µL                  XR chest 1 view portable   Final Result by Rizwan Villalba MD (09/24 1718)      No acute cardiopulmonary disease  Mild lower lung predominant reticulation corresponding with the CT from January 2019 due to mild pulmonary fibrosis  Workstation performed: [id number]                    Procedures  ECG 12 Lead Documentation Only    Date/Time: 9/24/2020 4:26 PM  Performed by: Dorcas Schwarz DO  Authorized by:  Dorcas Schwarz DO     Indications / Diagnosis:  Shortness of breath  ECG reviewed by me, the ED Provider: yes    Patient location:  ED  Previous ECG:     Previous ECG:  Compared to current    Comparison ECG info:  Left bundle branch is new when compared to EKG from 11/16/2018    Similarity:  Changes noted  Interpretation:     Interpretation: abnormal    Rate:     ECG rate:  63    ECG rate assessment: normal    Rhythm:     Rhythm: sinus rhythm    Ectopy:     Ectopy: none    QRS:     QRS axis:  Left    QRS intervals:  Normal  Conduction:     Conduction: abnormal      Abnormal conduction: complete LBBB    ST segments:     ST segments:  Normal  T waves:     T waves: normal               ED Course                           SBIRT 20yo+      Most Recent Value   SBIRT (22 yo +)   In order to provide better care to our patients, we are screening all of our patients for alcohol and drug use  Would it be okay to ask you these screening questions? Yes Filed at: 09/24/2020 1616   Initial Alcohol Screen: US AUDIT-C    1  How often do you have a drink containing alcohol?  0 Filed at: 09/24/2020 1616   2  How many drinks containing alcohol do you have on a typical day you are drinking? 0 Filed at: 09/24/2020 1616   3a  Male UNDER 65: How often do you have five or more drinks on one occasion? 0 Filed at: 09/24/2020 1616   3b  FEMALE Any Age, or MALE 65+: How often do you have 4 or more drinks on one occassion? 0 Filed at: 09/24/2020 1616   Audit-C Score  0 Filed at: 09/24/2020 1616   BOBBY: How many times in the past year have you    Used an illegal drug or used a prescription medication for non-medical reasons? Never Filed at: 09/24/2020 1616                  MDM  Number of Diagnoses or Management Options  Aortic stenosis:   Diarrhea:   Dyspnea on exertion: new and requires workup  Diagnosis management comments: Differential diagnosis:  Pneumonia, anemia, electrolyte disturbance, renal failure, myocardial infarction, COVID-19, stable progressive angina, worsening aortic stenosis    EKG, imaging and diagnostics reviewed  I discussed the results with the patient  The patient is asymptomatic while lying in the bed  Upon further questioning his symptoms have been ongoing for months and worsening  Is asymptomatic at rest but has symptomatology with exertion  I discussed his dyspnea on exertion as well as the potential for worsening stable angina    He understands the need to follow-up with his primary care doctor for further outpatient workup  Amount and/or Complexity of Data Reviewed  Clinical lab tests: reviewed  Tests in the radiology section of CPT®: reviewed  Tests in the medicine section of CPT®: reviewed  Review and summarize past medical records: yes (Old nuclear stress reviewed with EF 51% areas of significant ischemia)  Independent visualization of images, tracings, or specimens: yes        Disposition  Final diagnoses:   Dyspnea on exertion   Diarrhea   Aortic stenosis     Time reflects when diagnosis was documented in both MDM as applicable and the Disposition within this note     Time User Action Codes Description Comment    9/24/2020  6:16 PM Avery Ross B Add [R06 00] Dyspnea on exertion     9/24/2020  6:17 PM Terrance Reason Add [R19 7] Diarrhea     9/24/2020  6:17 PM Terrance Reason Add [I35 0] Aortic stenosis       ED Disposition     ED Disposition Condition Date/Time Comment    Discharge Stable Thu Sep 24, 2020  6:16 PM Rachelle Diaz discharge to home/self care              Follow-up Information     Follow up With Specialties Details Why Contact Info    Clarice Mcneill MD Internal Medicine Schedule an appointment as soon as possible for a visit  For further evaluation [address] [address]  [phone]            Discharge Medication List as of 9/24/2020  6:17 PM      CONTINUE these medications which have NOT CHANGED    Details   aspirin (ECOTRIN LOW STRENGTH) 81 mg EC tablet Take 1 tablet (81 mg total) by mouth daily, Starting Fri 5/17/2019, No Print      atorvastatin (LIPITOR) 10 mg tablet TAKE 1 TABLET BY MOUTH EVERY DAY, Normal      glimepiride (AMARYL) 2 mg tablet TAKE 1 TABLET BY MOUTH DAILY WITH BREAKFAST, Normal      ketoconazole (NIZORAL) 2 % cream Apply topically daily, Starting u 3/5/2020, Normal      Multiple Vitamins-Minerals (MULTIVITAMIN ADULT PO) Take 1 capsule by mouth daily, Historical Med      pantoprazole (PROTONIX) 40 mg tablet Take 1 tablet (40 mg total) by mouth daily, Starting Fri 7/24/2020, Normal      TRUE METRIX BLOOD GLUCOSE TEST test strip Testing 1 time daily DX:E11 9, Normal           No discharge procedures on file      PDMP Review     None          ED Provider  Electronically Signed by           Yesenia Garcia DO  09/24/20 4301

## 2020-09-25 ENCOUNTER — VBI (OUTPATIENT)
Dept: FAMILY MEDICINE CLINIC | Facility: HOSPITAL | Age: 85
End: 2020-09-25

## 2020-09-25 NOTE — TELEPHONE ENCOUNTER
Wilfredo Queen    ED Visit Information     Ed visit date: 9/24/2020  Diagnosis Description:   Dyspnea on exertion; Diarrhea; Aortic stenosis     In Network? [address]   Discharge status: Home  Discharged with meds ? No  Number of ED visits to date: 1  ED Severity:N/a     Outreach Information    Outreach successful: Yes 1  Date letter mailed:N/a  Date Finalized:9/25/2020    Care Coordination    Follow up appointment with pcp: no Declined  Transportation issues ? No    [facility] [employer] type:  7 Day Outreach  Emergent necessity warranted by diagnosis:  No  [facility] PCP:  Yes  Transportation:  Self Transport  Called PCP first?:  No  Feels able to call PCP for urgent problems ?:  Yes  Understands what emergencies can be handled by PCP ?:  Yes  Ever any problems getting appointment with PCP for minor emergency/urgency problems?:  No  Practice Contacted Patient ?:  No  Pt had ED follow up with pcp/staff ?:  No    Seen for follow-up out of network ?:  No  Reason Patient went to ED instead of Urgent Care or PCP?:  Perceived Severity of Illness  Urgent care Education?:  No  09/25/2020 02:37 PM Phone (Chu Moran) Brianna Browne (Self) [phone] (H)   Call Complete  Personal communication with patient regarding recent ED visit on 9/24/2020 for Dyspnea on exertion; Diarrhea; Aortic stenosis  Patient was discharged without medication and advised to follow up with PCP  Patient stated that he is feeling better and has not had any reoccurring symptoms  Patient is aware of PCP after hours answering service   Patient rushed of phone and I was unable to discuss care now facilities

## 2020-10-02 ENCOUNTER — IMMUNIZATIONS (OUTPATIENT)
Dept: FAMILY MEDICINE CLINIC | Facility: HOSPITAL | Age: 85
End: 2020-10-02
Payer: MEDICARE

## 2020-10-02 DIAGNOSIS — Z23 ENCOUNTER FOR IMMUNIZATION: ICD-10-CM

## 2020-10-02 PROCEDURE — 90662 IIV NO PRSV INCREASED AG IM: CPT

## 2020-10-02 PROCEDURE — G0008 ADMIN INFLUENZA VIRUS VAC: HCPCS

## 2020-11-04 ENCOUNTER — LAB (OUTPATIENT)
Dept: LAB | Facility: HOSPITAL | Age: 85
End: 2020-11-04
Attending: INTERNAL MEDICINE
Payer: MEDICARE

## 2020-11-04 ENCOUNTER — TELEPHONE (OUTPATIENT)
Dept: FAMILY MEDICINE CLINIC | Facility: HOSPITAL | Age: 85
End: 2020-11-04

## 2020-11-04 DIAGNOSIS — E11.9 CONTROLLED TYPE 2 DIABETES MELLITUS WITHOUT COMPLICATION, WITHOUT LONG-TERM CURRENT USE OF INSULIN (HCC): ICD-10-CM

## 2020-11-04 DIAGNOSIS — M17.11 ARTHROPATHY OF RIGHT KNEE: ICD-10-CM

## 2020-11-04 LAB
ALBUMIN SERPL BCP-MCNC: 3.8 G/DL (ref 3.5–5)
ALP SERPL-CCNC: 76 U/L (ref 46–116)
ALT SERPL W P-5'-P-CCNC: 44 U/L (ref 12–78)
ANION GAP SERPL CALCULATED.3IONS-SCNC: 3 MMOL/L (ref 4–13)
AST SERPL W P-5'-P-CCNC: 35 U/L (ref 5–45)
BASOPHILS # BLD AUTO: 0.04 THOUSANDS/ΜL (ref 0–0.1)
BASOPHILS NFR BLD AUTO: 1 % (ref 0–1)
BILIRUB SERPL-MCNC: 1.08 MG/DL (ref 0.2–1)
BUN SERPL-MCNC: 15 MG/DL (ref 5–25)
CALCIUM SERPL-MCNC: 9.2 MG/DL (ref 8.3–10.1)
CHLORIDE SERPL-SCNC: 109 MMOL/L (ref 100–108)
CHOLEST SERPL-MCNC: 147 MG/DL (ref 50–200)
CO2 SERPL-SCNC: 30 MMOL/L (ref 21–32)
CREAT SERPL-MCNC: 1.22 MG/DL (ref 0.6–1.3)
EOSINOPHIL # BLD AUTO: 0.21 THOUSAND/ΜL (ref 0–0.61)
EOSINOPHIL NFR BLD AUTO: 4 % (ref 0–6)
ERYTHROCYTE [DISTWIDTH] IN BLOOD BY AUTOMATED COUNT: 13.2 % (ref 11.6–15.1)
EST. AVERAGE GLUCOSE BLD GHB EST-MCNC: 128 MG/DL
GFR SERPL CREATININE-BSD FRML MDRD: 54 ML/MIN/1.73SQ M
GLUCOSE P FAST SERPL-MCNC: 126 MG/DL (ref 65–99)
HBA1C MFR BLD: 6.1 %
HCT VFR BLD AUTO: 43.2 % (ref 36.5–49.3)
HDLC SERPL-MCNC: 62 MG/DL
HGB BLD-MCNC: 13.9 G/DL (ref 12–17)
IMM GRANULOCYTES # BLD AUTO: 0.01 THOUSAND/UL (ref 0–0.2)
IMM GRANULOCYTES NFR BLD AUTO: 0 % (ref 0–2)
LDLC SERPL CALC-MCNC: 64 MG/DL (ref 0–100)
LYMPHOCYTES # BLD AUTO: 1.15 THOUSANDS/ΜL (ref 0.6–4.47)
LYMPHOCYTES NFR BLD AUTO: 24 % (ref 14–44)
MCH RBC QN AUTO: 31.1 PG (ref 26.8–34.3)
MCHC RBC AUTO-ENTMCNC: 32.2 G/DL (ref 31.4–37.4)
MCV RBC AUTO: 97 FL (ref 82–98)
MONOCYTES # BLD AUTO: 0.45 THOUSAND/ΜL (ref 0.17–1.22)
MONOCYTES NFR BLD AUTO: 9 % (ref 4–12)
NEUTROPHILS # BLD AUTO: 3.01 THOUSANDS/ΜL (ref 1.85–7.62)
NEUTS SEG NFR BLD AUTO: 62 % (ref 43–75)
NONHDLC SERPL-MCNC: 85 MG/DL
NRBC BLD AUTO-RTO: 0 /100 WBCS
PLATELET # BLD AUTO: 92 THOUSANDS/UL (ref 149–390)
PMV BLD AUTO: 9.9 FL (ref 8.9–12.7)
POTASSIUM SERPL-SCNC: 4.1 MMOL/L (ref 3.5–5.3)
PROT SERPL-MCNC: 7.8 G/DL (ref 6.4–8.2)
RBC # BLD AUTO: 4.47 MILLION/UL (ref 3.88–5.62)
SODIUM SERPL-SCNC: 142 MMOL/L (ref 136–145)
TRIGL SERPL-MCNC: 103 MG/DL
WBC # BLD AUTO: 4.87 THOUSAND/UL (ref 4.31–10.16)

## 2020-11-04 PROCEDURE — 36415 COLL VENOUS BLD VENIPUNCTURE: CPT

## 2020-11-04 PROCEDURE — 80053 COMPREHEN METABOLIC PANEL: CPT

## 2020-11-04 PROCEDURE — 80061 LIPID PANEL: CPT

## 2020-11-04 PROCEDURE — 85025 COMPLETE CBC W/AUTO DIFF WBC: CPT

## 2020-11-04 PROCEDURE — 83036 HEMOGLOBIN GLYCOSYLATED A1C: CPT

## 2020-11-09 ENCOUNTER — OFFICE VISIT (OUTPATIENT)
Dept: FAMILY MEDICINE CLINIC | Facility: HOSPITAL | Age: 85
End: 2020-11-09
Payer: MEDICARE

## 2020-11-09 VITALS
HEIGHT: 66 IN | DIASTOLIC BLOOD PRESSURE: 78 MMHG | HEART RATE: 70 BPM | SYSTOLIC BLOOD PRESSURE: 130 MMHG | BODY MASS INDEX: 26.74 KG/M2 | WEIGHT: 166.4 LBS | TEMPERATURE: 97.5 F

## 2020-11-09 DIAGNOSIS — N40.1 BENIGN PROSTATIC HYPERPLASIA WITH NOCTURIA: ICD-10-CM

## 2020-11-09 DIAGNOSIS — R07.89 OTHER CHEST PAIN: ICD-10-CM

## 2020-11-09 DIAGNOSIS — R35.1 BENIGN PROSTATIC HYPERPLASIA WITH NOCTURIA: ICD-10-CM

## 2020-11-09 DIAGNOSIS — E11.9 CONTROLLED TYPE 2 DIABETES MELLITUS WITHOUT COMPLICATION, WITHOUT LONG-TERM CURRENT USE OF INSULIN (HCC): ICD-10-CM

## 2020-11-09 DIAGNOSIS — E78.00 PURE HYPERCHOLESTEROLEMIA: Primary | ICD-10-CM

## 2020-11-09 DIAGNOSIS — M17.11 ARTHROPATHY OF RIGHT KNEE: ICD-10-CM

## 2020-11-09 DIAGNOSIS — F32.A MINOR DEPRESSION: ICD-10-CM
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
## 2020-11-09 PROBLEM — M65.331 TRIGGER MIDDLE FINGER OF RIGHT HAND: Status: RESOLVED | Noted: 2019-01-10 | Resolved: 2020-11-09

## 2020-11-09 PROBLEM — R26.9 NEUROLOGIC GAIT DYSFUNCTION: Status: ACTIVE | Noted: 2020-11-09

## 2020-11-09 PROCEDURE — 99214 OFFICE O/P EST MOD 30 MIN: CPT | Performed by: INTERNAL MEDICINE

## 2020-11-09 PROCEDURE — 10120 INC&RMVL FB SUBQ TISS SMPL: CPT | Performed by: INTERNAL MEDICINE

## 2020-11-09 RX ORDER — DOXYCYCLINE HYCLATE 100 MG
100 TABLET ORAL 2 TIMES DAILY
Qty: 6 TABLET | Refills: 0 | Status: SHIPPED | OUTPATIENT
Start: 2020-11-09 | End: 2020-11-12

## 2020-11-09 RX ORDER — ASPIRIN 81 MG/1
81 TABLET ORAL DAILY
Start: 2020-11-09 | End: 2021-01-01 | Stop reason: HOSPADM

## 2020-11-09 RX ORDER — TAMSULOSIN HYDROCHLORIDE 0.4 MG/1
0.4 CAPSULE ORAL
Qty: 30 CAPSULE | Refills: 5 | Status: SHIPPED | OUTPATIENT
Start: 2020-11-09 | End: 2021-01-01

## 2021-01-01 ENCOUNTER — ANESTHESIA (OUTPATIENT)
Dept: GASTROENTEROLOGY | Facility: HOSPITAL | Age: 86
End: 2021-01-01

## 2021-01-01 ENCOUNTER — HOSPITAL ENCOUNTER (OUTPATIENT)
Dept: NON INVASIVE DIAGNOSTICS | Facility: HOSPITAL | Age: 86
Discharge: HOME | End: 2021-06-03
Payer: MEDICARE

## 2021-01-01 ENCOUNTER — APPOINTMENT (INPATIENT)
Dept: RADIOLOGY | Facility: HOSPITAL | Age: 86
DRG: 266 | End: 2021-01-01
Payer: MEDICARE

## 2021-01-01 ENCOUNTER — APPOINTMENT (INPATIENT)
Dept: CT IMAGING | Facility: HOSPITAL | Age: 86
DRG: 438 | End: 2021-01-01
Payer: MEDICARE

## 2021-01-01 ENCOUNTER — OFFICE VISIT (OUTPATIENT)
Dept: FAMILY MEDICINE CLINIC | Facility: HOSPITAL | Age: 86
End: 2021-01-01
Payer: MEDICARE

## 2021-01-01 ENCOUNTER — LAB (OUTPATIENT)
Dept: LAB | Facility: HOSPITAL | Age: 86
End: 2021-01-01
Attending: INTERNAL MEDICINE
Payer: MEDICARE

## 2021-01-01 ENCOUNTER — TELEPHONE (OUTPATIENT)
Dept: FAMILY MEDICINE CLINIC | Facility: HOSPITAL | Age: 86
End: 2021-01-01

## 2021-01-01 ENCOUNTER — ANESTHESIA (OUTPATIENT)
Dept: ANESTHESIOLOGY | Facility: HOSPITAL | Age: 86
End: 2021-01-01

## 2021-01-01 ENCOUNTER — APPOINTMENT (OUTPATIENT)
Dept: CARDIAC REHAB | Facility: HOSPITAL | Age: 86
End: 2021-01-01
Payer: MEDICARE

## 2021-01-01 ENCOUNTER — APPOINTMENT (INPATIENT)
Dept: MRI IMAGING | Facility: HOSPITAL | Age: 86
DRG: 438 | End: 2021-01-01
Payer: MEDICARE

## 2021-01-01 ENCOUNTER — CLINICAL SUPPORT (OUTPATIENT)
Dept: CARDIAC REHAB | Facility: HOSPITAL | Age: 86
End: 2021-01-01
Payer: MEDICARE

## 2021-01-01 ENCOUNTER — ANESTHESIA (OUTPATIENT)
Dept: PERIOP | Facility: HOSPITAL | Age: 86
DRG: 266 | End: 2021-01-01
Payer: MEDICARE

## 2021-01-01 ENCOUNTER — HOSPITAL ENCOUNTER (INPATIENT)
Facility: HOSPITAL | Age: 86
LOS: 5 days | DRG: 438 | End: 2021-11-26
Attending: EMERGENCY MEDICINE | Admitting: STUDENT IN AN ORGANIZED HEALTH CARE EDUCATION/TRAINING PROGRAM
Payer: MEDICARE

## 2021-01-01 ENCOUNTER — IMMUNIZATIONS (OUTPATIENT)
Dept: FAMILY MEDICINE CLINIC | Facility: HOSPITAL | Age: 86
End: 2021-01-01

## 2021-01-01 ENCOUNTER — APPOINTMENT (OUTPATIENT)
Dept: NON INVASIVE DIAGNOSTICS | Facility: HOSPITAL | Age: 86
DRG: 266 | End: 2021-01-01
Attending: THORACIC SURGERY (CARDIOTHORACIC VASCULAR SURGERY)
Payer: MEDICARE

## 2021-01-01 ENCOUNTER — OFFICE VISIT (OUTPATIENT)
Dept: CARDIAC SURGERY | Facility: CLINIC | Age: 86
End: 2021-01-01
Payer: MEDICARE

## 2021-01-01 ENCOUNTER — TELEPHONE (OUTPATIENT)
Dept: CARDIAC SURGERY | Facility: CLINIC | Age: 86
End: 2021-01-01

## 2021-01-01 ENCOUNTER — ANESTHESIA (INPATIENT)
Dept: NON INVASIVE DIAGNOSTICS | Facility: HOSPITAL | Age: 86
DRG: 266 | End: 2021-01-01
Payer: MEDICARE

## 2021-01-01 ENCOUNTER — TRANSCRIBE ORDERS (OUTPATIENT)
Dept: RADIOLOGY | Facility: HOSPITAL | Age: 86
End: 2021-01-01

## 2021-01-01 ENCOUNTER — TELEPHONE (OUTPATIENT)
Dept: UROLOGY | Facility: MEDICAL CENTER | Age: 86
End: 2021-01-01

## 2021-01-01 ENCOUNTER — APPOINTMENT (EMERGENCY)
Dept: CT IMAGING | Facility: HOSPITAL | Age: 86
DRG: 438 | End: 2021-01-01
Payer: MEDICARE

## 2021-01-01 ENCOUNTER — APPOINTMENT (INPATIENT)
Dept: RADIOLOGY | Facility: HOSPITAL | Age: 86
DRG: 438 | End: 2021-01-01
Payer: MEDICARE

## 2021-01-01 ENCOUNTER — OFFICE VISIT (OUTPATIENT)
Dept: OBGYN CLINIC | Facility: CLINIC | Age: 86
End: 2021-01-01
Payer: MEDICARE

## 2021-01-01 ENCOUNTER — HOSPITAL ENCOUNTER (OUTPATIENT)
Dept: NON INVASIVE DIAGNOSTICS | Facility: HOSPITAL | Age: 86
Discharge: HOME | End: 2021-05-17
Attending: INTERNAL MEDICINE | Admitting: INTERNAL MEDICINE
Payer: MEDICARE

## 2021-01-01 ENCOUNTER — OFFICE VISIT (OUTPATIENT)
Dept: CARDIOLOGY CLINIC | Facility: CLINIC | Age: 86
End: 2021-01-01
Payer: MEDICARE

## 2021-01-01 ENCOUNTER — TRANSITIONAL CARE MANAGEMENT (OUTPATIENT)
Dept: FAMILY MEDICINE CLINIC | Facility: HOSPITAL | Age: 86
End: 2021-01-01

## 2021-01-01 ENCOUNTER — HOSPITAL ENCOUNTER (INPATIENT)
Facility: HOSPITAL | Age: 86
LOS: 4 days | Discharge: HOME HEALTH | DRG: 266 | End: 2021-06-26
Attending: THORACIC SURGERY (CARDIOTHORACIC VASCULAR SURGERY) | Admitting: THORACIC SURGERY (CARDIOTHORACIC VASCULAR SURGERY)
Payer: MEDICARE

## 2021-01-01 ENCOUNTER — TELEPHONE (OUTPATIENT)
Dept: CARDIOLOGY CLINIC | Facility: CLINIC | Age: 86
End: 2021-01-01

## 2021-01-01 ENCOUNTER — CONSULT (OUTPATIENT)
Dept: UROLOGY | Facility: HOSPITAL | Age: 86
End: 2021-01-01
Payer: MEDICARE

## 2021-01-01 ENCOUNTER — LAB REQUISITION (OUTPATIENT)
Dept: LAB | Facility: HOSPITAL | Age: 86
End: 2021-01-01
Payer: MEDICARE

## 2021-01-01 ENCOUNTER — APPOINTMENT (EMERGENCY)
Dept: NON INVASIVE DIAGNOSTICS | Facility: HOSPITAL | Age: 86
End: 2021-01-01
Payer: MEDICARE

## 2021-01-01 ENCOUNTER — TELEPHONE (OUTPATIENT)
Dept: GASTROENTEROLOGY | Facility: CLINIC | Age: 86
End: 2021-01-01

## 2021-01-01 ENCOUNTER — ANESTHESIA EVENT (OUTPATIENT)
Dept: GASTROENTEROLOGY | Facility: HOSPITAL | Age: 86
End: 2021-01-01

## 2021-01-01 ENCOUNTER — APPOINTMENT (OUTPATIENT)
Dept: LAB | Facility: HOSPITAL | Age: 86
End: 2021-01-01
Payer: MEDICARE

## 2021-01-01 ENCOUNTER — TELEPHONE (OUTPATIENT)
Dept: OTHER | Facility: HOSPITAL | Age: 86
End: 2021-01-01

## 2021-01-01 ENCOUNTER — HOSPITAL ENCOUNTER (OUTPATIENT)
Dept: RADIOLOGY | Facility: HOSPITAL | Age: 86
Discharge: HOME | End: 2021-06-03
Payer: MEDICARE

## 2021-01-01 ENCOUNTER — LAB (OUTPATIENT)
Dept: LAB | Facility: HOSPITAL | Age: 86
End: 2021-01-01
Payer: MEDICARE

## 2021-01-01 ENCOUNTER — ANESTHESIA EVENT (OUTPATIENT)
Dept: ANESTHESIOLOGY | Facility: HOSPITAL | Age: 86
End: 2021-01-01

## 2021-01-01 ENCOUNTER — IN-CLINIC DEVICE VISIT (OUTPATIENT)
Dept: CARDIOLOGY CLINIC | Facility: CLINIC | Age: 86
End: 2021-01-01

## 2021-01-01 ENCOUNTER — TELEPHONE (OUTPATIENT)
Dept: UROLOGY | Facility: AMBULATORY SURGERY CENTER | Age: 86
End: 2021-01-01

## 2021-01-01 ENCOUNTER — TELEPHONE (OUTPATIENT)
Dept: OTHER | Facility: OTHER | Age: 86
End: 2021-01-01

## 2021-01-01 ENCOUNTER — ANESTHESIA EVENT (OUTPATIENT)
Dept: PERIOP | Facility: HOSPITAL | Age: 86
DRG: 266 | End: 2021-01-01
Payer: MEDICARE

## 2021-01-01 ENCOUNTER — HOSPITAL ENCOUNTER (OUTPATIENT)
Dept: GASTROENTEROLOGY | Facility: HOSPITAL | Age: 86
Setting detail: OUTPATIENT SURGERY
Discharge: HOME | End: 2021-06-02
Attending: INTERNAL MEDICINE | Admitting: INTERNAL MEDICINE
Payer: MEDICARE

## 2021-01-01 ENCOUNTER — IMMUNIZATIONS (OUTPATIENT)
Dept: FAMILY MEDICINE CLINIC | Facility: HOSPITAL | Age: 86
End: 2021-01-01
Payer: MEDICARE

## 2021-01-01 ENCOUNTER — APPOINTMENT (EMERGENCY)
Dept: RADIOLOGY | Facility: HOSPITAL | Age: 86
DRG: 438 | End: 2021-01-01
Payer: MEDICARE

## 2021-01-01 ENCOUNTER — HOSPITAL ENCOUNTER (OUTPATIENT)
Dept: NON INVASIVE DIAGNOSTICS | Facility: HOSPITAL | Age: 86
Discharge: HOME | End: 2021-07-22
Payer: MEDICARE

## 2021-01-01 ENCOUNTER — HOSPITAL ENCOUNTER (EMERGENCY)
Facility: HOSPITAL | Age: 86
Discharge: HOME | End: 2021-06-28
Attending: EMERGENCY MEDICINE
Payer: MEDICARE

## 2021-01-01 ENCOUNTER — HOSPITAL ENCOUNTER (OUTPATIENT)
Dept: NON INVASIVE DIAGNOSTICS | Facility: HOSPITAL | Age: 86
Discharge: HOME | End: 2021-04-01
Attending: INTERNAL MEDICINE
Payer: MEDICARE

## 2021-01-01 ENCOUNTER — APPOINTMENT (OUTPATIENT)
Dept: RADIOLOGY | Facility: HOSPITAL | Age: 86
DRG: 266 | End: 2021-01-01
Payer: MEDICARE

## 2021-01-01 ENCOUNTER — TELEPHONE (OUTPATIENT)
Dept: SURGERY | Facility: HOSPITAL | Age: 86
End: 2021-01-01

## 2021-01-01 ENCOUNTER — CONSULT (OUTPATIENT)
Dept: CARDIOLOGY CLINIC | Facility: CLINIC | Age: 86
End: 2021-01-01
Payer: MEDICARE

## 2021-01-01 VITALS
HEIGHT: 66 IN | SYSTOLIC BLOOD PRESSURE: 120 MMHG | BODY MASS INDEX: 27.8 KG/M2 | HEIGHT: 66 IN | BODY MASS INDEX: 27.85 KG/M2 | DIASTOLIC BLOOD PRESSURE: 60 MMHG | WEIGHT: 173.3 LBS | HEART RATE: 68 BPM | OXYGEN SATURATION: 99 % | WEIGHT: 173 LBS | RESPIRATION RATE: 18 BRPM | TEMPERATURE: 97.2 F

## 2021-01-01 VITALS
HEIGHT: 66 IN | BODY MASS INDEX: 27.64 KG/M2 | WEIGHT: 172 LBS | HEART RATE: 71 BPM | SYSTOLIC BLOOD PRESSURE: 122 MMHG | OXYGEN SATURATION: 97 % | DIASTOLIC BLOOD PRESSURE: 74 MMHG

## 2021-01-01 VITALS
HEIGHT: 66 IN | SYSTOLIC BLOOD PRESSURE: 118 MMHG | OXYGEN SATURATION: 99 % | BODY MASS INDEX: 27.16 KG/M2 | DIASTOLIC BLOOD PRESSURE: 68 MMHG | HEART RATE: 74 BPM | WEIGHT: 169 LBS

## 2021-01-01 VITALS
OXYGEN SATURATION: 95 % | WEIGHT: 172 LBS | BODY MASS INDEX: 27.64 KG/M2 | HEART RATE: 58 BPM | HEIGHT: 66 IN | DIASTOLIC BLOOD PRESSURE: 66 MMHG | SYSTOLIC BLOOD PRESSURE: 102 MMHG

## 2021-01-01 VITALS
OXYGEN SATURATION: 95 % | DIASTOLIC BLOOD PRESSURE: 51 MMHG | RESPIRATION RATE: 15 BRPM | SYSTOLIC BLOOD PRESSURE: 95 MMHG | HEART RATE: 93 BPM | BODY MASS INDEX: 26.68 KG/M2 | TEMPERATURE: 97.4 F | HEIGHT: 66 IN | WEIGHT: 166.01 LBS

## 2021-01-01 VITALS — SYSTOLIC BLOOD PRESSURE: 120 MMHG | WEIGHT: 169 LBS | BODY MASS INDEX: 27.28 KG/M2 | DIASTOLIC BLOOD PRESSURE: 62 MMHG

## 2021-01-01 VITALS
HEART RATE: 69 BPM | TEMPERATURE: 98.2 F | SYSTOLIC BLOOD PRESSURE: 122 MMHG | OXYGEN SATURATION: 99 % | HEIGHT: 66 IN | DIASTOLIC BLOOD PRESSURE: 58 MMHG | WEIGHT: 170 LBS | RESPIRATION RATE: 18 BRPM | BODY MASS INDEX: 27.32 KG/M2

## 2021-01-01 VITALS
HEIGHT: 66 IN | DIASTOLIC BLOOD PRESSURE: 58 MMHG | SYSTOLIC BLOOD PRESSURE: 100 MMHG | HEART RATE: 54 BPM | WEIGHT: 171 LBS | BODY MASS INDEX: 27.48 KG/M2

## 2021-01-01 VITALS
HEART RATE: 81 BPM | SYSTOLIC BLOOD PRESSURE: 118 MMHG | WEIGHT: 168 LBS | DIASTOLIC BLOOD PRESSURE: 68 MMHG | BODY MASS INDEX: 27.12 KG/M2

## 2021-01-01 VITALS
OXYGEN SATURATION: 97 % | RESPIRATION RATE: 18 BRPM | WEIGHT: 168 LBS | SYSTOLIC BLOOD PRESSURE: 110 MMHG | DIASTOLIC BLOOD PRESSURE: 60 MMHG | HEART RATE: 83 BPM | BODY MASS INDEX: 27 KG/M2 | TEMPERATURE: 98.4 F | HEIGHT: 66 IN

## 2021-01-01 VITALS
OXYGEN SATURATION: 95 % | RESPIRATION RATE: 18 BRPM | HEART RATE: 62 BPM | TEMPERATURE: 97.9 F | WEIGHT: 167.99 LBS | BODY MASS INDEX: 27 KG/M2 | HEIGHT: 66 IN | SYSTOLIC BLOOD PRESSURE: 108 MMHG | DIASTOLIC BLOOD PRESSURE: 49 MMHG

## 2021-01-01 VITALS
HEIGHT: 66 IN | RESPIRATION RATE: 18 BRPM | WEIGHT: 167 LBS | DIASTOLIC BLOOD PRESSURE: 61 MMHG | TEMPERATURE: 98.5 F | BODY MASS INDEX: 26.84 KG/M2 | HEART RATE: 64 BPM | OXYGEN SATURATION: 96 % | SYSTOLIC BLOOD PRESSURE: 126 MMHG

## 2021-01-01 VITALS
WEIGHT: 171.6 LBS | BODY MASS INDEX: 27.58 KG/M2 | DIASTOLIC BLOOD PRESSURE: 61 MMHG | SYSTOLIC BLOOD PRESSURE: 125 MMHG | OXYGEN SATURATION: 99 % | RESPIRATION RATE: 18 BRPM | HEART RATE: 75 BPM | HEIGHT: 66 IN

## 2021-01-01 VITALS
HEIGHT: 66 IN | TEMPERATURE: 97.8 F | SYSTOLIC BLOOD PRESSURE: 121 MMHG | DIASTOLIC BLOOD PRESSURE: 56 MMHG | RESPIRATION RATE: 18 BRPM | WEIGHT: 160 LBS | HEART RATE: 74 BPM | OXYGEN SATURATION: 98 % | BODY MASS INDEX: 25.71 KG/M2

## 2021-01-01 VITALS
RESPIRATION RATE: 18 BRPM | SYSTOLIC BLOOD PRESSURE: 124 MMHG | BODY MASS INDEX: 27.56 KG/M2 | DIASTOLIC BLOOD PRESSURE: 62 MMHG | HEIGHT: 66 IN | HEIGHT: 66 IN | SYSTOLIC BLOOD PRESSURE: 110 MMHG | DIASTOLIC BLOOD PRESSURE: 58 MMHG | WEIGHT: 173 LBS | OXYGEN SATURATION: 95 % | TEMPERATURE: 98.2 F | WEIGHT: 171.5 LBS | HEART RATE: 63 BPM | BODY MASS INDEX: 27.8 KG/M2

## 2021-01-01 DIAGNOSIS — J30.2 SEASONAL ALLERGIC RHINITIS, UNSPECIFIED TRIGGER: ICD-10-CM

## 2021-01-01 DIAGNOSIS — I65.23 BILATERAL CAROTID ARTERY STENOSIS: ICD-10-CM

## 2021-01-01 DIAGNOSIS — I35.0 AORTIC STENOSIS, SEVERE: Primary | ICD-10-CM

## 2021-01-01 DIAGNOSIS — I63.9 CEREBELLAR INFARCT (HCC): ICD-10-CM

## 2021-01-01 DIAGNOSIS — R94.31 ABNORMAL EKG: ICD-10-CM

## 2021-01-01 DIAGNOSIS — Z95.2 S/P TAVR (TRANSCATHETER AORTIC VALVE REPLACEMENT): ICD-10-CM

## 2021-01-01 DIAGNOSIS — Z95.2 S/P TAVR (TRANSCATHETER AORTIC VALVE REPLACEMENT): Primary | ICD-10-CM

## 2021-01-01 DIAGNOSIS — N17.9 ACUTE KIDNEY INJURY (HCC): Primary | ICD-10-CM

## 2021-01-01 DIAGNOSIS — R45.851 SUICIDAL IDEATIONS: ICD-10-CM

## 2021-01-01 DIAGNOSIS — Z48.812 ENCOUNTER FOR SURGICAL AFTERCARE FOLLOWING SURGERY ON THE CIRCULATORY SYSTEM: ICD-10-CM

## 2021-01-01 DIAGNOSIS — Z01.818 ENCOUNTER FOR OTHER PREPROCEDURAL EXAMINATION: ICD-10-CM

## 2021-01-01 DIAGNOSIS — Z76.89 ENCOUNTER FOR SUPPORT AND COORDINATION OF TRANSITION OF CARE: Primary | ICD-10-CM

## 2021-01-01 DIAGNOSIS — E11.9 CONTROLLED TYPE 2 DIABETES MELLITUS WITHOUT COMPLICATION, WITHOUT LONG-TERM CURRENT USE OF INSULIN (HCC): ICD-10-CM

## 2021-01-01 DIAGNOSIS — I35.0 NONRHEUMATIC AORTIC (VALVE) STENOSIS: ICD-10-CM

## 2021-01-01 DIAGNOSIS — R06.02 SOB (SHORTNESS OF BREATH): ICD-10-CM

## 2021-01-01 DIAGNOSIS — I35.0 SEVERE AORTIC STENOSIS: Primary | ICD-10-CM

## 2021-01-01 DIAGNOSIS — I35.0 AORTIC STENOSIS, SEVERE: ICD-10-CM

## 2021-01-01 DIAGNOSIS — R13.14 PHARYNGOESOPHAGEAL DYSPHAGIA: ICD-10-CM

## 2021-01-01 DIAGNOSIS — K22.2 ESOPHAGEAL STRICTURE: ICD-10-CM

## 2021-01-01 DIAGNOSIS — D69.6 THROMBOCYTOPENIA (HCC): ICD-10-CM

## 2021-01-01 DIAGNOSIS — I25.10 CORONARY ARTERY DISEASE INVOLVING NATIVE HEART WITHOUT ANGINA PECTORIS, UNSPECIFIED VESSEL OR LESION TYPE: ICD-10-CM

## 2021-01-01 DIAGNOSIS — I35.0 MODERATE AORTIC STENOSIS: ICD-10-CM

## 2021-01-01 DIAGNOSIS — F32.89 OTHER DEPRESSION: Primary | ICD-10-CM

## 2021-01-01 DIAGNOSIS — Z09 HOSPITAL DISCHARGE FOLLOW-UP: Primary | ICD-10-CM

## 2021-01-01 DIAGNOSIS — E78.00 PURE HYPERCHOLESTEROLEMIA: Primary | ICD-10-CM

## 2021-01-01 DIAGNOSIS — M18.0 ARTHRITIS OF CARPOMETACARPAL (CMC) JOINT OF BOTH THUMBS: Primary | ICD-10-CM

## 2021-01-01 DIAGNOSIS — Z01.818 OTHER SPECIFIED PRE-OPERATIVE EXAMINATION: ICD-10-CM

## 2021-01-01 DIAGNOSIS — E87.2 LACTIC ACIDOSIS: ICD-10-CM

## 2021-01-01 DIAGNOSIS — Z23 ENCOUNTER FOR IMMUNIZATION: Primary | ICD-10-CM

## 2021-01-01 DIAGNOSIS — E78.00 PURE HYPERCHOLESTEROLEMIA: ICD-10-CM

## 2021-01-01 DIAGNOSIS — R55 NEAR SYNCOPE: ICD-10-CM

## 2021-01-01 DIAGNOSIS — Z01.818 PRE-OP TESTING: ICD-10-CM

## 2021-01-01 DIAGNOSIS — E11.9 CONTROLLED TYPE 2 DIABETES MELLITUS WITHOUT COMPLICATION, WITHOUT LONG-TERM CURRENT USE OF INSULIN (HCC): Primary | ICD-10-CM

## 2021-01-01 DIAGNOSIS — I35.0 SEVERE AORTIC STENOSIS: ICD-10-CM

## 2021-01-01 DIAGNOSIS — E86.0 DEHYDRATION: ICD-10-CM

## 2021-01-01 DIAGNOSIS — R26.2 AMBULATORY DYSFUNCTION: ICD-10-CM

## 2021-01-01 DIAGNOSIS — I44.7 LBBB (LEFT BUNDLE BRANCH BLOCK): ICD-10-CM

## 2021-01-01 DIAGNOSIS — F32.A MINOR DEPRESSION: ICD-10-CM

## 2021-01-01 DIAGNOSIS — K29.60 EROSIVE GASTRITIS: Primary | ICD-10-CM

## 2021-01-01 DIAGNOSIS — K21.9 GASTROESOPHAGEAL REFLUX DISEASE, UNSPECIFIED WHETHER ESOPHAGITIS PRESENT: ICD-10-CM

## 2021-01-01 DIAGNOSIS — R78.81 BACTEREMIA: ICD-10-CM

## 2021-01-01 DIAGNOSIS — Z95.0 PRESENCE OF PERMANENT CARDIAC PACEMAKER: Primary | ICD-10-CM

## 2021-01-01 DIAGNOSIS — K85.90 ACUTE PANCREATITIS, UNSPECIFIED COMPLICATION STATUS, UNSPECIFIED PANCREATITIS TYPE: ICD-10-CM

## 2021-01-01 DIAGNOSIS — R41.0 CONFUSION: ICD-10-CM

## 2021-01-01 DIAGNOSIS — R31.0 GROSS HEMATURIA: ICD-10-CM

## 2021-01-01 DIAGNOSIS — G92.9 TOXIC ENCEPHALOPATHY: ICD-10-CM

## 2021-01-01 DIAGNOSIS — G93.89 ENCEPHALOMALACIA ON IMAGING STUDY: ICD-10-CM

## 2021-01-01 DIAGNOSIS — Z48.89 ENCOUNTER FOR POSTOPERATIVE CARE: ICD-10-CM

## 2021-01-01 DIAGNOSIS — I63.9 CEREBROVASCULAR ACCIDENT (CVA), UNSPECIFIED MECHANISM (HCC): ICD-10-CM

## 2021-01-01 DIAGNOSIS — R65.20 SEVERE SEPSIS (HCC): ICD-10-CM

## 2021-01-01 DIAGNOSIS — R53.1 GENERALIZED WEAKNESS: ICD-10-CM

## 2021-01-01 DIAGNOSIS — K29.60 EROSIVE GASTRITIS: ICD-10-CM

## 2021-01-01 DIAGNOSIS — I35.0 NONRHEUMATIC AORTIC VALVE STENOSIS: ICD-10-CM

## 2021-01-01 DIAGNOSIS — E87.0 HYPERNATREMIA: ICD-10-CM

## 2021-01-01 DIAGNOSIS — L89.159 SACRAL DECUBITUS ULCER: ICD-10-CM

## 2021-01-01 DIAGNOSIS — A41.9 SEVERE SEPSIS (HCC): ICD-10-CM

## 2021-01-01 DIAGNOSIS — K85.90 PANCREATITIS: ICD-10-CM

## 2021-01-01 DIAGNOSIS — Z86.79 HISTORY OF AORTIC STENOSIS: ICD-10-CM

## 2021-01-01 DIAGNOSIS — S30.1XXA HEMATOMA OF GROIN, INITIAL ENCOUNTER: Primary | ICD-10-CM

## 2021-01-01 DIAGNOSIS — J84.10 PULMONARY FIBROSIS (HCC): ICD-10-CM

## 2021-01-01 DIAGNOSIS — R31.0 GROSS HEMATURIA: Primary | ICD-10-CM

## 2021-01-01 LAB
2HR DELTA HS TROPONIN: -3 NG/L
A2 MACROGLOB SERPL-MCNC: 265 MG/DL (ref 110–276)
ABO GROUP BLD BPU: NORMAL
ABO GROUP BLD: NORMAL
ABO GROUP BLD: NORMAL
AFP-TM SERPL-MCNC: 1.9 NG/ML (ref 0.5–8)
ALBUMIN SERPL BCP-MCNC: 1.5 G/DL (ref 3.5–5)
ALBUMIN SERPL BCP-MCNC: 1.7 G/DL (ref 3.5–5)
ALBUMIN SERPL BCP-MCNC: 1.8 G/DL (ref 3.5–5)
ALBUMIN SERPL BCP-MCNC: 1.9 G/DL (ref 3.5–5)
ALBUMIN SERPL BCP-MCNC: 2.2 G/DL (ref 3.5–5)
ALBUMIN SERPL BCP-MCNC: 2.2 G/DL (ref 3.5–5)
ALBUMIN SERPL BCP-MCNC: 2.8 G/DL (ref 3.5–5)
ALBUMIN SERPL BCP-MCNC: 3.2 G/DL (ref 3.5–5)
ALBUMIN SERPL BCP-MCNC: 3.5 G/DL (ref 3.5–5)
ALBUMIN SERPL BCP-MCNC: 3.7 G/DL (ref 3.5–5)
ALP SERPL-CCNC: 170 U/L (ref 46–116)
ALP SERPL-CCNC: 181 U/L (ref 46–116)
ALP SERPL-CCNC: 200 U/L (ref 46–116)
ALP SERPL-CCNC: 207 U/L (ref 46–116)
ALP SERPL-CCNC: 66 U/L (ref 46–116)
ALP SERPL-CCNC: 70 U/L (ref 46–116)
ALP SERPL-CCNC: 70 U/L (ref 46–116)
ALP SERPL-CCNC: 81 U/L (ref 46–116)
ALP SERPL-CCNC: 85 U/L (ref 46–116)
ALP SERPL-CCNC: 88 U/L (ref 46–116)
ALT SERPL W P-5'-P-CCNC: 38 U/L (ref 12–78)
ALT SERPL W P-5'-P-CCNC: 40 U/L (ref 12–78)
ALT SERPL W P-5'-P-CCNC: 43 U/L (ref 12–78)
ALT SERPL W P-5'-P-CCNC: 51 U/L (ref 12–78)
ALT SERPL W P-5'-P-CCNC: 54 U/L (ref 12–78)
ALT SERPL W P-5'-P-CCNC: 56 IU/L (ref 0–55)
ALT SERPL W P-5'-P-CCNC: 613 U/L (ref 12–78)
ALT SERPL W P-5'-P-CCNC: 66 U/L (ref 12–78)
ALT SERPL W P-5'-P-CCNC: 764 U/L (ref 12–78)
ALT SERPL W P-5'-P-CCNC: 799 U/L (ref 12–78)
ALT SERPL W P-5'-P-CCNC: 920 U/L (ref 12–78)
AMMONIA PLAS-SCNC: 17 UMOL/L (ref 11–35)
AMMONIA PLAS-SCNC: 27 UMOL/L (ref 11–35)
ANION GAP SERPL CALCULATED.3IONS-SCNC: 11 MMOL/L (ref 4–13)
ANION GAP SERPL CALCULATED.3IONS-SCNC: 12 MMOL/L (ref 4–13)
ANION GAP SERPL CALCULATED.3IONS-SCNC: 12 MMOL/L (ref 4–13)
ANION GAP SERPL CALCULATED.3IONS-SCNC: 13 MMOL/L (ref 4–13)
ANION GAP SERPL CALCULATED.3IONS-SCNC: 14 MMOL/L (ref 4–13)
ANION GAP SERPL CALCULATED.3IONS-SCNC: 14 MMOL/L (ref 4–13)
ANION GAP SERPL CALCULATED.3IONS-SCNC: 15 MMOL/L (ref 4–13)
ANION GAP SERPL CALCULATED.3IONS-SCNC: 19 MMOL/L (ref 4–13)
ANION GAP SERPL CALCULATED.3IONS-SCNC: 19 MMOL/L (ref 4–13)
ANION GAP SERPL CALCULATED.3IONS-SCNC: 4 MMOL/L (ref 4–13)
ANION GAP SERPL CALCULATED.3IONS-SCNC: 4 MMOL/L (ref 4–13)
ANION GAP SERPL CALCULATED.3IONS-SCNC: 5 MMOL/L (ref 4–13)
ANION GAP SERPL CALCULATED.3IONS-SCNC: 6 MMOL/L (ref 4–13)
ANION GAP SERPL CALCULATED.3IONS-SCNC: 7 MMOL/L (ref 4–13)
ANION GAP SERPL CALCULATED.3IONS-SCNC: 8 MMOL/L (ref 4–13)
ANISOCYTOSIS BLD QL SMEAR: PRESENT
APAP SERPL-MCNC: <2 UG/ML (ref 10–20)
APO A-I SERPL-MCNC: 54 MG/DL (ref 101–178)
AST SERPL W P-5'-P-CCNC: 2540 U/L (ref 5–45)
AST SERPL W P-5'-P-CCNC: 256 U/L (ref 5–45)
AST SERPL W P-5'-P-CCNC: 34 U/L (ref 5–45)
AST SERPL W P-5'-P-CCNC: 40 U/L (ref 5–45)
AST SERPL W P-5'-P-CCNC: 4132 U/L (ref 5–45)
AST SERPL W P-5'-P-CCNC: 44 U/L (ref 5–45)
AST SERPL W P-5'-P-CCNC: 50 U/L (ref 5–45)
AST SERPL W P-5'-P-CCNC: 58 U/L (ref 5–45)
AST SERPL W P-5'-P-CCNC: 60 U/L (ref 5–45)
AST SERPL W P-5'-P-CCNC: 766 U/L (ref 5–45)
ATRIAL RATE: 110 BPM
ATRIAL RATE: 61 BPM
ATRIAL RATE: 64 BPM
ATRIAL RATE: 64 BPM
ATRIAL RATE: 66 BPM
ATRIAL RATE: 76 BPM
ATRIAL RATE: 84 BPM
ATRIAL RATE: 97 BPM
BACTERIA BLD CULT: ABNORMAL
BACTERIA BLD CULT: NORMAL
BACTERIA BLD CULT: NORMAL
BACTERIA UR QL AUTO: ABNORMAL /HPF
BACTERIA UR QL AUTO: ABNORMAL /HPF
BASE EXCESS BLDA CALC-SCNC: -1 MMOL/L (ref -2–3)
BASE EXCESS BLDA CALC-SCNC: -1 MMOL/L (ref -2–3)
BASE EXCESS BLDA CALC-SCNC: -3 MMOL/L (ref -2–3)
BASOPHILS # BLD AUTO: 0.03 THOUSANDS/ΜL (ref 0–0.1)
BASOPHILS # BLD AUTO: 0.04 THOUSANDS/ΜL (ref 0–0.1)
BASOPHILS # BLD AUTO: 0.05 THOUSANDS/ΜL (ref 0–0.1)
BASOPHILS # BLD AUTO: 0.06 THOUSANDS/ΜL (ref 0–0.1)
BASOPHILS # BLD AUTO: 0.06 THOUSANDS/ΜL (ref 0–0.1)
BASOPHILS # BLD AUTO: 0.09 THOUSANDS/ΜL (ref 0–0.1)
BASOPHILS # BLD MANUAL: 0 THOUSAND/UL (ref 0–0.1)
BASOPHILS # BLD MANUAL: 0 THOUSAND/UL (ref 0–0.1)
BASOPHILS NFR BLD AUTO: 0 % (ref 0–1)
BASOPHILS NFR BLD AUTO: 1 % (ref 0–1)
BASOPHILS NFR MAR MANUAL: 0 % (ref 0–1)
BASOPHILS NFR MAR MANUAL: 0 % (ref 0–1)
BILIRUB DIRECT SERPL-MCNC: 0.62 MG/DL (ref 0–0.2)
BILIRUB SERPL-MCNC: 0.83 MG/DL (ref 0.2–1)
BILIRUB SERPL-MCNC: 1.14 MG/DL (ref 0.2–1)
BILIRUB SERPL-MCNC: 1.2 MG/DL (ref 0.2–1)
BILIRUB SERPL-MCNC: 1.3 MG/DL (ref 0.2–1)
BILIRUB SERPL-MCNC: 1.3 MG/DL (ref 0.2–1)
BILIRUB SERPL-MCNC: 1.3 MG/DL (ref 0–1.2)
BILIRUB SERPL-MCNC: 1.4 MG/DL (ref 0.2–1)
BILIRUB SERPL-MCNC: 1.6 MG/DL (ref 0.2–1)
BILIRUB SERPL-MCNC: 1.7 MG/DL (ref 0.2–1)
BILIRUB UR QL STRIP: NEGATIVE
BLD GP AB SCN SERPL QL: NEGATIVE
BLD GP AB SCN SERPL QL: NEGATIVE
BLD SMEAR INTERP: NORMAL
BPU ID: NORMAL
BUN SERPL-MCNC: 14 MG/DL (ref 5–25)
BUN SERPL-MCNC: 15 MG/DL (ref 5–25)
BUN SERPL-MCNC: 15 MG/DL (ref 5–25)
BUN SERPL-MCNC: 16 MG/DL (ref 5–25)
BUN SERPL-MCNC: 17 MG/DL (ref 5–25)
BUN SERPL-MCNC: 20 MG/DL (ref 5–25)
BUN SERPL-MCNC: 24 MG/DL (ref 5–25)
BUN SERPL-MCNC: 50 MG/DL (ref 5–25)
BUN SERPL-MCNC: 51 MG/DL (ref 5–25)
BUN SERPL-MCNC: 52 MG/DL (ref 5–25)
BUN SERPL-MCNC: 54 MG/DL (ref 5–25)
BUN SERPL-MCNC: 55 MG/DL (ref 5–25)
BUN SERPL-MCNC: 56 MG/DL (ref 5–25)
BUN SERPL-MCNC: 59 MG/DL (ref 5–25)
BUN SERPL-MCNC: 61 MG/DL (ref 5–25)
BUN SERPL-MCNC: 63 MG/DL (ref 5–25)
BUN SERPL-MCNC: 67 MG/DL (ref 5–25)
BUN SERPL-MCNC: 72 MG/DL (ref 5–25)
CA-I BLD-SCNC: 0.97 MMOL/L (ref 1.12–1.32)
CA-I BLD-SCNC: 1.06 MMOL/L (ref 1.12–1.32)
CA-I BLD-SCNC: 1.14 MMOL/L (ref 1.12–1.32)
CA-I BLD-SCNC: 1.19 MMOL/L (ref 1.12–1.32)
CA-I BLD-SCNC: 1.28 MMOL/L (ref 1.12–1.32)
CALCIUM ALBUM COR SERPL-MCNC: 10.1 MG/DL (ref 8.3–10.1)
CALCIUM ALBUM COR SERPL-MCNC: 9.5 MG/DL (ref 8.3–10.1)
CALCIUM ALBUM COR SERPL-MCNC: 9.6 MG/DL (ref 8.3–10.1)
CALCIUM ALBUM COR SERPL-MCNC: 9.8 MG/DL (ref 8.3–10.1)
CALCIUM SERPL-MCNC: 7.7 MG/DL (ref 8.3–10.1)
CALCIUM SERPL-MCNC: 7.8 MG/DL (ref 8.3–10.1)
CALCIUM SERPL-MCNC: 8 MG/DL (ref 8.3–10.1)
CALCIUM SERPL-MCNC: 8 MG/DL (ref 8.3–10.1)
CALCIUM SERPL-MCNC: 8.2 MG/DL (ref 8.3–10.1)
CALCIUM SERPL-MCNC: 8.3 MG/DL (ref 8.3–10.1)
CALCIUM SERPL-MCNC: 8.4 MG/DL (ref 8.3–10.1)
CALCIUM SERPL-MCNC: 8.4 MG/DL (ref 8.3–10.1)
CALCIUM SERPL-MCNC: 8.5 MG/DL (ref 8.3–10.1)
CALCIUM SERPL-MCNC: 8.7 MG/DL (ref 8.3–10.1)
CALCIUM SERPL-MCNC: 8.8 MG/DL (ref 8.3–10.1)
CALCIUM SERPL-MCNC: 8.9 MG/DL (ref 8.3–10.1)
CALCIUM SERPL-MCNC: 9 MG/DL (ref 8.3–10.1)
CALCIUM SERPL-MCNC: 9 MG/DL (ref 8.3–10.1)
CALCIUM SERPL-MCNC: 9.1 MG/DL (ref 8.3–10.1)
CARDIAC TROPONIN I PNL SERPL HS: 34 NG/L
CARDIAC TROPONIN I PNL SERPL HS: 37 NG/L
CHLORIDE SERPL-SCNC: 102 MMOL/L (ref 100–108)
CHLORIDE SERPL-SCNC: 103 MMOL/L (ref 100–108)
CHLORIDE SERPL-SCNC: 105 MMOL/L (ref 100–108)
CHLORIDE SERPL-SCNC: 106 MMOL/L (ref 100–108)
CHLORIDE SERPL-SCNC: 107 MMOL/L (ref 100–108)
CHLORIDE SERPL-SCNC: 108 MMOL/L (ref 100–108)
CHLORIDE SERPL-SCNC: 109 MMOL/L (ref 100–108)
CHLORIDE SERPL-SCNC: 109 MMOL/L (ref 100–108)
CHLORIDE SERPL-SCNC: 110 MMOL/L (ref 100–108)
CHLORIDE SERPL-SCNC: 111 MMOL/L (ref 100–108)
CHLORIDE SERPL-SCNC: 112 MMOL/L (ref 100–108)
CHLORIDE SERPL-SCNC: 112 MMOL/L (ref 100–108)
CHLORIDE SERPL-SCNC: 114 MMOL/L (ref 100–108)
CHLORIDE SERPL-SCNC: 114 MMOL/L (ref 100–108)
CHLORIDE SERPL-SCNC: 118 MMOL/L (ref 100–108)
CHLORIDE SERPL-SCNC: 119 MMOL/L (ref 100–108)
CHLORIDE SERPL-SCNC: 121 MMOL/L (ref 100–108)
CHLORIDE SERPL-SCNC: 121 MMOL/L (ref 100–108)
CHLORIDE SERPL-SCNC: 122 MMOL/L (ref 100–108)
CHLORIDE SERPL-SCNC: 122 MMOL/L (ref 100–108)
CHOLEST SERPL-MCNC: 114 MG/DL
CHOLEST SERPL-MCNC: 164 MG/DL (ref 50–200)
CK MB SERPL-MCNC: 1.6 % (ref 0–2.5)
CK MB SERPL-MCNC: 3.8 NG/ML (ref 0–5)
CK SERPL-CCNC: 119 U/L (ref 39–308)
CK SERPL-CCNC: 146 U/L (ref 39–308)
CK SERPL-CCNC: 241 U/L (ref 39–308)
CLARITY UR: ABNORMAL
CLARITY UR: CLEAR
CO2 SERPL-SCNC: 15 MMOL/L (ref 21–32)
CO2 SERPL-SCNC: 17 MMOL/L (ref 21–32)
CO2 SERPL-SCNC: 20 MMOL/L (ref 21–32)
CO2 SERPL-SCNC: 21 MMOL/L (ref 21–32)
CO2 SERPL-SCNC: 22 MMOL/L (ref 21–32)
CO2 SERPL-SCNC: 22 MMOL/L (ref 21–32)
CO2 SERPL-SCNC: 23 MMOL/L (ref 21–32)
CO2 SERPL-SCNC: 24 MMOL/L (ref 21–32)
CO2 SERPL-SCNC: 25 MMOL/L (ref 21–32)
CO2 SERPL-SCNC: 26 MMOL/L (ref 21–32)
CO2 SERPL-SCNC: 27 MMOL/L (ref 21–32)
CO2 SERPL-SCNC: 27 MMOL/L (ref 21–32)
CO2 SERPL-SCNC: 28 MMOL/L (ref 21–32)
COLOR UR: YELLOW
COMMENT: ABNORMAL
CREAT SERPL-MCNC: 1.02 MG/DL (ref 0.6–1.3)
CREAT SERPL-MCNC: 1.03 MG/DL (ref 0.6–1.3)
CREAT SERPL-MCNC: 1.07 MG/DL (ref 0.6–1.3)
CREAT SERPL-MCNC: 1.12 MG/DL (ref 0.6–1.3)
CREAT SERPL-MCNC: 1.16 MG/DL (ref 0.6–1.3)
CREAT SERPL-MCNC: 1.17 MG/DL (ref 0.6–1.3)
CREAT SERPL-MCNC: 1.17 MG/DL (ref 0.6–1.3)
CREAT SERPL-MCNC: 1.18 MG/DL (ref 0.6–1.3)
CREAT SERPL-MCNC: 1.19 MG/DL (ref 0.6–1.3)
CREAT SERPL-MCNC: 1.2 MG/DL (ref 0.6–1.3)
CREAT SERPL-MCNC: 1.23 MG/DL (ref 0.6–1.3)
CREAT SERPL-MCNC: 1.41 MG/DL (ref 0.6–1.3)
CREAT SERPL-MCNC: 1.53 MG/DL (ref 0.6–1.3)
CREAT SERPL-MCNC: 1.56 MG/DL (ref 0.6–1.3)
CREAT SERPL-MCNC: 1.67 MG/DL (ref 0.6–1.3)
CREAT SERPL-MCNC: 1.75 MG/DL (ref 0.6–1.3)
CREAT SERPL-MCNC: 1.87 MG/DL (ref 0.6–1.3)
CREAT SERPL-MCNC: 1.96 MG/DL (ref 0.6–1.3)
CREAT SERPL-MCNC: 2.09 MG/DL (ref 0.6–1.3)
CREAT SERPL-MCNC: 2.1 MG/DL (ref 0.6–1.3)
CREAT SERPL-MCNC: 2.32 MG/DL (ref 0.6–1.3)
CREAT SERPL-MCNC: 2.77 MG/DL (ref 0.6–1.3)
CROSSMATCH: NORMAL
EOSINOPHIL # BLD AUTO: 0.06 THOUSAND/ΜL (ref 0–0.61)
EOSINOPHIL # BLD AUTO: 0.08 THOUSAND/ΜL (ref 0–0.61)
EOSINOPHIL # BLD AUTO: 0.19 THOUSAND/ΜL (ref 0–0.61)
EOSINOPHIL # BLD AUTO: 0.27 THOUSAND/ΜL (ref 0–0.61)
EOSINOPHIL # BLD AUTO: 0.27 THOUSAND/ΜL (ref 0–0.61)
EOSINOPHIL # BLD AUTO: 0.39 THOUSAND/ΜL (ref 0–0.61)
EOSINOPHIL # BLD MANUAL: 0.1 THOUSAND/UL (ref 0–0.4)
EOSINOPHIL # BLD MANUAL: 0.13 THOUSAND/UL (ref 0–0.4)
EOSINOPHIL NFR BLD AUTO: 1 % (ref 0–6)
EOSINOPHIL NFR BLD AUTO: 10 % (ref 0–6)
EOSINOPHIL NFR BLD AUTO: 2 % (ref 0–6)
EOSINOPHIL NFR BLD AUTO: 5 % (ref 0–6)
EOSINOPHIL NFR BLD MANUAL: 1 % (ref 0–6)
EOSINOPHIL NFR BLD MANUAL: 1 % (ref 0–6)
ERYTHROCYTE [DISTWIDTH] IN BLOOD BY AUTOMATED COUNT: 12.5 % (ref 11.6–15.1)
ERYTHROCYTE [DISTWIDTH] IN BLOOD BY AUTOMATED COUNT: 12.8 % (ref 11.6–15.1)
ERYTHROCYTE [DISTWIDTH] IN BLOOD BY AUTOMATED COUNT: 12.9 % (ref 11.6–15.1)
ERYTHROCYTE [DISTWIDTH] IN BLOOD BY AUTOMATED COUNT: 12.9 % (ref 11.6–15.1)
ERYTHROCYTE [DISTWIDTH] IN BLOOD BY AUTOMATED COUNT: 13.1 % (ref 11.6–15.1)
ERYTHROCYTE [DISTWIDTH] IN BLOOD BY AUTOMATED COUNT: 13.1 % (ref 11.6–15.1)
ERYTHROCYTE [DISTWIDTH] IN BLOOD BY AUTOMATED COUNT: 13.3 % (ref 11.6–15.1)
ERYTHROCYTE [DISTWIDTH] IN BLOOD BY AUTOMATED COUNT: 13.7 % (ref 11.6–15.1)
ERYTHROCYTE [DISTWIDTH] IN BLOOD BY AUTOMATED COUNT: 14 % (ref 11.6–15.1)
ERYTHROCYTE [DISTWIDTH] IN BLOOD BY AUTOMATED COUNT: 14.4 % (ref 11.6–15.1)
ERYTHROCYTE [DISTWIDTH] IN BLOOD BY AUTOMATED COUNT: 14.7 % (ref 11.6–15.1)
ERYTHROCYTE [DISTWIDTH] IN BLOOD BY AUTOMATED COUNT: 15.1 % (ref 11.6–15.1)
ERYTHROCYTE [DISTWIDTH] IN BLOOD BY AUTOMATED COUNT: 15.4 % (ref 11.6–15.1)
EST. AVERAGE GLUCOSE BLD GHB EST-MCNC: 123 MG/DL
EST. AVERAGE GLUCOSE BLD GHB EST-MCNC: 123 MG/DL
EST. AVERAGE GLUCOSE BLD GHB EST-MCNC: 126 MG/DL
EST. AVERAGE GLUCOSE BLD GHB EST-MCNC: 131 MG/DL
ETHANOL SERPL-MCNC: <3 MG/DL (ref 0–3)
FERRITIN SERPL-MCNC: 424 NG/ML (ref 8–388)
FIBROSIS SCORING:: ABNORMAL
FIBROSIS STAGE SERPL QL: ABNORMAL
FLUAV RNA RESP QL NAA+PROBE: NEGATIVE
FLUBV RNA RESP QL NAA+PROBE: NEGATIVE
GFR SERPL CREATININE-BSD FRML MDRD: 20 ML/MIN/1.73SQ M
GFR SERPL CREATININE-BSD FRML MDRD: 25 ML/MIN/1.73SQ M
GFR SERPL CREATININE-BSD FRML MDRD: 28 ML/MIN/1.73SQ M
GFR SERPL CREATININE-BSD FRML MDRD: 28 ML/MIN/1.73SQ M
GFR SERPL CREATININE-BSD FRML MDRD: 30 ML/MIN/1.73SQ M
GFR SERPL CREATININE-BSD FRML MDRD: 32 ML/MIN/1.73SQ M
GFR SERPL CREATININE-BSD FRML MDRD: 35 ML/MIN/1.73SQ M
GFR SERPL CREATININE-BSD FRML MDRD: 37 ML/MIN/1.73SQ M
GFR SERPL CREATININE-BSD FRML MDRD: 40 ML/MIN/1.73SQ M
GFR SERPL CREATININE-BSD FRML MDRD: 41 ML/MIN/1.73SQ M
GFR SERPL CREATININE-BSD FRML MDRD: 45 ML/MIN/1.73SQ M
GFR SERPL CREATININE-BSD FRML MDRD: 53 ML/MIN/1.73SQ M
GFR SERPL CREATININE-BSD FRML MDRD: 55 ML/MIN/1.73SQ M
GFR SERPL CREATININE-BSD FRML MDRD: 55 ML/MIN/1.73SQ M
GFR SERPL CREATININE-BSD FRML MDRD: 56 ML/MIN/1.73SQ M
GFR SERPL CREATININE-BSD FRML MDRD: 57 ML/MIN/1.73SQ M
GFR SERPL CREATININE-BSD FRML MDRD: 60 ML/MIN/1.73SQ M
GFR SERPL CREATININE-BSD FRML MDRD: 63 ML/MIN/1.73SQ M
GFR SERPL CREATININE-BSD FRML MDRD: 66 ML/MIN/1.73SQ M
GFR SERPL CREATININE-BSD FRML MDRD: 67 ML/MIN/1.73SQ M
GGT SERPL-CCNC: 86 IU/L (ref 0–65)
GGT SERPL-CCNC: 96 U/L (ref 5–85)
GLUCOSE P FAST SERPL-MCNC: 108 MG/DL (ref 65–99)
GLUCOSE P FAST SERPL-MCNC: 135 MG/DL (ref 65–99)
GLUCOSE P FAST SERPL-MCNC: 137 MG/DL (ref 65–99)
GLUCOSE P FAST SERPL-MCNC: 237 MG/DL (ref 65–99)
GLUCOSE SERPL-MCNC: 102 MG/DL (ref 65–140)
GLUCOSE SERPL-MCNC: 103 MG/DL (ref 65–140)
GLUCOSE SERPL-MCNC: 104 MG/DL (ref 65–140)
GLUCOSE SERPL-MCNC: 108 MG/DL (ref 65–140)
GLUCOSE SERPL-MCNC: 109 MG/DL (ref 65–140)
GLUCOSE SERPL-MCNC: 110 MG/DL (ref 65–140)
GLUCOSE SERPL-MCNC: 113 MG/DL (ref 65–140)
GLUCOSE SERPL-MCNC: 118 MG/DL (ref 65–140)
GLUCOSE SERPL-MCNC: 120 MG/DL (ref 65–140)
GLUCOSE SERPL-MCNC: 121 MG/DL (ref 65–140)
GLUCOSE SERPL-MCNC: 123 MG/DL (ref 65–140)
GLUCOSE SERPL-MCNC: 123 MG/DL (ref 65–140)
GLUCOSE SERPL-MCNC: 124 MG/DL (ref 65–140)
GLUCOSE SERPL-MCNC: 124 MG/DL (ref 65–140)
GLUCOSE SERPL-MCNC: 125 MG/DL (ref 65–140)
GLUCOSE SERPL-MCNC: 125 MG/DL (ref 65–140)
GLUCOSE SERPL-MCNC: 130 MG/DL (ref 65–140)
GLUCOSE SERPL-MCNC: 130 MG/DL (ref 65–140)
GLUCOSE SERPL-MCNC: 131 MG/DL (ref 65–140)
GLUCOSE SERPL-MCNC: 132 MG/DL (ref 65–140)
GLUCOSE SERPL-MCNC: 134 MG/DL (ref 65–140)
GLUCOSE SERPL-MCNC: 134 MG/DL (ref 65–140)
GLUCOSE SERPL-MCNC: 135 MG/DL (ref 65–140)
GLUCOSE SERPL-MCNC: 136 MG/DL (ref 65–140)
GLUCOSE SERPL-MCNC: 136 MG/DL (ref 65–140)
GLUCOSE SERPL-MCNC: 137 MG/DL (ref 65–140)
GLUCOSE SERPL-MCNC: 138 MG/DL (ref 65–140)
GLUCOSE SERPL-MCNC: 142 MG/DL (ref 65–140)
GLUCOSE SERPL-MCNC: 142 MG/DL (ref 65–140)
GLUCOSE SERPL-MCNC: 143 MG/DL (ref 65–140)
GLUCOSE SERPL-MCNC: 152 MG/DL (ref 65–140)
GLUCOSE SERPL-MCNC: 155 MG/DL (ref 65–140)
GLUCOSE SERPL-MCNC: 155 MG/DL (ref 65–140)
GLUCOSE SERPL-MCNC: 156 MG/DL (ref 65–140)
GLUCOSE SERPL-MCNC: 156 MG/DL (ref 65–140)
GLUCOSE SERPL-MCNC: 157 MG/DL (ref 65–140)
GLUCOSE SERPL-MCNC: 161 MG/DL (ref 65–140)
GLUCOSE SERPL-MCNC: 165 MG/DL (ref 65–140)
GLUCOSE SERPL-MCNC: 167 MG/DL (ref 65–140)
GLUCOSE SERPL-MCNC: 170 MG/DL (ref 65–140)
GLUCOSE SERPL-MCNC: 171 MG/DL (ref 65–140)
GLUCOSE SERPL-MCNC: 172 MG/DL (ref 65–140)
GLUCOSE SERPL-MCNC: 181 MG/DL (ref 65–140)
GLUCOSE SERPL-MCNC: 184 MG/DL (ref 65–140)
GLUCOSE SERPL-MCNC: 185 MG/DL (ref 65–140)
GLUCOSE SERPL-MCNC: 190 MG/DL (ref 65–140)
GLUCOSE SERPL-MCNC: 198 MG/DL (ref 65–140)
GLUCOSE SERPL-MCNC: 199 MG/DL (ref 65–140)
GLUCOSE SERPL-MCNC: 206 MG/DL (ref 65–140)
GLUCOSE SERPL-MCNC: 211 MG/DL (ref 65–140)
GLUCOSE SERPL-MCNC: 212 MG/DL (ref 65–140)
GLUCOSE SERPL-MCNC: 214 MG/DL (ref 65–140)
GLUCOSE SERPL-MCNC: 217 MG/DL (ref 65–140)
GLUCOSE SERPL-MCNC: 227 MG/DL (ref 65–140)
GLUCOSE SERPL-MCNC: 228 MG/DL (ref 65–140)
GLUCOSE SERPL-MCNC: 258 MG/DL (ref 65–140)
GLUCOSE SERPL-MCNC: 30 MG/DL (ref 65–140)
GLUCOSE SERPL-MCNC: 86 MG/DL (ref 65–140)
GLUCOSE SERPL-MCNC: 98 MG/DL (ref 65–140)
GLUCOSE SERPL-MCNC: 99 MG/DL (ref 65–140)
GLUCOSE UR STRIP-MCNC: NEGATIVE MG/DL
GRAM STN SPEC: ABNORMAL
HAPTOGLOB SERPL-MCNC: 75 MG/DL (ref 38–329)
HAPTOGLOB SERPL-MCNC: <10 MG/DL (ref 38–329)
HAV IGM SER QL: NORMAL
HBA1C MFR BLD: 5.9 %
HBA1C MFR BLD: 5.9 %
HBA1C MFR BLD: 6 %
HBA1C MFR BLD: 6.2 %
HBV CORE AB SER QL: NORMAL
HBV CORE IGM SER QL: NORMAL
HBV CORE IGM SER QL: NORMAL
HBV SURFACE AG SER QL: NORMAL
HBV SURFACE AG SER QL: NORMAL
HCO3 BLDA-SCNC: 22.9 MMOL/L (ref 22–28)
HCO3 BLDA-SCNC: 24.7 MMOL/L (ref 22–28)
HCO3 BLDA-SCNC: 26.2 MMOL/L (ref 24–30)
HCT VFR BLD AUTO: 30.8 % (ref 36.5–49.3)
HCT VFR BLD AUTO: 32.3 % (ref 36.5–49.3)
HCT VFR BLD AUTO: 33.5 % (ref 36.5–49.3)
HCT VFR BLD AUTO: 33.9 % (ref 36.5–49.3)
HCT VFR BLD AUTO: 34.6 % (ref 36.5–49.3)
HCT VFR BLD AUTO: 35 % (ref 36.5–49.3)
HCT VFR BLD AUTO: 35.4 % (ref 36.5–49.3)
HCT VFR BLD AUTO: 36.3 % (ref 36.5–49.3)
HCT VFR BLD AUTO: 37 % (ref 36.5–49.3)
HCT VFR BLD AUTO: 37.3 % (ref 36.5–49.3)
HCT VFR BLD AUTO: 39.1 % (ref 36.5–49.3)
HCT VFR BLD AUTO: 39.7 % (ref 36.5–49.3)
HCT VFR BLD AUTO: 40.4 % (ref 36.5–49.3)
HCT VFR BLD AUTO: 41.8 % (ref 36.5–49.3)
HCT VFR BLD AUTO: 43.3 % (ref 36.5–49.3)
HCT VFR BLD AUTO: 43.7 % (ref 36.5–49.3)
HCT VFR BLD AUTO: 43.8 % (ref 36.5–49.3)
HCT VFR BLD CALC: 28 % (ref 36.5–49.3)
HCT VFR BLD CALC: 30 % (ref 36.5–49.3)
HCT VFR BLD CALC: 33 % (ref 36.5–49.3)
HCV AB SER QL: NORMAL
HCV AB SER QL: NORMAL
HDLC SERPL-MCNC: 16 MG/DL
HDLC SERPL-MCNC: 42 MG/DL
HEMOCCULT STL QL: POSITIVE
HGB BLD-MCNC: 10.4 G/DL (ref 12–17)
HGB BLD-MCNC: 10.9 G/DL (ref 12–17)
HGB BLD-MCNC: 11.3 G/DL (ref 12–17)
HGB BLD-MCNC: 11.5 G/DL (ref 12–17)
HGB BLD-MCNC: 11.5 G/DL (ref 12–17)
HGB BLD-MCNC: 11.7 G/DL (ref 12–17)
HGB BLD-MCNC: 11.7 G/DL (ref 12–17)
HGB BLD-MCNC: 12 G/DL (ref 12–17)
HGB BLD-MCNC: 12.1 G/DL (ref 12–17)
HGB BLD-MCNC: 12.6 G/DL (ref 12–17)
HGB BLD-MCNC: 12.7 G/DL (ref 12–17)
HGB BLD-MCNC: 13.1 G/DL (ref 12–17)
HGB BLD-MCNC: 13.2 G/DL (ref 12–17)
HGB BLD-MCNC: 13.4 G/DL (ref 12–17)
HGB BLD-MCNC: 13.6 G/DL (ref 12–17)
HGB BLD-MCNC: 14 G/DL (ref 12–17)
HGB BLD-MCNC: 14.1 G/DL (ref 12–17)
HGB BLDA-MCNC: 10.2 G/DL (ref 12–17)
HGB BLDA-MCNC: 11.2 G/DL (ref 12–17)
HGB BLDA-MCNC: 9.5 G/DL (ref 12–17)
HGB UR QL STRIP.AUTO: ABNORMAL
HGB UR QL STRIP.AUTO: ABNORMAL
HGB UR QL STRIP.AUTO: NEGATIVE
HGB UR QL STRIP.AUTO: NEGATIVE
HYALINE CASTS #/AREA URNS LPF: ABNORMAL /LPF
HYALINE CASTS #/AREA URNS LPF: ABNORMAL /LPF
IMM GRANULOCYTES # BLD AUTO: 0.01 THOUSAND/UL (ref 0–0.2)
IMM GRANULOCYTES # BLD AUTO: 0.02 THOUSAND/UL (ref 0–0.2)
IMM GRANULOCYTES # BLD AUTO: 0.14 THOUSAND/UL (ref 0–0.2)
IMM GRANULOCYTES # BLD AUTO: 0.15 THOUSAND/UL (ref 0–0.2)
IMM GRANULOCYTES # BLD AUTO: 0.16 THOUSAND/UL (ref 0–0.2)
IMM GRANULOCYTES # BLD AUTO: 0.24 THOUSAND/UL (ref 0–0.2)
IMM GRANULOCYTES NFR BLD AUTO: 0 % (ref 0–2)
IMM GRANULOCYTES NFR BLD AUTO: 0 % (ref 0–2)
IMM GRANULOCYTES NFR BLD AUTO: 1 % (ref 0–2)
INR PPP: 1.13 (ref 0.84–1.19)
INR PPP: 1.4 (ref 0.84–1.19)
INR PPP: 1.49 (ref 0.84–1.19)
INR PPP: 1.53 (ref 0.84–1.19)
INTERPRETATIONS: ABNORMAL
IRON SATN MFR SERPL: 16 % (ref 20–50)
IRON SERPL-MCNC: 24 UG/DL (ref 65–175)
KCT BLD-ACNC: 110 SEC (ref 89–137)
KCT BLD-ACNC: 138 SEC (ref 89–137)
KCT BLD-ACNC: 305 SEC (ref 89–137)
KETONES UR STRIP-MCNC: NEGATIVE MG/DL
LACTATE SERPL-SCNC: 1.7 MMOL/L (ref 0.5–2)
LACTATE SERPL-SCNC: 2 MMOL/L (ref 0.5–2)
LACTATE SERPL-SCNC: 2.8 MMOL/L (ref 0.5–2)
LACTATE SERPL-SCNC: 3.3 MMOL/L (ref 0.5–2)
LACTATE SERPL-SCNC: 3.4 MMOL/L (ref 0.5–2)
LACTATE SERPL-SCNC: 3.4 MMOL/L (ref 0.5–2)
LACTATE SERPL-SCNC: 3.9 MMOL/L (ref 0.5–2)
LACTATE SERPL-SCNC: 3.9 MMOL/L (ref 0.5–2)
LACTATE SERPL-SCNC: 4 MMOL/L (ref 0.5–2)
LACTATE SERPL-SCNC: 4.2 MMOL/L (ref 0.5–2)
LACTATE SERPL-SCNC: 6.5 MMOL/L (ref 0.5–2)
LDH SERPL-CCNC: 229 U/L (ref 81–234)
LDH SERPL-CCNC: 687 U/L (ref 81–234)
LDLC SERPL CALC-MCNC: 102 MG/DL (ref 0–100)
LDLC SERPL CALC-MCNC: 73 MG/DL (ref 0–100)
LEUKOCYTE ESTERASE UR QL STRIP: NEGATIVE
LIPASE SERPL-CCNC: 1227 U/L (ref 73–393)
LIPASE SERPL-CCNC: 404 U/L (ref 73–393)
LIPASE SERPL-CCNC: 459 U/L (ref 73–393)
LIPASE SERPL-CCNC: 495 U/L (ref 73–393)
LIPASE SERPL-CCNC: 513 U/L (ref 73–393)
LIPASE SERPL-CCNC: 834 U/L (ref 73–393)
LIVER FIBR SCORE SERPL CALC.FIBROSURE: 0.95 (ref 0–0.21)
LYMPHOCYTES # BLD AUTO: 0.6 THOUSANDS/ΜL (ref 0.6–4.47)
LYMPHOCYTES # BLD AUTO: 0.73 THOUSAND/UL (ref 0.6–4.47)
LYMPHOCYTES # BLD AUTO: 0.89 THOUSANDS/ΜL (ref 0.6–4.47)
LYMPHOCYTES # BLD AUTO: 1.14 THOUSANDS/ΜL (ref 0.6–4.47)
LYMPHOCYTES # BLD AUTO: 1.15 THOUSANDS/ΜL (ref 0.6–4.47)
LYMPHOCYTES # BLD AUTO: 1.28 THOUSANDS/ΜL (ref 0.6–4.47)
LYMPHOCYTES # BLD AUTO: 1.55 THOUSAND/UL (ref 0.6–4.47)
LYMPHOCYTES # BLD AUTO: 1.86 THOUSANDS/ΜL (ref 0.6–4.47)
LYMPHOCYTES # BLD AUTO: 12 % (ref 14–44)
LYMPHOCYTES # BLD AUTO: 7 % (ref 14–44)
LYMPHOCYTES NFR BLD AUTO: 10 % (ref 14–44)
LYMPHOCYTES NFR BLD AUTO: 11 % (ref 14–44)
LYMPHOCYTES NFR BLD AUTO: 11 % (ref 14–44)
LYMPHOCYTES NFR BLD AUTO: 15 % (ref 14–44)
LYMPHOCYTES NFR BLD AUTO: 22 % (ref 14–44)
LYMPHOCYTES NFR BLD AUTO: 9 % (ref 14–44)
MAGNESIUM SERPL-MCNC: 1.8 MG/DL (ref 1.6–2.6)
MAGNESIUM SERPL-MCNC: 2.6 MG/DL (ref 1.6–2.6)
MAGNESIUM SERPL-MCNC: 2.7 MG/DL (ref 1.6–2.6)
MCH RBC QN AUTO: 29.8 PG (ref 26.8–34.3)
MCH RBC QN AUTO: 29.9 PG (ref 26.8–34.3)
MCH RBC QN AUTO: 30 PG (ref 26.8–34.3)
MCH RBC QN AUTO: 30.1 PG (ref 26.8–34.3)
MCH RBC QN AUTO: 30.3 PG (ref 26.8–34.3)
MCH RBC QN AUTO: 30.4 PG (ref 26.8–34.3)
MCH RBC QN AUTO: 30.7 PG (ref 26.8–34.3)
MCH RBC QN AUTO: 31.3 PG (ref 26.8–34.3)
MCH RBC QN AUTO: 31.4 PG (ref 26.8–34.3)
MCH RBC QN AUTO: 31.5 PG (ref 26.8–34.3)
MCH RBC QN AUTO: 31.7 PG (ref 26.8–34.3)
MCH RBC QN AUTO: 31.8 PG (ref 26.8–34.3)
MCH RBC QN AUTO: 31.9 PG (ref 26.8–34.3)
MCH RBC QN AUTO: 31.9 PG (ref 26.8–34.3)
MCH RBC QN AUTO: 32.1 PG (ref 26.8–34.3)
MCH RBC QN AUTO: 32.1 PG (ref 26.8–34.3)
MCHC RBC AUTO-ENTMCNC: 30.6 G/DL (ref 31.4–37.4)
MCHC RBC AUTO-ENTMCNC: 31.6 G/DL (ref 31.4–37.4)
MCHC RBC AUTO-ENTMCNC: 31.7 G/DL (ref 31.4–37.4)
MCHC RBC AUTO-ENTMCNC: 32 G/DL (ref 31.4–37.4)
MCHC RBC AUTO-ENTMCNC: 32.5 G/DL (ref 31.4–37.4)
MCHC RBC AUTO-ENTMCNC: 32.6 G/DL (ref 31.4–37.4)
MCHC RBC AUTO-ENTMCNC: 32.7 G/DL (ref 31.4–37.4)
MCHC RBC AUTO-ENTMCNC: 33 G/DL (ref 31.4–37.4)
MCHC RBC AUTO-ENTMCNC: 33.1 G/DL (ref 31.4–37.4)
MCHC RBC AUTO-ENTMCNC: 33.7 G/DL (ref 31.4–37.4)
MCHC RBC AUTO-ENTMCNC: 33.7 G/DL (ref 31.4–37.4)
MCHC RBC AUTO-ENTMCNC: 33.8 G/DL (ref 31.4–37.4)
MCHC RBC AUTO-ENTMCNC: 33.9 G/DL (ref 31.4–37.4)
MCHC RBC AUTO-ENTMCNC: 34.3 G/DL (ref 31.4–37.4)
MCV RBC AUTO: 92 FL (ref 82–98)
MCV RBC AUTO: 92 FL (ref 82–98)
MCV RBC AUTO: 93 FL (ref 82–98)
MCV RBC AUTO: 93 FL (ref 82–98)
MCV RBC AUTO: 94 FL (ref 82–98)
MCV RBC AUTO: 95 FL (ref 82–98)
MCV RBC AUTO: 96 FL (ref 82–98)
MCV RBC AUTO: 96 FL (ref 82–98)
MCV RBC AUTO: 98 FL (ref 82–98)
MONOCYTES # BLD AUTO: 0 THOUSAND/UL (ref 0–1.22)
MONOCYTES # BLD AUTO: 0.32 THOUSAND/ΜL (ref 0.17–1.22)
MONOCYTES # BLD AUTO: 0.47 THOUSAND/ΜL (ref 0.17–1.22)
MONOCYTES # BLD AUTO: 0.66 THOUSAND/ΜL (ref 0.17–1.22)
MONOCYTES # BLD AUTO: 1.01 THOUSAND/ΜL (ref 0.17–1.22)
MONOCYTES # BLD AUTO: 1.02 THOUSAND/ΜL (ref 0.17–1.22)
MONOCYTES # BLD AUTO: 1.25 THOUSAND/UL (ref 0–1.22)
MONOCYTES # BLD AUTO: 1.57 THOUSAND/ΜL (ref 0.17–1.22)
MONOCYTES NFR BLD AUTO: 10 % (ref 4–12)
MONOCYTES NFR BLD AUTO: 6 % (ref 4–12)
MONOCYTES NFR BLD AUTO: 8 % (ref 4–12)
MONOCYTES NFR BLD AUTO: 8 % (ref 4–12)
MONOCYTES NFR BLD AUTO: 9 % (ref 4–12)
MONOCYTES NFR BLD AUTO: 9 % (ref 4–12)
MONOCYTES NFR BLD: 0 % (ref 4–12)
MONOCYTES NFR BLD: 12 % (ref 4–12)
MRSA NOSE QL CULT: NORMAL
MRSA NOSE QL CULT: NORMAL
NECROINFLAMM ACTIVITY SCORING:: ABNORMAL
NECROINFLAMMATORY ACT GRADE SERPL QL: ABNORMAL
NECROINFLAMMATORY ACT SCORE SERPL: 0.57 (ref 0–0.17)
NEUTROPHILS # BLD AUTO: 10.83 THOUSANDS/ΜL (ref 1.85–7.62)
NEUTROPHILS # BLD AUTO: 13.12 THOUSANDS/ΜL (ref 1.85–7.62)
NEUTROPHILS # BLD AUTO: 2.71 THOUSANDS/ΜL (ref 1.85–7.62)
NEUTROPHILS # BLD AUTO: 3.15 THOUSANDS/ΜL (ref 1.85–7.62)
NEUTROPHILS # BLD AUTO: 7.92 THOUSANDS/ΜL (ref 1.85–7.62)
NEUTROPHILS # BLD AUTO: 8.65 THOUSANDS/ΜL (ref 1.85–7.62)
NEUTROPHILS # BLD MANUAL: 11.1 THOUSAND/UL (ref 1.85–7.62)
NEUTROPHILS # BLD MANUAL: 8.35 THOUSAND/UL (ref 1.85–7.62)
NEUTS BAND NFR BLD MANUAL: 3 % (ref 0–8)
NEUTS SEG NFR BLD AUTO: 63 % (ref 43–75)
NEUTS SEG NFR BLD AUTO: 66 % (ref 43–75)
NEUTS SEG NFR BLD AUTO: 76 % (ref 43–75)
NEUTS SEG NFR BLD AUTO: 76 % (ref 43–75)
NEUTS SEG NFR BLD AUTO: 77 % (ref 43–75)
NEUTS SEG NFR BLD AUTO: 80 % (ref 43–75)
NEUTS SEG NFR BLD AUTO: 82 % (ref 43–75)
NEUTS SEG NFR BLD AUTO: 86 % (ref 43–75)
NITRITE UR QL STRIP: NEGATIVE
NON-SQ EPI CELLS URNS QL MICRO: ABNORMAL /HPF
NON-SQ EPI CELLS URNS QL MICRO: ABNORMAL /HPF
NONHDLC SERPL-MCNC: 98 MG/DL
NRBC BLD AUTO-RTO: 0 /100 WBCS
NRBC BLD AUTO-RTO: 2 /100 WBC (ref 0–2)
OSMOLALITY UR/SERPL-RTO: 331 MMOL/KG (ref 282–298)
P AXIS: -6 DEGREES
P AXIS: 31 DEGREES
P AXIS: 42 DEGREES
P AXIS: 54 DEGREES
P AXIS: 62 DEGREES
PCO2 BLD: 24 MMOL/L (ref 21–32)
PCO2 BLD: 26 MMOL/L (ref 21–32)
PCO2 BLD: 28 MMOL/L (ref 21–32)
PCO2 BLD: 41 MM HG (ref 36–44)
PCO2 BLD: 43.5 MM HG (ref 36–44)
PCO2 BLD: 56.9 MM HG (ref 42–50)
PH BLD: 7.27 [PH] (ref 7.3–7.4)
PH BLD: 7.36 [PH] (ref 7.35–7.45)
PH BLD: 7.36 [PH] (ref 7.35–7.45)
PH UR STRIP.AUTO: 6 [PH]
PH UR STRIP.AUTO: 6.5 [PH]
PHOSPHATE SERPL-MCNC: 3.4 MG/DL (ref 2.3–4.1)
PLATELET # BLD AUTO: 27 THOUSANDS/UL (ref 149–390)
PLATELET # BLD AUTO: 36 THOUSANDS/UL (ref 149–390)
PLATELET # BLD AUTO: 39 THOUSANDS/UL (ref 149–390)
PLATELET # BLD AUTO: 47 THOUSANDS/UL (ref 149–390)
PLATELET # BLD AUTO: 51 THOUSANDS/UL (ref 149–390)
PLATELET # BLD AUTO: 52 THOUSANDS/UL (ref 149–390)
PLATELET # BLD AUTO: 53 THOUSANDS/UL (ref 149–390)
PLATELET # BLD AUTO: 55 THOUSANDS/UL (ref 149–390)
PLATELET # BLD AUTO: 55 THOUSANDS/UL (ref 149–390)
PLATELET # BLD AUTO: 58 THOUSANDS/UL (ref 149–390)
PLATELET # BLD AUTO: 59 THOUSANDS/UL (ref 149–390)
PLATELET # BLD AUTO: 60 THOUSANDS/UL (ref 149–390)
PLATELET # BLD AUTO: 61 THOUSANDS/UL (ref 149–390)
PLATELET # BLD AUTO: 69 THOUSANDS/UL (ref 149–390)
PLATELET # BLD AUTO: 69 THOUSANDS/UL (ref 149–390)
PLATELET # BLD AUTO: 72 THOUSANDS/UL (ref 149–390)
PLATELET # BLD AUTO: 73 THOUSANDS/UL (ref 149–390)
PLATELET BLD QL SMEAR: ABNORMAL
PLATELET BLD QL SMEAR: ABNORMAL
PMV BLD AUTO: 10.1 FL (ref 8.9–12.7)
PMV BLD AUTO: 10.3 FL (ref 8.9–12.7)
PMV BLD AUTO: 10.4 FL (ref 8.9–12.7)
PMV BLD AUTO: 10.5 FL (ref 8.9–12.7)
PMV BLD AUTO: 10.6 FL (ref 8.9–12.7)
PMV BLD AUTO: 10.6 FL (ref 8.9–12.7)
PMV BLD AUTO: 11 FL (ref 8.9–12.7)
PMV BLD AUTO: 11.9 FL (ref 8.9–12.7)
PMV BLD AUTO: 9.5 FL (ref 8.9–12.7)
PMV BLD AUTO: 9.7 FL (ref 8.9–12.7)
PMV BLD AUTO: 9.9 FL (ref 8.9–12.7)
PO2 BLD: 179 MM HG (ref 75–129)
PO2 BLD: 189 MM HG (ref 75–129)
PO2 BLD: 68 MM HG (ref 35–45)
POTASSIUM BLD-SCNC: 3.6 MMOL/L (ref 3.5–5.3)
POTASSIUM BLD-SCNC: 3.8 MMOL/L (ref 3.5–5.3)
POTASSIUM BLD-SCNC: 3.9 MMOL/L (ref 3.5–5.3)
POTASSIUM SERPL-SCNC: 3.5 MMOL/L (ref 3.5–5.3)
POTASSIUM SERPL-SCNC: 3.6 MMOL/L (ref 3.5–5.3)
POTASSIUM SERPL-SCNC: 3.6 MMOL/L (ref 3.5–5.3)
POTASSIUM SERPL-SCNC: 3.7 MMOL/L (ref 3.5–5.3)
POTASSIUM SERPL-SCNC: 3.8 MMOL/L (ref 3.5–5.3)
POTASSIUM SERPL-SCNC: 3.9 MMOL/L (ref 3.5–5.3)
POTASSIUM SERPL-SCNC: 4 MMOL/L (ref 3.5–5.3)
POTASSIUM SERPL-SCNC: 4.1 MMOL/L (ref 3.5–5.3)
POTASSIUM SERPL-SCNC: 4.3 MMOL/L (ref 3.5–5.3)
PR INTERVAL: 140 MS
PR INTERVAL: 170 MS
PR INTERVAL: 183 MS
PR INTERVAL: 186 MS
PR INTERVAL: 186 MS
PR INTERVAL: 190 MS
PROCALCITONIN SERPL-MCNC: 1.43 NG/ML
PROCALCITONIN SERPL-MCNC: 1.49 NG/ML
PROCALCITONIN SERPL-MCNC: 3.18 NG/ML
PROT SERPL-MCNC: 6 G/DL (ref 6.4–8.2)
PROT SERPL-MCNC: 6 G/DL (ref 6.4–8.2)
PROT SERPL-MCNC: 6.1 G/DL (ref 6.4–8.2)
PROT SERPL-MCNC: 6.2 G/DL (ref 6.4–8.2)
PROT SERPL-MCNC: 6.4 G/DL (ref 6.4–8.2)
PROT SERPL-MCNC: 6.8 G/DL (ref 6.4–8.2)
PROT SERPL-MCNC: 7.1 G/DL (ref 6.4–8.2)
PROT SERPL-MCNC: 7.2 G/DL (ref 6.4–8.2)
PROT SERPL-MCNC: 7.3 G/DL (ref 6.4–8.2)
PROT SERPL-MCNC: 7.6 G/DL (ref 6.4–8.2)
PROT UR STRIP-MCNC: NEGATIVE MG/DL
PROTHROMBIN TIME: 14.5 SECONDS (ref 11.6–14.5)
PROTHROMBIN TIME: 17 SECONDS (ref 11.6–14.5)
PROTHROMBIN TIME: 17.7 SECONDS (ref 11.6–14.5)
PROTHROMBIN TIME: 18.1 SECONDS (ref 11.6–14.5)
QRS AXIS: -44 DEGREES
QRS AXIS: -45 DEGREES
QRS AXIS: -50 DEGREES
QRS AXIS: -79 DEGREES
QRS AXIS: 1 DEGREES
QRS AXIS: 134 DEGREES
QRS AXIS: 136 DEGREES
QRS AXIS: 217 DEGREES
QRSD INTERVAL: 134 MS
QRSD INTERVAL: 146 MS
QRSD INTERVAL: 146 MS
QRSD INTERVAL: 148 MS
QRSD INTERVAL: 148 MS
QRSD INTERVAL: 150 MS
QRSD INTERVAL: 152 MS
QRSD INTERVAL: 82 MS
QT INTERVAL: 356 MS
QT INTERVAL: 380 MS
QT INTERVAL: 408 MS
QT INTERVAL: 444 MS
QT INTERVAL: 454 MS
QT INTERVAL: 468 MS
QT INTERVAL: 480 MS
QT INTERVAL: 504 MS
QTC INTERVAL: 469 MS
QTC INTERVAL: 482 MS
QTC INTERVAL: 490 MS
QTC INTERVAL: 503 MS
QTC INTERVAL: 504 MS
QTC INTERVAL: 506 MS
RBC # BLD AUTO: 3.28 MILLION/UL (ref 3.88–5.62)
RBC # BLD AUTO: 3.46 MILLION/UL (ref 3.88–5.62)
RBC # BLD AUTO: 3.61 MILLION/UL (ref 3.88–5.62)
RBC # BLD AUTO: 3.65 MILLION/UL (ref 3.88–5.62)
RBC # BLD AUTO: 3.74 MILLION/UL (ref 3.88–5.62)
RBC # BLD AUTO: 3.76 MILLION/UL (ref 3.88–5.62)
RBC # BLD AUTO: 3.78 MILLION/UL (ref 3.88–5.62)
RBC # BLD AUTO: 3.85 MILLION/UL (ref 3.88–5.62)
RBC # BLD AUTO: 3.93 MILLION/UL (ref 3.88–5.62)
RBC # BLD AUTO: 4.14 MILLION/UL (ref 3.88–5.62)
RBC # BLD AUTO: 4.28 MILLION/UL (ref 3.88–5.62)
RBC # BLD AUTO: 4.33 MILLION/UL (ref 3.88–5.62)
RBC # BLD AUTO: 4.4 MILLION/UL (ref 3.88–5.62)
RBC # BLD AUTO: 4.49 MILLION/UL (ref 3.88–5.62)
RBC # BLD AUTO: 4.65 MILLION/UL (ref 3.88–5.62)
RBC # BLD AUTO: 4.72 MILLION/UL (ref 3.88–5.62)
RBC #/AREA URNS AUTO: ABNORMAL /HPF
RBC #/AREA URNS AUTO: ABNORMAL /HPF
RBC MORPH BLD: NORMAL
RH BLD: POSITIVE
RH BLD: POSITIVE
RSV RNA RESP QL NAA+PROBE: NEGATIVE
RYE IGE QN: NEGATIVE
SALICYLATES SERPL-MCNC: <3 MG/DL (ref 3–20)
SAO2 % BLD FROM PO2: 100 % (ref 60–85)
SAO2 % BLD FROM PO2: 100 % (ref 60–85)
SAO2 % BLD FROM PO2: 90 % (ref 60–85)
SARS-COV-2 RNA RESP QL NAA+PROBE: NEGATIVE
SERVICE CMNT-IMP: ABNORMAL
SODIUM BLD-SCNC: 141 MMOL/L (ref 136–145)
SODIUM BLD-SCNC: 143 MMOL/L (ref 136–145)
SODIUM BLD-SCNC: 144 MMOL/L (ref 136–145)
SODIUM SERPL-SCNC: 135 MMOL/L (ref 136–145)
SODIUM SERPL-SCNC: 135 MMOL/L (ref 136–145)
SODIUM SERPL-SCNC: 138 MMOL/L (ref 136–145)
SODIUM SERPL-SCNC: 139 MMOL/L (ref 136–145)
SODIUM SERPL-SCNC: 139 MMOL/L (ref 136–145)
SODIUM SERPL-SCNC: 141 MMOL/L (ref 136–145)
SODIUM SERPL-SCNC: 141 MMOL/L (ref 136–145)
SODIUM SERPL-SCNC: 142 MMOL/L (ref 136–145)
SODIUM SERPL-SCNC: 143 MMOL/L (ref 136–145)
SODIUM SERPL-SCNC: 147 MMOL/L (ref 136–145)
SODIUM SERPL-SCNC: 148 MMOL/L (ref 136–145)
SODIUM SERPL-SCNC: 150 MMOL/L (ref 136–145)
SODIUM SERPL-SCNC: 151 MMOL/L (ref 136–145)
SODIUM SERPL-SCNC: 152 MMOL/L (ref 136–145)
SODIUM SERPL-SCNC: 153 MMOL/L (ref 136–145)
SODIUM SERPL-SCNC: 153 MMOL/L (ref 136–145)
SODIUM SERPL-SCNC: 154 MMOL/L (ref 136–145)
SODIUM SERPL-SCNC: 155 MMOL/L (ref 136–145)
SODIUM SERPL-SCNC: 156 MMOL/L (ref 136–145)
SODIUM SERPL-SCNC: 157 MMOL/L (ref 136–145)
SP GR UR STRIP.AUTO: 1.01 (ref 1–1.03)
SP GR UR STRIP.AUTO: 1.01 (ref 1–1.03)
SP GR UR STRIP.AUTO: 1.02 (ref 1–1.03)
SP GR UR STRIP.AUTO: 1.02 (ref 1–1.03)
SPECIMEN EXPIRATION DATE: NORMAL
SPECIMEN EXPIRATION DATE: NORMAL
SPECIMEN SOURCE: ABNORMAL
SPECIMEN SOURCE: NORMAL
T WAVE AXIS: 100 DEGREES
T WAVE AXIS: 117 DEGREES
T WAVE AXIS: 118 DEGREES
T WAVE AXIS: 218 DEGREES
T WAVE AXIS: 3 DEGREES
T WAVE AXIS: 75 DEGREES
T WAVE AXIS: 77 DEGREES
T WAVE AXIS: 82 DEGREES
TIBC SERPL-MCNC: 151 UG/DL (ref 250–450)
TRIGL SERPL-MCNC: 100 MG/DL
TRIGL SERPL-MCNC: 124 MG/DL
TSH SERPL DL<=0.05 MIU/L-ACNC: 1.01 UIU/ML (ref 0.36–3.74)
UNIT DISPENSE STATUS: NORMAL
UNIT PRODUCT CODE: NORMAL
UNIT RH: NORMAL
UROBILINOGEN UR QL STRIP.AUTO: 0.2 E.U./DL
VANCOMYCIN SERPL-MCNC: 7.5 UG/ML
VARIANT LYMPHS # BLD AUTO: 1 %
VENTRICULAR RATE: 114 BPM
VENTRICULAR RATE: 60 BPM
VENTRICULAR RATE: 64 BPM
VENTRICULAR RATE: 64 BPM
VENTRICULAR RATE: 66 BPM
VENTRICULAR RATE: 78 BPM
VENTRICULAR RATE: 84 BPM
VENTRICULAR RATE: 97 BPM
WBC # BLD AUTO: 10.34 THOUSAND/UL (ref 4.31–10.16)
WBC # BLD AUTO: 10.44 THOUSAND/UL (ref 4.31–10.16)
WBC # BLD AUTO: 10.49 THOUSAND/UL (ref 4.31–10.16)
WBC # BLD AUTO: 12.91 THOUSAND/UL (ref 4.31–10.16)
WBC # BLD AUTO: 13.52 THOUSAND/UL (ref 4.31–10.16)
WBC # BLD AUTO: 17.15 THOUSAND/UL (ref 4.31–10.16)
WBC # BLD AUTO: 3.24 THOUSAND/UL (ref 4.31–10.16)
WBC # BLD AUTO: 3.47 THOUSAND/UL (ref 4.31–10.16)
WBC # BLD AUTO: 3.98 THOUSAND/UL (ref 4.31–10.16)
WBC # BLD AUTO: 4.06 THOUSAND/UL (ref 4.31–10.16)
WBC # BLD AUTO: 4.17 THOUSAND/UL (ref 4.31–10.16)
WBC # BLD AUTO: 4.96 THOUSAND/UL (ref 4.31–10.16)
WBC # BLD AUTO: 5.1 THOUSAND/UL (ref 4.31–10.16)
WBC # BLD AUTO: 5.8 THOUSAND/UL (ref 4.31–10.16)
WBC # BLD AUTO: 6.65 THOUSAND/UL (ref 4.31–10.16)
WBC # BLD AUTO: 8.22 THOUSAND/UL (ref 4.31–10.16)
WBC #/AREA URNS AUTO: ABNORMAL /HPF
WBC #/AREA URNS AUTO: ABNORMAL /HPF

## 2021-01-01 PROCEDURE — 80053 COMPREHEN METABOLIC PANEL: CPT | Performed by: NURSE PRACTITIONER

## 2021-01-01 PROCEDURE — 99024 POSTOP FOLLOW-UP VISIT: CPT | Performed by: PHYSICIAN ASSISTANT

## 2021-01-01 PROCEDURE — 88305 TISSUE EXAM BY PATHOLOGIST: CPT | Performed by: PATHOLOGY

## 2021-01-01 PROCEDURE — 87077 CULTURE AEROBIC IDENTIFY: CPT | Performed by: INTERNAL MEDICINE

## 2021-01-01 PROCEDURE — G1004 CDSM NDSC: HCPCS

## 2021-01-01 PROCEDURE — 93010 ELECTROCARDIOGRAM REPORT: CPT | Performed by: INTERNAL MEDICINE

## 2021-01-01 PROCEDURE — 97163 PT EVAL HIGH COMPLEX 45 MIN: CPT

## 2021-01-01 PROCEDURE — 82948 REAGENT STRIP/BLOOD GLUCOSE: CPT

## 2021-01-01 PROCEDURE — 85027 COMPLETE CBC AUTOMATED: CPT | Performed by: PHYSICIAN ASSISTANT

## 2021-01-01 PROCEDURE — 83615 LACTATE (LD) (LDH) ENZYME: CPT | Performed by: PHYSICIAN ASSISTANT

## 2021-01-01 PROCEDURE — 86235 NUCLEAR ANTIGEN ANTIBODY: CPT | Performed by: INTERNAL MEDICINE

## 2021-01-01 PROCEDURE — 93005 ELECTROCARDIOGRAM TRACING: CPT

## 2021-01-01 PROCEDURE — 74174 CTA ABD&PLVS W/CONTRAST: CPT

## 2021-01-01 PROCEDURE — 80048 BASIC METABOLIC PNL TOTAL CA: CPT | Performed by: INTERNAL MEDICINE

## 2021-01-01 PROCEDURE — 93306 TTE W/DOPPLER COMPLETE: CPT | Performed by: INTERNAL MEDICINE

## 2021-01-01 PROCEDURE — 36415 COLL VENOUS BLD VENIPUNCTURE: CPT

## 2021-01-01 PROCEDURE — 81003 URINALYSIS AUTO W/O SCOPE: CPT | Performed by: INTERNAL MEDICINE

## 2021-01-01 PROCEDURE — 99232 SBSQ HOSP IP/OBS MODERATE 35: CPT | Performed by: INTERNAL MEDICINE

## 2021-01-01 PROCEDURE — C1769 GUIDE WIRE: HCPCS | Performed by: THORACIC SURGERY (CARDIOTHORACIC VASCULAR SURGERY)

## 2021-01-01 PROCEDURE — 33208 INSRT HEART PM ATRIAL & VENT: CPT | Performed by: PHYSICIAN ASSISTANT

## 2021-01-01 PROCEDURE — 82272 OCCULT BLD FECES 1-3 TESTS: CPT | Performed by: INTERNAL MEDICINE

## 2021-01-01 PROCEDURE — 93798 PHYS/QHP OP CAR RHAB W/ECG: CPT

## 2021-01-01 PROCEDURE — 86900 BLOOD TYPING SEROLOGIC ABO: CPT

## 2021-01-01 PROCEDURE — 86850 RBC ANTIBODY SCREEN: CPT | Performed by: PHYSICIAN ASSISTANT

## 2021-01-01 PROCEDURE — 83690 ASSAY OF LIPASE: CPT | Performed by: EMERGENCY MEDICINE

## 2021-01-01 PROCEDURE — 97110 THERAPEUTIC EXERCISES: CPT

## 2021-01-01 PROCEDURE — 99214 OFFICE O/P EST MOD 30 MIN: CPT | Performed by: ORTHOPAEDIC SURGERY

## 2021-01-01 PROCEDURE — 85027 COMPLETE CBC AUTOMATED: CPT

## 2021-01-01 PROCEDURE — 82140 ASSAY OF AMMONIA: CPT | Performed by: EMERGENCY MEDICINE

## 2021-01-01 PROCEDURE — 83036 HEMOGLOBIN GLYCOSYLATED A1C: CPT | Performed by: INTERNAL MEDICINE

## 2021-01-01 PROCEDURE — 80053 COMPREHEN METABOLIC PANEL: CPT | Performed by: PHYSICIAN ASSISTANT

## 2021-01-01 PROCEDURE — 02HK3JZ INSERTION OF PACEMAKER LEAD INTO RIGHT VENTRICLE, PERCUTANEOUS APPROACH: ICD-10-PCS | Performed by: INTERNAL MEDICINE

## 2021-01-01 PROCEDURE — 80048 BASIC METABOLIC PNL TOTAL CA: CPT | Performed by: PHYSICIAN ASSISTANT

## 2021-01-01 PROCEDURE — 71045 X-RAY EXAM CHEST 1 VIEW: CPT

## 2021-01-01 PROCEDURE — 99232 SBSQ HOSP IP/OBS MODERATE 35: CPT | Performed by: THORACIC SURGERY (CARDIOTHORACIC VASCULAR SURGERY)

## 2021-01-01 PROCEDURE — 99214 OFFICE O/P EST MOD 30 MIN: CPT | Performed by: NURSE PRACTITIONER

## 2021-01-01 PROCEDURE — 83010 ASSAY OF HAPTOGLOBIN QUANT: CPT | Performed by: PHYSICIAN ASSISTANT

## 2021-01-01 PROCEDURE — 93880 EXTRACRANIAL BILAT STUDY: CPT | Performed by: SURGERY

## 2021-01-01 PROCEDURE — 80053 COMPREHEN METABOLIC PANEL: CPT | Performed by: INTERNAL MEDICINE

## 2021-01-01 PROCEDURE — 30233R1 TRANSFUSION OF NONAUTOLOGOUS PLATELETS INTO PERIPHERAL VEIN, PERCUTANEOUS APPROACH: ICD-10-PCS | Performed by: THORACIC SURGERY (CARDIOTHORACIC VASCULAR SURGERY)

## 2021-01-01 PROCEDURE — 85610 PROTHROMBIN TIME: CPT | Performed by: REGISTERED NURSE

## 2021-01-01 PROCEDURE — 43239 EGD BIOPSY SINGLE/MULTIPLE: CPT | Performed by: INTERNAL MEDICINE

## 2021-01-01 PROCEDURE — 82077 ASSAY SPEC XCP UR&BREATH IA: CPT | Performed by: NURSE PRACTITIONER

## 2021-01-01 PROCEDURE — 93926 LOWER EXTREMITY STUDY: CPT | Performed by: SURGERY

## 2021-01-01 PROCEDURE — 86901 BLOOD TYPING SEROLOGIC RH(D): CPT

## 2021-01-01 PROCEDURE — 99223 1ST HOSP IP/OBS HIGH 75: CPT | Performed by: INTERNAL MEDICINE

## 2021-01-01 PROCEDURE — 85025 COMPLETE CBC W/AUTO DIFF WBC: CPT

## 2021-01-01 PROCEDURE — 02H63JZ INSERTION OF PACEMAKER LEAD INTO RIGHT ATRIUM, PERCUTANEOUS APPROACH: ICD-10-PCS | Performed by: INTERNAL MEDICINE

## 2021-01-01 PROCEDURE — 33208 INSRT HEART PM ATRIAL & VENT: CPT | Performed by: INTERNAL MEDICINE

## 2021-01-01 PROCEDURE — 83036 HEMOGLOBIN GLYCOSYLATED A1C: CPT

## 2021-01-01 PROCEDURE — G0008 ADMIN INFLUENZA VIRUS VAC: HCPCS

## 2021-01-01 PROCEDURE — 80179 DRUG ASSAY SALICYLATE: CPT | Performed by: INTERNAL MEDICINE

## 2021-01-01 PROCEDURE — 86901 BLOOD TYPING SEROLOGIC RH(D): CPT | Performed by: PHYSICIAN ASSISTANT

## 2021-01-01 PROCEDURE — 80053 COMPREHEN METABOLIC PANEL: CPT

## 2021-01-01 PROCEDURE — 99233 SBSQ HOSP IP/OBS HIGH 50: CPT | Performed by: INTERNAL MEDICINE

## 2021-01-01 PROCEDURE — 92526 ORAL FUNCTION THERAPY: CPT

## 2021-01-01 PROCEDURE — C1769 GUIDE WIRE: HCPCS | Performed by: INTERNAL MEDICINE

## 2021-01-01 PROCEDURE — 1124F ACP DISCUSS-NO DSCNMKR DOCD: CPT | Performed by: PHYSICIAN ASSISTANT

## 2021-01-01 PROCEDURE — 99238 HOSP IP/OBS DSCHRG MGMT 30/<: CPT | Performed by: NURSE PRACTITIONER

## 2021-01-01 PROCEDURE — 83605 ASSAY OF LACTIC ACID: CPT | Performed by: INTERNAL MEDICINE

## 2021-01-01 PROCEDURE — 87040 BLOOD CULTURE FOR BACTERIA: CPT | Performed by: INTERNAL MEDICINE

## 2021-01-01 PROCEDURE — C1894 INTRO/SHEATH, NON-LASER: HCPCS | Performed by: THORACIC SURGERY (CARDIOTHORACIC VASCULAR SURGERY)

## 2021-01-01 PROCEDURE — 99285 EMERGENCY DEPT VISIT HI MDM: CPT | Performed by: EMERGENCY MEDICINE

## 2021-01-01 PROCEDURE — C9113 INJ PANTOPRAZOLE SODIUM, VIA: HCPCS | Performed by: PHYSICIAN ASSISTANT

## 2021-01-01 PROCEDURE — 82947 ASSAY GLUCOSE BLOOD QUANT: CPT

## 2021-01-01 PROCEDURE — 85025 COMPLETE CBC W/AUTO DIFF WBC: CPT | Performed by: INTERNAL MEDICINE

## 2021-01-01 PROCEDURE — 82977 ASSAY OF GGT: CPT | Performed by: INTERNAL MEDICINE

## 2021-01-01 PROCEDURE — 0012A SARS-COV-2 / COVID-19 MRNA VACCINE (MODERNA) 100 MCG: CPT

## 2021-01-01 PROCEDURE — 85610 PROTHROMBIN TIME: CPT | Performed by: NURSE PRACTITIONER

## 2021-01-01 PROCEDURE — NC001 PR NO CHARGE: Performed by: PHYSICIAN ASSISTANT

## 2021-01-01 PROCEDURE — 97116 GAIT TRAINING THERAPY: CPT

## 2021-01-01 PROCEDURE — P9037 PLATE PHERES LEUKOREDU IRRAD: HCPCS

## 2021-01-01 PROCEDURE — 91301 SARS-COV-2 / COVID-19 MRNA VACCINE (MODERNA) 100 MCG: CPT

## 2021-01-01 PROCEDURE — 33361 REPLACE AORTIC VALVE PERQ: CPT | Performed by: INTERNAL MEDICINE

## 2021-01-01 PROCEDURE — 84145 PROCALCITONIN (PCT): CPT | Performed by: INTERNAL MEDICINE

## 2021-01-01 PROCEDURE — 93454 CORONARY ARTERY ANGIO S&I: CPT | Performed by: INTERNAL MEDICINE

## 2021-01-01 PROCEDURE — 75572 CT HRT W/3D IMAGE: CPT

## 2021-01-01 PROCEDURE — C1887 CATHETER, GUIDING: HCPCS | Performed by: INTERNAL MEDICINE

## 2021-01-01 PROCEDURE — 0241U HB NFCT DS VIR RESP RNA 4 TRGT: CPT | Performed by: INTERNAL MEDICINE

## 2021-01-01 PROCEDURE — 82105 ALPHA-FETOPROTEIN SERUM: CPT | Performed by: INTERNAL MEDICINE

## 2021-01-01 PROCEDURE — 86705 HEP B CORE ANTIBODY IGM: CPT | Performed by: INTERNAL MEDICINE

## 2021-01-01 PROCEDURE — 81001 URINALYSIS AUTO W/SCOPE: CPT | Performed by: EMERGENCY MEDICINE

## 2021-01-01 PROCEDURE — 82550 ASSAY OF CK (CPK): CPT | Performed by: INTERNAL MEDICINE

## 2021-01-01 PROCEDURE — 86900 BLOOD TYPING SEROLOGIC ABO: CPT | Performed by: PHYSICIAN ASSISTANT

## 2021-01-01 PROCEDURE — 86920 COMPATIBILITY TEST SPIN: CPT

## 2021-01-01 PROCEDURE — 83690 ASSAY OF LIPASE: CPT | Performed by: REGISTERED NURSE

## 2021-01-01 PROCEDURE — 87040 BLOOD CULTURE FOR BACTERIA: CPT | Performed by: PHYSICIAN ASSISTANT

## 2021-01-01 PROCEDURE — 99495 TRANSJ CARE MGMT MOD F2F 14D: CPT | Performed by: PHYSICIAN ASSISTANT

## 2021-01-01 PROCEDURE — 99222 1ST HOSP IP/OBS MODERATE 55: CPT | Performed by: INTERNAL MEDICINE

## 2021-01-01 PROCEDURE — 80202 ASSAY OF VANCOMYCIN: CPT | Performed by: NURSE PRACTITIONER

## 2021-01-01 PROCEDURE — 85007 BL SMEAR W/DIFF WBC COUNT: CPT | Performed by: EMERGENCY MEDICINE

## 2021-01-01 PROCEDURE — 99282 EMERGENCY DEPT VISIT SF MDM: CPT | Performed by: PHYSICIAN ASSISTANT

## 2021-01-01 PROCEDURE — 85027 COMPLETE CBC AUTOMATED: CPT | Performed by: EMERGENCY MEDICINE

## 2021-01-01 PROCEDURE — 85014 HEMATOCRIT: CPT

## 2021-01-01 PROCEDURE — 99215 OFFICE O/P EST HI 40 MIN: CPT | Performed by: NURSE PRACTITIONER

## 2021-01-01 PROCEDURE — 82330 ASSAY OF CALCIUM: CPT | Performed by: PHYSICIAN ASSISTANT

## 2021-01-01 PROCEDURE — 80048 BASIC METABOLIC PNL TOTAL CA: CPT

## 2021-01-01 PROCEDURE — 94760 N-INVAS EAR/PLS OXIMETRY 1: CPT

## 2021-01-01 PROCEDURE — 90662 IIV NO PRSV INCREASED AG IM: CPT

## 2021-01-01 PROCEDURE — 83010 ASSAY OF HAPTOGLOBIN QUANT: CPT | Performed by: REGISTERED NURSE

## 2021-01-01 PROCEDURE — 99232 SBSQ HOSP IP/OBS MODERATE 35: CPT | Performed by: PHYSICIAN ASSISTANT

## 2021-01-01 PROCEDURE — 02RF38Z REPLACEMENT OF AORTIC VALVE WITH ZOOPLASTIC TISSUE, PERCUTANEOUS APPROACH: ICD-10-PCS | Performed by: THORACIC SURGERY (CARDIOTHORACIC VASCULAR SURGERY)

## 2021-01-01 PROCEDURE — 81001 URINALYSIS AUTO W/SCOPE: CPT | Performed by: PHYSICIAN ASSISTANT

## 2021-01-01 PROCEDURE — 83605 ASSAY OF LACTIC ACID: CPT | Performed by: PHYSICIAN ASSISTANT

## 2021-01-01 PROCEDURE — 93000 ELECTROCARDIOGRAM COMPLETE: CPT | Performed by: INTERNAL MEDICINE

## 2021-01-01 PROCEDURE — 71260 CT THORAX DX C+: CPT

## 2021-01-01 PROCEDURE — NC001 PR NO CHARGE: Performed by: INTERNAL MEDICINE

## 2021-01-01 PROCEDURE — 84295 ASSAY OF SERUM SODIUM: CPT

## 2021-01-01 PROCEDURE — 84132 ASSAY OF SERUM POTASSIUM: CPT

## 2021-01-01 PROCEDURE — C1894 INTRO/SHEATH, NON-LASER: HCPCS | Performed by: INTERNAL MEDICINE

## 2021-01-01 PROCEDURE — 99233 SBSQ HOSP IP/OBS HIGH 50: CPT | Performed by: THORACIC SURGERY (CARDIOTHORACIC VASCULAR SURGERY)

## 2021-01-01 PROCEDURE — 87081 CULTURE SCREEN ONLY: CPT | Performed by: PHYSICIAN ASSISTANT

## 2021-01-01 PROCEDURE — 86850 RBC ANTIBODY SCREEN: CPT

## 2021-01-01 PROCEDURE — 84484 ASSAY OF TROPONIN QUANT: CPT | Performed by: EMERGENCY MEDICINE

## 2021-01-01 PROCEDURE — 85347 COAGULATION TIME ACTIVATED: CPT

## 2021-01-01 PROCEDURE — 97167 OT EVAL HIGH COMPLEX 60 MIN: CPT

## 2021-01-01 PROCEDURE — C1898 LEAD, PMKR, OTHER THAN TRANS: HCPCS

## 2021-01-01 PROCEDURE — C1892 INTRO/SHEATH,FIXED,PEEL-AWAY: HCPCS | Performed by: PHYSICIAN ASSISTANT

## 2021-01-01 PROCEDURE — 87340 HEPATITIS B SURFACE AG IA: CPT | Performed by: INTERNAL MEDICINE

## 2021-01-01 PROCEDURE — 83735 ASSAY OF MAGNESIUM: CPT | Performed by: PHYSICIAN ASSISTANT

## 2021-01-01 PROCEDURE — 99204 OFFICE O/P NEW MOD 45 MIN: CPT | Performed by: NURSE PRACTITIONER

## 2021-01-01 PROCEDURE — 85018 HEMOGLOBIN: CPT | Performed by: PHYSICIAN ASSISTANT

## 2021-01-01 PROCEDURE — 84145 PROCALCITONIN (PCT): CPT | Performed by: PHYSICIAN ASSISTANT

## 2021-01-01 PROCEDURE — 99284 EMERGENCY DEPT VISIT MOD MDM: CPT

## 2021-01-01 PROCEDURE — 99213 OFFICE O/P EST LOW 20 MIN: CPT | Performed by: NURSE PRACTITIONER

## 2021-01-01 PROCEDURE — 93797 PHYS/QHP OP CAR RHAB WO ECG: CPT

## 2021-01-01 PROCEDURE — 80048 BASIC METABOLIC PNL TOTAL CA: CPT | Performed by: NURSE PRACTITIONER

## 2021-01-01 PROCEDURE — 33361 REPLACE AORTIC VALVE PERQ: CPT | Performed by: THORACIC SURGERY (CARDIOTHORACIC VASCULAR SURGERY)

## 2021-01-01 PROCEDURE — 86038 ANTINUCLEAR ANTIBODIES: CPT | Performed by: INTERNAL MEDICINE

## 2021-01-01 PROCEDURE — 99214 OFFICE O/P EST MOD 30 MIN: CPT | Performed by: INTERNAL MEDICINE

## 2021-01-01 PROCEDURE — 81003 URINALYSIS AUTO W/O SCOPE: CPT | Performed by: THORACIC SURGERY (CARDIOTHORACIC VASCULAR SURGERY)

## 2021-01-01 PROCEDURE — 0011A SARS-COV-2 / COVID-19 MRNA VACCINE (MODERNA) 100 MCG: CPT

## 2021-01-01 PROCEDURE — 97530 THERAPEUTIC ACTIVITIES: CPT

## 2021-01-01 PROCEDURE — 82550 ASSAY OF CK (CPK): CPT | Performed by: PHYSICIAN ASSISTANT

## 2021-01-01 PROCEDURE — 02HV33Z INSERTION OF INFUSION DEVICE INTO SUPERIOR VENA CAVA, PERCUTANEOUS APPROACH: ICD-10-PCS | Performed by: ANESTHESIOLOGY

## 2021-01-01 PROCEDURE — 93926 LOWER EXTREMITY STUDY: CPT

## 2021-01-01 PROCEDURE — 99152 MOD SED SAME PHYS/QHP 5/>YRS: CPT | Performed by: INTERNAL MEDICINE

## 2021-01-01 PROCEDURE — 80143 DRUG ASSAY ACETAMINOPHEN: CPT | Performed by: INTERNAL MEDICINE

## 2021-01-01 PROCEDURE — 80076 HEPATIC FUNCTION PANEL: CPT | Performed by: NURSE PRACTITIONER

## 2021-01-01 PROCEDURE — 83690 ASSAY OF LIPASE: CPT | Performed by: INTERNAL MEDICINE

## 2021-01-01 PROCEDURE — 83550 IRON BINDING TEST: CPT | Performed by: INTERNAL MEDICINE

## 2021-01-01 PROCEDURE — 82140 ASSAY OF AMMONIA: CPT | Performed by: PHYSICIAN ASSISTANT

## 2021-01-01 PROCEDURE — C1785 PMKR, DUAL, RATE-RESP: HCPCS

## 2021-01-01 PROCEDURE — 80053 COMPREHEN METABOLIC PANEL: CPT | Performed by: EMERGENCY MEDICINE

## 2021-01-01 PROCEDURE — 70450 CT HEAD/BRAIN W/O DYE: CPT

## 2021-01-01 PROCEDURE — 82977 ASSAY OF GGT: CPT | Performed by: REGISTERED NURSE

## 2021-01-01 PROCEDURE — 71046 X-RAY EXAM CHEST 2 VIEWS: CPT

## 2021-01-01 PROCEDURE — C1769 GUIDE WIRE: HCPCS | Performed by: PHYSICIAN ASSISTANT

## 2021-01-01 PROCEDURE — 99153 MOD SED SAME PHYS/QHP EA: CPT | Performed by: INTERNAL MEDICINE

## 2021-01-01 PROCEDURE — 99222 1ST HOSP IP/OBS MODERATE 55: CPT | Performed by: NURSE PRACTITIONER

## 2021-01-01 PROCEDURE — 85610 PROTHROMBIN TIME: CPT

## 2021-01-01 PROCEDURE — 85025 COMPLETE CBC W/AUTO DIFF WBC: CPT | Performed by: PHYSICIAN ASSISTANT

## 2021-01-01 PROCEDURE — 99214 OFFICE O/P EST MOD 30 MIN: CPT | Performed by: PHYSICIAN ASSISTANT

## 2021-01-01 PROCEDURE — 71250 CT THORAX DX C-: CPT

## 2021-01-01 PROCEDURE — 84100 ASSAY OF PHOSPHORUS: CPT | Performed by: PHYSICIAN ASSISTANT

## 2021-01-01 PROCEDURE — 74176 CT ABD & PELVIS W/O CONTRAST: CPT

## 2021-01-01 PROCEDURE — 74177 CT ABD & PELVIS W/CONTRAST: CPT

## 2021-01-01 PROCEDURE — 92610 EVALUATE SWALLOWING FUNCTION: CPT

## 2021-01-01 PROCEDURE — 85027 COMPLETE CBC AUTOMATED: CPT | Performed by: INTERNAL MEDICINE

## 2021-01-01 PROCEDURE — 83605 ASSAY OF LACTIC ACID: CPT | Performed by: EMERGENCY MEDICINE

## 2021-01-01 PROCEDURE — 93880 EXTRACRANIAL BILAT STUDY: CPT

## 2021-01-01 PROCEDURE — 99291 CRITICAL CARE FIRST HOUR: CPT | Performed by: PHYSICIAN ASSISTANT

## 2021-01-01 PROCEDURE — G9197 ORDER FOR CEPH: HCPCS | Performed by: INTERNAL MEDICINE

## 2021-01-01 PROCEDURE — 85027 COMPLETE CBC AUTOMATED: CPT | Performed by: THORACIC SURGERY (CARDIOTHORACIC VASCULAR SURGERY)

## 2021-01-01 PROCEDURE — 85049 AUTOMATED PLATELET COUNT: CPT | Performed by: PHYSICIAN ASSISTANT

## 2021-01-01 PROCEDURE — 82947 ASSAY GLUCOSE BLOOD QUANT: CPT | Performed by: INTERNAL MEDICINE

## 2021-01-01 PROCEDURE — 84460 ALANINE AMINO (ALT) (SGPT): CPT | Performed by: REGISTERED NURSE

## 2021-01-01 PROCEDURE — 80061 LIPID PANEL: CPT | Performed by: INTERNAL MEDICINE

## 2021-01-01 PROCEDURE — 0JH606Z INSERTION OF PACEMAKER, DUAL CHAMBER INTO CHEST SUBCUTANEOUS TISSUE AND FASCIA, OPEN APPROACH: ICD-10-PCS | Performed by: INTERNAL MEDICINE

## 2021-01-01 PROCEDURE — 93306 TTE W/DOPPLER COMPLETE: CPT

## 2021-01-01 PROCEDURE — 82728 ASSAY OF FERRITIN: CPT | Performed by: INTERNAL MEDICINE

## 2021-01-01 PROCEDURE — 99222 1ST HOSP IP/OBS MODERATE 55: CPT | Performed by: PHYSICIAN ASSISTANT

## 2021-01-01 PROCEDURE — 93355 ECHO TRANSESOPHAGEAL (TEE): CPT

## 2021-01-01 PROCEDURE — 82247 BILIRUBIN TOTAL: CPT | Performed by: REGISTERED NURSE

## 2021-01-01 PROCEDURE — 80061 LIPID PANEL: CPT

## 2021-01-01 PROCEDURE — 82172 ASSAY OF APOLIPOPROTEIN: CPT | Performed by: REGISTERED NURSE

## 2021-01-01 PROCEDURE — 99223 1ST HOSP IP/OBS HIGH 75: CPT | Performed by: PSYCHIATRY & NEUROLOGY

## 2021-01-01 PROCEDURE — 20600 DRAIN/INJ JOINT/BURSA W/O US: CPT | Performed by: ORTHOPAEDIC SURGERY

## 2021-01-01 PROCEDURE — 86704 HEP B CORE ANTIBODY TOTAL: CPT | Performed by: INTERNAL MEDICINE

## 2021-01-01 PROCEDURE — 84443 ASSAY THYROID STIM HORMONE: CPT | Performed by: EMERGENCY MEDICINE

## 2021-01-01 PROCEDURE — 82553 CREATINE MB FRACTION: CPT | Performed by: EMERGENCY MEDICINE

## 2021-01-01 PROCEDURE — 87186 SC STD MICRODIL/AGAR DIL: CPT | Performed by: INTERNAL MEDICINE

## 2021-01-01 PROCEDURE — 99204 OFFICE O/P NEW MOD 45 MIN: CPT | Performed by: INTERNAL MEDICINE

## 2021-01-01 PROCEDURE — G9197 ORDER FOR CEPH: HCPCS | Performed by: THORACIC SURGERY (CARDIOTHORACIC VASCULAR SURGERY)

## 2021-01-01 PROCEDURE — 85610 PROTHROMBIN TIME: CPT | Performed by: INTERNAL MEDICINE

## 2021-01-01 PROCEDURE — 99223 1ST HOSP IP/OBS HIGH 75: CPT | Performed by: PHYSICIAN ASSISTANT

## 2021-01-01 PROCEDURE — 83540 ASSAY OF IRON: CPT | Performed by: INTERNAL MEDICINE

## 2021-01-01 PROCEDURE — 85025 COMPLETE CBC W/AUTO DIFF WBC: CPT | Performed by: NURSE PRACTITIONER

## 2021-01-01 PROCEDURE — 83930 ASSAY OF BLOOD OSMOLALITY: CPT | Performed by: NURSE PRACTITIONER

## 2021-01-01 PROCEDURE — 99285 EMERGENCY DEPT VISIT HI MDM: CPT

## 2021-01-01 PROCEDURE — 82550 ASSAY OF CK (CPK): CPT | Performed by: EMERGENCY MEDICINE

## 2021-01-01 PROCEDURE — 83735 ASSAY OF MAGNESIUM: CPT | Performed by: INTERNAL MEDICINE

## 2021-01-01 PROCEDURE — 80074 ACUTE HEPATITIS PANEL: CPT | Performed by: NURSE PRACTITIONER

## 2021-01-01 PROCEDURE — 86803 HEPATITIS C AB TEST: CPT | Performed by: INTERNAL MEDICINE

## 2021-01-01 PROCEDURE — G0439 PPPS, SUBSEQ VISIT: HCPCS | Performed by: INTERNAL MEDICINE

## 2021-01-01 PROCEDURE — NC001 PR NO CHARGE: Performed by: NURSE PRACTITIONER

## 2021-01-01 PROCEDURE — 87081 CULTURE SCREEN ONLY: CPT

## 2021-01-01 PROCEDURE — 76377 3D RENDER W/INTRP POSTPROCES: CPT

## 2021-01-01 PROCEDURE — 85007 BL SMEAR W/DIFF WBC COUNT: CPT | Performed by: INTERNAL MEDICINE

## 2021-01-01 PROCEDURE — 70551 MRI BRAIN STEM W/O DYE: CPT

## 2021-01-01 PROCEDURE — 99238 HOSP IP/OBS DSCHRG MGMT 30/<: CPT | Performed by: THORACIC SURGERY (CARDIOTHORACIC VASCULAR SURGERY)

## 2021-01-01 PROCEDURE — C1760 CLOSURE DEV, VASC: HCPCS | Performed by: THORACIC SURGERY (CARDIOTHORACIC VASCULAR SURGERY)

## 2021-01-01 PROCEDURE — 96361 HYDRATE IV INFUSION ADD-ON: CPT

## 2021-01-01 PROCEDURE — 36415 COLL VENOUS BLD VENIPUNCTURE: CPT | Performed by: EMERGENCY MEDICINE

## 2021-01-01 PROCEDURE — 96360 HYDRATION IV INFUSION INIT: CPT

## 2021-01-01 PROCEDURE — 82803 BLOOD GASES ANY COMBINATION: CPT

## 2021-01-01 PROCEDURE — 85014 HEMATOCRIT: CPT | Performed by: PHYSICIAN ASSISTANT

## 2021-01-01 PROCEDURE — 99024 POSTOP FOLLOW-UP VISIT: CPT | Performed by: INTERNAL MEDICINE

## 2021-01-01 PROCEDURE — 83883 ASSAY NEPHELOMETRY NOT SPEC: CPT | Performed by: REGISTERED NURSE

## 2021-01-01 PROCEDURE — 82330 ASSAY OF CALCIUM: CPT

## 2021-01-01 DEVICE — PERCLOSE PROGLIDE™ SUTURE-MEDIATED CLOSURE SYSTEM
Type: IMPLANTABLE DEVICE | Site: GROIN | Status: FUNCTIONAL
Brand: PERCLOSE PROGLIDE™

## 2021-01-01 DEVICE — TAVR SAPIEN 3 CMNDR DLV SYS 29MM: Type: IMPLANTABLE DEVICE | Site: HEART | Status: FUNCTIONAL

## 2021-01-01 RX ORDER — CALCIUM GLUCONATE 20 MG/ML
2 INJECTION, SOLUTION INTRAVENOUS ONCE
Status: COMPLETED | OUTPATIENT
Start: 2021-01-01 | End: 2021-01-01

## 2021-01-01 RX ORDER — GENTAMICIN SULFATE 40 MG/ML
INJECTION, SOLUTION INTRAMUSCULAR; INTRAVENOUS CODE/TRAUMA/SEDATION MEDICATION
Status: COMPLETED | OUTPATIENT
Start: 2021-01-01 | End: 2021-01-01

## 2021-01-01 RX ORDER — FENTANYL CITRATE 50 UG/ML
INJECTION, SOLUTION INTRAMUSCULAR; INTRAVENOUS AS NEEDED
Status: DISCONTINUED | OUTPATIENT
Start: 2021-01-01 | End: 2021-01-01

## 2021-01-01 RX ORDER — CALCIUM CITRATE/VITAMIN D3 200MG-6.25
TABLET ORAL
Qty: 100 EACH | Refills: 5 | Status: SHIPPED | OUTPATIENT
Start: 2021-01-01

## 2021-01-01 RX ORDER — PANTOPRAZOLE SODIUM 40 MG/1
40 TABLET, DELAYED RELEASE ORAL
Status: DISCONTINUED | OUTPATIENT
Start: 2021-01-01 | End: 2021-01-01 | Stop reason: HOSPADM

## 2021-01-01 RX ORDER — GLYCOPYRROLATE 0.2 MG/ML
0.1 INJECTION INTRAMUSCULAR; INTRAVENOUS EVERY 4 HOURS PRN
Status: CANCELLED | OUTPATIENT
Start: 2021-01-01

## 2021-01-01 RX ORDER — OMEPRAZOLE 40 MG/1
40 CAPSULE, DELAYED RELEASE ORAL DAILY
Qty: 30 CAPSULE | Refills: 5 | Status: SHIPPED | OUTPATIENT
Start: 2021-01-01

## 2021-01-01 RX ORDER — FENTANYL CITRATE 50 UG/ML
25 INJECTION, SOLUTION INTRAMUSCULAR; INTRAVENOUS
Status: DISCONTINUED | OUTPATIENT
Start: 2021-01-01 | End: 2021-01-01

## 2021-01-01 RX ORDER — SODIUM CHLORIDE, SODIUM GLUCONATE, SODIUM ACETATE, POTASSIUM CHLORIDE, MAGNESIUM CHLORIDE, SODIUM PHOSPHATE, DIBASIC, AND POTASSIUM PHOSPHATE .53; .5; .37; .037; .03; .012; .00082 G/100ML; G/100ML; G/100ML; G/100ML; G/100ML; G/100ML; G/100ML
150 INJECTION, SOLUTION INTRAVENOUS CONTINUOUS
Status: DISCONTINUED | OUTPATIENT
Start: 2021-01-01 | End: 2021-01-01

## 2021-01-01 RX ORDER — OMEPRAZOLE 40 MG/1
CAPSULE, DELAYED RELEASE ORAL
Qty: 90 CAPSULE | Refills: 1 | OUTPATIENT
Start: 2021-01-01

## 2021-01-01 RX ORDER — LIDOCAINE HYDROCHLORIDE 10 MG/ML
0.5 INJECTION, SOLUTION INFILTRATION; PERINEURAL
Status: COMPLETED | OUTPATIENT
Start: 2021-01-01 | End: 2021-01-01

## 2021-01-01 RX ORDER — FENTANYL CITRATE-0.9 % NACL/PF 10 MCG/ML
25 PLASTIC BAG, INJECTION (ML) INTRAVENOUS CONTINUOUS
Status: CANCELLED | OUTPATIENT
Start: 2021-01-01

## 2021-01-01 RX ORDER — NEOSTIGMINE METHYLSULFATE 1 MG/ML
INJECTION INTRAVENOUS AS NEEDED
Status: DISCONTINUED | OUTPATIENT
Start: 2021-01-01 | End: 2021-01-01

## 2021-01-01 RX ORDER — ACETAMINOPHEN 650 MG/1
650 SUPPOSITORY RECTAL EVERY 6 HOURS PRN
Status: DISCONTINUED | OUTPATIENT
Start: 2021-01-01 | End: 2021-01-01 | Stop reason: HOSPADM

## 2021-01-01 RX ORDER — PROTAMINE SULFATE 10 MG/ML
INJECTION, SOLUTION INTRAVENOUS AS NEEDED
Status: DISCONTINUED | OUTPATIENT
Start: 2021-01-01 | End: 2021-01-01

## 2021-01-01 RX ORDER — LORAZEPAM 2 MG/ML
0.5 INJECTION INTRAMUSCULAR EVERY 4 HOURS PRN
Status: CANCELLED | OUTPATIENT
Start: 2021-01-01

## 2021-01-01 RX ORDER — BETAMETHASONE SODIUM PHOSPHATE AND BETAMETHASONE ACETATE 3; 3 MG/ML; MG/ML
3 INJECTION, SUSPENSION INTRA-ARTICULAR; INTRALESIONAL; INTRAMUSCULAR; SOFT TISSUE
Status: COMPLETED | OUTPATIENT
Start: 2021-01-01 | End: 2021-01-01

## 2021-01-01 RX ORDER — CHLORHEXIDINE GLUCONATE 0.12 MG/ML
15 RINSE ORAL EVERY 12 HOURS SCHEDULED
Status: DISCONTINUED | OUTPATIENT
Start: 2021-01-01 | End: 2021-01-01

## 2021-01-01 RX ORDER — HEPARIN SODIUM 1000 [USP'U]/ML
INJECTION, SOLUTION INTRAVENOUS; SUBCUTANEOUS AS NEEDED
Status: DISCONTINUED | OUTPATIENT
Start: 2021-01-01 | End: 2021-01-01

## 2021-01-01 RX ORDER — SODIUM CHLORIDE, SODIUM LACTATE, POTASSIUM CHLORIDE, CALCIUM CHLORIDE 600; 310; 30; 20 MG/100ML; MG/100ML; MG/100ML; MG/100ML
100 INJECTION, SOLUTION INTRAVENOUS CONTINUOUS
Status: CANCELLED | OUTPATIENT
Start: 2021-01-01

## 2021-01-01 RX ORDER — DOCUSATE SODIUM 100 MG/1
100 CAPSULE, LIQUID FILLED ORAL 2 TIMES DAILY
Qty: 10 CAPSULE | Refills: 0 | Status: SHIPPED | OUTPATIENT
Start: 2021-01-01

## 2021-01-01 RX ORDER — ACETAMINOPHEN 325 MG/1
650 TABLET ORAL EVERY 6 HOURS PRN
Status: DISCONTINUED | OUTPATIENT
Start: 2021-01-01 | End: 2021-01-01

## 2021-01-01 RX ORDER — MIDAZOLAM HYDROCHLORIDE 2 MG/2ML
INJECTION, SOLUTION INTRAMUSCULAR; INTRAVENOUS CODE/TRAUMA/SEDATION MEDICATION
Status: COMPLETED | OUTPATIENT
Start: 2021-01-01 | End: 2021-01-01

## 2021-01-01 RX ORDER — CHLORHEXIDINE GLUCONATE 0.12 MG/ML
15 RINSE ORAL 2 TIMES DAILY
Status: DISCONTINUED | OUTPATIENT
Start: 2021-01-01 | End: 2021-01-01 | Stop reason: HOSPADM

## 2021-01-01 RX ORDER — AMOXICILLIN 500 MG/1
2000 TABLET, FILM COATED ORAL ONCE AS NEEDED
Qty: 4 TABLET | Refills: 1
Start: 2021-01-01 | End: 2022-07-20

## 2021-01-01 RX ORDER — ALBUMIN, HUMAN INJ 5% 5 %
12.5 SOLUTION INTRAVENOUS ONCE
Status: COMPLETED | OUTPATIENT
Start: 2021-01-01 | End: 2021-01-01

## 2021-01-01 RX ORDER — EPHEDRINE SULFATE 50 MG/ML
INJECTION INTRAVENOUS AS NEEDED
Status: DISCONTINUED | OUTPATIENT
Start: 2021-01-01 | End: 2021-01-01

## 2021-01-01 RX ORDER — GLIMEPIRIDE 1 MG/1
1 TABLET ORAL DAILY
Status: DISCONTINUED | OUTPATIENT
Start: 2021-01-01 | End: 2021-01-01 | Stop reason: HOSPADM

## 2021-01-01 RX ORDER — CEFAZOLIN SODIUM 2 G/50ML
2000 SOLUTION INTRAVENOUS ONCE
Status: CANCELLED | OUTPATIENT
Start: 2021-01-01 | End: 2021-01-01

## 2021-01-01 RX ORDER — CLOPIDOGREL BISULFATE 75 MG/1
75 TABLET ORAL DAILY
Status: DISCONTINUED | OUTPATIENT
Start: 2021-01-01 | End: 2021-01-01

## 2021-01-01 RX ORDER — CHLORHEXIDINE GLUCONATE 0.12 MG/ML
15 RINSE ORAL ONCE
Status: COMPLETED | OUTPATIENT
Start: 2021-01-01 | End: 2021-01-01

## 2021-01-01 RX ORDER — LIDOCAINE HYDROCHLORIDE 10 MG/ML
INJECTION, SOLUTION EPIDURAL; INFILTRATION; INTRACAUDAL; PERINEURAL
Status: COMPLETED | OUTPATIENT
Start: 2021-01-01 | End: 2021-01-01

## 2021-01-01 RX ORDER — HYDROMORPHONE HCL/PF 1 MG/ML
0.5 SYRINGE (ML) INJECTION
Status: DISCONTINUED | OUTPATIENT
Start: 2021-01-01 | End: 2021-01-01 | Stop reason: HOSPADM

## 2021-01-01 RX ORDER — ACETAMINOPHEN 650 MG/1
650 SUPPOSITORY RECTAL EVERY 6 HOURS PRN
Status: DISCONTINUED | OUTPATIENT
Start: 2021-01-01 | End: 2021-01-01

## 2021-01-01 RX ORDER — ONDANSETRON 2 MG/ML
4 INJECTION INTRAMUSCULAR; INTRAVENOUS EVERY 6 HOURS PRN
Status: DISCONTINUED | OUTPATIENT
Start: 2021-01-01 | End: 2021-01-01

## 2021-01-01 RX ORDER — ASPIRIN 81 MG/1
324 TABLET, CHEWABLE ORAL ONCE
Status: CANCELLED | OUTPATIENT
Start: 2021-01-01 | End: 2021-01-01

## 2021-01-01 RX ORDER — SODIUM CHLORIDE, SODIUM LACTATE, POTASSIUM CHLORIDE, CALCIUM CHLORIDE 600; 310; 30; 20 MG/100ML; MG/100ML; MG/100ML; MG/100ML
INJECTION, SOLUTION INTRAVENOUS CONTINUOUS PRN
Status: DISCONTINUED | OUTPATIENT
Start: 2021-01-01 | End: 2021-01-01

## 2021-01-01 RX ORDER — PANTOPRAZOLE SODIUM 40 MG/1
40 INJECTION, POWDER, FOR SOLUTION INTRAVENOUS DAILY
Status: DISCONTINUED | OUTPATIENT
Start: 2021-01-01 | End: 2021-01-01

## 2021-01-01 RX ORDER — FENTANYL CITRATE-0.9 % NACL/PF 10 MCG/ML
25 PLASTIC BAG, INJECTION (ML) INTRAVENOUS CONTINUOUS
Status: DISCONTINUED | OUTPATIENT
Start: 2021-01-01 | End: 2021-01-01 | Stop reason: HOSPADM

## 2021-01-01 RX ORDER — ATORVASTATIN CALCIUM 10 MG/1
10 TABLET, FILM COATED ORAL DAILY
Status: DISCONTINUED | OUTPATIENT
Start: 2021-01-01 | End: 2021-01-01 | Stop reason: HOSPADM

## 2021-01-01 RX ORDER — PROPOFOL 10 MG/ML
INJECTION, EMULSION INTRAVENOUS AS NEEDED
Status: DISCONTINUED | OUTPATIENT
Start: 2021-01-01 | End: 2021-01-01

## 2021-01-01 RX ORDER — FENTANYL CITRATE/PF 50 MCG/ML
25 SYRINGE (ML) INJECTION
Status: DISCONTINUED | OUTPATIENT
Start: 2021-01-01 | End: 2021-01-01 | Stop reason: HOSPADM

## 2021-01-01 RX ORDER — FENTANYL CITRATE 50 UG/ML
25 INJECTION, SOLUTION INTRAMUSCULAR; INTRAVENOUS ONCE
Status: COMPLETED | OUTPATIENT
Start: 2021-01-01 | End: 2021-01-01

## 2021-01-01 RX ORDER — HEPARIN SODIUM 1000 [USP'U]/ML
400 INJECTION, SOLUTION INTRAVENOUS; SUBCUTANEOUS ONCE
Status: CANCELLED | OUTPATIENT
Start: 2021-01-01

## 2021-01-01 RX ORDER — HEPARIN SODIUM 5000 [USP'U]/ML
5000 INJECTION, SOLUTION INTRAVENOUS; SUBCUTANEOUS EVERY 8 HOURS SCHEDULED
Status: DISCONTINUED | OUTPATIENT
Start: 2021-01-01 | End: 2021-01-01

## 2021-01-01 RX ORDER — GLIMEPIRIDE 2 MG/1
1 TABLET ORAL
Start: 2021-01-01

## 2021-01-01 RX ORDER — LIDOCAINE HYDROCHLORIDE 10 MG/ML
INJECTION, SOLUTION EPIDURAL; INFILTRATION; INTRACAUDAL; PERINEURAL CODE/TRAUMA/SEDATION MEDICATION
Status: COMPLETED | OUTPATIENT
Start: 2021-01-01 | End: 2021-01-01

## 2021-01-01 RX ORDER — DEXTROSE MONOHYDRATE 50 MG/ML
125 INJECTION, SOLUTION INTRAVENOUS CONTINUOUS
Status: DISCONTINUED | OUTPATIENT
Start: 2021-01-01 | End: 2021-01-01

## 2021-01-01 RX ORDER — DOCUSATE SODIUM 100 MG/1
100 CAPSULE, LIQUID FILLED ORAL 2 TIMES DAILY
Status: DISCONTINUED | OUTPATIENT
Start: 2021-01-01 | End: 2021-01-01

## 2021-01-01 RX ORDER — SODIUM CHLORIDE 9 MG/ML
75 INJECTION, SOLUTION INTRAVENOUS CONTINUOUS
Status: DISCONTINUED | OUTPATIENT
Start: 2021-01-01 | End: 2021-01-01

## 2021-01-01 RX ORDER — VERAPAMIL HYDROCHLORIDE 2.5 MG/ML
INJECTION, SOLUTION INTRAVENOUS CODE/TRAUMA/SEDATION MEDICATION
Status: COMPLETED | OUTPATIENT
Start: 2021-01-01 | End: 2021-01-01

## 2021-01-01 RX ORDER — ATORVASTATIN CALCIUM 10 MG/1
10 TABLET, FILM COATED ORAL DAILY
Qty: 90 TABLET | Refills: 3 | Status: SHIPPED | OUTPATIENT
Start: 2021-01-01

## 2021-01-01 RX ORDER — SODIUM CHLORIDE 9 MG/ML
75 INJECTION, SOLUTION INTRAVENOUS CONTINUOUS
Status: CANCELLED | OUTPATIENT
Start: 2021-01-01

## 2021-01-01 RX ORDER — CHLORHEXIDINE GLUCONATE 0.12 MG/ML
15 RINSE ORAL EVERY 12 HOURS SCHEDULED
Status: CANCELLED | OUTPATIENT
Start: 2021-01-01

## 2021-01-01 RX ORDER — ACETAMINOPHEN 650 MG/1
650 SUPPOSITORY RECTAL EVERY 6 HOURS PRN
Status: CANCELLED | OUTPATIENT
Start: 2021-01-01

## 2021-01-01 RX ORDER — POTASSIUM CHLORIDE 29.8 MG/ML
40 INJECTION INTRAVENOUS ONCE
Status: COMPLETED | OUTPATIENT
Start: 2021-01-01 | End: 2021-01-01

## 2021-01-01 RX ORDER — PROPOFOL 10 MG/ML
INJECTION, EMULSION INTRAVENOUS CONTINUOUS PRN
Status: DISCONTINUED | OUTPATIENT
Start: 2021-01-01 | End: 2021-01-01

## 2021-01-01 RX ORDER — SODIUM CHLORIDE, SODIUM LACTATE, POTASSIUM CHLORIDE, CALCIUM CHLORIDE 600; 310; 30; 20 MG/100ML; MG/100ML; MG/100ML; MG/100ML
100 INJECTION, SOLUTION INTRAVENOUS CONTINUOUS
Status: DISCONTINUED | OUTPATIENT
Start: 2021-01-01 | End: 2021-01-01

## 2021-01-01 RX ORDER — MIDAZOLAM HYDROCHLORIDE 2 MG/2ML
INJECTION, SOLUTION INTRAMUSCULAR; INTRAVENOUS AS NEEDED
Status: DISCONTINUED | OUTPATIENT
Start: 2021-01-01 | End: 2021-01-01

## 2021-01-01 RX ORDER — ONDANSETRON 2 MG/ML
4 INJECTION INTRAMUSCULAR; INTRAVENOUS EVERY 6 HOURS PRN
Status: DISCONTINUED | OUTPATIENT
Start: 2021-01-01 | End: 2021-01-01 | Stop reason: HOSPADM

## 2021-01-01 RX ORDER — POLYETHYLENE GLYCOL 3350 17 G/17G
17 POWDER, FOR SOLUTION ORAL DAILY
Status: DISCONTINUED | OUTPATIENT
Start: 2021-01-01 | End: 2021-01-01 | Stop reason: HOSPADM

## 2021-01-01 RX ORDER — CEFAZOLIN SODIUM 2 G/50ML
2000 SOLUTION INTRAVENOUS EVERY 8 HOURS
Status: COMPLETED | OUTPATIENT
Start: 2021-01-01 | End: 2021-01-01

## 2021-01-01 RX ORDER — LIDOCAINE HYDROCHLORIDE 10 MG/ML
INJECTION, SOLUTION EPIDURAL; INFILTRATION; INTRACAUDAL; PERINEURAL AS NEEDED
Status: DISCONTINUED | OUTPATIENT
Start: 2021-01-01 | End: 2021-01-01

## 2021-01-01 RX ORDER — ACETAMINOPHEN 650 MG/1
650 SUPPOSITORY RECTAL EVERY 4 HOURS PRN
Status: DISCONTINUED | OUTPATIENT
Start: 2021-01-01 | End: 2021-01-01 | Stop reason: HOSPADM

## 2021-01-01 RX ORDER — HYDROMORPHONE HCL IN WATER/PF 6 MG/30 ML
0.2 PATIENT CONTROLLED ANALGESIA SYRINGE INTRAVENOUS
Status: DISCONTINUED | OUTPATIENT
Start: 2021-01-01 | End: 2021-01-01

## 2021-01-01 RX ORDER — HYDROMORPHONE HCL IN WATER/PF 6 MG/30 ML
0.2 PATIENT CONTROLLED ANALGESIA SYRINGE INTRAVENOUS ONCE
Status: COMPLETED | OUTPATIENT
Start: 2021-01-01 | End: 2021-01-01

## 2021-01-01 RX ORDER — ASPIRIN 81 MG/1
324 TABLET, CHEWABLE ORAL ONCE
Status: COMPLETED | OUTPATIENT
Start: 2021-01-01 | End: 2021-01-01

## 2021-01-01 RX ORDER — CEFTRIAXONE 1 G/50ML
1000 INJECTION, SOLUTION INTRAVENOUS EVERY 24 HOURS
Status: DISCONTINUED | OUTPATIENT
Start: 2021-01-01 | End: 2021-01-01

## 2021-01-01 RX ORDER — SODIUM CHLORIDE, SODIUM GLUCONATE, SODIUM ACETATE, POTASSIUM CHLORIDE, MAGNESIUM CHLORIDE, SODIUM PHOSPHATE, DIBASIC, AND POTASSIUM PHOSPHATE .53; .5; .37; .037; .03; .012; .00082 G/100ML; G/100ML; G/100ML; G/100ML; G/100ML; G/100ML; G/100ML
500 INJECTION, SOLUTION INTRAVENOUS ONCE
Status: COMPLETED | OUTPATIENT
Start: 2021-01-01 | End: 2021-01-01

## 2021-01-01 RX ORDER — VANCOMYCIN HYDROCHLORIDE 1 G/200ML
1000 INJECTION, SOLUTION INTRAVENOUS ONCE
Status: COMPLETED | OUTPATIENT
Start: 2021-01-01 | End: 2021-01-01

## 2021-01-01 RX ORDER — ACETAMINOPHEN 325 MG/1
650 TABLET ORAL EVERY 4 HOURS PRN
Status: DISCONTINUED | OUTPATIENT
Start: 2021-01-01 | End: 2021-01-01 | Stop reason: HOSPADM

## 2021-01-01 RX ORDER — AMOXICILLIN 250 MG
1 CAPSULE ORAL 2 TIMES DAILY
Status: DISCONTINUED | OUTPATIENT
Start: 2021-01-01 | End: 2021-01-01 | Stop reason: HOSPADM

## 2021-01-01 RX ORDER — MIRTAZAPINE 15 MG/1
7.5 TABLET, FILM COATED ORAL
Status: DISCONTINUED | OUTPATIENT
Start: 2021-01-01 | End: 2021-01-01

## 2021-01-01 RX ORDER — FLUTICASONE PROPIONATE 50 MCG
1 SPRAY, SUSPENSION (ML) NASAL DAILY
Qty: 15.8 ML | Refills: 5 | Status: SHIPPED | OUTPATIENT
Start: 2021-01-01

## 2021-01-01 RX ORDER — ONDANSETRON 2 MG/ML
4 INJECTION INTRAMUSCULAR; INTRAVENOUS ONCE AS NEEDED
Status: DISCONTINUED | OUTPATIENT
Start: 2021-01-01 | End: 2021-01-01 | Stop reason: HOSPADM

## 2021-01-01 RX ORDER — FENTANYL CITRATE 50 UG/ML
INJECTION, SOLUTION INTRAMUSCULAR; INTRAVENOUS CODE/TRAUMA/SEDATION MEDICATION
Status: COMPLETED | OUTPATIENT
Start: 2021-01-01 | End: 2021-01-01

## 2021-01-01 RX ORDER — GLYCOPYRROLATE 0.2 MG/ML
0.1 INJECTION INTRAMUSCULAR; INTRAVENOUS EVERY 4 HOURS PRN
Status: DISCONTINUED | OUTPATIENT
Start: 2021-01-01 | End: 2021-01-01 | Stop reason: HOSPADM

## 2021-01-01 RX ORDER — LANOLIN ALCOHOL/MO/W.PET/CERES
6 CREAM (GRAM) TOPICAL
Status: DISCONTINUED | OUTPATIENT
Start: 2021-01-01 | End: 2021-01-01 | Stop reason: HOSPADM

## 2021-01-01 RX ORDER — ONDANSETRON 2 MG/ML
INJECTION INTRAMUSCULAR; INTRAVENOUS AS NEEDED
Status: DISCONTINUED | OUTPATIENT
Start: 2021-01-01 | End: 2021-01-01

## 2021-01-01 RX ORDER — CEFAZOLIN SODIUM 1 G/3ML
INJECTION, POWDER, FOR SOLUTION INTRAMUSCULAR; INTRAVENOUS AS NEEDED
Status: DISCONTINUED | OUTPATIENT
Start: 2021-01-01 | End: 2021-01-01

## 2021-01-01 RX ORDER — ASPIRIN 81 MG/1
81 TABLET ORAL DAILY
Start: 2021-01-01 | End: 2021-01-01

## 2021-01-01 RX ORDER — SODIUM CHLORIDE 9 MG/ML
INJECTION, SOLUTION INTRAVENOUS CONTINUOUS PRN
Status: DISCONTINUED | OUTPATIENT
Start: 2021-01-01 | End: 2021-01-01

## 2021-01-01 RX ORDER — DEXTROSE AND SODIUM CHLORIDE 5; .9 G/100ML; G/100ML
125 INJECTION, SOLUTION INTRAVENOUS CONTINUOUS
Status: DISCONTINUED | OUTPATIENT
Start: 2021-01-01 | End: 2021-01-01

## 2021-01-01 RX ORDER — SODIUM CHLORIDE, SODIUM LACTATE, POTASSIUM CHLORIDE, CALCIUM CHLORIDE 600; 310; 30; 20 MG/100ML; MG/100ML; MG/100ML; MG/100ML
100 INJECTION, SOLUTION INTRAVENOUS CONTINUOUS
Status: DISCONTINUED | OUTPATIENT
Start: 2021-01-01 | End: 2021-01-01 | Stop reason: HOSPADM

## 2021-01-01 RX ORDER — BISACODYL 10 MG
10 SUPPOSITORY, RECTAL RECTAL DAILY PRN
Status: DISCONTINUED | OUTPATIENT
Start: 2021-01-01 | End: 2021-01-01 | Stop reason: HOSPADM

## 2021-01-01 RX ORDER — ACETAMINOPHEN 325 MG/1
650 TABLET ORAL EVERY 4 HOURS PRN
Refills: 0 | COMMUNITY
Start: 2021-01-01

## 2021-01-01 RX ORDER — HYDROMORPHONE HCL/PF 1 MG/ML
0.5 SYRINGE (ML) INJECTION
Status: CANCELLED | OUTPATIENT
Start: 2021-01-01

## 2021-01-01 RX ORDER — ROCURONIUM BROMIDE 10 MG/ML
INJECTION, SOLUTION INTRAVENOUS AS NEEDED
Status: DISCONTINUED | OUTPATIENT
Start: 2021-01-01 | End: 2021-01-01

## 2021-01-01 RX ORDER — GLYCOPYRROLATE 0.2 MG/ML
INJECTION INTRAMUSCULAR; INTRAVENOUS AS NEEDED
Status: DISCONTINUED | OUTPATIENT
Start: 2021-01-01 | End: 2021-01-01

## 2021-01-01 RX ORDER — SODIUM CHLORIDE, SODIUM GLUCONATE, SODIUM ACETATE, POTASSIUM CHLORIDE, MAGNESIUM CHLORIDE, SODIUM PHOSPHATE, DIBASIC, AND POTASSIUM PHOSPHATE .53; .5; .37; .037; .03; .012; .00082 G/100ML; G/100ML; G/100ML; G/100ML; G/100ML; G/100ML; G/100ML
50 INJECTION, SOLUTION INTRAVENOUS CONTINUOUS
Status: DISPENSED | OUTPATIENT
Start: 2021-01-01 | End: 2021-01-01

## 2021-01-01 RX ORDER — SODIUM CHLORIDE 9 MG/ML
75 INJECTION, SOLUTION INTRAVENOUS CONTINUOUS
Status: DISPENSED | OUTPATIENT
Start: 2021-01-01 | End: 2021-01-01

## 2021-01-01 RX ORDER — LORAZEPAM 2 MG/ML
0.5 INJECTION INTRAMUSCULAR EVERY 4 HOURS PRN
Status: DISCONTINUED | OUTPATIENT
Start: 2021-01-01 | End: 2021-01-01 | Stop reason: HOSPADM

## 2021-01-01 RX ORDER — FENTANYL CITRATE 50 UG/ML
25 INJECTION, SOLUTION INTRAMUSCULAR; INTRAVENOUS EVERY 2 HOUR PRN
Status: DISCONTINUED | OUTPATIENT
Start: 2021-01-01 | End: 2021-01-01

## 2021-01-01 RX ORDER — NITROGLYCERIN 20 MG/100ML
INJECTION INTRAVENOUS CODE/TRAUMA/SEDATION MEDICATION
Status: COMPLETED | OUTPATIENT
Start: 2021-01-01 | End: 2021-01-01

## 2021-01-01 RX ADMIN — SODIUM CHLORIDE, SODIUM GLUCONATE, SODIUM ACETATE, POTASSIUM CHLORIDE, MAGNESIUM CHLORIDE, SODIUM PHOSPHATE, DIBASIC, AND POTASSIUM PHOSPHATE 150 ML/HR: .53; .5; .37; .037; .03; .012; .00082 INJECTION, SOLUTION INTRAVENOUS at 12:43

## 2021-01-01 RX ADMIN — ATORVASTATIN CALCIUM 10 MG: 10 TABLET, FILM COATED ORAL at 09:39

## 2021-01-01 RX ADMIN — PIPERACILLIN AND TAZOBACTAM 2.25 G: 2; .25 INJECTION, POWDER, LYOPHILIZED, FOR SOLUTION INTRAVENOUS at 20:39

## 2021-01-01 RX ADMIN — METOPROLOL TARTRATE 25 MG: 25 TABLET, FILM COATED ORAL at 21:01

## 2021-01-01 RX ADMIN — PIPERACILLIN AND TAZOBACTAM 2.25 G: 2; .25 INJECTION, POWDER, LYOPHILIZED, FOR SOLUTION INTRAVENOUS at 17:11

## 2021-01-01 RX ADMIN — INSULIN LISPRO 1 UNITS: 100 INJECTION, SOLUTION INTRAVENOUS; SUBCUTANEOUS at 18:34

## 2021-01-01 RX ADMIN — INSULIN LISPRO 1 UNITS: 100 INJECTION, SOLUTION INTRAVENOUS; SUBCUTANEOUS at 00:20

## 2021-01-01 RX ADMIN — ATORVASTATIN CALCIUM 10 MG: 10 TABLET, FILM COATED ORAL at 08:43

## 2021-01-01 RX ADMIN — SENNOSIDES AND DOCUSATE SODIUM 1 TABLET: 8.6; 5 TABLET ORAL at 17:11

## 2021-01-01 RX ADMIN — INSULIN LISPRO 1 UNITS: 100 INJECTION, SOLUTION INTRAVENOUS; SUBCUTANEOUS at 21:30

## 2021-01-01 RX ADMIN — ATORVASTATIN CALCIUM 10 MG: 10 TABLET, FILM COATED ORAL at 08:39

## 2021-01-01 RX ADMIN — INSULIN LISPRO 1 UNITS: 100 INJECTION, SOLUTION INTRAVENOUS; SUBCUTANEOUS at 17:47

## 2021-01-01 RX ADMIN — MELATONIN 6 MG: at 21:49

## 2021-01-01 RX ADMIN — SODIUM CHLORIDE 150 ML/HR: 234 INJECTION, SOLUTION INTRAVENOUS at 17:21

## 2021-01-01 RX ADMIN — MUPIROCIN 1 APPLICATION: 20 OINTMENT TOPICAL at 08:43

## 2021-01-01 RX ADMIN — FENTANYL CITRATE 25 MCG: 0.05 INJECTION, SOLUTION INTRAMUSCULAR; INTRAVENOUS at 19:10

## 2021-01-01 RX ADMIN — IOHEXOL 90 ML: 350 INJECTION, SOLUTION INTRAVENOUS at 08:20

## 2021-01-01 RX ADMIN — GENTAMICIN SULFATE 80 MG: 40 INJECTION, SOLUTION INTRAMUSCULAR; INTRAVENOUS at 14:07

## 2021-01-01 RX ADMIN — ACETAMINOPHEN 650 MG: 650 SUPPOSITORY RECTAL at 23:00

## 2021-01-01 RX ADMIN — CEFAZOLIN SODIUM 2000 MG: 2 SOLUTION INTRAVENOUS at 21:29

## 2021-01-01 RX ADMIN — METOPROLOL TARTRATE 25 MG: 25 TABLET ORAL at 12:38

## 2021-01-01 RX ADMIN — PIPERACILLIN AND TAZOBACTAM 2.25 G: 2; .25 INJECTION, POWDER, LYOPHILIZED, FOR SOLUTION INTRAVENOUS at 05:34

## 2021-01-01 RX ADMIN — METOPROLOL TARTRATE 25 MG: 25 TABLET, FILM COATED ORAL at 08:39

## 2021-01-01 RX ADMIN — DOCUSATE SODIUM 100 MG: 100 CAPSULE ORAL at 10:07

## 2021-01-01 RX ADMIN — FENTANYL CITRATE 25 MCG: 0.05 INJECTION, SOLUTION INTRAMUSCULAR; INTRAVENOUS at 00:28

## 2021-01-01 RX ADMIN — DEXTROSE AND SODIUM CHLORIDE 125 ML/HR: 5; .9 INJECTION, SOLUTION INTRAVENOUS at 08:37

## 2021-01-01 RX ADMIN — MIDAZOLAM 1 MG: 1 INJECTION INTRAMUSCULAR; INTRAVENOUS at 09:08

## 2021-01-01 RX ADMIN — IOHEXOL 100 ML: 350 INJECTION, SOLUTION INTRAVENOUS at 18:16

## 2021-01-01 RX ADMIN — HYDROMORPHONE HYDROCHLORIDE 0.2 MG: 0.2 INJECTION, SOLUTION INTRAMUSCULAR; INTRAVENOUS; SUBCUTANEOUS at 05:34

## 2021-01-01 RX ADMIN — Medication 25 MCG/HR: at 03:44

## 2021-01-01 RX ADMIN — INSULIN LISPRO 2 UNITS: 100 INJECTION, SOLUTION INTRAVENOUS; SUBCUTANEOUS at 11:52

## 2021-01-01 RX ADMIN — FENTANYL CITRATE 25 MCG: 50 INJECTION, SOLUTION INTRAMUSCULAR; INTRAVENOUS at 09:44

## 2021-01-01 RX ADMIN — ACETAMINOPHEN 650 MG: 650 SUPPOSITORY RECTAL at 06:42

## 2021-01-01 RX ADMIN — HEPARIN SODIUM 5000 UNITS: 5000 INJECTION, SOLUTION INTRAVENOUS; SUBCUTANEOUS at 14:24

## 2021-01-01 RX ADMIN — CHLORHEXIDINE GLUCONATE 15 ML: 1.2 SOLUTION ORAL at 08:43

## 2021-01-01 RX ADMIN — CALCIUM GLUCONATE 2 G: 20 INJECTION, SOLUTION INTRAVENOUS at 21:54

## 2021-01-01 RX ADMIN — FENTANYL CITRATE 25 MCG: 0.05 INJECTION, SOLUTION INTRAMUSCULAR; INTRAVENOUS at 17:03

## 2021-01-01 RX ADMIN — SODIUM CHLORIDE, SODIUM LACTATE, POTASSIUM CHLORIDE, AND CALCIUM CHLORIDE: .6; .31; .03; .02 INJECTION, SOLUTION INTRAVENOUS at 08:50

## 2021-01-01 RX ADMIN — METOPROLOL TARTRATE 25 MG: 25 TABLET, FILM COATED ORAL at 08:43

## 2021-01-01 RX ADMIN — PIPERACILLIN AND TAZOBACTAM 2.25 G: 2; .25 INJECTION, POWDER, LYOPHILIZED, FOR SOLUTION INTRAVENOUS at 04:04

## 2021-01-01 RX ADMIN — CHLORHEXIDINE GLUCONATE 15 ML: 1.2 SOLUTION ORAL at 09:39

## 2021-01-01 RX ADMIN — Medication 200 MCG: at 07:57

## 2021-01-01 RX ADMIN — DEXTROSE 125 ML/HR: 5 SOLUTION INTRAVENOUS at 09:13

## 2021-01-01 RX ADMIN — POLYETHYLENE GLYCOL 3350 17 G: 17 POWDER, FOR SOLUTION ORAL at 09:39

## 2021-01-01 RX ADMIN — ASPIRIN 324 MG: 81 TABLET, CHEWABLE ORAL at 07:14

## 2021-01-01 RX ADMIN — VANCOMYCIN HYDROCHLORIDE 750 MG: 750 INJECTION, SOLUTION INTRAVENOUS at 11:24

## 2021-01-01 RX ADMIN — IOHEXOL 65 ML: 350 INJECTION, SOLUTION INTRAVENOUS at 10:29

## 2021-01-01 RX ADMIN — FENTANYL CITRATE 25 MCG: 0.05 INJECTION, SOLUTION INTRAMUSCULAR; INTRAVENOUS at 04:49

## 2021-01-01 RX ADMIN — POLYETHYLENE GLYCOL 3350 17 G: 17 POWDER, FOR SOLUTION ORAL at 08:43

## 2021-01-01 RX ADMIN — INSULIN LISPRO 1 UNITS: 100 INJECTION, SOLUTION INTRAVENOUS; SUBCUTANEOUS at 12:00

## 2021-01-01 RX ADMIN — PIPERACILLIN AND TAZOBACTAM 2.25 G: 2; .25 INJECTION, POWDER, LYOPHILIZED, FOR SOLUTION INTRAVENOUS at 15:48

## 2021-01-01 RX ADMIN — DEXTROSE AND SODIUM CHLORIDE 125 ML/HR: 5; .9 INJECTION, SOLUTION INTRAVENOUS at 14:40

## 2021-01-01 RX ADMIN — FENTANYL CITRATE 25 MCG: 0.05 INJECTION, SOLUTION INTRAMUSCULAR; INTRAVENOUS at 03:11

## 2021-01-01 RX ADMIN — PROPOFOL 50 MCG/KG/MIN: 10 INJECTION, EMULSION INTRAVENOUS at 13:05

## 2021-01-01 RX ADMIN — MUPIROCIN 1 APPLICATION: 20 OINTMENT TOPICAL at 14:40

## 2021-01-01 RX ADMIN — PROPOFOL 30 MG: 10 INJECTION, EMULSION INTRAVENOUS at 10:18

## 2021-01-01 RX ADMIN — CHLORHEXIDINE GLUCONATE 15 ML: 1.2 SOLUTION ORAL at 21:29

## 2021-01-01 RX ADMIN — LIDOCAINE HYDROCHLORIDE 8 ML: 10 INJECTION, SOLUTION EPIDURAL; INFILTRATION; INTRACAUDAL; PERINEURAL at 09:11

## 2021-01-01 RX ADMIN — CEFAZOLIN 2000 MG: 1 INJECTION, POWDER, FOR SOLUTION INTRAMUSCULAR; INTRAVENOUS at 09:27

## 2021-01-01 RX ADMIN — INSULIN LISPRO 1 UNITS: 100 INJECTION, SOLUTION INTRAVENOUS; SUBCUTANEOUS at 11:29

## 2021-01-01 RX ADMIN — SODIUM CHLORIDE, SODIUM LACTATE, POTASSIUM CHLORIDE, AND CALCIUM CHLORIDE: .6; .31; .03; .02 INJECTION, SOLUTION INTRAVENOUS at 10:30

## 2021-01-01 RX ADMIN — HEPARIN SODIUM 5000 UNITS: 5000 INJECTION, SOLUTION INTRAVENOUS; SUBCUTANEOUS at 05:47

## 2021-01-01 RX ADMIN — BETAMETHASONE SODIUM PHOSPHATE AND BETAMETHASONE ACETATE 3 MG: 3; 3 INJECTION, SUSPENSION INTRA-ARTICULAR; INTRALESIONAL; INTRAMUSCULAR; SOFT TISSUE at 14:11

## 2021-01-01 RX ADMIN — CHLORHEXIDINE GLUCONATE 15 ML: 1.2 SOLUTION ORAL at 08:03

## 2021-01-01 RX ADMIN — CHLORHEXIDINE GLUCONATE 15 ML: 1.2 SOLUTION ORAL at 10:05

## 2021-01-01 RX ADMIN — GLYCOPYRROLATE 0.2 MG: 0.2 INJECTION, SOLUTION INTRAMUSCULAR; INTRAVENOUS at 10:29

## 2021-01-01 RX ADMIN — PIPERACILLIN AND TAZOBACTAM 2.25 G: 2; .25 INJECTION, POWDER, LYOPHILIZED, FOR SOLUTION INTRAVENOUS at 21:45

## 2021-01-01 RX ADMIN — MUPIROCIN 1 APPLICATION: 20 OINTMENT TOPICAL at 21:30

## 2021-01-01 RX ADMIN — FENTANYL CITRATE 25 MCG: 0.05 INJECTION, SOLUTION INTRAMUSCULAR; INTRAVENOUS at 01:47

## 2021-01-01 RX ADMIN — DEXTROSE AND SODIUM CHLORIDE 125 ML/HR: 5; .9 INJECTION, SOLUTION INTRAVENOUS at 02:34

## 2021-01-01 RX ADMIN — CHLORHEXIDINE GLUCONATE 15 ML: 1.2 SOLUTION ORAL at 18:27

## 2021-01-01 RX ADMIN — ROCURONIUM BROMIDE 50 MG: 50 INJECTION, SOLUTION INTRAVENOUS at 09:12

## 2021-01-01 RX ADMIN — CEFAZOLIN SODIUM 2000 MG: 2 SOLUTION INTRAVENOUS at 04:46

## 2021-01-01 RX ADMIN — SODIUM BICARBONATE 150 ML/HR: 84 INJECTION, SOLUTION INTRAVENOUS at 00:54

## 2021-01-01 RX ADMIN — CEFTRIAXONE 1000 MG: 1 INJECTION, SOLUTION INTRAVENOUS at 14:23

## 2021-01-01 RX ADMIN — POTASSIUM CHLORIDE 40 MEQ: 29.8 INJECTION, SOLUTION INTRAVENOUS at 02:08

## 2021-01-01 RX ADMIN — INSULIN LISPRO 1 UNITS: 100 INJECTION, SOLUTION INTRAVENOUS; SUBCUTANEOUS at 17:08

## 2021-01-01 RX ADMIN — LIDOCAINE HYDROCHLORIDE 0.5 ML: 10 INJECTION, SOLUTION INFILTRATION; PERINEURAL at 14:11

## 2021-01-01 RX ADMIN — VANCOMYCIN HYDROCHLORIDE 1250 MG: 5 INJECTION, POWDER, LYOPHILIZED, FOR SOLUTION INTRAVENOUS at 08:47

## 2021-01-01 RX ADMIN — INSULIN LISPRO 1 UNITS: 100 INJECTION, SOLUTION INTRAVENOUS; SUBCUTANEOUS at 23:27

## 2021-01-01 RX ADMIN — INSULIN LISPRO 1 UNITS: 100 INJECTION, SOLUTION INTRAVENOUS; SUBCUTANEOUS at 06:01

## 2021-01-01 RX ADMIN — PANTOPRAZOLE SODIUM 40 MG: 40 TABLET, DELAYED RELEASE ORAL at 05:40

## 2021-01-01 RX ADMIN — ONDANSETRON 4 MG: 2 INJECTION INTRAMUSCULAR; INTRAVENOUS at 10:15

## 2021-01-01 RX ADMIN — POLYETHYLENE GLYCOL 3350 17 G: 17 POWDER, FOR SOLUTION ORAL at 08:39

## 2021-01-01 RX ADMIN — SODIUM CHLORIDE, PRESERVATIVE FREE 0.04 MG: 5 INJECTION INTRAVENOUS at 22:41

## 2021-01-01 RX ADMIN — SENNOSIDES AND DOCUSATE SODIUM 1 TABLET: 8.6; 5 TABLET ORAL at 08:46

## 2021-01-01 RX ADMIN — PANTOPRAZOLE SODIUM 40 MG: 40 INJECTION, POWDER, FOR SOLUTION INTRAVENOUS at 21:54

## 2021-01-01 RX ADMIN — ATORVASTATIN CALCIUM 10 MG: 10 TABLET, FILM COATED ORAL at 10:06

## 2021-01-01 RX ADMIN — SODIUM CHLORIDE 75 ML/HR: 0.9 INJECTION, SOLUTION INTRAVENOUS at 09:09

## 2021-01-01 RX ADMIN — SODIUM CHLORIDE 1000 ML: 0.9 INJECTION, SOLUTION INTRAVENOUS at 18:42

## 2021-01-01 RX ADMIN — LIDOCAINE HYDROCHLORIDE 1 ML: 10 INJECTION, SOLUTION EPIDURAL; INFILTRATION; INTRACAUDAL; PERINEURAL at 07:55

## 2021-01-01 RX ADMIN — FENTANYL CITRATE 25 MCG: 0.05 INJECTION, SOLUTION INTRAMUSCULAR; INTRAVENOUS at 12:06

## 2021-01-01 RX ADMIN — VANCOMYCIN HYDROCHLORIDE 1000 MG: 1 INJECTION, SOLUTION INTRAVENOUS at 12:41

## 2021-01-01 RX ADMIN — ACETAMINOPHEN 650 MG: 650 SUPPOSITORY RECTAL at 00:30

## 2021-01-01 RX ADMIN — PANTOPRAZOLE SODIUM 40 MG: 40 TABLET, DELAYED RELEASE ORAL at 05:16

## 2021-01-01 RX ADMIN — MIDAZOLAM 2 MG: 1 INJECTION INTRAMUSCULAR; INTRAVENOUS at 07:52

## 2021-01-01 RX ADMIN — PROTAMINE SULFATE 50 MG: 10 INJECTION, SOLUTION INTRAVENOUS at 10:16

## 2021-01-01 RX ADMIN — MUPIROCIN 1 APPLICATION: 20 OINTMENT TOPICAL at 21:49

## 2021-01-01 RX ADMIN — INSULIN LISPRO 1 UNITS: 100 INJECTION, SOLUTION INTRAVENOUS; SUBCUTANEOUS at 12:06

## 2021-01-01 RX ADMIN — INSULIN LISPRO 1 UNITS: 100 INJECTION, SOLUTION INTRAVENOUS; SUBCUTANEOUS at 05:38

## 2021-01-01 RX ADMIN — SENNOSIDES AND DOCUSATE SODIUM 1 TABLET: 8.6; 5 TABLET ORAL at 12:06

## 2021-01-01 RX ADMIN — IOHEXOL 10 ML: 350 INJECTION, SOLUTION INTRAVENOUS at 13:29

## 2021-01-01 RX ADMIN — PROPOFOL 20 MG: 10 INJECTION, EMULSION INTRAVENOUS at 09:51

## 2021-01-01 RX ADMIN — FENTANYL CITRATE 25 MCG: 50 INJECTION, SOLUTION INTRAMUSCULAR; INTRAVENOUS at 06:29

## 2021-01-01 RX ADMIN — MIRTAZAPINE 7.5 MG: 15 TABLET, FILM COATED ORAL at 21:44

## 2021-01-01 RX ADMIN — MUPIROCIN 1 APPLICATION: 20 OINTMENT TOPICAL at 09:39

## 2021-01-01 RX ADMIN — DOCUSATE SODIUM 100 MG: 100 CAPSULE ORAL at 09:39

## 2021-01-01 RX ADMIN — HEPARIN SODIUM 5000 UNITS: 5000 INJECTION, SOLUTION INTRAVENOUS; SUBCUTANEOUS at 05:35

## 2021-01-01 RX ADMIN — FENTANYL CITRATE 25 MCG: 0.05 INJECTION, SOLUTION INTRAMUSCULAR; INTRAVENOUS at 06:07

## 2021-01-01 RX ADMIN — CHLORHEXIDINE GLUCONATE 15 ML: 1.2 SOLUTION ORAL at 17:11

## 2021-01-01 RX ADMIN — HYDROMORPHONE HYDROCHLORIDE 0.5 MG: 1 INJECTION, SOLUTION INTRAMUSCULAR; INTRAVENOUS; SUBCUTANEOUS at 15:36

## 2021-01-01 RX ADMIN — NOREPINEPHRINE BITARTRATE 2 MCG/KG/MIN: 1 INJECTION, SOLUTION, CONCENTRATE INTRAVENOUS at 13:21

## 2021-01-01 RX ADMIN — MIDAZOLAM 1 MG: 1 INJECTION INTRAMUSCULAR; INTRAVENOUS at 09:09

## 2021-01-01 RX ADMIN — PROPOFOL 50 MG: 10 INJECTION, EMULSION INTRAVENOUS at 09:58

## 2021-01-01 RX ADMIN — METOPROLOL TARTRATE 25 MG: 25 TABLET, FILM COATED ORAL at 21:49

## 2021-01-01 RX ADMIN — HYDROMORPHONE HYDROCHLORIDE 0.5 MG: 1 INJECTION, SOLUTION INTRAMUSCULAR; INTRAVENOUS; SUBCUTANEOUS at 07:19

## 2021-01-01 RX ADMIN — INSULIN LISPRO 1 UNITS: 100 INJECTION, SOLUTION INTRAVENOUS; SUBCUTANEOUS at 12:11

## 2021-01-01 RX ADMIN — SODIUM CHLORIDE 1000 ML: 0.9 INJECTION, SOLUTION INTRAVENOUS at 21:28

## 2021-01-01 RX ADMIN — MELATONIN 6 MG: at 21:01

## 2021-01-01 RX ADMIN — HEPARIN SODIUM 5000 UNITS: 5000 INJECTION, SOLUTION INTRAVENOUS; SUBCUTANEOUS at 21:44

## 2021-01-01 RX ADMIN — ACETAMINOPHEN 650 MG: 650 SUPPOSITORY RECTAL at 15:10

## 2021-01-01 RX ADMIN — HEPARIN SODIUM 5000 UNITS: 5000 INJECTION, SOLUTION INTRAVENOUS; SUBCUTANEOUS at 14:27

## 2021-01-01 RX ADMIN — SODIUM CHLORIDE 232.8 ML: 0.9 INJECTION, SOLUTION INTRAVENOUS at 07:15

## 2021-01-01 RX ADMIN — FENTANYL CITRATE 50 MCG: 50 INJECTION INTRAMUSCULAR; INTRAVENOUS at 09:11

## 2021-01-01 RX ADMIN — ACETAMINOPHEN 650 MG: 650 SUPPOSITORY RECTAL at 18:49

## 2021-01-01 RX ADMIN — EPHEDRINE SULFATE 10 MG: 50 INJECTION, SOLUTION INTRAVENOUS at 13:20

## 2021-01-01 RX ADMIN — SODIUM CHLORIDE, SODIUM GLUCONATE, SODIUM ACETATE, POTASSIUM CHLORIDE, MAGNESIUM CHLORIDE, SODIUM PHOSPHATE, DIBASIC, AND POTASSIUM PHOSPHATE 500 ML: .53; .5; .37; .037; .03; .012; .00082 INJECTION, SOLUTION INTRAVENOUS at 20:14

## 2021-01-01 RX ADMIN — FENTANYL CITRATE 50 MCG: 50 INJECTION INTRAMUSCULAR; INTRAVENOUS at 13:01

## 2021-01-01 RX ADMIN — HEPARIN SODIUM 5000 UNITS: 5000 INJECTION, SOLUTION INTRAVENOUS; SUBCUTANEOUS at 00:00

## 2021-01-01 RX ADMIN — PANTOPRAZOLE SODIUM 40 MG: 40 TABLET, DELAYED RELEASE ORAL at 06:17

## 2021-01-01 RX ADMIN — PANTOPRAZOLE SODIUM 40 MG: 40 TABLET, DELAYED RELEASE ORAL at 05:07

## 2021-01-01 RX ADMIN — ATORVASTATIN CALCIUM 10 MG: 10 TABLET, FILM COATED ORAL at 14:39

## 2021-01-01 RX ADMIN — PIPERACILLIN AND TAZOBACTAM 2.25 G: 2; .25 INJECTION, POWDER, LYOPHILIZED, FOR SOLUTION INTRAVENOUS at 15:05

## 2021-01-01 RX ADMIN — VERAPAMIL HYDROCHLORIDE 2.5 MG: 2.5 INJECTION, SOLUTION INTRAVENOUS at 07:57

## 2021-01-01 RX ADMIN — CHLORHEXIDINE GLUCONATE 15 ML: 1.2 SOLUTION ORAL at 08:46

## 2021-01-01 RX ADMIN — DOCUSATE SODIUM 100 MG: 100 CAPSULE ORAL at 18:27

## 2021-01-01 RX ADMIN — PIPERACILLIN AND TAZOBACTAM 2.25 G: 2; .25 INJECTION, POWDER, LYOPHILIZED, FOR SOLUTION INTRAVENOUS at 03:22

## 2021-01-01 RX ADMIN — SODIUM CHLORIDE 1000 ML: 0.9 INJECTION, SOLUTION INTRAVENOUS at 19:07

## 2021-01-01 RX ADMIN — SODIUM CHLORIDE, SODIUM GLUCONATE, SODIUM ACETATE, POTASSIUM CHLORIDE, MAGNESIUM CHLORIDE, SODIUM PHOSPHATE, DIBASIC, AND POTASSIUM PHOSPHATE 50 ML/HR: .53; .5; .37; .037; .03; .012; .00082 INJECTION, SOLUTION INTRAVENOUS at 10:50

## 2021-01-01 RX ADMIN — PANTOPRAZOLE SODIUM 40 MG: 40 INJECTION, POWDER, FOR SOLUTION INTRAVENOUS at 09:23

## 2021-01-01 RX ADMIN — FENTANYL CITRATE 25 MCG: 0.05 INJECTION, SOLUTION INTRAMUSCULAR; INTRAVENOUS at 21:09

## 2021-01-01 RX ADMIN — HYDROMORPHONE HYDROCHLORIDE 0.2 MG: 0.2 INJECTION, SOLUTION INTRAMUSCULAR; INTRAVENOUS; SUBCUTANEOUS at 22:09

## 2021-01-01 RX ADMIN — FENTANYL CITRATE 25 MCG: 50 INJECTION, SOLUTION INTRAMUSCULAR; INTRAVENOUS at 10:26

## 2021-01-01 RX ADMIN — DEXTROSE AND SODIUM CHLORIDE 125 ML/HR: 5; .9 INJECTION, SOLUTION INTRAVENOUS at 17:13

## 2021-01-01 RX ADMIN — SENNOSIDES AND DOCUSATE SODIUM 1 TABLET: 8.6; 5 TABLET ORAL at 08:43

## 2021-01-01 RX ADMIN — FENTANYL CITRATE 50 MCG: 50 INJECTION INTRAMUSCULAR; INTRAVENOUS at 07:51

## 2021-01-01 RX ADMIN — MUPIROCIN 1 APPLICATION: 20 OINTMENT TOPICAL at 21:01

## 2021-01-01 RX ADMIN — SODIUM CHLORIDE, SODIUM GLUCONATE, SODIUM ACETATE, POTASSIUM CHLORIDE, MAGNESIUM CHLORIDE, SODIUM PHOSPHATE, DIBASIC, AND POTASSIUM PHOSPHATE 150 ML/HR: .53; .5; .37; .037; .03; .012; .00082 INJECTION, SOLUTION INTRAVENOUS at 23:59

## 2021-01-01 RX ADMIN — CEFAZOLIN SODIUM 2000 MG: 2 SOLUTION INTRAVENOUS at 09:35

## 2021-01-01 RX ADMIN — INSULIN LISPRO 1 UNITS: 100 INJECTION, SOLUTION INTRAVENOUS; SUBCUTANEOUS at 06:17

## 2021-01-01 RX ADMIN — IODIXANOL 120 ML: 320 INJECTION, SOLUTION INTRAVASCULAR at 10:27

## 2021-01-01 RX ADMIN — INSULIN LISPRO 1 UNITS: 100 INJECTION, SOLUTION INTRAVENOUS; SUBCUTANEOUS at 05:59

## 2021-01-01 RX ADMIN — PROPOFOL 10 MG: 10 INJECTION, EMULSION INTRAVENOUS at 09:56

## 2021-01-01 RX ADMIN — INSULIN LISPRO 1 UNITS: 100 INJECTION, SOLUTION INTRAVENOUS; SUBCUTANEOUS at 17:09

## 2021-01-01 RX ADMIN — PIPERACILLIN AND TAZOBACTAM 2.25 G: 2; .25 INJECTION, POWDER, LYOPHILIZED, FOR SOLUTION INTRAVENOUS at 22:54

## 2021-01-01 RX ADMIN — PIPERACILLIN AND TAZOBACTAM 2.25 G: 2; .25 INJECTION, POWDER, LYOPHILIZED, FOR SOLUTION INTRAVENOUS at 09:29

## 2021-01-01 RX ADMIN — MELATONIN 6 MG: at 21:29

## 2021-01-01 RX ADMIN — SODIUM CHLORIDE, PRESERVATIVE FREE 0.4 MG: 5 INJECTION INTRAVENOUS at 22:18

## 2021-01-01 RX ADMIN — SODIUM CHLORIDE: 9 INJECTION, SOLUTION INTRAVENOUS at 12:52

## 2021-01-01 RX ADMIN — LIDOCAINE HYDROCHLORIDE 30 ML: 10 INJECTION, SOLUTION EPIDURAL; INFILTRATION; INTRACAUDAL; PERINEURAL at 13:28

## 2021-01-01 RX ADMIN — HEPARIN SODIUM 5000 UNITS: 5000 INJECTION, SOLUTION INTRAVENOUS; SUBCUTANEOUS at 05:56

## 2021-01-01 RX ADMIN — POLYETHYLENE GLYCOL 3350 17 G: 17 POWDER, FOR SOLUTION ORAL at 10:07

## 2021-01-01 RX ADMIN — PIPERACILLIN AND TAZOBACTAM 2.25 G: 2; .25 INJECTION, POWDER, LYOPHILIZED, FOR SOLUTION INTRAVENOUS at 09:28

## 2021-01-01 RX ADMIN — FENTANYL CITRATE 25 MCG: 50 INJECTION, SOLUTION INTRAMUSCULAR; INTRAVENOUS at 04:04

## 2021-01-01 RX ADMIN — FENTANYL CITRATE 50 MCG: 50 INJECTION INTRAMUSCULAR; INTRAVENOUS at 09:08

## 2021-01-01 RX ADMIN — NEOSTIGMINE METHYLSULFATE 2 MG: 1 INJECTION INTRAVENOUS at 10:29

## 2021-01-01 RX ADMIN — ONDANSETRON 4 MG: 2 INJECTION INTRAMUSCULAR; INTRAVENOUS at 07:10

## 2021-01-01 RX ADMIN — MUPIROCIN 1 APPLICATION: 20 OINTMENT TOPICAL at 08:39

## 2021-01-01 RX ADMIN — SODIUM CHLORIDE, SODIUM LACTATE, POTASSIUM CHLORIDE, AND CALCIUM CHLORIDE: .6; .31; .03; .02 INJECTION, SOLUTION INTRAVENOUS at 09:08

## 2021-01-01 RX ADMIN — FENTANYL CITRATE 25 MCG: 50 INJECTION, SOLUTION INTRAMUSCULAR; INTRAVENOUS at 02:12

## 2021-01-01 RX ADMIN — SODIUM CHLORIDE 5 MG/HR: 0.9 INJECTION, SOLUTION INTRAVENOUS at 11:10

## 2021-01-01 RX ADMIN — ACETAMINOPHEN 650 MG: 650 SUPPOSITORY RECTAL at 15:03

## 2021-01-01 RX ADMIN — HEPARIN SODIUM 10000 UNITS: 1000 INJECTION INTRAVENOUS; SUBCUTANEOUS at 10:04

## 2021-01-01 RX ADMIN — PROPOFOL 120 MG: 10 INJECTION, EMULSION INTRAVENOUS at 09:11

## 2021-01-01 RX ADMIN — INSULIN LISPRO 1 UNITS: 100 INJECTION, SOLUTION INTRAVENOUS; SUBCUTANEOUS at 21:34

## 2021-01-01 RX ADMIN — SODIUM CHLORIDE 1000 ML: 0.9 INJECTION, SOLUTION INTRAVENOUS at 17:13

## 2021-01-01 RX ADMIN — SODIUM CHLORIDE 150 ML/HR: 234 INJECTION, SOLUTION INTRAVENOUS at 09:28

## 2021-01-01 RX ADMIN — ALBUMIN (HUMAN) 12.5 G: 12.5 INJECTION, SOLUTION INTRAVENOUS at 15:31

## 2021-01-01 RX ADMIN — DEXTROSE 125 ML/HR: 5 SOLUTION INTRAVENOUS at 00:36

## 2021-01-01 RX ADMIN — LIDOCAINE HYDROCHLORIDE 0.2 ML: 10 INJECTION, SOLUTION EPIDURAL; INFILTRATION; INTRACAUDAL; PERINEURAL at 09:47

## 2021-01-01 RX ADMIN — HEPARIN SODIUM 5000 UNITS: 5000 INJECTION, SOLUTION INTRAVENOUS; SUBCUTANEOUS at 21:46

## 2021-01-01 RX ADMIN — PANTOPRAZOLE SODIUM 40 MG: 40 TABLET, DELAYED RELEASE ORAL at 14:40

## 2021-01-01 RX ADMIN — MUPIROCIN 1 APPLICATION: 20 OINTMENT TOPICAL at 21:29

## 2021-01-01 RX ADMIN — MELATONIN 6 MG: at 21:31

## 2021-01-01 RX ADMIN — MUPIROCIN 1 APPLICATION: 20 OINTMENT TOPICAL at 08:03

## 2021-01-01 RX ADMIN — MUPIROCIN 1 APPLICATION: 20 OINTMENT TOPICAL at 10:06

## 2021-02-02 NOTE — PROGRESS NOTES
Pt had an episode of painless hematuria today for the first time without abdominal pain, nausea, fever, dysuria or incomplete bladder emptying  I asked pt to call the office at 9 am tomorrow to get an appointment for evaluation or go to the ER if he develops fever, nausea, abdominal pain

## 2021-02-02 NOTE — TELEPHONE ENCOUNTER
[phone]  PT: Enid KATZ 37  Reason: Patient is experiencing bleeding from penis and is requesting a call back

## 2021-02-03 NOTE — PROGRESS NOTES
Assessment/Plan:    No problem-specific Assessment & Plan notes found for this encounter  Diagnoses and all orders for this visit:    Gross hematuria  -     POCT urine dip auto non-scope  -     UA w Reflex to Microscopic w Reflex to Culture  -     Cytology, urine; Future  -     US kidney and bladder; Future  -     Ambulatory referral to Urology; Future    Thrombocytopenia (HCC)    Other orders  -     Apoaequorin (PREVAGEN PO); Take by mouth 1 daily          Patient had painless gross hematuria  I doubt urinary tract infection, nephrolithiasis  Will check urinalysis, urine cytology I will start an ultrasound of the kidney and bladder besides Urology referral   I do not think patient's thrombocytopenia is severe enough to cause gross hematuria  I asked patient to stop taking aspirin for now    Subjective:      Patient ID: Sol Moreno is a 81 y o  male  Patient has a long history of urinary urgency  He denies history of urinary retention or incomplete bladder emptying  He was shoveling snow yesterday and he felt the urge to go to urinate all of a sudden  He made to the bathroom but to his surprise he was urinating and large amount of blood without blood clots  He had 2 more episodes after 1st 1 of gross hematuria but the amount was gradually decreasing to the point that he no longer sees blood in his urine  He denies any fevers, nausea, vomiting, abdominal pain, flank pain  Today he came to the office for evaluation  He has had no hematuria today and denies any signs of infection  The following portions of the patient's history were reviewed and updated as appropriate: current medications, past family history, past medical history, past social history, past surgical history and problem list     Review of Systems   Constitutional: Negative for chills and fever  Respiratory: Negative for shortness of breath  Cardiovascular: Negative for chest pain     Gastrointestinal: Negative for abdominal pain, blood in stool, nausea and vomiting  Genitourinary: Positive for hematuria and urgency (Chronic)  Negative for difficulty urinating, discharge, flank pain, penile pain and testicular pain  Musculoskeletal: Positive for back pain ( patient has chronic back pain without change)  Skin: Negative for rash  Objective:    /60   Pulse 83   Temp 98 4 °F (36 9 °C) (Tympanic)   Resp 18   Ht 5' 6" (1 676 m)   Wt 76 2 kg (168 lb)   SpO2 97%   BMI 27 12 kg/m²      Physical Exam  HENT:      Head: Normocephalic  Eyes:      Conjunctiva/sclera: Conjunctivae normal    Cardiovascular:      Rate and Rhythm: Normal rate and regular rhythm  Heart sounds: Murmur ( systolic murmur was heard) present  Pulmonary:      Effort: No respiratory distress  Breath sounds: No wheezing or rales  Abdominal:      General: Bowel sounds are normal  There is no distension  Palpations: Abdomen is soft  There is no mass  Tenderness: There is no abdominal tenderness  There is no right CVA tenderness or left CVA tenderness  Skin:     General: Skin is warm and dry  Comments: Patient no pedal edema   Neurological:      Mental Status: He is alert and oriented to person, place, and time        Gait: Gait abnormal              Shilpa Santana MD

## 2021-02-03 NOTE — ASSESSMENT & PLAN NOTE
Patient has chronic thrombocytopenia ever since 2016    His platelet counts were at baseline in November last year:  90 K

## 2021-02-04 NOTE — TELEPHONE ENCOUNTER
Patient called because PCP referred patient to our office  Patient advised that his hematuria has now cleared up  He advised that he had a urine test and if that came back with blood in his urine he will make an appointment  If it does not show blood he will hold off on the appointment until he would need us and will call for an appointment  Please advise      Complaint/diagnosis: Gross hematuria    Insurance:Medicare    Previous Urologist:no    Outside testing/where:no    Records requested/where:no    Preferred Location:[place]

## 2021-03-15 NOTE — PROGRESS NOTES
Assessment/Plan:       Diagnoses and all orders for this visit:    Controlled type 2 diabetes mellitus without complication, without long-term current use of insulin ([facility])  -     Basic metabolic panel; Future  -     Hemoglobin A1C; Future  -     Microalbumin / creatinine urine ratio    Esophageal stricture    Pure hypercholesterolemia    Moderate aortic stenosis  -     Echo complete with contrast if indicated; Future          All of the above diagnoses have been assessed  Additional COMMENTS/PLAN: doing well    Will see back in 4 months  Blood work be done prior to that  Echo be done as well to assess the aortic valve disease  Subjective:      Patient ID: Mary Coyne is a 81 y o  male  HPI     Hematuria-this was not in urine-this was external      DM- this is good control  Hyperlipidemia- The patient is compliant with diet and taking meds  The patient understands the efficacy of the treatment in vascular prevention  BPH-has stopped the flomax  Does leak urine during day  Does have urgency  The following portions of the patient's history were revised and updated as appropriate: Problem list, allergies, med list, FH, SH, Past medical and surgical histories  Current Outpatient Medications   Medication Sig Dispense Refill    aspirin (ECOTRIN LOW STRENGTH) 81 mg EC tablet Take 1 tablet (81 mg total) by mouth daily      atorvastatin (LIPITOR) 10 mg tablet TAKE 1 TABLET BY MOUTH EVERY DAY 90 tablet 3    glimepiride (AMARYL) 2 mg tablet TAKE 1 TABLET BY MOUTH DAILY WITH BREAKFAST (Patient taking differently: 1/2 tab daily) 90 tablet 3    TRUE METRIX BLOOD GLUCOSE TEST test strip Testing 1 time daily DX:E11 9 100 each 5    Apoaequorin (PREVAGEN PO) Take by mouth 1 daily       No current facility-administered medications for this visit  Review of Systems   All other systems reviewed and are negative          Objective:    /68   Pulse 74   Ht 5' 6" (1 676 m)   Wt 76 7 kg (169 lb)   SpO2 99%   BMI 27 28 kg/m²     BP Readings from Last 3 Encounters:   03/15/21 118/68   02/03/21 110/60   12/07/20 118/70                  Wt Readings from Last 3 Encounters:   03/15/21 76 7 kg (169 lb)   02/03/21 76 2 kg (168 lb)   12/07/20 79 6 kg (175 lb 6 4 oz)         Physical Exam  Vitals signs reviewed  Constitutional:       Appearance: He is well-developed  HENT:      Head: Normocephalic and atraumatic  Neck:      Thyroid: No thyromegaly  Vascular: No JVD  Cardiovascular:      Rate and Rhythm: Normal rate and regular rhythm  Pulses:           Dorsalis pedis pulses are 1+ on the right side and 1+ on the left side  Posterior tibial pulses are 1+ on the right side and 1+ on the left side  Heart sounds: Murmur present  Pulmonary:      Effort: Pulmonary effort is normal       Breath sounds: Normal breath sounds  Abdominal:      General: Bowel sounds are normal       Palpations: Abdomen is soft  Tenderness: There is no abdominal tenderness  Musculoskeletal: Normal range of motion  Feet:      Right foot:      Skin integrity: No ulcer, skin breakdown, erythema, warmth, callus or dry skin  Left foot:      Skin integrity: No ulcer, skin breakdown, erythema, warmth, callus or dry skin  Patient's shoes and socks removed  Right Foot/Ankle   Right Foot Inspection  Skin Exam: skin normal and skin intact no dry skin, no warmth, no callus, no erythema, no maceration, no abnormal color, no pre-ulcer, no ulcer and no callus                          Toe Exam: ROM and strength within normal limits  Sensory       Monofilament testing: intact  Vascular    The right DP pulse is 1+  The right PT pulse is 1+       Left Foot/Ankle  Left Foot Inspection  Skin Exam: skin normal and skin intactno dry skin, no warmth, no erythema, no maceration, normal color, no pre-ulcer, no ulcer and no callus                         Toe Exam: ROM and strength within normal limits Sensory       Monofilament: intact  Vascular    The left DP pulse is 1+  The left PT pulse is 1+  Assign Risk Category:  ; ;        Risk: 0      Lab on 03/12/2021   Component Date Value Ref Range Status    Hemoglobin A1C 03/12/2021 5 9* Normal 3 8-5 6%; PreDiabetic 5 7-6 4%; Diabetic >=6 5%; Glycemic control for adults with diabetes <7 0% % Final    EAG 03/12/2021 123  mg/dl Final     BMI Counseling: Body mass index is 27 28 kg/m²  The BMI is above normal  Nutrition recommendations include reducing portion sizes  Alisha Dowd MD    Some or all of this note was generated with a voice recognition dictation system and therefore my contain grammatical or spelling errors

## 2021-03-15 NOTE — PROGRESS NOTES
ASSESSMENT/PLAN:    Assessment:   Trigger Finger  left  index finger -resolved   Thumb CMC Arthritis  bilateral     Plan:    patient will proceed with bilateral thumb CMC joint steroid injections today  He has no formal restrictions  Follow Up:  3  month(s)    To Do Next Visit:       General Discussions:     CMC Arthritis: The anatomy and physiology of carpometacarpal joint arthritis was discussed with the patient today in the office  Deterioration of the articular cartilage eventually leads to hypermobility at the thumb [facility] joint, resulting in joint subluxation, osteophyte formation, cystic changes within the trapezium and base of the first metacarpal, as well as subchondral sclerosis  Eventually, pain, limited mobility, and compensatory hyperextension at the metacarpophalangeal joint may develop  While normal activity and usage of the thumb joint may provide a painful experience to the patient, this typically does not result in damage to the thumb or hand  Treatment options include resting thumb spica splints to decreased joint edema, pain, and inflammation  Therapy exercises to strengthen the thenar musculature may relieve pain, but do not alter the overall continued development of osteoarthritis  Oral medications, topical medications, corticosteroid injections may decrease pain and increase overall function  Eventually, approximately 5% of patients may require surgical intervention  Operative Discussions:       _____________________________________________________  CHIEF COMPLAINT:  Chief Complaint   Patient presents with    Left Index Finger - Follow-up    Left Thumb - Follow-up    Right Thumb - Follow-up         SUBJECTIVE:  Vida De Leon is a 81 y o  male who presents for follow up regarding Thumb CMC Arthritis  Bilateral  And left long finger trigger finger  Since last visit, Vida De Leon has tried  Steroid injections to his left thumb CMC and left index trigger finger   Patient states that his trigger resolved however his thumbs are still painful  Today there is Pain  Moderate  Intermittant  Sharp to the bilateral thumb  Radiation: Yes to the  thumb  Associated symptoms: No Complaints    PAST MEDICAL HISTORY:  Past Medical History:   Diagnosis Date    Aortic stenosis     Bacterial infection due to Helicobacter pylori     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     Colon polyp     Depression (emotion) 11/16/2018    GERD (gastroesophageal reflux disease)     Hyperlipidemia     Hypertension     Post herpetic neuralgia     Shingles        PAST SURGICAL HISTORY:  Past Surgical History:   Procedure Laterality Date    ANAL SPHINCTEROTOMY      BASAL CELL CARCINOMA EXCISION      CHOLECYSTECTOMY      OPEN REDUCTION SHOULDER DISLOCATION      ROTATOR CUFF REPAIR         FAMILY HISTORY:  Family History   Problem Relation Age of Onset    Depression Mother     Hyperlipidemia Mother     Substance Abuse Neg Hx     Colon cancer Neg Hx     Colon polyps Neg Hx        SOCIAL HISTORY:  Social History     Tobacco Use    Smoking status: Former Smoker    Smokeless tobacco: Former User     Quit date: 1/1/1976   Substance Use Topics    Alcohol use: No    Drug use: No       MEDICATIONS:    Current Outpatient Medications:     Apoaequorin (PREVAGEN PO), Take by mouth 1 daily, Disp: , Rfl:     aspirin (ECOTRIN LOW STRENGTH) 81 mg EC tablet, Take 1 tablet (81 mg total) by mouth daily, Disp:  , Rfl:     atorvastatin (LIPITOR) 10 mg tablet, TAKE 1 TABLET BY MOUTH EVERY DAY, Disp: 90 tablet, Rfl: 3    glimepiride (AMARYL) 2 mg tablet, TAKE 1 TABLET BY MOUTH DAILY WITH BREAKFAST (Patient taking differently: 1/2 tab daily), Disp: 90 tablet, Rfl: 3    TRUE METRIX BLOOD GLUCOSE TEST test strip, Testing 1 time daily DX:E11 9, Disp: 100 each, Rfl: 5    ALLERGIES:  Allergies   Allergen Reactions    Metformin Diarrhea       REVIEW OF SYSTEMS:  Pertinent items are noted in HPI      LABS:  HgA1c:   Lab Results Component Value Date    HGBA1C 5 9 (H) 03/12/2021     BMP:   Lab Results   Component Value Date    GLUCOSE 150 (H) 04/07/2014    CALCIUM 9 2 11/04/2020     04/07/2014    K 4 1 11/04/2020    CO2 30 11/04/2020     (H) 11/04/2020    BUN 15 11/04/2020    CREATININE 1 22 11/04/2020           _____________________________________________________  PHYSICAL EXAMINATION:  Vital signs: /62   Wt 76 7 kg (169 lb)   BMI 27 28 kg/m²   General: well developed and well nourished, alert, oriented times 3 and appears comfortable  Psychiatric: Normal  HEENT: Trachea Midline, No torticollis  Cardiovascular: No discernable arrhythmia  Pulmonary: No wheezing or stridor  Skin: No Erythema  Neurovascular: Sensation Intact to the Median, Ulnar, Radial Nerve, Motor Intact to the Median, Ulnar, Radial Nerve and Pulses Intact    MUSCULOSKELETAL EXAMINATION:  LEFT SIDE:  CMC: No Instability, Positive grind, Positive tendnerness CMC and Positive Shoulder Sign, no hyperextension at MP jt  RIGHT SIDE:  CMC: No Instability, Positive grind, Positive tendnerness CMC and Positive Shoulder Sign, no hyperextension at MP jt    _____________________________________________________  STUDIES REVIEWED:  No Studies to review      PROCEDURES PERFORMED:  Small joint arthrocentesis: bilateral thumb CMC  Universal Protocol:  Consent given by: patient  Site marked: the operative site was marked  Supporting Documentation  Indications: pain   Procedure Details  Location: thumb - bilateral thumb CMC    Medications (Right): 0 5 mL lidocaine 1 %; 3 mg betamethasone acetate-betamethasone sodium phosphate 6 (3-3) mg/mLMedications (Left): 0 5 mL lidocaine 1 %; 3 mg betamethasone acetate-betamethasone sodium phosphate 6 (3-3) mg/mL   Patient tolerance: patient tolerated the procedure well with no immediate complications  Dressing:  Sterile dressing applied            Scribe Attestation    I,:  Gianfranco Manriquez am acting as a scribe while in the presence of the attending physician :       I,:  Keren Mac MD personally performed the services described in this documentation    as scribed in my presence :

## 2021-04-21 NOTE — PROGRESS NOTES
Cardiology Consultation     Claudia Feliz  [id number]  1935  [address] [facility] [place]  [address]  [facility] [address]  [phone]  1  Pure hypercholesterolemia  POCT ECG    Cardiac catheterization    Ambulatory referral to Cardiovascular Surgery   2  Nonrheumatic aortic valve stenosis  POCT ECG    Cardiac catheterization    Ambulatory referral to Cardiovascular Surgery   3  Severe aortic stenosis  Ambulatory referral to Cardiology    POCT ECG    Cardiac catheterization    Ambulatory referral to Cardiovascular Surgery   4  Abnormal EKG  POCT ECG    Cardiac catheterization    Ambulatory referral to Cardiovascular Surgery   5  LBBB (left bundle branch block)  POCT ECG    Cardiac catheterization    Ambulatory referral to Cardiovascular Surgery   6  SOB (shortness of breath)  Cardiac catheterization    Ambulatory referral to Cardiovascular Surgery   7  Near syncope  Cardiac catheterization    Ambulatory referral to Cardiovascular Surgery     Patient Active Problem List   Diagnosis    Benign prostatic hyperplasia without lower urinary tract symptoms    Bilateral carotid artery stenosis    Pure hypercholesterolemia    Severe aortic stenosis    Controlled type 2 diabetes mellitus without complication, without long-term current use of insulin (HCC)    Trigger ring finger of right hand    Pharyngoesophageal dysphagia    Arthropathy of right knee    Pulmonary fibrosis (HCC)    Minor depression    Thrombocytopenia (HCC)    Esophageal stricture    Neurologic gait dysfunction    Benign prostatic hyperplasia with nocturia       HPI   Patient is here for a cardiac evaluation  He has a history of aortic valve stenosis , hyperlipidemia and diabetes mellitus  Echocardiogram done April 2021 demonstrated preserved LV systolic function with mild LVH    There was mild mitral and tricuspid regurgitation with mild dilatation of the ascending aorta  Aortic valve stenosis with a mean gradient of 26 mmHg and a peak velocity across the valve of 3 4 m/sec was noted  Echocardiogram done 2019 demonstrated a peak velocity across the valve of 2 75 m/sec with a mean gradient of 19 4 mmHg  Pharmacologic nuclear stress test done May 2019 demonstrated no evidence of ischemia  Lipid profile done 2020 demonstrated total cholesterol of 147 with an HDL of 62 and a calculated LDL of 64  EKG today demonstrates sinus bradycardia with intermittent left bundle branch block conduction  When there is a skinny QRS the patient has precordial T-wave changes  EKG done 2020 demonstrated sinus rhythm with a left bundle branch block  Patient is a  whose wife  in   He has been quite depressed since that happened  He has a son in [place] and a daughter in [place]   He has been having symptoms of near-syncope  He has dyspnea with exertion  He tries to walk and is not able to do this because of shortness of breath  He had two episodes of lightheadedness recently  He clearly has symptomatic disease and possibly coronary disease as well  He concurs that he needs workup in reference to this  He has a very remote smoker  PMH-  Past Medical History:   Diagnosis Date    Aortic stenosis     Bacterial infection due to Helicobacter pylori     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     Colon polyp     Depression (emotion) 2018    GERD (gastroesophageal reflux disease)     Hyperlipidemia     Hypertension     Post herpetic neuralgia     Shingles         SOCIAL HISTORY-  Social History     Socioeconomic History    Marital status:       Spouse name: Not on file    Number of children: Not on file    Years of education: Not on file    Highest education level: Not on file   Occupational History    Occupation: retired   Social Needs    Financial resource strain: Not on [place] insecurity     Worry: Not on file     Inability: Not on file    Transportation needs     Medical: No     Non-medical: No   Tobacco Use    Smoking status: Former Smoker    Smokeless tobacco: Former User     Quit date: 1/1/1976   Substance and Sexual Activity    Alcohol use: No    Drug use: No    Sexual activity: Not Currently   Lifestyle    Physical activity     Days per week: Not on file     Minutes per session: Not on file    Stress: Not on file   Relationships    Social connections     Talks on phone: Not on file     Gets together: Not on file     Attends Yarsanism service: Not on file     Active member of club or organization: Not on file     Attends meetings of clubs or organizations: Not on file     Relationship status: Not on file    Intimate partner violence     Fear of current or ex partner: Not on file     Emotionally abused: Not on file     Physically abused: Not on file     Forced sexual activity: Not on file   Other Topics Concern    Not on file   Social History Narrative    Not on file        FAMILY HISTORY-  Family History   Problem Relation Age of Onset    Depression Mother     Hyperlipidemia Mother     Substance Abuse Neg Hx     Colon cancer Neg Hx     Colon polyps Neg Hx        SURGICAL HISTORY-  Past Surgical History:   Procedure Laterality Date    ANAL SPHINCTEROTOMY      BASAL CELL CARCINOMA EXCISION      CHOLECYSTECTOMY      OPEN REDUCTION SHOULDER DISLOCATION      ROTATOR CUFF REPAIR           Current Outpatient Medications:     aspirin (ECOTRIN LOW STRENGTH) 81 mg EC tablet, Take 1 tablet (81 mg total) by mouth daily, Disp:  , Rfl:     atorvastatin (LIPITOR) 10 mg tablet, TAKE 1 TABLET BY MOUTH EVERY DAY, Disp: 90 tablet, Rfl: 3    glimepiride (AMARYL) 2 mg tablet, TAKE 1 TABLET BY MOUTH DAILY WITH BREAKFAST (Patient taking differently: 1/2 tab daily), Disp: 90 tablet, Rfl: 3    True Metrix Blood Glucose Test test strip, Testing 1 time daily DX:E11 9, Disp: 100 each, Rfl: 5  Allergies   Allergen Reactions    Metformin Diarrhea     Vitals:    04/30/21 0831   BP: 100/58   BP Location: Left arm   Patient Position: Sitting   Cuff Size: Standard   Pulse: (!) 54   Weight: 77 6 kg (171 lb)   Height: 5' 6" (1 676 m)         Review of Systems:  Review of Systems   All other systems reviewed and are negative  Physical Exam:  Physical Exam  Vitals signs reviewed  Constitutional:       Appearance: He is well-developed  HENT:      Head: Normocephalic and atraumatic  Eyes:      Conjunctiva/sclera: Conjunctivae normal       Pupils: Pupils are equal, round, and reactive to light  Neck:      Musculoskeletal: Normal range of motion and neck supple  Cardiovascular:      Rate and Rhythm: Normal rate  Heart sounds: Murmur present  Comments: S1 with reduced S2 and mid to late peaking systolic murmur heard at the right upper sternal border  Pulmonary:      Effort: Pulmonary effort is normal       Breath sounds: Normal breath sounds  Skin:     General: Skin is warm and dry  Neurological:      Mental Status: He is alert and oriented to person, place, and time  Discussion/Summary:  Patient has symptomatic aortic valve stenosis  He also has risk factors for coronary artery disease  Will proceed with cardiac catheterization and then consultation with cardiac surgery  Patient is hopeful that he will be able to have a TAVR as he would be reluctant to have a sternotomy  Will continue his current medical regimen  I have asked him to call me if he has a problem in the interim  I will see him in follow-up

## 2021-04-21 NOTE — H&P (VIEW-ONLY)
Cardiology Consultation     Dariel Hamlin  [id number]  1935  [address] [facility] [place]  [address]  [facility] [address]  [phone]  1  Pure hypercholesterolemia  POCT ECG    Cardiac catheterization    Ambulatory referral to Cardiovascular Surgery   2  Nonrheumatic aortic valve stenosis  POCT ECG    Cardiac catheterization    Ambulatory referral to Cardiovascular Surgery   3  Severe aortic stenosis  Ambulatory referral to Cardiology    POCT ECG    Cardiac catheterization    Ambulatory referral to Cardiovascular Surgery   4  Abnormal EKG  POCT ECG    Cardiac catheterization    Ambulatory referral to Cardiovascular Surgery   5  LBBB (left bundle branch block)  POCT ECG    Cardiac catheterization    Ambulatory referral to Cardiovascular Surgery   6  SOB (shortness of breath)  Cardiac catheterization    Ambulatory referral to Cardiovascular Surgery   7  Near syncope  Cardiac catheterization    Ambulatory referral to Cardiovascular Surgery     Patient Active Problem List   Diagnosis    Benign prostatic hyperplasia without lower urinary tract symptoms    Bilateral carotid artery stenosis    Pure hypercholesterolemia    Severe aortic stenosis    Controlled type 2 diabetes mellitus without complication, without long-term current use of insulin (HCC)    Trigger ring finger of right hand    Pharyngoesophageal dysphagia    Arthropathy of right knee    Pulmonary fibrosis (HCC)    Minor depression    Thrombocytopenia (HCC)    Esophageal stricture    Neurologic gait dysfunction    Benign prostatic hyperplasia with nocturia       HPI   Patient is here for a cardiac evaluation  He has a history of aortic valve stenosis , hyperlipidemia and diabetes mellitus  Echocardiogram done April 2021 demonstrated preserved LV systolic function with mild LVH    There was mild mitral and tricuspid regurgitation with mild dilatation of the ascending aorta  Aortic valve stenosis with a mean gradient of 26 mmHg and a peak velocity across the valve of 3 4 m/sec was noted  Echocardiogram done 2019 demonstrated a peak velocity across the valve of 2 75 m/sec with a mean gradient of 19 4 mmHg  Pharmacologic nuclear stress test done May 2019 demonstrated no evidence of ischemia  Lipid profile done 2020 demonstrated total cholesterol of 147 with an HDL of 62 and a calculated LDL of 64  EKG today demonstrates sinus bradycardia with intermittent left bundle branch block conduction  When there is a skinny QRS the patient has precordial T-wave changes  EKG done 2020 demonstrated sinus rhythm with a left bundle branch block  Patient is a  whose wife  in   He has been quite depressed since that happened  He has a son in [place] and a daughter in [place]   He has been having symptoms of near-syncope  He has dyspnea with exertion  He tries to walk and is not able to do this because of shortness of breath  He had two episodes of lightheadedness recently  He clearly has symptomatic disease and possibly coronary disease as well  He concurs that he needs workup in reference to this  He has a very remote smoker  PMH-  Past Medical History:   Diagnosis Date    Aortic stenosis     Bacterial infection due to Helicobacter pylori     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     Colon polyp     Depression (emotion) 2018    GERD (gastroesophageal reflux disease)     Hyperlipidemia     Hypertension     Post herpetic neuralgia     Shingles         SOCIAL HISTORY-  Social History     Socioeconomic History    Marital status:       Spouse name: Not on file    Number of children: Not on file    Years of education: Not on file    Highest education level: Not on file   Occupational History    Occupation: retired   Social Needs    Financial resource strain: Not on [place] insecurity     Worry: Not on file     Inability: Not on file    Transportation needs     Medical: No     Non-medical: No   Tobacco Use    Smoking status: Former Smoker    Smokeless tobacco: Former User     Quit date: 1/1/1976   Substance and Sexual Activity    Alcohol use: No    Drug use: No    Sexual activity: Not Currently   Lifestyle    Physical activity     Days per week: Not on file     Minutes per session: Not on file    Stress: Not on file   Relationships    Social connections     Talks on phone: Not on file     Gets together: Not on file     Attends Yazidism service: Not on file     Active member of club or organization: Not on file     Attends meetings of clubs or organizations: Not on file     Relationship status: Not on file    Intimate partner violence     Fear of current or ex partner: Not on file     Emotionally abused: Not on file     Physically abused: Not on file     Forced sexual activity: Not on file   Other Topics Concern    Not on file   Social History Narrative    Not on file        FAMILY HISTORY-  Family History   Problem Relation Age of Onset    Depression Mother     Hyperlipidemia Mother     Substance Abuse Neg Hx     Colon cancer Neg Hx     Colon polyps Neg Hx        SURGICAL HISTORY-  Past Surgical History:   Procedure Laterality Date    ANAL SPHINCTEROTOMY      BASAL CELL CARCINOMA EXCISION      CHOLECYSTECTOMY      OPEN REDUCTION SHOULDER DISLOCATION      ROTATOR CUFF REPAIR           Current Outpatient Medications:     aspirin (ECOTRIN LOW STRENGTH) 81 mg EC tablet, Take 1 tablet (81 mg total) by mouth daily, Disp:  , Rfl:     atorvastatin (LIPITOR) 10 mg tablet, TAKE 1 TABLET BY MOUTH EVERY DAY, Disp: 90 tablet, Rfl: 3    glimepiride (AMARYL) 2 mg tablet, TAKE 1 TABLET BY MOUTH DAILY WITH BREAKFAST (Patient taking differently: 1/2 tab daily), Disp: 90 tablet, Rfl: 3    True Metrix Blood Glucose Test test strip, Testing 1 time daily DX:E11 9, Disp: 100 each, Rfl: 5  Allergies   Allergen Reactions    Metformin Diarrhea     Vitals:    04/30/21 0831   BP: 100/58   BP Location: Left arm   Patient Position: Sitting   Cuff Size: Standard   Pulse: (!) 54   Weight: 77 6 kg (171 lb)   Height: 5' 6" (1 676 m)         Review of Systems:  Review of Systems   All other systems reviewed and are negative  Physical Exam:  Physical Exam  Vitals signs reviewed  Constitutional:       Appearance: He is well-developed  HENT:      Head: Normocephalic and atraumatic  Eyes:      Conjunctiva/sclera: Conjunctivae normal       Pupils: Pupils are equal, round, and reactive to light  Neck:      Musculoskeletal: Normal range of motion and neck supple  Cardiovascular:      Rate and Rhythm: Normal rate  Heart sounds: Murmur present  Comments: S1 with reduced S2 and mid to late peaking systolic murmur heard at the right upper sternal border  Pulmonary:      Effort: Pulmonary effort is normal       Breath sounds: Normal breath sounds  Skin:     General: Skin is warm and dry  Neurological:      Mental Status: He is alert and oriented to person, place, and time  Discussion/Summary:  Patient has symptomatic aortic valve stenosis  He also has risk factors for coronary artery disease  Will proceed with cardiac catheterization and then consultation with cardiac surgery  Patient is hopeful that he will be able to have a TAVR as he would be reluctant to have a sternotomy  Will continue his current medical regimen  I have asked him to call me if he has a problem in the interim  I will see him in follow-up

## 2021-04-30 NOTE — TELEPHONE ENCOUNTER
Pt son called requesting information about pt's appt with Dr Malcolm  Unfortunately due to HIPAA and son's information not being listed as a person to release medical information to, son was informed we would not be able to give him any details of today's appt and advised that his father fill out a medical communication consent form  Pt's son stated he understood and would accompany his father to his next appt so that form could be filled out

## 2021-04-30 NOTE — PATIENT INSTRUCTIONS
Will arrange cardiac testing  Will arrange consultation with the cardiac surgeon  Please call if there is a problem in the interim

## 2021-05-03 NOTE — TELEPHONE ENCOUNTER
----- Message from Sahara Maldonado MD sent at 4/30/2021  9:25 AM EDT -----    Saw patient today at [facility] who will require cardiac catheterization as he has aortic valve stenosis  Please schedule in the near future as his aortic valve stenosis is symptomatic  Thank you

## 2021-05-03 NOTE — TELEPHONE ENCOUNTER
Patient stopped in today and added his son Iris Canseco as his primary emergency contact & he updated his Medical Communication Consent  He said he did tell Dr Alina Ramesh at the appt that he did not want him to call his son  He has since changed his mind and would like Dr  To call his son at the [phone] to explain what is going on and update him on the last appt  Please contact son at your earliest convenience

## 2021-05-04 NOTE — TELEPHONE ENCOUNTER
Patient's son, Martha Elise, would like a call back at [phone] to review his father's plan of treatment including 5/17 procedure scheduled

## 2021-05-17 NOTE — DISCHARGE INSTRUCTIONS
1  Please see the post cardiac catheterization dishcarge instructions  No heavy lifting, greater than 10 lbs  or strenuous  activity for 48 hrs  2 Remove band aid tomorrow  Shower and wash area- wrist gently with soap and water- beginning tomorrow  Rinse and pat dry  Apply new water seal band aid  Repeat this process for 5 days  No powders, creams lotions or antibiotic ointments  for 5 days  No tub baths, hot tubs or swimming for 5 days  3  Please call our office ([phone]) if you have any fever, redness, swelling, discharge from your wrist access site  4 No driving for 1 day    ================================================================================================================================================================    Left Heart Catheterization   WHAT YOU NEED TO KNOW:   A left heart catheterization is a procedure to look at your heart and its arteries  You may need this procedure if you have chest pain, heart disease, or your heart is not working as it should  DISCHARGE INSTRUCTIONS:   Call 911 for any of the following:   · You have any of the following signs of a heart attack:      ? Squeezing, pressure, or pain in your chest     ? and  any of the following:     ? Discomfort or pain in your back, neck, jaw, stomach, or arm     ? Shortness of breath    ? Nausea or vomiting    ? Lightheadedness or a sudden cold sweat    · You have any of the following signs of a stroke:      ? Numbness or drooping on one side of your face     ? Weakness in an arm or leg    ? Confusion or difficulty speaking    ? Dizziness, a severe headache, or vision loss    Seek care immediately if:   · Your arm or leg feels warm, tender, and painful  It may look swollen and red  · The leg or arm used for your angiogram is numb, painful, or changes color  · The bruise at your catheter site gets bigger or becomes swollen       · Your wound does not stop bleeding even after you apply firm pressure for 15 minutes  · You have weakness in an arm or leg  · You become confused or have difficulty speaking  · You have dizziness, a severe headache, or vision loss  Contact your healthcare provider if:   · You have a fever  · Your catheter site is red, leaks pus, or smells bad  · You have increasing pain at your catheter site  · You have questions or concerns about your condition or care  Limit activity as directed:   · Avoid unnecessary stair climbing for 48 hours, if a catheter was put in your groin  · Do not place pressure on your arm, hand, or wrist, if the catheter was placed in your wrist  Avoid pushing, pulling, or heavy lifting with that arm  · If you need to cough, support the area where the catheter was inserted with your hand  · Ask your healthcare provider how long you need to limit movement and avoid certain activities  · You may feel like resting more after your procedure  Slowly start to do more each day  Rest when you feel it is needed  Keep your bandage clean and dry:  Ask your healthcare provider when you can bathe and shower  Do not take baths or go in pools or hot tubs  Check your site every day for signs of infection such as swelling, redness, or pus  Drink liquids as directed:  Liquids help flush the dye used for your procedure out of your body  Ask your healthcare provider how much liquid to drink each day, and which liquids to drink  Some foods, such as soup and fruit, also provide liquid  Limit alcohol:  Do not drink alcohol for 24 hours after your procedure  Then limit alcohol  Women should limit alcohol to 1 drink a day  Men should limit alcohol to 2 drinks a day  A drink of alcohol is 12 ounces of beer, 5 ounces of wine, or 1½ ounces of liquor  Do not smoke:  Nicotine and other chemicals in cigarettes and cigars can damage your blood vessels  Ask your healthcare provider for information if you currently smoke and need help to quit   E-cigarettes or smokeless tobacco still contain nicotine  Talk to your healthcare provider before you use these products  Follow up with your healthcare provider as directed:  Write down your questions so you remember to ask them during your visits  © Copyright [employer] 2018 Information is for End User's use only and may not be sold, redistributed or otherwise used for commercial purposes  All illustrations and images included in CareNotes® are the copyrighted property of A D A M , Inc  or [employer]enn[employer]  The above information is an  only  It is not intended as medical advice for individual conditions or treatments  Talk to your doctor, nurse or pharmacist before following any medical regimen to see if it is safe and effective for you

## 2021-05-17 NOTE — INTERVAL H&P NOTE
H&P reviewed  After examining the patient, I find no changed to the H&P since it had been written  /59 (BP Location: Right arm)   Pulse 64   Temp 98 5 °F (36 9 °C) (Oral)   Resp 18   Ht 5' 6" (1 676 m)   Wt 75 8 kg (167 lb)   SpO2 99%   BMI 26 95 kg/m²     Patient re-evaluated   Accept as history and physical     Kathrin Montero MD

## 2021-05-24 NOTE — PROGRESS NOTES
Consultation - Cardiothoracic Surgery   Emma Hdez 81 y o  male MRN: [id number]    Physician Requesting Consult: Dr Marguerite Graham    Reason for Consult / Principal Problem: Aortic stenosis, Non-Rheumatic    History of Present Illness: Emma Hdez is a 81y o  year old male who presents for initial outpatient surgical consultation for symptomatic severe aortic stenosis  Patient's PMHx is notable for AS, HTN, HLD, DM2, Dysphagia/Esophageal stricture s/p esophageal dilatation (5-6 yr ago), remote TIAs (> 30 yr ago), depression, GERD, skin cancer and mild pulmonary fibrosis on chest CT scan  Patient has had a heart murmur for a few years, followed with echo by his PCP  Most recent echo demonstrates progression of his AS to a severe range with MARIO ALBERTO 0 87 cm2 & MG 26 mm Hg  He also has undergone cardiac catheterization recently that demonstrates normal coronaries  Upon interview today patient reports symptoms or worsening exertional dyspnea, fatigue and decrease in activity tolerance  He reports a few episode of lightheadedness but denies syncope, palpitations, chest pian, weight gain or edema  He reports an alteration in his balance and a few falls as a result  He uses a cane to ambulate  Patient is   He is a  of [employer]  He lives alone in a trailer and is independent with his ADL's  His son Walter Bob is present today for this office visit and is available for assistance  Patient is a former smoker  He has a full upper denture and a few lower teeth with crowns  He has not seen a dentist in a number of years       Past Medical History:  Past Medical History:   Diagnosis Date    Aortic stenosis     Bacterial infection due to Helicobacter pylori     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     Colon polyp     Depression (emotion) 11/16/2018    GERD (gastroesophageal reflux disease)     Hyperlipidemia     Hypertension     Post herpetic neuralgia     Shingles          Past Surgical History:   Past Surgical History:   Procedure Laterality Date    ANAL SPHINCTEROTOMY      BASAL CELL CARCINOMA EXCISION      CHOLECYSTECTOMY      OPEN REDUCTION SHOULDER DISLOCATION      ROTATOR CUFF REPAIR           Family History:  Family History   Problem Relation Age of Onset    Depression Mother     Hyperlipidemia Mother     Substance Abuse Neg Hx     Colon cancer Neg Hx     Colon polyps Neg Hx          Social History:    Social History     Substance and Sexual Activity   Alcohol Use No     Social History     Substance and Sexual Activity   Drug Use No     Social History     Tobacco Use   Smoking Status Former Smoker   Smokeless Tobacco Former User    Quit date: 1/1/1976         Home Medications:   Prior to Admission medications    Medication Sig Start Date End Date Taking? Authorizing Provider   aspirin (ECOTRIN LOW STRENGTH) 81 mg EC tablet Take 1 tablet (81 mg total) by mouth daily 11/9/20  Yes Hannah Pittman MD   atorvastatin (LIPITOR) 10 mg tablet TAKE 1 TABLET BY MOUTH EVERY DAY 3/31/20  Yes Hannah Pittman MD   glimepiride (AMARYL) 2 mg tablet TAKE 1 TABLET BY MOUTH DAILY WITH BREAKFAST  Patient taking differently: 1/2 tab daily 9/3/20  Yes David Doshi PA-C   True Metrix Blood Glucose Test test strip Testing 1 time daily DX:E11 9 4/21/21  Yes Hannah Pittman MD       Allergies:   Allergies   Allergen Reactions    Metformin Diarrhea       Review of Systems:  Review of Systems - History obtained from chart review and the patient  General ROS: positive for  - fatigue and decrease in activity tolerance   negative for - chills, fever, sleep disturbance or weight gain  Psychological ROS: negative  Ophthalmic ROS: positive for - uses glasses and decreased vision left eye  ENT ROS: negative  Allergy and Immunology ROS: negative  Hematological and Lymphatic ROS: negative for - bleeding problems, blood clots, bruising or jaundice  Endocrine ROS: negative  Respiratory ROS: no cough, shortness of breath, or wheezing  Cardiovascular ROS: positive for - dyspnea on exertion and murmur  negative for - chest pain, edema, irregular heartbeat, loss of consciousness, palpitations, paroxysmal nocturnal dyspnea or rapid heart rate  Gastrointestinal ROS: positive for - constipation and swallowing difficulty/pain  negative for - abdominal pain, appetite loss, blood in stools, diarrhea, gas/bloating, hematemesis, melena or nausea/vomiting  Genito-Urinary ROS: no dysuria, trouble voiding, or hematuria  Musculoskeletal ROS: positive for - gait disturbance and ambulates with cane  Neurological ROS: positive for - gait disturbance and impaired coordination/balance  negative for - confusion, headaches, memory loss, numbness/tingling, seizures, speech problems or tremors  Dermatological ROS: negative    Vital Signs:     Vitals:    05/24/21 1257 05/24/21 1301   BP: 122/64 120/60   BP Location: Right arm Left arm   Patient Position: Sitting Sitting   Cuff Size: Standard    Pulse: 68    Resp: 18    Temp: (!) 97 2 °F (36 2 °C)    TempSrc: Tympanic    SpO2: 99%    Weight: 78 6 kg (173 lb 4 8 oz)    Height: 5' 6" (1 676 m)        Physical Exam:    General: Alert, oriented, well developed, no acute distress  HEENT/NECK:  PERRLA  No jugular venous distention  Cardiac:Regular rate and rhythm, III/VI harsh systolic murmur RUSB   Carotid arteries: 1+ pulses, delayed upstrokes, cardiac murmur radiates to neck vs bruit  Pulmonary:  Breath sounds clear bilaterally  Abdomen:  Non-tender, Non-distended  Positive bowel sounds  Upper extremities: 2+ radial pulses; brisk capillary refill; right hand dominant  Lower extremities: Extremities warm/dry  PT/DP pulses 2+ bilaterally  Trace edema B/L  Neuro: Alert and oriented X 3  Sensation is grossly intact  No focal deficits  Musculoskeletal: MAEE, stable gait with use of cane  Skin: Warm/Dry, without rashes or lesions        Lab Results:   Lab Results   Component Value Date     04/07/2014 SODIUM 142 2021    K 3 9 2021     (H) 2021    CO2 25 2021    ANIONGAP 8 2014    AGAP 6 2021    BUN 16 2021    CREATININE 1 03 2021    GLUC 137 2021    GLUF 137 (H) 2021    CALCIUM 8 9 2021    AST 34 05/10/2021    ALT 38 05/10/2021    ALKPHOS 66 05/10/2021    PROT 7 0 2014    TP 7 6 05/10/2021    BILITOT 0 4 2014    TBILI 1 14 (H) 05/10/2021    EGFR 66 2021     Lab Results   Component Value Date    WBC 5 10 05/10/2021    HGB 13 6 05/10/2021    HCT 40 4 05/10/2021    MCV 94 05/10/2021    PLT 73 (L) 05/10/2021     Lab Results   Component Value Date    HGBA1C 5 9 (H) 2021     Lab Results   Component Value Date    CKTOTAL 135 2014    TROPONINI 0 02 2020       Imaging Studies:     Echocardiogram: 21  LEFT VENTRICLE:  Systolic function was normal  Ejection fraction was estimated to be 55 %  There were no regional wall motion abnormalities  Wall thickness was mildly increased  Doppler parameters were consistent with abnormal left ventricular relaxation (grade 1 diastolic dysfunction)      MITRAL VALVE:  There was mild regurgitation      AORTIC VALVE:  The valve was trileaflet  Leaflets exhibited markedly increased thickness and marked calcification  Transaortic velocity was increased due to valvular stenosis  There was severe stenosis  There was mild regurgitation  Valve mean gradient was 26 mmHg  Estimated aortic valve area (by VTI) was 0 87 cmï¾²     TRICUSPID VALVE:  There was mild regurgitation  Pulmonary artery systolic pressure was within the normal range      AORTA:  There was mild dilatation of the ascending aorta      Cardiac cath: 21  CORONARY CIRCULATION:  Left main: Normal   LAD: Normal   Circumflex: Normal     EC21  Sinus rhythm with occasional Premature ventricular complexes  Left axis deviation  Left bundle branch block    I have personally reviewed pertinent films in PACS    TAVR evaluation Assessment:     Jose Antonio Laird: III    STS risk score (preliminary): 1 97%    Assessment:  Patient Active Problem List    Diagnosis Date Noted    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    Benign prostatic hyperplasia with nocturia 11/09/2020    Minor depression 07/07/2020    Thrombocytopenia ([facility] ) 07/07/2020    Esophageal stricture 07/07/2020    Pulmonary fibrosis ([facility] ) 06/12/2020    Arthropathy of right knee 03/05/2020    Pharyngoesophageal dysphagia 08/26/2019    Trigger ring finger of right hand 06/13/2019    Controlled type 2 diabetes mellitus without complication, without long-term current use of insulin ([facility] ) 05/10/2019    Aortic stenosis, severe 04/04/2019    Benign prostatic hyperplasia without lower urinary tract symptoms 12/10/2018    Bilateral carotid artery stenosis 12/10/2018    Pure hypercholesterolemia 12/10/2018     Severe aortic stenosis; Ongoing TAVR workup    Plan:    Dino Cameron has symptomatic severe aortic stenosis  They will undergo the following testing for transcatheter aortic valve replacement: Gated CTA of the chest/abdomen/pelvis,  dental clearance, GI consultation and carotid artery ultrasound  Once these studies have been completed, Dino Cameron will follow up in our office to review the results and to be evaluated to confirm the suitability of proceeding with transcatheter aortic valve replacment  Dino Cameron was comfortable with our recommendations, and their questions were answered to their satisfaction  Thank you for allowing us to participate in the care of this patient  The patient recently had a screening colonoscopy in 1/17/19  Therefore GI referral is not indicated at this time       ADALBERTO Burrell  DATE: May 24, 2021  TIME: 1:48 PM

## 2021-05-24 NOTE — LETTER
May 24, 2021     Britany Cartagena, [address] [address]    Patient: Nolvia Sims   YOB: 1935   Date of Visit: 5/24/2021       Dear Dr Meagan Martinez Recipients: Thank you for referring Shree Voss to me for evaluation  Below are my notes for this consultation  If you have questions, please do not hesitate to call me  I look forward to following your patient along with you  Sincerely,        Jose Rocah, DO        CC: MD Arian Leon CRNP  5/24/2021  2:04 PM  Attested  Consultation - Cardiothoracic Surgery   Nolvia Sims 81 y o  male MRN: [id number]    Physician Requesting Consult: Dr Toyin Ryan    Reason for Consult / Principal Problem: Aortic stenosis, Non-Rheumatic    History of Present Illness: Nolvia Sims is a 81y o  year old male who presents for initial outpatient surgical consultation for symptomatic severe aortic stenosis  Patient's PMHx is notable for AS, HTN, HLD, DM2, Dysphagia/Esophageal stricture s/p esophageal dilatation (5-6 yr ago), remote TIAs (> 30 yr ago), depression, GERD, skin cancer and mild pulmonary fibrosis on chest CT scan  Patient has had a heart murmur for a few years, followed with echo by his PCP  Most recent echo demonstrates progression of his AS to a severe range with MARIO ALBERTO 0 87 cm2 & MG 26 mm Hg  He also has undergone cardiac catheterization recently that demonstrates normal coronaries  Upon interview today patient reports symptoms or worsening exertional dyspnea, fatigue and decrease in activity tolerance  He reports a few episode of lightheadedness but denies syncope, palpitations, chest pian, weight gain or edema  He reports an alteration in his balance and a few falls as a result  He uses a cane to ambulate  Patient is   He is a [place] of [employer]  He lives alone in a trailer and is independent with his ADL's   His son Vu Shelton is present today for this office visit and is available for assistance  Patient is a former smoker  He has a full upper denture and a few lower teeth with crowns  He has not seen a dentist in a number of years  Past Medical History:  Past Medical History:   Diagnosis Date    Aortic stenosis     Bacterial infection due to Helicobacter pylori     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     Colon polyp     Depression (emotion) 11/16/2018    GERD (gastroesophageal reflux disease)     Hyperlipidemia     Hypertension     Post herpetic neuralgia     Shingles          Past Surgical History:   Past Surgical History:   Procedure Laterality Date    ANAL SPHINCTEROTOMY      BASAL CELL CARCINOMA EXCISION      CHOLECYSTECTOMY      OPEN REDUCTION SHOULDER DISLOCATION      ROTATOR CUFF REPAIR           Family History:  Family History   Problem Relation Age of Onset    Depression Mother     Hyperlipidemia Mother     Substance Abuse Neg Hx     Colon cancer Neg Hx     Colon polyps Neg Hx          Social History:    Social History     Substance and Sexual Activity   Alcohol Use No     Social History     Substance and Sexual Activity   Drug Use No     Social History     Tobacco Use   Smoking Status Former Smoker   Smokeless Tobacco Former User    Quit date: 1/1/1976         Home Medications:   Prior to Admission medications    Medication Sig Start Date End Date Taking? Authorizing Provider   aspirin (ECOTRIN LOW STRENGTH) 81 mg EC tablet Take 1 tablet (81 mg total) by mouth daily 11/9/20  Yes Andrew Das MD   atorvastatin (LIPITOR) 10 mg tablet TAKE 1 TABLET BY MOUTH EVERY DAY 3/31/20  Yes Andrew Das MD   glimepiride (AMARYL) 2 mg tablet TAKE 1 TABLET BY MOUTH DAILY WITH BREAKFAST  Patient taking differently: 1/2 tab daily 9/3/20  Yes Kevyn Geronimo PA-C   True Metrix Blood Glucose Test test strip Testing 1 time daily DX:E11 9 4/21/21  Yes Andrew Das MD       Allergies:   Allergies   Allergen Reactions    Metformin Diarrhea       Review of Systems:  Review of Systems - History obtained from chart review and the patient  General ROS: positive for  - fatigue and decrease in activity tolerance   negative for - chills, fever, sleep disturbance or weight gain  Psychological ROS: negative  Ophthalmic ROS: positive for - uses glasses and decreased vision left eye  ENT ROS: negative  Allergy and Immunology ROS: negative  Hematological and Lymphatic ROS: negative for - bleeding problems, blood clots, bruising or jaundice  Endocrine ROS: negative  Respiratory ROS: no cough, shortness of breath, or wheezing  Cardiovascular ROS: positive for - dyspnea on exertion and murmur  negative for - chest pain, edema, irregular heartbeat, loss of consciousness, palpitations, paroxysmal nocturnal dyspnea or rapid heart rate  Gastrointestinal ROS: positive for - constipation and swallowing difficulty/pain  negative for - abdominal pain, appetite loss, blood in stools, diarrhea, gas/bloating, hematemesis, melena or nausea/vomiting  Genito-Urinary ROS: no dysuria, trouble voiding, or hematuria  Musculoskeletal ROS: positive for - gait disturbance and ambulates with cane  Neurological ROS: positive for - gait disturbance and impaired coordination/balance  negative for - confusion, headaches, memory loss, numbness/tingling, seizures, speech problems or tremors  Dermatological ROS: negative    Vital Signs:     Vitals:    05/24/21 1257 05/24/21 1301   BP: 122/64 120/60   BP Location: Right arm Left arm   Patient Position: Sitting Sitting   Cuff Size: Standard    Pulse: 68    Resp: 18    Temp: (!) 97 2 °F (36 2 °C)    TempSrc: Tympanic    SpO2: 99%    Weight: 78 6 kg (173 lb 4 8 oz)    Height: 5' 6" (1 676 m)        Physical Exam:    General: Alert, oriented, well developed, no acute distress  HEENT/NECK:  PERRLA  No jugular venous distention      Cardiac:Regular rate and rhythm, III/VI harsh systolic murmur RUSB   Carotid arteries: 1+ pulses, delayed upstrokes, cardiac murmur radiates to neck vs bruit  Pulmonary:  Breath sounds clear bilaterally  Abdomen:  Non-tender, Non-distended  Positive bowel sounds  Upper extremities: 2+ radial pulses; brisk capillary refill; right hand dominant  Lower extremities: Extremities warm/dry  PT/DP pulses 2+ bilaterally  Trace edema B/L  Neuro: Alert and oriented X 3  Sensation is grossly intact  No focal deficits  Musculoskeletal: MAEE, stable gait with use of cane  Skin: Warm/Dry, without rashes or lesions  Lab Results:   Lab Results   Component Value Date     04/07/2014    SODIUM 142 05/17/2021    K 3 9 05/17/2021     (H) 05/17/2021    CO2 25 05/17/2021    ANIONGAP 8 04/07/2014    AGAP 6 05/17/2021    BUN 16 05/17/2021    CREATININE 1 03 05/17/2021    GLUC 137 05/17/2021    GLUF 137 (H) 05/17/2021    CALCIUM 8 9 05/17/2021    AST 34 05/10/2021    ALT 38 05/10/2021    ALKPHOS 66 05/10/2021    PROT 7 0 04/07/2014    TP 7 6 05/10/2021    BILITOT 0 4 04/07/2014    TBILI 1 14 (H) 05/10/2021    EGFR 66 05/17/2021     Lab Results   Component Value Date    WBC 5 10 05/10/2021    HGB 13 6 05/10/2021    HCT 40 4 05/10/2021    MCV 94 05/10/2021    PLT 73 (L) 05/10/2021     Lab Results   Component Value Date    HGBA1C 5 9 (H) 03/12/2021     Lab Results   Component Value Date    CKTOTAL 135 04/07/2014    TROPONINI 0 02 09/24/2020       Imaging Studies:     Echocardiogram: 4/1/21  LEFT VENTRICLE:  Systolic function was normal  Ejection fraction was estimated to be 55 %  There were no regional wall motion abnormalities  Wall thickness was mildly increased  Doppler parameters were consistent with abnormal left ventricular relaxation (grade 1 diastolic dysfunction)      MITRAL VALVE:  There was mild regurgitation      AORTIC VALVE:  The valve was trileaflet  Leaflets exhibited markedly increased thickness and marked calcification  Transaortic velocity was increased due to valvular stenosis  There was severe stenosis  There was mild regurgitation    Valve mean gradient was 26 mmHg  Estimated aortic valve area (by VTI) was 0 87 cmï¾²     TRICUSPID VALVE:  There was mild regurgitation  Pulmonary artery systolic pressure was within the normal range      AORTA:  There was mild dilatation of the ascending aorta  Cardiac cath: 21  CORONARY CIRCULATION:  Left main: Normal   LAD: Normal   Circumflex: Normal     EC21  Sinus rhythm with occasional Premature ventricular complexes  Left axis deviation  Left bundle branch block    I have personally reviewed pertinent films in PACS    TAVR evaluation Assessment:     Sourav Golden 122: III    STS risk score (preliminary): 1 97%    Assessment:  Patient Active Problem List    Diagnosis Date Noted    Neurologic gait dysfunction 2020    Benign prostatic hyperplasia with nocturia 2020    Minor depression 2020    Thrombocytopenia ([facility] [address] ) 2020    Esophageal stricture 2020    Pulmonary fibrosis ([facility] [address] ) 2020    Arthropathy of right knee 2020    Pharyngoesophageal dysphagia 2019    Trigger ring finger of right hand 2019    Controlled type 2 diabetes mellitus without complication, without long-term current use of insulin ([facility] [address] ) 05/10/2019    Aortic stenosis, severe 2019    Benign prostatic hyperplasia without lower urinary tract symptoms 12/10/2018    Bilateral carotid artery stenosis 12/10/2018    Pure hypercholesterolemia 12/10/2018     Severe aortic stenosis; Ongoing TAVR workup    Plan:    Leida Aquino has symptomatic severe aortic stenosis  They will undergo the following testing for transcatheter aortic valve replacement: Gated CTA of the chest/abdomen/pelvis,  dental clearance, GI consultation and carotid artery ultrasound  Once these studies have been completed, Leida Aquino will follow up in our office to review the results and to be evaluated to confirm the suitability of proceeding with transcatheter aortic valve replacment       Leida Aquino was comfortable with our recommendations, and their questions were answered to their satisfaction  Thank you for allowing us to participate in the care of this patient  The patient recently had a screening colonoscopy in 1/17/19  Therefore GI referral is not indicated at this time  SIGNATURECoADALBERTO Godwin  DATE: May 24, 2021  TIME: 1:48 PM  Attestation signed by David Love DO at 5/24/2021  2:26 PM:  This is and 82 y/o male with a PMHx significant for AS, HTN, HLD, Dm2, esophageal stricture s/p dilation, who presents for evaluation of severe AS  He states he has some occasional unsteadiness on his feet and SOB, but no chest pain, lightheadedness or syncope  He had a cath on 5/17 that showed no significant coronary artery disease  I reviewed the risks and benefits of TAVR and SAVR with the patient in detail  Based on his age and comorbidities, I recommend TAVR  The patient is willing to proceed with work-up and eventual surgery  He will require dental clearance  He should also have an EGD to assess his esophageal stricture prior to surgery due to REBECCA  He will complete his pre-op testing and then return to the office for scheduling

## 2021-05-26 NOTE — PROGRESS NOTES
21    Sadie Villa   1935   [id number]     Assessment  1 BPH with LUTS    Discussion/Plan  1 BPH with LUTS   Urinary urgency, incontinence, nocturia, weak stream   PVR: unable to urinate in the office today   AUA 13   Patient discontinued Flomax due to side effects   Recommended Saw Palmetto    Management of constipation with Miralax or Colace/Senna as needed, increase water intake to 48 oz daily, avoid bladder irritants, add fiber to diet   Encouraged patient to practice Kegel exercises  We discussed pelvic floor physical therapy  Patient will consider this option after he recovers from heart surgery  We will discuss in follow-up  Referral placed to Psychology     Patient will follow up in 3-4 months with PVR  All questions answered  He will call with issues or concerns  He will schedule appointment with psychology  Emotional support provided  Subjective  HPI   Yessenia Ceron is an 81year old male who presents to our office in consultation for BPH with lower urinary tract symptoms  His biggest complaint is urinary urgency and urinary leakage  He also reports weak stream and nocturia  He requires an incontinence brief for protection  He states the leakage occurs when he goes from sitting to standing position  He discontinued us of Tamsulosin due to side effects  He started taking Super Beta Prostate and did not notice a difference in his urinary pattern  He hydrates primarily with water approximately 32 oz  He admits to being constipated  He denies dysuria, gross hematuria, or incomplete emptying  No recent infection  Patient is a   His wife  "in his arms" 5 years ago and he has been extremely depressed since this time  From his description, it seems she was chronically ill with cancer  He reports having nightmares and flashbacks about this and only being able to sleep for 3-4 hours at a time  He has a son in [place] and a daughter in [place]  He is close with his son   He 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 and the experiences and traumas he suffered during his time in [facility] in [facility]  He is tearful during our conversation  He is having heart valve repair surgery next week with Dr Meryle Prose  He was having near-syncopal episodes, JOE and shortness of breath which prompted cardiac work up  He states, "if I don't wake up after surgery that would be okay " He denies thoughts of harming himself or a plan to harm himself in any way  He has attended support groups at his [facility] in the past  He does not wish to take medications for depression or PTSD  PMH: DM, hyperlipidemia, aortic valve stenosis, former smoker      Review of Systems - History obtained from chart review and the patient  General ROS: positive for - sleep disturbance  Psychological ROS: positive for - depression  Ophthalmic ROS: negative  ENT ROS: negative  Allergy and Immunology ROS: negative  Hematological and Lymphatic ROS: negative  Endocrine ROS: negative  Breast ROS: negative for breast lumps  Respiratory ROS: no cough, shortness of breath, or wheezing  Cardiovascular ROS: no chest pain or dyspnea on exertion  Gastrointestinal ROS: positive for - constipation   Genito-Urinary ROS: positive for - change in urinary stream, incontinence, nocturia and urinary frequency/urgency  Musculoskeletal ROS: negative  Neurological ROS: no TIA or stroke symptoms  Dermatological ROS: negative       Objective  Physical Exam  Vitals signs and nursing note reviewed  Constitutional:       General: He is awake  He is not in acute distress  Appearance: Normal appearance  He is well-developed, well-groomed and normal weight  He is not ill-appearing, toxic-appearing or diaphoretic  Neck:      Musculoskeletal: Normal range of motion and neck supple  Pulmonary:      Effort: Pulmonary effort is normal    Musculoskeletal: Normal range of motion  Skin:     General: Skin is warm  Neurological:      General: No focal deficit present        Mental Status: He is alert and oriented to person, place, and time  Mental status is at baseline  Psychiatric:         Attention and Perception: Attention normal          Mood and Affect: Mood is anxious and depressed  Affect is tearful  Speech: Speech normal          Behavior: Behavior normal  Behavior is cooperative  Thought Content: Thought content is not paranoid or delusional  Thought content does not include homicidal or suicidal ideation  Thought content does not include homicidal or suicidal plan  Cognition and Memory: Cognition and memory normal          Judgment: Judgment normal        AUA SYMPTOM SCORE      Most Recent Value   AUA SYMPTOM SCORE   How often have you had a sensation of not emptying your bladder completely after you finished urinating? 0   How often have you had to urinate again less than two hours after you finished urinating? 5   How often have you found you stopped and started again several times when you urinate? 5   How often have you found it difficult to postpone urination? 0   How often have you had a weak urinary stream?  0   How often have you had to push or strain to begin urination? 0   How many times did you most typically get up to urinate from the time you went to bed at night until the time you got up in the morning?   3   Quality of Life: If you were to spend the rest of your life with your urinary condition just the way it is now, how would you feel about that?  [address]  [address] Ronnie Shine

## 2021-05-27 NOTE — TELEPHONE ENCOUNTER
Why does your doctor want you to have this procedure? Dysphagia    Do you have kidney disease?  no  If yes, are you on dialysis :     Have you had diverticulitis within the past 2 months? no    Are you diabetic?  yes  If yes, insulin dependent: If yes, provide diabetic instructions sheet     Do take iron supplements?  no  If yes, instruct patient to hold iron supplement for 7 days prior    Are you on a blood thinner? no   Was the blood thinner sheet complete and faxed to cardiologist no  Plavix (clopidogrel), Coumadin (warfarin), Lovenox (enoxaparin), Xarelto (rivaroxaban), Pradaxa(dabigatran), Eliquis(apixaban) Savaysa/Lixiana (edoxapan)    Do you have an automatic implantable cardiac defibrillator (AICD)/pacemaker ([facility])? no  Was AICD/pacemaker sheet completed and faxed to cardiologist? no    Are you on home oxygen? no  If yes, continuous or nocturnal:     Have you been treated for MRSA, VRE or any communicable diseases? no    Heart attack, stroke, or stent within 3 months? no  Schedule at Hospital if within 3-6 months   Use nitroglycerin for chest pain in the last 6 months? no    History of organ  transplant?  no   If yes, notify Endo      History of neck/throat/tongue surgery or cancer? no  IF yes, notify Endo      Any problems with anesthesia in the past? no     Was stool C diff ordered?  no Stool specimen needs to be completed prior to procedure    Do have any facial or body piercings?no     Do you have a latex allergy? no     Do have an allergy to metals? (Bravo study only) no     If pediatric patient, was consent faxed to pediatrician no     Patient rights reviewed yes     Instructions given to pt

## 2021-06-02 NOTE — H&P
History and Physical - [facility]  Derald Holter 81 y o  male MRN: [id number]      HPI: Derald Holter is a 81y o  year old male who presents for history of esophageal stricture, status post dilation  Now undergoing preop evaluation for TAVR for aortic stenosis  REVIEW OF SYSTEMS: Per the HPI, and otherwise unremarkable      Historical Information   Past Medical History:   Diagnosis Date    Aortic stenosis     Bacterial infection due to Helicobacter pylori     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     Colon polyp     Depression (emotion) 11/16/2018    GERD (gastroesophageal reflux disease)     Hyperlipidemia     Hypertension     Post herpetic neuralgia     Shingles      Past Surgical History:   Procedure Laterality Date    ANAL SPHINCTEROTOMY      BASAL CELL CARCINOMA EXCISION      CHOLECYSTECTOMY      OPEN REDUCTION SHOULDER DISLOCATION      ROTATOR CUFF REPAIR       Social History   Social History     Substance and Sexual Activity   Alcohol Use No     Social History     Substance and Sexual Activity   Drug Use No     Social History     Tobacco Use   Smoking Status Former Smoker   Smokeless Tobacco Former User    Quit date: 1/1/1976     Family History   Problem Relation Age of Onset    Depression Mother     Hyperlipidemia Mother     Substance Abuse Neg Hx     Colon cancer Neg Hx     Colon polyps Neg Hx        Meds/Allergies       Current Outpatient Medications:     aspirin (ECOTRIN LOW STRENGTH) 81 mg EC tablet    atorvastatin (LIPITOR) 10 mg tablet    glimepiride (AMARYL) 2 mg tablet    True Metrix Blood Glucose Test test strip    Current Facility-Administered Medications:     lactated ringers infusion, 100 mL/hr, Intravenous, Continuous    Allergies   Allergen Reactions    Metformin Diarrhea       Objective     /62   Pulse 73   Temp 98 2 °F (36 8 °C) (Oral)   Resp 20   Ht 5' 6" (1 676 m)   Wt 77 1 kg (170 lb)   SpO2 99%   BMI 27 44 kg/m²       PHYSICAL EXAM    Gen: NAD AAOx3  Head: Normocephalic, Atraumatic  CV: S1S2 RRR no m/r/g  CHEST: Clear b/l no c/r/w  ABD: soft, +BS NT/ND no masses  EXT: no edema      ASSESSMENT/PLAN:  This is a 81y o  year old male here for upper endoscopy, and he is stable and optimized for his procedure

## 2021-06-02 NOTE — ANESTHESIA PREPROCEDURE EVALUATION
Procedure:  EGD    Relevant Problems   ANESTHESIA (within normal limits)      CARDIO  Recent cath with no signficant CAD   (+) Aortic stenosis, severe (preop for TAVR  significant dyspnea with exertion, no angina)   (+) Bilateral carotid artery stenosis   (+) Pure hypercholesterolemia      ENDO   (+) Controlled type 2 diabetes mellitus without complication, without long-term current use of insulin (HCC)      GI/HEPATIC   (+) Pharyngoesophageal dysphagia      /RENAL   (+) Benign prostatic hyperplasia without lower urinary tract symptoms      HEMATOLOGY   (+) Thrombocytopenia (HCC)      NEURO/PSYCH   (+) Minor depression      Other   (+) Esophageal stricture   (+) Pulmonary fibrosis (HCC) (mild)      Physical Exam    Airway    Mallampati score: II  TM Distance: >3 FB  Neck ROM: full     Dental   Comment: Upper edentulous, lower front intact,     Cardiovascular      Pulmonary      Other Findings      Lab Results   Component Value Date    WBC 5 10 05/10/2021    HGB 13 6 05/10/2021    PLT 73 (L) 05/10/2021     Lab Results   Component Value Date    SODIUM 142 05/17/2021    K 3 9 05/17/2021    BUN 16 05/17/2021    CREATININE 1 03 05/17/2021    EGFR 66 05/17/2021    GLUCOSE 150 (H) 04/07/2014     Lab Results   Component Value Date    HGBA1C 5 9 (H) 03/12/2021     TTE 4/1/21 SUMMARY     LEFT VENTRICLE:  Systolic function was normal  Ejection fraction was estimated to be 55%  There were no regional wall motion abnormalities  Wall thickness was mildly increased  Doppler parameters were consistent with abnormal left ventricular relaxation (grade 1 diastolic dysfunction)      MITRAL VALVE:  There was mild regurgitation      AORTIC VALVE:  The valve was trileaflet  Leaflets exhibited markedly increased thickness and marked calcification  Transaortic velocity was increased due to valvular stenosis  There was severe stenosis  There was mild regurgitation  Valve mean gradient was 26 mmHg    Estimated aortic valve area (by VTI) was 0 87 cmï¾²     TRICUSPID VALVE:  There was mild regurgitation  Pulmonary artery systolic pressure was within the normal range      AORTA:  There was mild dilatation of the ascending aorta  Anesthesia Plan  ASA Score- 4     Anesthesia Type- IV sedation with anesthesia with ASA Monitors  Additional Monitors:   Airway Plan:           Plan Factors-Exercise tolerance (METS): >4 METS  Chart reviewed  Existing labs reviewed  Patient summary reviewed  Patient is not a current smoker  Induction- intravenous  Postoperative Plan-     Informed Consent- Anesthetic plan and risks discussed with patient  I personally reviewed this patient with the CRNA  Discussed and agreed on the Anesthesia Plan with the CRNA  Prashant Gale

## 2021-06-02 NOTE — ANESTHESIA POSTPROCEDURE EVALUATION
Post-Op Assessment Note    CV Status:  Stable  Pain Score: 0    Pain management: adequate     Mental Status:  Lethargic   Hydration Status:  Stable   PONV Controlled:  None   Airway Patency:  Patent      Post Op Vitals Reviewed: Yes      Staff: CRNA   Comments: vss, repoert to rn        No complications documented      BP      Temp      Pulse     Resp      SpO2

## 2021-06-16 NOTE — LETTER
June 16, 2021     Conrad Sam, [address]  [address] [address]    Patient: Radha Peters   YOB: 1935   Date of Visit: 6/16/2021       Dear Dr Meena Hutchinson: Thank you for referring Love Prather to me for evaluation  Below are my notes for this consultation  If you have questions, please do not hesitate to call me  I look forward to following your patient along with you  Sincerely,        Mariusz Sim, DO        CC: MD Saige Ignacio PA-C  6/16/2021  2:35 PM  Attested  Consultation - Cardiothoracic Surgery   Radha Peters 81 y o  male MRN: [id number]      Reason for Consult / Principal Problem: Aortic stenosis, Non-Rheumatic    History of Present Illness: Radha Peters is a 81y o  year old male who was previously evaluated in our office by ANA Johnson  for transcatheter aortic valve replacement  During this initial consultation, arrangements were made for the following studies to be completed: gated CTA of the chest, abdomen, and pelvis, dental clearance and carotid ultrasound  Radha Peters now presents to review the results of these tests and confirm the suitability of proceeding with transcatheter aortic valve replacment  C/o stable c/o JOE, intermittent LE edema & overall fatigue  Changes to PMH include EGD complete & establishment of care w/ urology (not on any medication at this time for BPH (using depends now for dribbling)      Past Medical History:  Past Medical History:   Diagnosis Date    BPH (benign prostatic hyperplasia)     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     Carotid stenosis, asymptomatic, left     ~79% LICA    Depression (emotion) 11/16/2018    Diabetes mellitus ([facility] )     type 2, non-insulin dependent    Dysphagia     Esophageal stricture     s/p dilation    Former tobacco use     GERD (gastroesophageal reflux disease)     History of basal cell carcinoma excision     History of Helicobacter pylori infection     History of shingles     History of TIA (transient ischemic attack)     multiple, no residual sx    Hyperlipidemia     Hypertension     Personal history of colonic polyps     Post herpetic neuralgia     Pulmonary fibrosis (HCC)     mild    Severe aortic stenosis     Splenomegaly     mild     Past Surgical History:   Past Surgical History:   Procedure Laterality Date    ANAL SPHINCTEROTOMY      BASAL CELL CARCINOMA EXCISION      CARDIAC CATHETERIZATION      CHOLECYSTECTOMY      OPEN REDUCTION SHOULDER DISLOCATION      ROTATOR CUFF REPAIR       Family History:  Family History   Problem Relation Age of Onset    Depression Mother     Hyperlipidemia Mother     Substance Abuse Neg Hx     Colon cancer Neg Hx     Colon polyps Neg Hx      Social History:  Social History     Substance and Sexual Activity   Alcohol Use No     Social History     Substance and Sexual Activity   Drug Use No     Social History     Tobacco Use   Smoking Status Former Smoker   Smokeless Tobacco Former User    Quit date: 1/1/1976       Home Medications:   Prior to Admission medications    Medication Sig Start Date End Date Taking? Authorizing Provider   aspirin (ECOTRIN LOW STRENGTH) 81 mg EC tablet Take 1 tablet (81 mg total) by mouth daily 11/9/20  Yes Jerome Orta MD   atorvastatin (LIPITOR) 10 mg tablet TAKE 1 TABLET BY MOUTH EVERY DAY 3/31/20  Yes Jerome Orta MD   glimepiride (AMARYL) 2 mg tablet TAKE 1 TABLET BY MOUTH DAILY WITH BREAKFAST  Patient taking differently: 1/2 tab daily 9/3/20  Yes Chinedu Rubio PA-C   omeprazole (PriLOSEC) 40 MG capsule Take 1 capsule (40 mg total) by mouth daily 6/2/21  Yes Shruti Gil MD   True Metrix Blood Glucose Test test strip Testing 1 time daily DX:E11 9 4/21/21  Yes Jerome Orta MD       Allergies: Allergies   Allergen Reactions    Metformin Diarrhea       Review of Systems:     Review of Systems   Constitutional: Positive for activity change   Negative for diaphoresis, fatigue and unexpected weight change  HENT: Negative  Negative for dental problem  Respiratory: Positive for shortness of breath  Negative for chest tightness and wheezing  Cardiovascular: Positive for leg swelling  Negative for chest pain and palpitations  Gastrointestinal: Negative  Negative for blood in stool, constipation, diarrhea, nausea and vomiting  Genitourinary: Negative  Musculoskeletal: Negative  Negative for arthralgias, back pain, gait problem and myalgias  Skin: Negative  Neurological: Negative  Negative for dizziness, syncope, weakness, light-headedness, numbness and headaches  Hematological: Negative  Does not bruise/bleed easily  All other systems reviewed and are negative  Vital Signs:     Vitals:    06/16/21 1400   BP: 120/68   BP Location: Left arm   Patient Position: Sitting   Cuff Size: Standard   Pulse: 63   Resp: 18   Temp: 98 2 °F (36 8 °C)   TempSrc: Tympanic   Weight: 77 8 kg (171 lb 8 oz)   Height: 5' 6" (1 676 m)       Physical Exam:     Physical Exam  Constitutional:       General: He is not in acute distress  Appearance: Normal appearance  He is well-developed  He is not ill-appearing or toxic-appearing  Comments: Laying in bed in NAD   HENT:      Head: Normocephalic and atraumatic  Mouth/Throat:      Dentition: Normal dentition  Does not have dentures  Pharynx: Uvula midline  Neck:      Vascular: No carotid bruit or JVD  Trachea: Trachea normal    Cardiovascular:      Rate and Rhythm: Normal rate and regular rhythm  Chest Wall: PMI is not displaced  Heart sounds: S1 normal and S2 normal  Murmur heard  Systolic murmur is present with a grade of 4/6  No friction rub  No S3 or S4 sounds  Comments: No heaves/lifts on palpation  Pulmonary:      Effort: Pulmonary effort is normal  No accessory muscle usage or respiratory distress  Breath sounds: Normal breath sounds   No wheezing, rhonchi or rales    Abdominal:      General: Bowel sounds are normal  There is no distension  Palpations: Abdomen is not rigid  There is no mass  Tenderness: There is no abdominal tenderness  There is no guarding or rebound  Musculoskeletal:      Cervical back: Normal range of motion and neck supple  Skin:     General: Skin is warm and dry  Findings: No bruising, petechiae or rash  Nails: There is no clubbing  Comments: Trace edema b/l LE, no varicosities appreciated to b/l LE   Neurological:      Mental Status: He is alert and oriented to person, place, and time  Cranial Nerves: No cranial nerve deficit  Sensory: No sensory deficit  Comments: No focal deficit appreciated         Lab Results:               Invalid input(s): LABGLOM      Lab Results   Component Value Date    HGBA1C 5 9 (H) 03/12/2021     Lab Results   Component Value Date    CKTOTAL 135 04/07/2014    TROPONINI 0 02 09/24/2020       Imaging Studies:     Transthoracic Echocardiogram 4/1/2021: EF 55%, no RWMA, grade 1 DD, mild MR, trileaflet AV w/ sever AS/mild AI (mean 26, MARIO ALBERTO 0 87cm2), mild TR, PAP WNL, mild dilatation of ascending aorta    Gated CTA of the chest/abdomen/pelvis 6/3/2021: TAVR measurements complete, mild pulm fibrosis, mild splenomegaly    Left cardiac catheterization 5/17/2021: no CAD    Carotid artery ultrasound 6/3/2021: <50% SHANNAN stenosis, ~57% LICA stenosis, antegrade flow b/l, no subclavian disease b/l    I have personally reviewed pertinent reports     and I have personally reviewed pertinent films in PACS    TAVR evaluation Assessment:     5 Meter Walk Test:      Attempt 1: 8   Attempt 2: 9   Attempt 3: 8    STS Risk Score: 1 97 %, mortality risk    [address]  [facility]: III    KCCQ-12 was completed    Assessment:  Patient Active Problem List    Diagnosis Date Noted    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    Benign prostatic hyperplasia with nocturia 11/09/2020    Minor depression 07/07/2020    Thrombocytopenia ([facility] ) 07/07/2020    Esophageal stricture 07/07/2020    Pulmonary fibrosis ([facility] ) 06/12/2020    Arthropathy of right knee 03/05/2020    Pharyngoesophageal dysphagia 08/26/2019    Trigger ring finger of right hand 06/13/2019    Controlled type 2 diabetes mellitus without complication, without long-term current use of insulin ([facility] ) 05/10/2019    Aortic stenosis, severe 04/04/2019    Benign prostatic hyperplasia without lower urinary tract symptoms 12/10/2018    Bilateral carotid artery stenosis 12/10/2018    Pure hypercholesterolemia 12/10/2018     Severe aortic stenosis; Ongoing TAVR workup    Plan:    Claudia Lara has severe symptomatic aortic stenosis  Based on their STS risk assessment, they have undergone testing for transcatheter aortic valve replacement  The results of these studies have been interpreted by ANA Guerra  who has determined the patient to be an appropriate candidate for transcatheter aortic valve replacement  The risks, benefits, and alternatives to TAVR were discussed in detail with the patient today  They understand and wish to proceed with transfemoral transcatheter aortic valve replacement  Informed consent was obtained and a date for the intervention has been set  Claudia Lara was comfortable with our recommendations, and their questions were answered to their satisfaction  The following preoperative instructions were provided at the conclusion of their consultation:     1  You will receive a phone call from the hospital between 2:00 PM and 8:00 PM the day prior to surgery to confirm arrival time and location  For surgery on Mondays, you will receive a call on Friday  2  Do not drink or eat anything after midnight the night before surgery  That includes no water, candy, gum, lozenges, Lifesavers, etc  We recommend you not eat any "junk" food, consume alcohol or smoke the night before surgery    3  Continue taking aspirin but only 81 mg daily   4  If you take Coumadin and/or Plavix, discontinue it 5 days before surgery  5  If you are diabetic, do not take any of your diabetic pills the morning of surgery  If you take Lantus insulin, you may take it at your regularly scheduled time the day before surgery  Do not take any other insulins the morning of surgery  6  The 2 nights before surgery, take a shower each night using the special antiseptic soap or soap sponges you received from the office or hospital  Giana Cowboy your hair with regular shampoo and rinse completely before using the antiseptic sponges  Use the sponge to wash from your neck down, with special attention to the armpits and groin area  Do not use any other soap or cleanser on your skin  Do not use lotions, powder, deodorant or perfume of any kind on your skin after you shower  Use clean bed linens and wear clean pajamas after your shower  7  You will be prescribed Mupirocin nasal ointment  Apply to both nostrils twice a day for 5 days prior to surgery  8  Do not take a shower the morning of her surgery; you'll be given a special" bath" at the hospital   9  Notify the CT surgery office if you develop a cold, sore throat, cough, fever or other health issues before your surgery  10  Other medication changes included the following: stop Amaryl 3 days prior to sx     SIGNATURE: Wilmar Leija PA-C  DATE: June 16, 2021  TIME: 2:04 PM    * This note was completed in part utilizing m[employer] fluency direct voice recognition software  Grammatical errors, random word insertion, spelling mistakes, and incomplete sentences may be an occasional consequence of the system secondary to software limitations, ambient noise and hardware issues  At the time of dictation, efforts were made to edit, clarify and /or correct errors  Please read the chart carefully and recognize, using context, where substitutions have occurred    If you have any questions or concerns about the context, text or information contained within the body of this dictation, please contact myself, the provider, for further clarification  Attestation signed by Jay Pereira DO at 6/16/2021  2:44 PM:  The patient was seen and examined, and I agree with the midlevel's history, physical exam, assessment and plan with the following additions:    Mr Amaya Sorto  was seen for 2nd TAVR visit  He was accompanied   In the office today with his son  His preoperative testing has been completed, and is been reviewed by myself personally discussed with him  Our plan is for a 29 Britney S3 valve transfemoral   The risks, benefits, and alternatives to TAVR been discussed with him  He consents to proceed

## 2021-06-16 NOTE — H&P (VIEW-ONLY)
Consultation - Cardiothoracic Surgery   Delmis Ramos 81 y o  male MRN: [id number]      Reason for Consult / Principal Problem: Aortic stenosis, Non-Rheumatic    History of Present Illness: Delmis Ramos is a 81y o  year old male who was previously evaluated in our office by ANA Vasquez  for transcatheter aortic valve replacement  During this initial consultation, arrangements were made for the following studies to be completed: gated CTA of the chest, abdomen, and pelvis, dental clearance and carotid ultrasound  Delmis Ramos now presents to review the results of these tests and confirm the suitability of proceeding with transcatheter aortic valve replacment  C/o stable c/o JOE, intermittent LE edema & overall fatigue  Changes to PMH include EGD complete & establishment of care w/ urology (not on any medication at this time for BPH (using depends now for dribbling)      Past Medical History:  Past Medical History:   Diagnosis Date    BPH (benign prostatic hyperplasia)     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     Carotid stenosis, asymptomatic, left     ~28% LICA    Depression (emotion) 11/16/2018    Diabetes mellitus ([facility] )     type 2, non-insulin dependent    Dysphagia     Esophageal stricture     s/p dilation    Former tobacco use     GERD (gastroesophageal reflux disease)     History of basal cell carcinoma excision     History of Helicobacter pylori infection     History of shingles     History of TIA (transient ischemic attack)     multiple, no residual sx    Hyperlipidemia     Hypertension     Personal history of colonic polyps     Post herpetic neuralgia     Pulmonary fibrosis (HCC)     mild    Severe aortic stenosis     Splenomegaly     mild     Past Surgical History:   Past Surgical History:   Procedure Laterality Date    ANAL SPHINCTEROTOMY      BASAL CELL CARCINOMA EXCISION      CARDIAC CATHETERIZATION      CHOLECYSTECTOMY      OPEN REDUCTION SHOULDER DISLOCATION  ROTATOR CUFF REPAIR       Family History:  Family History   Problem Relation Age of Onset    Depression Mother     Hyperlipidemia Mother     Substance Abuse Neg Hx     Colon cancer Neg Hx     Colon polyps Neg Hx      Social History:  Social History     Substance and Sexual Activity   Alcohol Use No     Social History     Substance and Sexual Activity   Drug Use No     Social History     Tobacco Use   Smoking Status Former Smoker   Smokeless Tobacco Former User    Quit date: 1/1/1976       Home Medications:   Prior to Admission medications    Medication Sig Start Date End Date Taking? Authorizing Provider   aspirin (ECOTRIN LOW STRENGTH) 81 mg EC tablet Take 1 tablet (81 mg total) by mouth daily 11/9/20  Yes Gato Jack MD   atorvastatin (LIPITOR) 10 mg tablet TAKE 1 TABLET BY MOUTH EVERY DAY 3/31/20  Yes Gato Jack MD   glimepiride (AMARYL) 2 mg tablet TAKE 1 TABLET BY MOUTH DAILY WITH BREAKFAST  Patient taking differently: 1/2 tab daily 9/3/20  Yes Harleen Braun PA-C   omeprazole (PriLOSEC) 40 MG capsule Take 1 capsule (40 mg total) by mouth daily 6/2/21  Yes Zuleima Duncan MD   True Metrix Blood Glucose Test test strip Testing 1 time daily DX:E11 9 4/21/21  Yes Gato Jack MD       Allergies: Allergies   Allergen Reactions    Metformin Diarrhea       Review of Systems:     Review of Systems   Constitutional: Positive for activity change  Negative for diaphoresis, fatigue and unexpected weight change  HENT: Negative  Negative for dental problem  Respiratory: Positive for shortness of breath  Negative for chest tightness and wheezing  Cardiovascular: Positive for leg swelling  Negative for chest pain and palpitations  Gastrointestinal: Negative  Negative for blood in stool, constipation, diarrhea, nausea and vomiting  Genitourinary: Negative  Musculoskeletal: Negative  Negative for arthralgias, back pain, gait problem and myalgias  Skin: Negative  Neurological: Negative  Negative for dizziness, syncope, weakness, light-headedness, numbness and headaches  Hematological: Negative  Does not bruise/bleed easily  All other systems reviewed and are negative  Vital Signs:     Vitals:    06/16/21 1400   BP: 120/68   BP Location: Left arm   Patient Position: Sitting   Cuff Size: Standard   Pulse: 63   Resp: 18   Temp: 98 2 °F (36 8 °C)   TempSrc: Tympanic   Weight: 77 8 kg (171 lb 8 oz)   Height: 5' 6" (1 676 m)       Physical Exam:     Physical Exam  Constitutional:       General: He is not in acute distress  Appearance: Normal appearance  He is well-developed  He is not ill-appearing or toxic-appearing  Comments: Laying in bed in NAD   HENT:      Head: Normocephalic and atraumatic  Mouth/Throat:      Dentition: Normal dentition  Does not have dentures  Pharynx: Uvula midline  Neck:      Vascular: No carotid bruit or JVD  Trachea: Trachea normal    Cardiovascular:      Rate and Rhythm: Normal rate and regular rhythm  Chest Wall: PMI is not displaced  Heart sounds: S1 normal and S2 normal  Murmur heard  Systolic murmur is present with a grade of 4/6  No friction rub  No S3 or S4 sounds  Comments: No heaves/lifts on palpation  Pulmonary:      Effort: Pulmonary effort is normal  No accessory muscle usage or respiratory distress  Breath sounds: Normal breath sounds  No wheezing, rhonchi or rales  Abdominal:      General: Bowel sounds are normal  There is no distension  Palpations: Abdomen is not rigid  There is no mass  Tenderness: There is no abdominal tenderness  There is no guarding or rebound  Musculoskeletal:      Cervical back: Normal range of motion and neck supple  Skin:     General: Skin is warm and dry  Findings: No bruising, petechiae or rash  Nails: There is no clubbing        Comments: Trace edema b/l LE, no varicosities appreciated to b/l LE   Neurological:      Mental Status: He is alert and oriented to person, place, and time  Cranial Nerves: No cranial nerve deficit  Sensory: No sensory deficit  Comments: No focal deficit appreciated         Lab Results:               Invalid input(s): LABGLOM      Lab Results   Component Value Date    HGBA1C 5 9 (H) 03/12/2021     Lab Results   Component Value Date    CKTOTAL 135 04/07/2014    TROPONINI 0 02 09/24/2020       Imaging Studies:     Transthoracic Echocardiogram 4/1/2021: EF 55%, no RWMA, grade 1 DD, mild MR, trileaflet AV w/ sever AS/mild AI (mean 26, MARIO ALBERTO 0 87cm2), mild TR, PAP WNL, mild dilatation of ascending aorta    Gated CTA of the chest/abdomen/pelvis 6/3/2021: TAVR measurements complete, mild pulm fibrosis, mild splenomegaly    Left cardiac catheterization 5/17/2021: no CAD    Carotid artery ultrasound 6/3/2021: <50% SHANNAN stenosis, ~84% LICA stenosis, antegrade flow b/l, no subclavian disease b/l    I have personally reviewed pertinent reports     and I have personally reviewed pertinent films in PACS    TAVR evaluation Assessment:     5 Meter Walk Test:      Attempt 1: 8   Attempt 2: 9   Attempt 3: 8    STS Risk Score: 1 97 %, mortality risk    [address]: III    KCCQ-12 was completed    Assessment:  Patient Active Problem List    Diagnosis Date Noted    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    Benign prostatic hyperplasia with nocturia 11/09/2020    Minor depression 07/07/2020    Thrombocytopenia ([address] ) 07/07/2020    Esophageal stricture 07/07/2020    Pulmonary fibrosis ([address] ) 06/12/2020    Arthropathy of right knee 03/05/2020    Pharyngoesophageal dysphagia 08/26/2019    Trigger ring finger of right hand 06/13/2019    Controlled type 2 diabetes mellitus without complication, without long-term current use of insulin ([address] ) 05/10/2019    Aortic stenosis, severe 04/04/2019    Benign prostatic hyperplasia without lower urinary tract symptoms 12/10/2018    Bilateral carotid artery stenosis 12/10/2018    Pure hypercholesterolemia 12/10/2018     Severe aortic stenosis; Ongoing TAVR workup    Plan:    Kevin Baxter has severe symptomatic aortic stenosis  Based on their STS risk assessment, they have undergone testing for transcatheter aortic valve replacement  The results of these studies have been interpreted by ANA Guallpa  who has determined the patient to be an appropriate candidate for transcatheter aortic valve replacement  The risks, benefits, and alternatives to TAVR were discussed in detail with the patient today  They understand and wish to proceed with transfemoral transcatheter aortic valve replacement  Informed consent was obtained and a date for the intervention has been set  Kevin Baxter was comfortable with our recommendations, and their questions were answered to their satisfaction  The following preoperative instructions were provided at the conclusion of their consultation:     1  You will receive a phone call from the hospital between 2:00 PM and 8:00 PM the day prior to surgery to confirm arrival time and location  For surgery on Mondays, you will receive a call on Friday  2  Do not drink or eat anything after midnight the night before surgery  That includes no water, candy, gum, lozenges, Lifesavers, etc  We recommend you not eat any "junk" food, consume alcohol or smoke the night before surgery  3  Continue taking aspirin but only 81 mg daily  4  If you take Coumadin and/or Plavix, discontinue it 5 days before surgery  5  If you are diabetic, do not take any of your diabetic pills the morning of surgery  If you take Lantus insulin, you may take it at your regularly scheduled time the day before surgery  Do not take any other insulins the morning of surgery    6  The 2 nights before surgery, take a shower each night using the special antiseptic soap or soap sponges you received from the office or [facility] your hair with regular shampoo and rinse completely before using the antiseptic sponges  Use the sponge to wash from your neck down, with special attention to the armpits and groin area  Do not use any other soap or cleanser on your skin  Do not use lotions, powder, deodorant or perfume of any kind on your skin after you shower  Use clean bed linens and wear clean pajamas after your shower  7  You will be prescribed Mupirocin nasal ointment  Apply to both nostrils twice a day for 5 days prior to surgery  8  Do not take a shower the morning of her surgery; you'll be given a special" bath" at the hospital   9  Notify the CT surgery office if you develop a cold, sore throat, cough, fever or other health issues before your surgery  10  Other medication changes included the following: stop Amaryl 3 days prior to sx     SIGNATURE: Kesha Matos PA-C  DATE: June 16, 2021  TIME: 2:04 PM    * This note was completed in part utilizing [employer] direct voice recognition software  Grammatical errors, random word insertion, spelling mistakes, and incomplete sentences may be an occasional consequence of the system secondary to software limitations, ambient noise and hardware issues  At the time of dictation, efforts were made to edit, clarify and /or correct errors  Please read the chart carefully and recognize, using context, where substitutions have occurred  If you have any questions or concerns about the context, text or information contained within the body of this dictation, please contact myself, the provider, for further clarification

## 2021-06-16 NOTE — H&P
H&P- Cardiothoracic Surgery   Dawit Min 81 y o  male MRN: [id number]      Reason for Consult / Principal Problem: Aortic stenosis, Non-Rheumatic    History of Present Illness: Dawit Min is a 81y o  year old male who was previously evaluated in our office by ANA Kumar  for transcatheter aortic valve replacement  During this initial consultation, arrangements were made for the following studies to be completed: gated CTA of the chest, abdomen, and pelvis, dental clearance and carotid ultrasound  Dawit Min now presents to review the results of these tests and confirm the suitability of proceeding with transcatheter aortic valve replacment  C/o stable c/o JOE, intermittent LE edema & overall fatigue  Changes to PMH include EGD complete & establishment of care w/ urology (not on any medication at this time for BPH (using depends now for dribbling)      Past Medical History:  Past Medical History:   Diagnosis Date    BPH (benign prostatic hyperplasia)     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     Carotid stenosis, asymptomatic, left     ~88% LICA    Depression (emotion) 11/16/2018    Diabetes mellitus ([facility] [address] )     type 2, non-insulin dependent    Dysphagia     Esophageal stricture     s/p dilation    Former tobacco use     GERD (gastroesophageal reflux disease)     History of basal cell carcinoma excision     History of Helicobacter pylori infection     History of shingles     History of TIA (transient ischemic attack)     multiple, no residual sx    Hyperlipidemia     Hypertension     Personal history of colonic polyps     Post herpetic neuralgia     Pulmonary fibrosis (HCC)     mild    Severe aortic stenosis     Splenomegaly     mild     Past Surgical History:   Past Surgical History:   Procedure Laterality Date    ANAL SPHINCTEROTOMY      BASAL CELL CARCINOMA EXCISION      CARDIAC CATHETERIZATION      CHOLECYSTECTOMY      OPEN REDUCTION SHOULDER DISLOCATION      ROTATOR CUFF REPAIR       Family History:  Family History   Problem Relation Age of Onset    Depression Mother     Hyperlipidemia Mother     Substance Abuse Neg Hx     Colon cancer Neg Hx     Colon polyps Neg Hx      Social History:  Social History     Substance and Sexual Activity   Alcohol Use No     Social History     Substance and Sexual Activity   Drug Use No     Social History     Tobacco Use   Smoking Status Former Smoker   Smokeless Tobacco Former User    Quit date: 1/1/1976       Home Medications:   Prior to Admission medications    Medication Sig Start Date End Date Taking? Authorizing Provider   aspirin (ECOTRIN LOW STRENGTH) 81 mg EC tablet Take 1 tablet (81 mg total) by mouth daily 11/9/20  Yes Abner Weiner MD   atorvastatin (LIPITOR) 10 mg tablet TAKE 1 TABLET BY MOUTH EVERY DAY 3/31/20  Yes Abner Weiner MD   glimepiride (AMARYL) 2 mg tablet TAKE 1 TABLET BY MOUTH DAILY WITH BREAKFAST  Patient taking differently: 1/2 tab daily 9/3/20  Yes Fe Forte PA-C   omeprazole (PriLOSEC) 40 MG capsule Take 1 capsule (40 mg total) by mouth daily 6/2/21  Yes Reggie Roblero MD   True Metrix Blood Glucose Test test strip Testing 1 time daily [id number] 9 4/21/21  Yes Abner Weiner MD       Allergies: Allergies   Allergen Reactions    Metformin Diarrhea       Review of Systems:     Review of Systems   Constitutional: Positive for activity change  Negative for diaphoresis, fatigue and unexpected weight change  HENT: Negative  Negative for dental problem  Respiratory: Positive for shortness of breath  Negative for chest tightness and wheezing  Cardiovascular: Positive for leg swelling  Negative for chest pain and palpitations  Gastrointestinal: Negative  Negative for blood in stool, constipation, diarrhea, nausea and vomiting  Genitourinary: Negative  Musculoskeletal: Negative  Negative for arthralgias, back pain, gait problem and myalgias  Skin: Negative  Neurological: Negative    Negative for dizziness, syncope, weakness, light-headedness, numbness and headaches  Hematological: Negative  Does not bruise/bleed easily  All other systems reviewed and are negative  Vital Signs:     Vitals:    06/16/21 1400   BP: 120/68   BP Location: Left arm   Patient Position: Sitting   Cuff Size: Standard   Pulse: 63   Resp: 18   Temp: 98 2 °F (36 8 °C)   TempSrc: Tympanic   Weight: 77 8 kg (171 lb 8 oz)   Height: 5' 6" (1 676 m)       Physical Exam:     Physical Exam  Constitutional:       General: He is not in acute distress  Appearance: Normal appearance  He is well-developed  He is not ill-appearing or toxic-appearing  Comments: Laying in bed in NAD   HENT:      Head: Normocephalic and atraumatic  Mouth/Throat:      Dentition: Normal dentition  Does not have dentures  Pharynx: Uvula midline  Neck:      Vascular: No carotid bruit or JVD  Trachea: Trachea normal    Cardiovascular:      Rate and Rhythm: Normal rate and regular rhythm  Chest Wall: PMI is not displaced  Heart sounds: S1 normal and S2 normal  Murmur heard  Systolic murmur is present with a grade of 4/6  No friction rub  No S3 or S4 sounds  Comments: No heaves/lifts on palpation  Pulmonary:      Effort: Pulmonary effort is normal  No accessory muscle usage or respiratory distress  Breath sounds: Normal breath sounds  No wheezing, rhonchi or rales  Abdominal:      General: Bowel sounds are normal  There is no distension  Palpations: Abdomen is not rigid  There is no mass  Tenderness: There is no abdominal tenderness  There is no guarding or rebound  Musculoskeletal:      Cervical back: Normal range of motion and neck supple  Skin:     General: Skin is warm and dry  Findings: No bruising, petechiae or rash  Nails: There is no clubbing        Comments: Trace edema b/l LE, no varicosities appreciated to b/l LE   Neurological:      Mental Status: He is alert and oriented to person, place, and time  Cranial Nerves: No cranial nerve deficit  Sensory: No sensory deficit  Comments: No focal deficit appreciated         Lab Results:               Invalid input(s): LABGLOM      Lab Results   Component Value Date    HGBA1C 5 9 (H) 03/12/2021     Lab Results   Component Value Date    CKTOTAL 135 04/07/2014    TROPONINI 0 02 09/24/2020       Imaging Studies:     Transthoracic Echocardiogram 4/1/2021: EF 55%, no RWMA, grade 1 DD, mild MR, trileaflet AV w/ sever AS/mild AI (mean 26, MARIO ALBERTO 0 87cm2), mild TR, PAP WNL, mild dilatation of ascending aorta    Gated CTA of the chest/abdomen/pelvis 6/3/2021: TAVR measurements complete, mild pulm fibrosis, mild splenomegaly    Left cardiac catheterization 5/17/2021: no CAD    Carotid artery ultrasound 6/3/2021: <50% SHANNAN stenosis, ~69% LICA stenosis, antegrade flow b/l, no subclavian disease b/l    I have personally reviewed pertinent reports     and I have personally reviewed pertinent films in PACS    TAVR evaluation Assessment:     5 Meter Walk Test:      Attempt 1: 8   Attempt 2: 9   Attempt 3: 8    STS Risk Score: 1 97 %, mortality risk    [address]  [facility] [address]: III    KCCQ-12 was completed    Assessment:  Patient Active Problem List    Diagnosis Date Noted    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    Benign prostatic hyperplasia with nocturia 11/09/2020    Minor depression 07/07/2020    Thrombocytopenia ([address] ) 07/07/2020    Esophageal stricture 07/07/2020    Pulmonary fibrosis ([address] ) 06/12/2020    Arthropathy of right knee 03/05/2020    Pharyngoesophageal dysphagia 08/26/2019    Trigger ring finger of right hand 06/13/2019    Controlled type 2 diabetes mellitus without complication, without long-term current use of insulin ([address] ) 05/10/2019    Aortic stenosis, severe 04/04/2019    Benign prostatic hyperplasia without lower urinary tract symptoms 12/10/2018    Bilateral carotid artery stenosis 12/10/2018    Pure hypercholesterolemia 12/10/2018 Severe aortic stenosis; Ongoing TAVR workup    Plan:    Claudia Lara has severe symptomatic aortic stenosis  Based on their STS risk assessment, they have undergone testing for transcatheter aortic valve replacement  The results of these studies have been interpreted by ANA Guerra  who has determined the patient to be an appropriate candidate for transcatheter aortic valve replacement  The risks, benefits, and alternatives to TAVR were discussed in detail with the patient today  They understand and wish to proceed with transfemoral transcatheter aortic valve replacement  Informed consent was obtained and a date for the intervention has been set  Claudia Lara was comfortable with our recommendations, and their questions were answered to their satisfaction  The following preoperative instructions were provided at the conclusion of their consultation:     1  You will receive a phone call from the hospital between 2:00 PM and 8:00 PM the day prior to surgery to confirm arrival time and location  For surgery on Mondays, you will receive a call on Friday  2  Do not drink or eat anything after midnight the night before surgery  That includes no water, candy, gum, lozenges, Lifesavers, etc  We recommend you not eat any "junk" food, consume alcohol or smoke the night before surgery  3  Continue taking aspirin but only 81 mg daily  4  If you take Coumadin and/or Plavix, discontinue it 5 days before surgery  5  If you are diabetic, do not take any of your diabetic pills the morning of surgery  If you take Lantus insulin, you may take it at your regularly scheduled time the day before surgery  Do not take any other insulins the morning of surgery  6  The 2 nights before surgery, take a shower each night using the special antiseptic soap or soap sponges you received from the office or [facility] Flav[place] your hair with regular shampoo and rinse completely before using the antiseptic sponges  Use the sponge to wash from your neck down, with special attention to the armpits and groin area  Do not use any other soap or cleanser on your skin  Do not use lotions, powder, deodorant or perfume of any kind on your skin after you shower  Use clean bed linens and wear clean pajamas after your shower  7  You will be prescribed Mupirocin nasal ointment  Apply to both nostrils twice a day for 5 days prior to surgery  8  Do not take a shower the morning of her surgery; you'll be given a special" bath" at the hospital   9  Notify the CT surgery office if you develop a cold, sore throat, cough, fever or other health issues before your surgery  10  Other medication changes included the following: stop Amaryl 3 days prior to sx     SIGNATURE: Nicola Simons PA-C  DATE: June 16, 2021  TIME: 2:04 PM    * This note was completed in part utilizing [employer] direct voice recognition software  Grammatical errors, random word insertion, spelling mistakes, and incomplete sentences may be an occasional consequence of the system secondary to software limitations, ambient noise and hardware issues  At the time of dictation, efforts were made to edit, clarify and /or correct errors  Please read the chart carefully and recognize, using context, where substitutions have occurred  If you have any questions or concerns about the context, text or information contained within the body of this dictation, please contact myself, the provider, for further clarification

## 2021-06-16 NOTE — PATIENT INSTRUCTIONS
PRE-OP INSTRUCTIONS  1  You will receive a phone call from the hospital between 2:00 PM and 8:00 PM the day prior to surgery to confirm arrival time and location  For surgery on Mondays, you will receive a call on Friday  2  Do not drink or eat anything after midnight the night before surgery  That includes no water, candy, gum, lozenges, Lifesavers, etc  We recommend you not eat any junk food, consume alcohol or smoke the night before surgery  3  Continue taking aspirin but only 81 mg daily  4  If you take Coumadin and/or Plavix, discontinue it 5 days before surgery  5  If you are diabetic, do not take any of your diabetic pills the morning of surgery  If you take Lantus insulin, you may take it at your regularly scheduled time the day before surgery  Do not take any other insulins the morning of surgery  6  The 2 nights before surgery, take a shower each night using the special antiseptic soap or soap sponges you received from the office or hospital  Sherren Bernardo your hair with regular shampoo and rinse completely before using the antiseptic sponges  Use the sponge to wash from your neck down, with special attention to the armpits and groin area  Do not use any other soap or cleanser on your skin  Do not use lotions, powder, deodorant or perfume of any kind on your skin after you shower  Use clean bed linens and wear clean pajamas after your shower  7  You will be prescribed Mupirocin nasal ointment  Apply to both nostrils twice a day for 5 days prior to surgery  8  Do not take a shower the morning of surgery; you'll be given a special bath at the hospital   9  Notify the CT surgery office if you develop a cold, sore throat, cough, fever or other health issues before your surgery  10  Other medication changes included the following: stop Amaryl 3 days prior to sx

## 2021-06-16 NOTE — PROGRESS NOTES
Consultation - Cardiothoracic Surgery   Britt Smith 81 y o  male MRN: [id number]      Reason for Consult / Principal Problem: Aortic stenosis, Non-Rheumatic    History of Present Illness: Britt Smith is a 81y o  year old male who was previously evaluated in our office by ANA Delgado  for transcatheter aortic valve replacement  During this initial consultation, arrangements were made for the following studies to be completed: gated CTA of the chest, abdomen, and pelvis, dental clearance and carotid ultrasound  Britt Smith now presents to review the results of these tests and confirm the suitability of proceeding with transcatheter aortic valve replacment  C/o stable c/o JOE, intermittent LE edema & overall fatigue  Changes to PMH include EGD complete & establishment of care w/ urology (not on any medication at this time for BPH (using depends now for dribbling)      Past Medical History:  Past Medical History:   Diagnosis Date    BPH (benign prostatic hyperplasia)     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     Carotid stenosis, asymptomatic, left     ~75% LICA    Depression (emotion) 11/16/2018    Diabetes mellitus ([facility] [address] )     type 2, non-insulin dependent    Dysphagia     Esophageal stricture     s/p dilation    Former tobacco use     GERD (gastroesophageal reflux disease)     History of basal cell carcinoma excision     History of Helicobacter pylori infection     History of shingles     History of TIA (transient ischemic attack)     multiple, no residual sx    Hyperlipidemia     Hypertension     Personal history of colonic polyps     Post herpetic neuralgia     Pulmonary fibrosis (HCC)     mild    Severe aortic stenosis     Splenomegaly     mild     Past Surgical History:   Past Surgical History:   Procedure Laterality Date    ANAL SPHINCTEROTOMY      BASAL CELL CARCINOMA EXCISION      CARDIAC CATHETERIZATION      CHOLECYSTECTOMY      OPEN REDUCTION SHOULDER DISLOCATION  ROTATOR CUFF REPAIR       Family History:  Family History   Problem Relation Age of Onset    Depression Mother     Hyperlipidemia Mother     Substance Abuse Neg Hx     Colon cancer Neg Hx     Colon polyps Neg Hx      Social History:  Social History     Substance and Sexual Activity   Alcohol Use No     Social History     Substance and Sexual Activity   Drug Use No     Social History     Tobacco Use   Smoking Status Former Smoker   Smokeless Tobacco Former User    Quit date: 1/1/1976       Home Medications:   Prior to Admission medications    Medication Sig Start Date End Date Taking? Authorizing Provider   aspirin (ECOTRIN LOW STRENGTH) 81 mg EC tablet Take 1 tablet (81 mg total) by mouth daily 11/9/20  Yes Venice Mora MD   atorvastatin (LIPITOR) 10 mg tablet TAKE 1 TABLET BY MOUTH EVERY DAY 3/31/20  Yes Venice Mora MD   glimepiride (AMARYL) 2 mg tablet TAKE 1 TABLET BY MOUTH DAILY WITH BREAKFAST  Patient taking differently: 1/2 tab daily 9/3/20  Yes Carter Kwong PA-C   omeprazole (PriLOSEC) 40 MG capsule Take 1 capsule (40 mg total) by mouth daily 6/2/21  Yes Ramila Pham MD   True Metrix Blood Glucose Test test strip Testing 1 time daily DX:[id number] 9 4/21/21  Yes Venice Mora MD       Allergies: Allergies   Allergen Reactions    Metformin Diarrhea       Review of Systems:     Review of Systems   Constitutional: Positive for activity change  Negative for diaphoresis, fatigue and unexpected weight change  HENT: Negative  Negative for dental problem  Respiratory: Positive for shortness of breath  Negative for chest tightness and wheezing  Cardiovascular: Positive for leg swelling  Negative for chest pain and palpitations  Gastrointestinal: Negative  Negative for blood in stool, constipation, diarrhea, nausea and vomiting  Genitourinary: Negative  Musculoskeletal: Negative  Negative for arthralgias, back pain, gait problem and myalgias  Skin: Negative  Neurological: Negative  Negative for dizziness, syncope, weakness, light-headedness, numbness and headaches  Hematological: Negative  Does not bruise/bleed easily  All other systems reviewed and are negative  Vital Signs:     Vitals:    06/16/21 1400   BP: 120/68   BP Location: Left arm   Patient Position: Sitting   Cuff Size: Standard   Pulse: 63   Resp: 18   Temp: 98 2 °F (36 8 °C)   TempSrc: Tympanic   Weight: 77 8 kg (171 lb 8 oz)   Height: 5' 6" (1 676 m)       Physical Exam:     Physical Exam  Constitutional:       General: He is not in acute distress  Appearance: Normal appearance  He is well-developed  He is not ill-appearing or toxic-appearing  Comments: Laying in bed in NAD   HENT:      Head: Normocephalic and atraumatic  Mouth/Throat:      Dentition: Normal dentition  Does not have dentures  Pharynx: Uvula midline  Neck:      Vascular: No carotid bruit or JVD  Trachea: Trachea normal    Cardiovascular:      Rate and Rhythm: Normal rate and regular rhythm  Chest Wall: PMI is not displaced  Heart sounds: S1 normal and S2 normal  Murmur heard  Systolic murmur is present with a grade of 4/6  No friction rub  No S3 or S4 sounds  Comments: No heaves/lifts on palpation  Pulmonary:      Effort: Pulmonary effort is normal  No accessory muscle usage or respiratory distress  Breath sounds: Normal breath sounds  No wheezing, rhonchi or rales  Abdominal:      General: Bowel sounds are normal  There is no distension  Palpations: Abdomen is not rigid  There is no mass  Tenderness: There is no abdominal tenderness  There is no guarding or rebound  Musculoskeletal:      Cervical back: Normal range of motion and neck supple  Skin:     General: Skin is warm and dry  Findings: No bruising, petechiae or rash  Nails: There is no clubbing        Comments: Trace edema b/l LE, no varicosities appreciated to b/l LE   Neurological:      Mental Status: He is alert and oriented to person, place, and time  Cranial Nerves: No cranial nerve deficit  Sensory: No sensory deficit  Comments: No focal deficit appreciated         Lab Results:               Invalid input(s): LABGLOM      Lab Results   Component Value Date    HGBA1C 5 9 (H) 03/12/2021     Lab Results   Component Value Date    CKTOTAL 135 04/07/2014    TROPONINI 0 02 09/24/2020       Imaging Studies:     Transthoracic Echocardiogram 4/1/2021: EF 55%, no RWMA, grade 1 DD, mild MR, trileaflet AV w/ sever AS/mild AI (mean 26, MARIO ALBERTO 0 87cm2), mild TR, PAP WNL, mild dilatation of ascending aorta    Gated CTA of the chest/abdomen/pelvis 6/3/2021: TAVR measurements complete, mild pulm fibrosis, mild splenomegaly    Left cardiac catheterization 5/17/2021: no CAD    Carotid artery ultrasound 6/3/2021: <50% SHANNAN stenosis, ~84% LICA stenosis, antegrade flow b/l, no subclavian disease b/l    I have personally reviewed pertinent reports     and I have personally reviewed pertinent films in PACS    TAVR evaluation Assessment:     5 Meter Walk Test:      Attempt 1: 8   Attempt 2: 9   Attempt 3: 8    STS Risk Score: 1 97 %, mortality risk    [address]: III    KCCQ-12 was completed    Assessment:  Patient Active Problem List    Diagnosis Date Noted    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    Benign prostatic hyperplasia with nocturia 11/09/2020    Minor depression 07/07/2020    Thrombocytopenia ([address] ) 07/07/2020    Esophageal stricture 07/07/2020    Pulmonary fibrosis ([address] ) 06/12/2020    Arthropathy of right knee 03/05/2020    Pharyngoesophageal dysphagia 08/26/2019    Trigger ring finger of right hand 06/13/2019    Controlled type 2 diabetes mellitus without complication, without long-term current use of insulin ([address] ) 05/10/2019    Aortic stenosis, severe 04/04/2019    Benign prostatic hyperplasia without lower urinary tract symptoms 12/10/2018    Bilateral carotid artery stenosis 12/10/2018    Pure hypercholesterolemia 12/10/2018     Severe aortic stenosis; Ongoing TAVR workup    Plan:    Fam Alvarado has severe symptomatic aortic stenosis  Based on their STS risk assessment, they have undergone testing for transcatheter aortic valve replacement  The results of these studies have been interpreted by ANA Mcrae  who has determined the patient to be an appropriate candidate for transcatheter aortic valve replacement  The risks, benefits, and alternatives to TAVR were discussed in detail with the patient today  They understand and wish to proceed with transfemoral transcatheter aortic valve replacement  Informed consent was obtained and a date for the intervention has been set  Fam Alvarado was comfortable with our recommendations, and their questions were answered to their satisfaction  The following preoperative instructions were provided at the conclusion of their consultation:     1  You will receive a phone call from the hospital between 2:00 PM and 8:00 PM the day prior to surgery to confirm arrival time and location  For surgery on Mondays, you will receive a call on Friday  2  Do not drink or eat anything after midnight the night before surgery  That includes no water, candy, gum, lozenges, Lifesavers, etc  We recommend you not eat any "junk" food, consume alcohol or smoke the night before surgery  3  Continue taking aspirin but only 81 mg daily  4  If you take Coumadin and/or Plavix, discontinue it 5 days before surgery  5  If you are diabetic, do not take any of your diabetic pills the morning of surgery  If you take Lantus insulin, you may take it at your regularly scheduled time the day before surgery  Do not take any other insulins the morning of surgery    6  The 2 nights before surgery, take a shower each night using the special antiseptic soap or soap sponges you received from the office or hospital  Jules Pastel your hair with regular shampoo and rinse completely before using the antiseptic sponges  Use the sponge to wash from your neck down, with special attention to the armpits and groin area  Do not use any other soap or cleanser on your skin  Do not use lotions, powder, deodorant or perfume of any kind on your skin after you shower  Use clean bed linens and wear clean pajamas after your shower  7  You will be prescribed Mupirocin nasal ointment  Apply to both nostrils twice a day for 5 days prior to surgery  8  Do not take a shower the morning of her surgery; you'll be given a special" bath" at the hospital   9  Notify the CT surgery office if you develop a cold, sore throat, cough, fever or other health issues before your surgery  10  Other medication changes included the following: stop Amaryl 3 days prior to sx     SIGNATURE: Jose De Jesus Kee PA-C  DATE: June 16, 2021  TIME: 2:04 PM    * This note was completed in part utilizing [employer] direct voice recognition software  Grammatical errors, random word insertion, spelling mistakes, and incomplete sentences may be an occasional consequence of the system secondary to software limitations, ambient noise and hardware issues  At the time of dictation, efforts were made to edit, clarify and /or correct errors  Please read the chart carefully and recognize, using context, where substitutions have occurred  If you have any questions or concerns about the context, text or information contained within the body of this dictation, please contact myself, the provider, for further clarification

## 2021-06-18 NOTE — PRE-PROCEDURE INSTRUCTIONS
Pre-Surgery Instructions:   Medication Instructions    aspirin (ECOTRIN LOW STRENGTH) 81 mg EC tablet Patient was instructed by Physician and understands   atorvastatin (LIPITOR) 10 mg tablet Patient was instructed by Physician and understands   glimepiride (AMARYL) 2 mg tablet Patient was instructed by Physician and understands   mupirocin (BACTROBAN) 2 % nasal ointment Patient was instructed by Physician and understands   omeprazole (PriLOSEC) 40 MG capsule Patient was instructed by Physician and understands  Pre procedure instructions reviewed verbalizes understanding  NPO after MN  Bathing reviewed  Follow office instructions for medications and bathing  No NSAIDS   Instructions to swathi Lujan

## 2021-06-22 PROBLEM — Z95.2 S/P TAVR (TRANSCATHETER AORTIC VALVE REPLACEMENT): Status: ACTIVE | Noted: 2021-01-01

## 2021-06-22 NOTE — OP NOTE
OPERATIVE REPORT  PATIENT NAME: Jana Moreira    :  1935  MRN: [id number]  Pt Location: [facility] OR ROOM 02    SURGERY DATE: 2021    SURGEON: Hawa Guillen MD    CO-SURGEON: Chris Marti DO    ASSISTANT: Fabrizio Brown MD    ADDITIONAL ASSISTANT: Sarah Frankel PA-C    PREOPERATIVE DIAGNOSIS: Symptomatic severe aortic stenosis  POSTOPERATIVE DIAGNOSIS: Symptomatic severe aortic stenosis  NYHA Class: 3  CCS Class: 1    PROCEDURE:   Transcatheter aortic valve replacement with a 29 mm Giron PANCHO 3 bioprosthetic valve via left transfemoral approach  CARDIOPULMONARY BYPASS TIME: 0 minutes  ANESTHESIA Dr Blanca Thakur, general endotracheal anesthesia with transesophageal echocardiogram guidance  INDICATIONS:  The patient is a 81y o  year-old male with clinical and echocardiographic findings consistent with severe aortic stenosis  The patient was evaluated in our heart valve center, we recommended the procedure described previously  FINDINGS:  1  Intraoperative transesophageal echocardiogram revealed severe aortic stenosis  2  Final transesophageal echocardiogram demonstrated prosthetic valve with normal function trace perivalvular leak  OPERATIVE TECHNIQUE:    The patient was taken to the operating room and placed supine on the operating table  Following the satisfactory induction of general anesthesia and placement of monitoring lines, the patient was prepped and draped in the usual sterile fashion  A time-out procedure was performed  The left common femoral vein was accessed percutaneously using Seldinger technique and fluoroscopy, a balloon-tip temporary pacing catheter was inserted and advanced into the right ventricle and its capture was confirmed  The left common femoral artery was accessed percutaneously using Seldinger technique and fluoroscopy  Two (2) Perclose sutures were deployed in the standard fashion   The patient was systemically heparinized  A MegaZebraerquist extra-stiff wire was positioned in the ascending aorta over an exchange catheter  The sheath for the delivery system was inserted  A pigtail catheter was advanced to the right coronary cusp, an aortogram was performed to determine proper angle and orientation for valve deployment  The aortic valve was crossed with a 0 035 straight-tip wire, this was then exchanged for a curved tip extra stiff wire  A 29 mm PANCHO 3 valve delivery system was advanced through the delivery sheath into the aorta, the delivery system was configured for deployment  The valve on the delivery system was then advanced and the aortic valve was crossed  At this point, the catheter was desheathed in the standard fashion  The valve was positioned appropriately using a combination of fluoroscopy and transesophageal echocardiogram guidance  During an episode of rapid pacing, balloon deployment of the valve was performed  Following deployment, the position of the valve was confirmed by fluoroscopy and echocardiography and its position appeared appropriate with trace perivalvular leak  The valve delivery system was subsequently removed followed by removal of the sheath while the Perclose sutures were secured and direct pressure was held  Protamine was administered with normalization of the ACT  Pressure was released, without evidence of active bleeding  The left common femoral vein sheath was removed and pressure was held  COMPLICATIONS: None    PACKS/TUBES/DRAINS: None  EBL: 20  TRANSFUSION: None  SPECIMENS: None      As the attending surgeon, I was present and scrubbed for all critical portions of this procedure  There was no qualified surgical resident available  Sponge, needle and instrument counts were reported as correct by the nursing staff   A cardiology co-surgeon was required as is a CMS requirement      SIGNATURE: Odalis Scott MD  DATE: June 22, 2021  TIME: 1:58 PM

## 2021-06-22 NOTE — ANESTHESIA POSTPROCEDURE EVALUATION
Post-Op Assessment Note    CV Status:  Stable    Pain management: adequate     Mental Status:  Awake   Hydration Status:  Euvolemic   PONV Controlled:  Controlled   Airway Patency:  Patent      Post Op Vitals Reviewed: Yes      Staff: CRNA         No complications documented      /56 (06/22/21 1040)    Temp 97 5 °F (36 4 °C) (06/22/21 1040)    Pulse 66 (06/22/21 1040)   Resp 16 (06/22/21 1040)    SpO2 97 % (06/22/21 1040)

## 2021-06-22 NOTE — ANESTHESIA PROCEDURE NOTES
Central Line Insertion  Performed by: Nathalie Vegas MD  Authorized by: Nathalie Vegas MD     Date/Time: 6/22/2021 9:23 AM  Catheter Type:  triple lumen  Consent: Written consent obtained  Risks and benefits: risks, benefits and alternatives were discussed  Consent given by: patient  Patient understanding: patient states understanding of the procedure being performed  Patient consent: the patient's understanding of the procedure matches consent given  Patient identity confirmed: verbally with patient and arm band  Time out: Immediately prior to procedure a "time out" was called to verify the correct patient, procedure, equipment, support staff and site/side marked as required    Indications: vascular access  Location details: right internal jugular  Catheter size: 7 Fr  Patient position: Trendelenburg  Assessment: blood return through all ports and free fluid flow  Preparation: skin prepped with 2% chlorhexidine  Skin prep agent dried: skin prep agent completely dried prior to procedure  Sterile barriers: all five maximum sterile barriers used - cap, mask, sterile gown, sterile gloves, and large sterile sheet  Hand hygiene: hand hygiene performed prior to central venous catheter insertion  Ultrasound guidance: yes  sterile gel and probe cover used in ultrasound-guided central venous catheter insertionultrasound permanent image saved  Pre-procedure: landmarks identified  Number of attempts: 1  Successful placement: yes  Post-procedure: line sutured and dressing applied  Patient tolerance: Patient tolerated the procedure well with no immediate complications  Comments: Wires confirmed in svc by us; single vessel puncture easy thread

## 2021-06-22 NOTE — INTERVAL H&P NOTE
H&P reviewed  After examining the patient I find no changes in the patients condition since the H&P had been written  Vitals:    06/22/21 0751   BP: 117/54   Pulse: 72   Resp: 19   Temp: 97 5 °F (36 4 °C)   SpO2: 99%     Anticipated Length of Stay:  Patient will be admitted on an Inpatient basis with an anticipated length of stay of  greater than 2 midnights  Justification for Hospital Stay:  Post surgical recovery following open heart surgery

## 2021-06-22 NOTE — ANESTHESIA PROCEDURE NOTES
Procedure Performed: REBECCA Anesthesia  Start Time:  6/22/2021 9:31 AM        Preanesthesia Checklist    Patient identified, IV assessed, risks and benefits discussed, monitors and equipment assessed, procedure being performed at surgeon's request and anesthesia consent obtained  Procedure    Type of REBECCA: interventional REBECCA with real time guidance of intracardiac procedure  Images Saved: ultrasound permanent image saved  Location performed: OR  Intubated  Heart visualized  Insertion of REBECCA Probe:  Easy  Probe Type:  Epiaortic and multiplane  Modalities:  Color flow mapping, 3D, continuous wave Doppler and pulse wave Doppler  Echocardiographic and Doppler Measurements    PREPROCEDURE    LEFT VENTRICLE:  Systolic Function: moderately impaired  Ejection Fraction: 35%  Regional Wall Motion Abnormalities: global hk  RIGHT VENTRICLE:  Systolic Function: normal               AORTIC VALVE:  Leaflets: trileaflet  Leaflet motions restricted  Mean Gradient: 22 mmHg  Regurgitation: moderate  MITRAL VALVE:  Leaflets: calcified and thickened  Leaflet Motions: restricted  Regurgitation: trace  TRICUSPID VALVE:  Leaflets: normal        PULMONIC VALVE:  Leaflets: normal      OTHER VALVE FINDINGS:  Av 3cusps and heavily calcifed; area 4 1 cm2 by 3dq but low confidence measurement d/t shadowing  ASCENDING AORTA:  Size:  normal  Dissection not present  AORTIC ARCH:  Size:  normal  dissection not present  Grade 3: atheroma protruding < 0 5 cm into lumen  DESCENDING AORTA:  Size: normal  Dissection not present  Grade 3: atheroma protruding < 0 5 cm into lumen  RIGHT ATRIUM:  No spontaneous echo contrast     LEFT ATRIUM:  No spontaneous echo contrast     LEFT ATRIAL APPENDAGE:  No spontaneous echo contrast         ATRIAL SEPTUM:  Intra-atrial septal morphology: normal          VENTRICULAR SEPTUM:  Intra-ventricular septum morphology: normal          EPIAORTIC:  Plaque Thickness: 0-5 mm      OTHER FINDINGS:  Pericardium:  normal          POSTPROCEDURE    LEFT VENTRICLE:   Systolic Function: mildly depressed  Ejection Fraction: 45 %  RIGHT VENTRICLE: Unchanged   AORTIC VALVE:   Leaflets: bioprosthetic  Stenosis: none  Mean Gradient: 3 mmHg  Regurgitation: none  Valve Size: 29 mm  MITRAL VALVE: Unchanged   TRICUSPID VALVE: Unchanged   PULMONIC VALVE: Unchanged       OTHER VALVE FINDINGS: Valve well seated in aortic position; cusp motion nml x 3; no pvl; mg 3; eoa 3 61cm2  ATRIA: Unchanged   Left Atrial Appendage Ligate: No  Residual Flow in Left Atrial Appendage by Color Flow Doppler: No       AORTA: Unchanged   Dissection: Dissection not present  REMOVAL:  Probe Removal: atraumatic  ECHOCARDIOGRAM COMMENTS:  No PC effusion

## 2021-06-22 NOTE — PERIOPERATIVE NURSING NOTE
Awaiting available room, labs stable, VSS, no change lt groin fro 1045  Pt presently resting comfortably on his lt side   Pt's son updated, no questions

## 2021-06-22 NOTE — ANESTHESIA PREPROCEDURE EVALUATION
Procedure:  REPLACEMENT AORTIC VALVE TRANSCATHETER (TAVR) TRANSFEMORAL W/ 29MM PATEL PANCHO S3 ULTRA VALVE(ACCESS ON LEFT) (N/A Chest)  TRANSESOPHAGEAL ECHOCARDIOGRAM (REBECCA) (N/A Esophagus)    Relevant Problems   CARDIO   (+) Aortic stenosis, severe   (+) Pure hypercholesterolemia   (+) S/P TAVR (transcatheter aortic valve replacement)      ENDO   (+) Controlled type 2 diabetes mellitus without complication, without long-term current use of insulin (HCC)      GI/HEPATIC   (+) Pharyngoesophageal dysphagia      /RENAL   (+) Benign prostatic hyperplasia without lower urinary tract symptoms      HEMATOLOGY   (+) Thrombocytopenia (HCC)      NEURO/PSYCH   (+) Minor depression      NO STRICTURES ON EGD    Lab Results   Component Value Date    WBC 4 17 (L) 2021    HGB 12 6 2021    PLT 69 (L) 2021     Lab Results   Component Value Date    SODIUM 139 2021    K 4 1 2021    BUN 17 2021    CREATININE 1 17 2021    EGFR 57 2021    GLUCOSE 150 (H) 2014     Lab Results   Component Value Date    PTT 42 (H) 2018      Lab Results   Component Value Date    INR 1 13 2021       Blood type A    Lab Results   Component Value Date    HGBA1C 5 9 (H) 2021       [place], [place] [address]     Transthoracic Echocardiogram  2D, M-mode, Doppler, and Color Doppler     Study date:  2021     Patient: Heather Leiva  MR number: [id number]  Account number: [de-identified]  : 1935  Age: 81 years  Gender: Male  Status: Outpatient  Location: [address]  Height: 66 in  Weight: 168 7 lb  BP: 110/ 60 mmHg     Indications:  Moderate Aortic Stenosis     Diagnoses: I35 0 - Nonrheumatic aortic (valve) stenosis     Sonographer:  Tamika Arnold RDCS  Referring Physician:  Rupali Funez MD  Group:  [facility]  Interpreting Physician:  Magi Spear MD     SUMMARY     LEFT VENTRICLE:  Systolic function was normal  Ejection fraction was estimated to be 55 %  There were no regional wall motion abnormalities  Wall thickness was mildly increased  Doppler parameters were consistent with abnormal left ventricular relaxation (grade 1 diastolic dysfunction)      MITRAL VALVE:  There was mild regurgitation      AORTIC VALVE:  The valve was trileaflet  Leaflets exhibited markedly increased thickness and marked calcification  Transaortic velocity was increased due to valvular stenosis  There was severe stenosis  There was mild regurgitation  Valve mean gradient was 26 mmHg  Estimated aortic valve area (by VTI) was 0 87 cmï¾²     TRICUSPID VALVE:  There was mild regurgitation  Pulmonary artery systolic pressure was within the normal range      AORTA:  There was mild dilatation of the ascending aorta  Physical Exam    Airway    Mallampati score: II  TM Distance: >3 FB       Dental   No notable dental hx     Cardiovascular      Pulmonary      Other Findings        Anesthesia Plan  ASA Score- 4     Anesthesia Type- general with ASA Monitors  Additional Monitors: central venous line, pulmonary artery catheter and arterial line  Airway Plan: ETT  Comment: CORI Hatfield , have personally seen and evaluated the patient prior to anesthetic care  I have reviewed the pre-anesthetic record, and other medical records if appropriate to the anesthetic care  If a CRNA is involved in the case, I have reviewed the CRNA assessment, if present, and agree  Risks/benefits and alternatives discussed with patient including possible PONV, sore throat, and possibility of rare anesthetic and surgical emergencies  Patient REPORTS history of dysphagia, diverticula, esophageal dysmotility, strictures, stents, or varices  RECENT EGD W/O STRICTURES    Preinduction ABP; post-induction TLC; REBECCA    Plan Factors-    Chart reviewed  EKG reviewed  Imaging results reviewed  Existing labs reviewed   Patient summary reviewed  Patient is not a current smoker  Patient not instructed to abstain from smoking on day of procedure  Obstructive sleep apnea risk education given perioperatively  Induction- intravenous  Postoperative Plan- Plan for postoperative opioid use  Planned trial extubation    Informed Consent- Anesthetic plan and risks discussed with patient  I personally reviewed this patient with the CRNA  Discussed and agreed on the Anesthesia Plan with the CRNA  Juventino Valerio

## 2021-06-22 NOTE — ANESTHESIA PROCEDURE NOTES
Arterial Line Insertion  Performed by: Zhen Samuel CRNA  Authorized by: Paige Tejeda MD   Preparation: Patient was prepped and draped in the usual sterile fashion    Indications: hemodynamic monitoring  Orientation:  Right  Location: radial arterylidocaine (PF) (XYLOCAINE-MPF) 1 % infiltration, 0 2 mL  Sedation:  Patient sedated: yes  Analgesia: see MAR for details    Procedure Details:  Needle gauge: 20  Number of attempts: 1    Post-procedure:  Post-procedure: dressing applied  Waveform: good waveform  Patient tolerance: Patient tolerated the procedure well with no immediate complications

## 2021-06-23 PROBLEM — I44.7 LBBB (LEFT BUNDLE BRANCH BLOCK): Status: ACTIVE | Noted: 2021-01-01

## 2021-06-23 PROBLEM — D64.9 ANEMIA: Status: ACTIVE | Noted: 2021-01-01

## 2021-06-23 NOTE — OCCUPATIONAL THERAPY NOTE
Occupational Therapy Evaluation     Patient Name: Fredrick Litten  LKBVC'V Date: 6/23/2021  Problem List  Principal Problem: Aortic stenosis, severe  Active Problems:    Benign prostatic hyperplasia without lower urinary tract symptoms    Bilateral carotid artery stenosis    Pure hypercholesterolemia    Controlled type 2 diabetes mellitus without complication, without long-term current use of insulin (HCC)    Pulmonary fibrosis (HCC)    Minor depression    Thrombocytopenia (HCC)    Neurologic gait dysfunction    Benign prostatic hyperplasia with nocturia    S/P TAVR (transcatheter aortic valve replacement)    LBBB (left bundle branch block)    Anemia    Past Medical History  Past Medical History:   Diagnosis Date    BPH (benign prostatic hyperplasia)     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     Carotid stenosis, asymptomatic, left     ~46% LICA    Depression (emotion) 11/16/2018    Diabetes mellitus ([facility] [address] )     type 2, non-insulin dependent    Dysphagia     Esophageal stricture     s/p dilation    Former tobacco use     GERD (gastroesophageal reflux disease)     History of basal cell carcinoma excision     History of Helicobacter pylori infection     History of shingles     History of TIA (transient ischemic attack)     multiple, no residual sx    Hyperlipidemia     Hypertension     Personal history of colonic polyps     Post herpetic neuralgia     Pulmonary fibrosis (HCC)     mild    Severe aortic stenosis     Splenomegaly     mild     Past Surgical History  Past Surgical History:   Procedure Laterality Date    ANAL SPHINCTEROTOMY      BASAL CELL CARCINOMA EXCISION      CARDIAC CATHETERIZATION      cardiac catheterization    CHOLECYSTECTOMY      EGD      OPEN REDUCTION SHOULDER DISLOCATION      WI ECHO TRANSESOPHAG R-T 2D W/PRB IMG ACQUISJ I&R N/A 6/22/2021    Procedure: TRANSESOPHAGEAL ECHOCARDIOGRAM (REBECCA);   Surgeon: Davie Gurrola MD;  [facility];  Service: Cardiac Surgery  NH REPLACE AORTIC VALVE OPENFEMORAL ARTERY APPROACH N/A 6/22/2021    Procedure: REPLACEMENT AORTIC VALVE TRANSCATHETER (TAVR) TRANSFEMORAL W/ 29MM PATEL PANCHO S3 ULTRA VALVE(ACCESS ON LEFT); Surgeon: Deonna Dupree MD;  [facility];  Service: Cardiac Surgery    ROTATOR CUFF REPAIR Left          06/23/21 1205   OT Last Visit   OT Visit Date 06/23/21   Note Type   Note type Evaluation   Restrictions/Precautions   Weight Bearing Precautions Per Order No   Other Precautions Cognitive;Cardiac/sternal;Impulsive; Chair Alarm; Bed Alarm;Multiple lines;Telemetry; Fall Risk   Pain Assessment   Pain Assessment Tool 0-10   Pain Score No Pain   Home Living   Type of [address] One level;Stairs to enter with rails; Other (Comment)  (3 [address])   Bathroom Shower/Tub Walk-in shower   Bathroom Toilet Standard   Bathroom Equipment Grab bars in [address]   Additional Comments Pt reports he lives in a 1 floor home  Pt was tangential when questions were asked and often veered from question  Would be beneficial to confirm with son or continue to reassess   Prior Function   Level of [place] Independent with ADLs and functional mobility  (pt reports independence)   Lives With Alone; Other (Comment)  ()   Receives Help From Family  (pt reports his son is a good support)   ADL Assistance Independent   IADLs Independent   Falls in the last 6 months 0  (pt denies)   Vocational Retired   Comments Pt enjoys gardening    Lifestyle   Autonomy PTA pt reports I in ADLs, IADLs  He reports his son drives him  Spoke with son who reports significant functional decline over the past 6 months   Reciprocal Relationships Pt reports that his son is a good support but lives over 100 miles away   Pt reports he has a sister but she has cataracts and would be unable to assist him if he needed it    Service to Others Retired   [address] Pt enjoys gardening Psychosocial   Psychosocial (WDL) X   Patient Behaviors/Mood Labile  (Pt had bouts of tearfulness, calmness, and confusion )   Subjective   Subjective Pt hopes he is able to return to prior LOF   ADL   Where Assessed Chair   Eating Assistance 7  Independent   Grooming Assistance 5  [address] [facility] 4  [address] 4  [address]  4  Minimal Assistance   Bed Mobility   Supine to Sit 3  Moderate assistance   Additional items Bedrails;HOB elevated; Increased time required;Verbal cues;LE management   Additional Comments Pt was laying in bed at start of session  Pt was in chair with alarm on, call bell placed in reach, and all other reported needs met   Transfers   Sit to Stand 4  Minimal assistance   Additional items Assist x 1; Increased time required;Verbal cues   Stand to Sit 4  Minimal assistance   Additional items Assist x 1; Increased time required;Verbal cues   Functional Mobility   Functional Mobility 4  Minimal assistance   Additional Comments Pt demonstrated household mobility with use of RW  Pt ambulated very slowly and needed verbal cues to keep going as he would get distracted and stop while he was talking to therapy team   Additional items Rolling walker   Balance   Static Sitting Fair   Dynamic Sitting Fair   Static Standing Fair -   Dynamic Standing Poor +   Ambulatory Poor +   Activity Tolerance   Activity Tolerance Patient tolerated treatment well   Medical Staff Made Aware co-eval with PT 2* pts medical complexity   Nurse Made Aware Nsg ok'd therapy and was in room at conclusion of therapy   Cognition   Overall Cognitive Status Impaired   Arousal/Participation Cooperative; Alert   Attention Attends with cues to redirect   Orientation Level Oriented X4   Memory Decreased recall of precautions   Following Commands Follows one step commands with increased time or repetition   Comments Pt tangential and easily distracted  Pt labile and mood changed frequently throughout session  Plan to complete formal cognitive assessment   Assessment   Limitation Decreased ADL status; Decreased UE strength;Decreased Safe judgement during ADL;Decreased cognition;Decreased endurance;Decreased self-care trans;Decreased high-level ADLs   Prognosis Fair   Assessment Pt is a 81 y o  male admitted to [facility] on 6/22/2021 s/p TAVR 2* severe aortic stenosis  Pt  has a past medical history of BPH (benign prostatic hyperplasia), 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, Carotid stenosis, asymptomatic, left, Depression (emotion) (11/16/2018), Diabetes mellitus ([facility] [address] ), Dysphagia, Esophageal stricture, Former tobacco use, GERD (gastroesophageal reflux disease), History of basal cell carcinoma excision, History of Helicobacter pylori infection, History of shingles, History of TIA (transient ischemic attack), Hyperlipidemia, Hypertension, Personal history of colonic polyps, Post herpetic neuralgia, Pulmonary fibrosis ([facility] [address] ), Severe aortic stenosis, and Splenomegaly  Pt has active orders for OT and ambulation orders  Pt lives alone in a one floor mobile home with 3 GAURANG & handrails  He has little social support in the area  He reports that his son lives over 100 miles away and his sister has cataracts and would not be able to assist him  PTA pt reports I in ADLs, IADLs, and drove  Pt reports that he has a cane that he uses for functional mobility  Despite reporting he can drive pt reported that his son usually drives him around  Pt currently requires Min A for functional transfers, Mod A for bed mobility, Min A for UB ADLs and LB ADLs  Occupational performance areas to address include grooming, bathing/shower, toilet hygiene, dressing, medication management, health maintenance, functional mobility, community mobility, meal prep and household maintenance   Pt is currently limited by endurance, activity tolerance, functional mobility, forward functional reach, balance, functional standing tolerance, unsupportive home environment, decreased I w/ ADLS/IADLS, strength and cognitive impairments  From and OT standpoint, anticipate d/c to post acute rehabilitation pending pt [place]  Pt will benefit from OT while in the hospital to address the following goals for: 3-5x/week to  w/in 10-14 days: Gerhardt Sep Goals   Patient Goals get better   LTG Time Frame 10-14   Long Term Goal #1 see goals below   Plan   Treatment Interventions ADL retraining;Functional transfer training;UE strengthening/ROM; Endurance training;Cognitive reorientation;Equipment evaluation/education;Patient/family training; Compensatory technique education;Continued evaluation; Energy conservation; Activityengagement;Cardiac education   Goal Expiration Date 21   OT Frequency 3-5x/wk   Recommendation   OT Discharge Recommendation Post acute rehabilitation services   OT - OK to Discharge Yes  (when medically appropriate)   AM-PAC Daily Activity Inpatient   Lower Body Dressing 2   Bathing 2   Toileting 2   Upper Body Dressing 3   Grooming 3   Eating 4   Daily Activity Raw Score 16   Daily Activity Standardized Score (Calc for Raw Score >=11) 35 96   AM-PAC Applied Cognition Inpatient   Following a Speech/Presentation 3   Understanding Ordinary Conversation 3   Taking Medications 3   Remembering Where Things Are Placed or Put Away 3   Remembering List of 4-5 Errands 2   Taking Care of Complicated Tasks 2   Applied Cognition Raw Score 16   Applied Cognition Standardized Score 35 03   Modified Steven Scale   Modified [place] Scale 4     Goals:  1) Pt will complete functional mobility with Mod I and AE/DME PRN to increase independence in ADLs/IADLs  2) Pt will complete functional transfers with Mod I and AE/DME PRN to increase participation in ADLs/IADLs  3) Pt will complete UB dressing with Mod I and AE/DME PRN    4) Pt will complete LB dressing with Mod I and AE/DME PRN  5) Pt will complete toileting with Mod I and AE/DME PRN  6) Pt will participate in formal cognitive assessment PRN to assist in safe d/c plan  7) Pt will demonstrate good safety awareness and problem solving skills during ADLs/IADLs with AE/DME PRN  8) Pt will increase activity tolerance to G for 30 minute session  9) Pt will participate in and demonstrate understanding of cardiac packet during functional activities      Ashley Bell, OTS

## 2021-06-23 NOTE — PLAN OF CARE
Problem: OCCUPATIONAL THERAPY ADULT  Goal: Performs self-care activities at highest level of function for planned discharge setting  See evaluation for individualized goals  Description: Treatment Interventions: ADL retraining, Functional transfer training, UE strengthening/ROM, Endurance training, Cognitive reorientation, Equipment evaluation/education, Patient/family training, Compensatory technique education, Continued evaluation, Energy conservation, Activityengagement, Cardiac education          See flowsheet documentation for full assessment, interventions and recommendations  Outcome: Progressing  Note: Limitation: Decreased ADL status, Decreased UE strength, Decreased Safe judgement during ADL, Decreased cognition, Decreased endurance, Decreased self-care trans, Decreased high-level ADLs  Prognosis: Fair  Assessment: Pt is a 81 y o  male admitted to [facility] on 6/22/2021 s/p TAVR 2* severe aortic stenosis  Pt  has a past medical history of BPH (benign prostatic hyperplasia), 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, Carotid stenosis, asymptomatic, left, Depression (emotion) (11/16/2018), Diabetes mellitus ([facility] [address] ), Dysphagia, Esophageal stricture, Former tobacco use, GERD (gastroesophageal reflux disease), History of basal cell carcinoma excision, History of Helicobacter pylori infection, History of shingles, History of TIA (transient ischemic attack), Hyperlipidemia, Hypertension, Personal history of colonic polyps, Post herpetic neuralgia, Pulmonary fibrosis ([address] ), Severe aortic stenosis, and Splenomegaly  Pt has active orders for OT and ambulation orders  Pt lives alone in a one floor mobile home with 3 [address] & handrails  He has little social support in the area  He reports that his son lives over 100 miles away and his sister has cataracts and would not be able to assist him  PTA pt reports I in ADLs, IADLs, and drove  Pt reports that he has a cane that he uses for functional mobility   Despite reporting he can drive pt reported that his son usually drives him around  Pt currently requires Min A for functional transfers, Mod A for bed mobility, Min A for UB ADLs and LB ADLs  Occupational performance areas to address include grooming, bathing/shower, toilet hygiene, dressing, medication management, health maintenance, functional mobility, community mobility, meal prep and household maintenance  Pt is currently limited by endurance, activity tolerance, functional mobility, forward functional reach, balance, functional standing tolerance, unsupportive home environment, decreased I w/ ADLS/IADLS, strength and cognitive impairments  From and OT standpoint, anticipate d/c to post acute rehabilitation pending pt [place]  Pt will benefit from OT while in the hospital to address the following goals for: 3-5x/week to  w/in 10-14 days:         OT Discharge Recommendation: Post acute rehabilitation services  OT - OK to Discharge: Yes (when medically appropriate)

## 2021-06-23 NOTE — CONSULTS
Consultation - Bronson Moore [place] 81 y o  male MRN: [id number]  Unit/Bed#: [facility] 426-01 Encounter: [id number]      Assessment/Plan  1  Aortic Stenosis  S/p TAVR  CT surgery managing    2  Frailty  Mild   Risk factors: hospitalization, polypharmacy, urinary frequency, limited caregiver support, cognitive decline, ambulatory dysfunction  PT/OT  Albumin 3 5, maintain protein in diet  Keep physically, mentally and socially active    3  Normal age related cognitive decline  No reported memory loss  Recommend check TSH and Vitamin B12  Keep physically, mentally and socially active  Recommend [address]    4  Ambulatory dysfunction  Hx of falls, uses a cane  At risk for falls secondary to age, polypharmacy, gait instability, visual impairment, home environment  Recommend fall prevention/ balance training such as Matter of Balance or [address] or yoga  Keep physically active  Hand outs given from Jewel Gustafson, Stay Independent, Chair Rise Exercises, What YOU Can Do to Prevent Falls    5  Insomnia  Related to hospitalization  First line is behavioral therapy  Avoid sedative hypnotics such as benzodiazepines and benadryl  Encourage staying awake during the day  Encourage daytime activity, morning exercise  Decrease or eliminate day time naps  Avoid caffiene, alcohol especially during late afternoon and evening hours  Establish a night time routine  Recommend melatonin 3mg po QHS    6   Delirium precautions  Alert and oriented x 3 at baseline  Provide frequent redirection, reorientation, distraction techniques  Avoid deliriogenic medications such as tramadol, benzodiazepines, anticholinergics,  Benadryl  Treat pain, See geriatric pain protocol  Monitor for constipation and urinary retention  Encourage early and frequent moblization, OOB  Encourage Hydration/ Nutrition  Implement sleep hygiene, limit night time interuptions, group activities      History of Present Illness   Physician Requesting Consult: Ji Saldana Franny Carpenter MD  Reason for Consult / Principal Problem: aortic stenosis  Hx and PE limited by: NA  HPI: Capri Wise is a 81y o  year old male who presents with aortic stenosis  He is admitted for transcatheter aortic valve replacement surgery  He has BPH, DM, GERD, HTN, hyperlipidemia  Prior to arrival pt lives at home alone  He is independent with IADLs and ADLS  He has a history of previous falls  He wears glasses  He has no issues with hearing or dentition  He denies issues with memory  He states he does not have hobbies or exercise at home  Upon exam pt is oob in recliner chair  He states he is tired, he had a busy day  Inpatient consult to Gerontology  Consult performed by: ADALBERTO Littlejohn  Consult ordered by: Fredericka Apgar, PA-C          Review of Systems   Constitutional: Negative for unexpected weight change  HENT: Negative for hearing loss  Eyes: Negative for visual disturbance  Respiratory: Negative for cough  Cardiovascular: Negative for chest pain  Gastrointestinal: Negative for constipation  Genitourinary: Negative for difficulty urinating  Musculoskeletal: Positive for gait problem  Skin: Negative for color change  Neurological: Negative for dizziness  Psychiatric/Behavioral: Negative for sleep disturbance         Historical Information   Past Medical History:   Diagnosis Date    BPH (benign prostatic hyperplasia)     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     Carotid stenosis, asymptomatic, left     ~38% LICA    Depression (emotion) 2018    Diabetes mellitus ([facility] [address] )     type 2, non-insulin dependent    Dysphagia     Esophageal stricture     s/p dilation    Former tobacco use     GERD (gastroesophageal reflux disease)     History of basal cell carcinoma excision     History of Helicobacter pylori infection     History of shingles     History of TIA (transient ischemic attack)     multiple, no residual sx    Hyperlipidemia     Hypertension  Personal history of colonic polyps     Post herpetic neuralgia     Pulmonary fibrosis (HCC)     mild    Severe aortic stenosis     Splenomegaly     mild     Past Surgical History:   Procedure Laterality Date    ANAL SPHINCTEROTOMY      BASAL CELL CARCINOMA EXCISION      CARDIAC CATHETERIZATION      cardiac catheterization    CHOLECYSTECTOMY      EGD      OPEN REDUCTION SHOULDER DISLOCATION      RI ECHO TRANSESOPHAG R-T 2D W/PRB IMG ACQUISJ I&R N/A 6/22/2021    Procedure: TRANSESOPHAGEAL ECHOCARDIOGRAM (REBECCA); Surgeon: Sarah Wadsworth MD;  [facility]: [facility];  Service: Cardiac Surgery    RI REPLACE AORTIC VALVE OPENFEMORAL ARTERY APPROACH N/A 6/22/2021    Procedure: REPLACEMENT AORTIC VALVE TRANSCATHETER (TAVR) TRANSFEMORAL W/ 29MM PATEL PANCHO S3 ULTRA VALVE(ACCESS ON LEFT);   Surgeon: Sarah Wadsworth MD;  [facility];  Service: Cardiac Surgery    ROTATOR CUFF REPAIR Left      Social History   Social History     Substance and Sexual Activity   Alcohol Use Yes    Comment: occasional     Social History     Substance and Sexual Activity   Drug Use No     Social History     Tobacco Use   Smoking Status Former Smoker   Smokeless Tobacco Former User    Quit date: 1/1/1976         Family History:   Family History   Problem Relation Age of Onset    Depression Mother     Hyperlipidemia Mother     Substance Abuse Neg Hx     Colon cancer Neg Hx     Colon polyps Neg Hx        Meds/Allergies   Current meds:   Current Facility-Administered Medications   Medication Dose Route Frequency    acetaminophen (TYLENOL) rectal suppository 650 mg  650 mg Rectal Q4H PRN    acetaminophen (TYLENOL) tablet 650 mg  650 mg Oral Q4H PRN    atorvastatin (LIPITOR) tablet 10 mg  10 mg Oral Daily    bisacodyl (DULCOLAX) rectal suppository 10 mg  10 mg Rectal Daily PRN    chlorhexidine (PERIDEX) 0 12 % oral rinse 15 mL  15 mL Mouth/Throat BID    docusate sodium (COLACE) capsule 100 mg  100 mg Oral BID    glimepiride (AMARYL) tablet 1 mg  1 mg Oral Daily    insulin lispro (HumaLOG) 100 units/mL subcutaneous injection 1-5 Units  1-5 Units Subcutaneous TID AC    insulin lispro (HumaLOG) 100 units/mL subcutaneous injection 1-5 Units  1-5 Units Subcutaneous HS    melatonin tablet 6 mg  6 mg Oral HS    mupirocin (BACTROBAN) 2 % nasal ointment 1 application  1 application Nasal [address]    ondansetron (ZOFRAN) injection 4 mg  4 mg Intravenous Q6H PRN    pantoprazole (PROTONIX) EC tablet 40 mg  40 mg Oral Early Morning    polyethylene glycol (MIRALAX) packet 17 g  17 g Oral Daily      Current PTA meds:  Medications Prior to Admission   Medication    aspirin (ECOTRIN LOW STRENGTH) 81 mg EC tablet    atorvastatin (LIPITOR) 10 mg tablet    glimepiride (AMARYL) 2 mg tablet    mupirocin (BACTROBAN) 2 % nasal ointment    omeprazole (PriLOSEC) 40 MG capsule    True Metrix Blood Glucose Test test strip        No Known Allergies    Objective   Vitals: Blood pressure 114/56, pulse 74, temperature 98 5 °F (36 9 °C), temperature source Oral, resp  rate 16, height 5' 6" (1 676 m), weight 74 9 kg (165 lb 2 oz), SpO2 96 %  ,Body mass index is 26 65 kg/m²  Physical Exam  Vitals and nursing note reviewed  HENT:      Head: Normocephalic  Nose: No congestion  Mouth/Throat:      Mouth: Mucous membranes are moist    Eyes:      General:         Right eye: No discharge  Left eye: No discharge  Cardiovascular:      Rate and Rhythm: Normal rate  Pulses: Normal pulses  Pulmonary:      Effort: No respiratory distress  Breath sounds: Normal breath sounds  Abdominal:      General: Bowel sounds are normal       Palpations: Abdomen is soft  Musculoskeletal:         General: Normal range of motion  Cervical back: Normal range of motion  Skin:     General: Skin is warm and dry  Neurological:      Mental Status: He is alert and oriented to person, place, and time  Mental status is at baseline  Psychiatric:         Mood and Affect: Mood normal          Lab Results:   Results from last 7 days   Lab Units 06/23/21  0447   WBC Thousand/uL 8 22   HEMOGLOBIN g/dL 11 7*   HEMATOCRIT % 34 6*   PLATELETS Thousands/uL 55*        Results from last 7 days   Lab Units 06/23/21  0447 06/22/21  1022 06/22/21  0924 06/17/21  0831   POTASSIUM mmol/L 4 3  --    < > 4 1   CHLORIDE mmol/L 106  --    < > 108   CO2 mmol/L 28  --    < > 26   CO2, I-STAT mmol/L  --  24  --   --    BUN mg/dL 16  --    < > 17   CREATININE mg/dL 1 19  --    < > 1 17   CALCIUM mg/dL 8 4  --    < > 9 1   ALK PHOS U/L  --   --   --  70   ALT U/L  --   --   --  43   AST U/L  --   --   --  40   GLUCOSE, ISTAT mg/dl  --  103  --   --     < > = values in this interval not displayed  Imaging Studies: I have personally reviewed pertinent reports  EKG, Pathology, and Other Studies: I have personally reviewed pertinent reports      VTE Prophylaxis: Sequential compression device (Venodyne)     Code Status: Level 1 - Full Code

## 2021-06-23 NOTE — PLAN OF CARE
Problem: PAIN - ADULT  Goal: Verbalizes/displays adequate comfort level or baseline comfort level  Description: Interventions:  - Encourage patient to monitor pain and request assistance  - Assess pain using appropriate pain scale  - Administer analgesics based on type and severity of pain and evaluate response  - Implement non-pharmacological measures as appropriate and evaluate response  - Consider cultural and social influences on pain and pain management  - Notify physician/advanced practitioner if interventions unsuccessful or patient reports new pain  Outcome: Progressing     Problem: INFECTION - ADULT  Goal: Absence or prevention of progression during hospitalization  Description: INTERVENTIONS:  - Assess and monitor for signs and symptoms of infection  - Monitor lab/diagnostic results  - Monitor all insertion sites, i e  indwelling lines, tubes, and drains  - Monitor endotracheal if appropriate and nasal secretions for changes in amount and color  - [place] appropriate cooling/warming therapies per order  - Administer medications as ordered  - Instruct and encourage patient and family to use good hand hygiene technique  - Identify and instruct in appropriate isolation precautions for identified infection/condition  Outcome: Progressing  Goal: Absence of fever/infection during neutropenic period  Description: INTERVENTIONS:  - Monitor WBC    Outcome: Progressing     Problem: SAFETY ADULT  Goal: Patient will remain free of falls  Description: INTERVENTIONS:  - Educate patient/family on patient safety including physical limitations  - Instruct patient to call for assistance with activity   - Consult OT/PT to assist with strengthening/mobility   - Keep Call bell within reach  - Keep bed low and locked with side rails adjusted as appropriate  - Keep care items and personal belongings within reach  - Initiate and maintain comfort rounds  - Make Fall Risk Sign visible to staff  - Offer Toileting every  Hours, in advance of need  - Initiate/Maintain alarm  - Obtain necessary fall risk management equipment:   - Apply yellow socks and bracelet for high fall risk patients  - Consider moving patient to room near nurses station  Outcome: Progressing  Goal: Maintain or return to baseline ADL function  Description: INTERVENTIONS:  -  Assess patient's ability to carry out ADLs; assess patient's baseline for ADL function and identify physical deficits which impact ability to perform ADLs (bathing, care of mouth/teeth, toileting, grooming, dressing, etc )  - Assess/evaluate cause of self-care deficits   - Assess range of motion  - Assess patient's mobility; develop plan if impaired  - Assess patient's need for assistive devices and provide as appropriate  - Encourage maximum independence but intervene and supervise when necessary  - Involve family in performance of ADLs  - Assess for home care needs following discharge   - Consider OT consult to assist with ADL evaluation and planning for discharge  - Provide patient education as appropriate  Outcome: Progressing  Goal: Maintains/Returns to pre admission functional level  Description: INTERVENTIONS:  - Perform BMAT or MOVE assessment daily    - Set and communicate daily mobility goal to care team and patient/family/caregiver  - Collaborate with rehabilitation services on mobility goals if consulted  - Perform Range of Motion  times a day  - Reposition patient every  hours    - Dangle patient  times a day  - Stand patient  times a day  - Ambulate patient  times a day  - Out of bed to chair  times a day   - Out of bed for meals  times a day  - Out of bed for toileting  - Record patient progress and toleration of activity level   Outcome: Progressing     Problem: DISCHARGE PLANNING  Goal: Discharge to home or other facility with appropriate resources  Description: INTERVENTIONS:  - Identify barriers to discharge w/patient and caregiver  - Arrange for needed discharge resources and transportation as appropriate  - Identify discharge learning needs (meds, wound care, etc )  - Arrange for interpretive services to assist at discharge as needed  - Refer to Case Management Department for coordinating discharge planning if the patient needs post-hospital services based on physician/advanced practitioner order or complex needs related to functional status, cognitive ability, or social support system  Outcome: Progressing     Problem: Knowledge Deficit  Goal: Patient/family/caregiver demonstrates understanding of disease process, treatment plan, medications, and discharge instructions  Description: Complete learning assessment and assess knowledge base    Interventions:  - Provide teaching at level of understanding  - Provide teaching via preferred learning methods  Outcome: Progressing     Problem: NEUROSENSORY - ADULT  Goal: Achieves stable or improved neurological status  Description: INTERVENTIONS  - Monitor and report changes in neurological status  - Monitor vital signs such as temperature, blood pressure, glucose, and any other labs ordered   - Initiate measures to prevent increased intracranial pressure  - Monitor for seizure activity and implement precautions if appropriate      Outcome: Progressing  Goal: Remains free of injury related to seizures activity  Description: INTERVENTIONS  - Maintain airway, patient safety  and administer oxygen as ordered  - Monitor patient for seizure activity, document and report duration and description of seizure to physician/advanced practitioner  - If seizure occurs,  ensure patient safety during seizure  - Reorient patient post seizure  - Seizure pads on all 4 side rails  - Instruct patient/family to notify RN of any seizure activity including if an aura is experienced  - Instruct patient/family to call for assistance with activity based on nursing assessment  - Administer anti-seizure medications if ordered    Outcome: Progressing  Goal: Achieves maximal functionality and self care  Description: INTERVENTIONS  - Monitor swallowing and airway patency with patient fatigue and changes in neurological status  - Encourage and assist patient to increase activity and self care     - Encourage visually impaired, hearing impaired and aphasic patients to use assistive/communication devices  Outcome: Progressing     Problem: CARDIOVASCULAR - ADULT  Goal: Maintains optimal cardiac output and hemodynamic stability  Description: INTERVENTIONS:  - Monitor I/O, vital signs and rhythm  - Monitor for S/S and trends of decreased cardiac output  - Administer and titrate ordered vasoactive medications to optimize hemodynamic stability  - Assess quality of pulses, skin color and temperature  - Assess for signs of decreased coronary artery perfusion  - Instruct patient to report change in severity of symptoms  Outcome: Progressing  Goal: Absence of cardiac dysrhythmias or at baseline rhythm  Description: INTERVENTIONS:  - Continuous cardiac monitoring, vital signs, obtain 12 lead EKG if ordered  - Administer antiarrhythmic and heart rate control medications as ordered  - Monitor electrolytes and administer replacement therapy as ordered  Outcome: Progressing     Problem: RESPIRATORY - ADULT  Goal: Achieves optimal ventilation and oxygenation  Description: INTERVENTIONS:  - Assess for changes in respiratory status  - Assess for changes in mentation and behavior  - Position to facilitate oxygenation and minimize respiratory effort  - Oxygen administered by appropriate delivery if ordered  - Initiate smoking cessation education as indicated  - Encourage broncho-pulmonary hygiene including cough, deep breathe, Incentive Spirometry  - Assess the need for suctioning and aspirate as needed  - Assess and instruct to report SOB or any respiratory difficulty  - Respiratory Therapy support as indicated  Outcome: Progressing     Problem: GASTROINTESTINAL - ADULT  Goal: Minimal or absence of nausea and/or vomiting  Description: INTERVENTIONS:  - Administer IV fluids if ordered to ensure adequate hydration  - Maintain NPO status until nausea and vomiting are resolved  - Nasogastric tube if ordered  - Administer ordered antiemetic medications as needed  - Provide nonpharmacologic comfort measures as appropriate  - Advance diet as tolerated, if ordered  - Consider nutrition services referral to assist patient with adequate nutrition and appropriate food choices  Outcome: Progressing  Goal: Maintains or returns to baseline bowel function  Description: INTERVENTIONS:  - Assess bowel function  - Encourage oral fluids to ensure adequate hydration  - Administer IV fluids if ordered to ensure adequate hydration  - Administer ordered medications as needed  - Encourage mobilization and activity  - Consider nutritional services referral to assist patient with adequate nutrition and appropriate food choices  Outcome: Progressing  Goal: Maintains adequate nutritional intake  Description: INTERVENTIONS:  - Monitor percentage of each meal consumed  - Identify factors contributing to decreased intake, treat as appropriate  - Assist with meals as needed  - Monitor I&O, weight, and lab values if indicated  - Obtain nutrition services referral as needed  Outcome: Progressing  Goal: Establish and maintain optimal ostomy function  Description: INTERVENTIONS:  - Assess bowel function  - Encourage oral fluids to ensure adequate hydration  - Administer IV fluids if ordered to ensure adequate hydration   - Administer ordered medications as needed  - Encourage mobilization and activity  - Nutrition services referral to assist patient with appropriate food choices  - Assess stoma site  - Consider wound care consult   Outcome: Progressing  Goal: Oral mucous membranes remain intact  Description: INTERVENTIONS  - Assess oral mucosa and hygiene practices  - Implement preventative oral hygiene regimen  - Implement oral medicated treatments as ordered  - Initiate Nutrition services referral as needed  Outcome: Progressing     Problem: GENITOURINARY - ADULT  Goal: Maintains or returns to baseline urinary function  Description: INTERVENTIONS:  - Assess urinary function  - Encourage oral fluids to ensure adequate hydration if ordered  - Administer IV fluids as ordered to ensure adequate hydration  - Administer ordered medications as needed  - Offer frequent toileting  - Follow urinary retention protocol if ordered  Outcome: Progressing  Goal: Absence of urinary retention  Description: INTERVENTIONS:  - Assess patients ability to void and empty bladder  - Monitor I/O  - Bladder scan as needed  - Discuss with physician/AP medications to alleviate retention as needed  - Discuss catheterization for long term situations as appropriate  Outcome: Progressing  Goal: Urinary catheter remains patent  Description: INTERVENTIONS:  - Assess patency of urinary catheter  - If patient has a chronic dickerson, consider changing catheter if non-functioning  - Follow guidelines for intermittent irrigation of non-functioning urinary catheter  Outcome: Progressing     Problem: METABOLIC, FLUID AND ELECTROLYTES - ADULT  Goal: Electrolytes maintained within normal limits  Description: INTERVENTIONS:  - Monitor labs and assess patient for signs and symptoms of electrolyte imbalances  - Administer electrolyte replacement as ordered  - Monitor response to electrolyte replacements, including repeat lab results as appropriate  - Instruct patient on fluid and nutrition as appropriate  Outcome: Progressing  Goal: Fluid balance maintained  Description: INTERVENTIONS:  - Monitor labs   - Monitor I/O and WT  - Instruct patient on fluid and nutrition as appropriate  - Assess for signs & symptoms of volume excess or deficit  Outcome: Progressing  Goal: Glucose maintained within target range  Description: INTERVENTIONS:  - Monitor Blood Glucose as ordered  - Assess for signs and symptoms of hyperglycemia and hypoglycemia  - Administer ordered medications to maintain glucose within target range  - Assess nutritional intake and initiate nutrition service referral as needed  Outcome: Progressing     Problem: SKIN/TISSUE INTEGRITY - ADULT  Goal: Skin Integrity remains intact(Skin Breakdown Prevention)  Description: Assess:  -Perform Mansoor assessment every   -Clean and moisturize skin every   -Inspect skin when repositioning, toileting, and assisting with ADLS  -Assess under medical devices such as  every   -Assess extremities for adequate circulation and sensation     Bed Management:  -Have minimal linens on bed & keep smooth, unwrinkled  -Change linens as needed when moist or perspiring  -Avoid sitting or lying in one position for more than  hours while in bed  -Keep HOB at degrees     Toileting:  -Offer bedside commode  -Assess for incontinence every   -Use incontinent care products after each incontinent episode such as     Activity:  -Mobilize patient  times a day  -Encourage activity and walks on unit  -Encourage or provide ROM exercises   -Turn and reposition patient every  Hours  -Use appropriate equipment to lift or move patient in bed  -Instruct/ Assist with weight shifting every  when out of bed in chair  -Consider limitation of chair time  hour intervals    Skin Care:  -Avoid use of baby powder, tape, friction and shearing, hot water or constrictive clothing  -Relieve pressure over bony prominences using   -Do not massage red bony areas    Next Steps:  -Teach patient strategies to minimize risks such as    -Consider consults to  interdisciplinary teams such as   Outcome: Progressing  Goal: Incision(s), wounds(s) or drain site(s) healing without S/S of infection  Description: INTERVENTIONS  - Assess and document dressing, incision, wound bed, drain sites and surrounding tissue  - Provide patient and family education  - Perform skin care/dressing changes every   Outcome: Progressing  Goal: Pressure injury heals and does not worsen  Description: Interventions:  - Implement low air loss mattress or specialty surface (Criteria met)  - Apply silicone foam dressing  - Instruct/assist with weight shifting every  minutes when in chair   - Limit chair time to  hour intervals  - Use special pressure reducing interventions such as  when in chair   - Apply fecal or urinary incontinence containment device   - Perform passive or active ROM every   - Turn and reposition patient & offload bony prominences every  hours   - Utilize friction reducing device or surface for transfers   - Consider consults to  interdisciplinary teams such as   - Use incontinent care products after each incontinent episode such  - Consider nutrition services referral as needed  Outcome: Progressing     Problem: HEMATOLOGIC - ADULT  Goal: Maintains hematologic stability  Description: INTERVENTIONS  - Assess for signs and symptoms of bleeding or hemorrhage  - Monitor labs  - Administer supportive blood products/factors as ordered and appropriate  Outcome: Progressing     Problem: MUSCULOSKELETAL - ADULT  Goal: Maintain or return mobility to safest level of function  Description: INTERVENTIONS:  - Assess patient's ability to carry out ADLs; assess patient's baseline for ADL function and identify physical deficits which impact ability to perform ADLs (bathing, care of mouth/teeth, toileting, grooming, dressing, etc )  - Assess/evaluate cause of self-care deficits   - Assess range of motion  - Assess patient's mobility  - Assess patient's need for assistive devices and provide as appropriate  - Encourage maximum independence but intervene and supervise when necessary  - Involve family in performance of ADLs  - Assess for home care needs following discharge   - Consider OT consult to assist with ADL evaluation and planning for discharge  - Provide patient education as appropriate  Outcome: Progressing  Goal: Maintain proper alignment of affected body part  Description: INTERVENTIONS:  - Support, maintain and protect limb and body alignment  - Provide patient/ family with appropriate education  Outcome: Progressing

## 2021-06-23 NOTE — PHYSICAL THERAPY NOTE
Physical Therapy Evaluation    Patient's Name: Britt Rater    Admitting Diagnosis  Aortic stenosis, severe [I35 0]    Problem List  Patient Active Problem List   Diagnosis    Benign prostatic hyperplasia without lower urinary tract symptoms    Bilateral carotid artery stenosis    Pure hypercholesterolemia    Aortic stenosis, severe    Controlled type 2 diabetes mellitus without complication, without long-term current use of insulin (HCC)    Trigger ring finger of right hand    Pharyngoesophageal dysphagia    Arthropathy of right knee    Pulmonary fibrosis ([address] )    Minor depression    Thrombocytopenia ([address] )    Esophageal stricture    Neurologic gait dysfunction    Benign prostatic hyperplasia with nocturia    S/P TAVR (transcatheter aortic valve replacement)    LBBB (left bundle branch block)    Anemia       Past Medical History  Past Medical History:   Diagnosis Date    BPH (benign prostatic hyperplasia)     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     Carotid stenosis, asymptomatic, left     ~37% LICA    Depression (emotion) 11/16/2018    Diabetes mellitus ([facility] )     type 2, non-insulin dependent    Dysphagia     Esophageal stricture     s/p dilation    Former tobacco use     GERD (gastroesophageal reflux disease)     History of basal cell carcinoma excision     History of Helicobacter pylori infection     History of shingles     History of TIA (transient ischemic attack)     multiple, no residual sx    Hyperlipidemia     Hypertension     Personal history of colonic polyps     Post herpetic neuralgia     Pulmonary fibrosis (HCC)     mild    Severe aortic stenosis     Splenomegaly     mild       Past Surgical History  Past Surgical History:   Procedure Laterality Date    ANAL SPHINCTEROTOMY      BASAL CELL CARCINOMA EXCISION      CARDIAC CATHETERIZATION      cardiac catheterization    CHOLECYSTECTOMY      EGD      OPEN REDUCTION SHOULDER DISLOCATION      WI ECHO TRANSESOPHAG R-T 2D W/PRB IMG ACQUISJ I&R N/A 6/22/2021    Procedure: TRANSESOPHAGEAL ECHOCARDIOGRAM (REBECCA); Surgeon: Damon Hayden MD;  [facility];  Service: Cardiac Surgery    MD REPLACE AORTIC VALVE OPENFEMORAL ARTERY APPROACH N/A 6/22/2021    Procedure: REPLACEMENT AORTIC VALVE TRANSCATHETER (TAVR) TRANSFEMORAL W/ 29MM PATEL PANCHO S3 ULTRA VALVE(ACCESS ON LEFT); Surgeon: Damon Hayden MD;  [facility];  Service: Cardiac Surgery    ROTATOR CUFF REPAIR Left         06/23/21 1206   PT Last Visit   PT Visit Date 06/23/21   Note Type   Note type Evaluation   Pain Assessment   Pain Assessment Tool Pain Assessment not indicated - pt denies pain   Home Living   Type of [address] One level   Bathroom Shower/Tub Walk-in shower   Bathroom Toilet Standard   Home Equipment [employer] [place]   Additional Comments Pt lives in a mobile home with 3 GAURANG w/ HR  Was ambulating with SPC PTA   Prior Function   Level of [place] Independent with ADLs and functional mobility   Lives With Alone   Receives Help From Family   ADL Assistance Independent   IADLs Independent   Falls in the last 6 months 0   Vocational Retired   Comments Pt's son lives in [place], limited social support noted at this time   Restrictions/Precautions   [place] Bearing Precautions Per Order No   Other Precautions Cognitive; Chair Alarm; Bed Alarm;Multiple lines;Telemetry; Fall Risk   Cognition   Overall Cognitive Status Impaired   Attention Attends with cues to redirect   Orientation Level Oriented X4   Memory Decreased recall of precautions   Following Commands Follows one step commands with increased time or repetition   Comments Pt cooperative, but tangential and easily distractible requiring frequent VCs to redirect   RLE Assessment   RLE Assessment WFL  (assessed functionally)   LLE Assessment   LLE Assessment WFL  (assessed functionally)   Bed Mobility   Supine to Sit 3  Moderate assistance   Additional items Assist x 1;HOB elevated; Increased time required;Verbal cues   Additional Comments Pt ended session seated in recliner with chair alarm on and all needs in reach  Transfers   Sit to Stand 4  Minimal assistance   Additional items Assist x 1; Increased time required;Verbal cues   Stand to Sit 4  Minimal assistance   Additional items Assist x 1; Increased time required;Verbal cues   Additional Comments Transfers with RW  VCs for proper hand placement and safety   Ambulation/Elevation   Gait pattern Excessively slow; Short stride;Decreased foot clearance   Gait Assistance 4  Minimal assist   Additional items Assist x 1;Verbal cues   Assistive Device Rolling walker   Distance 140ft with very slow gait; VCs for sequencing with rotational turns   Balance   Static Sitting Fair +   Dynamic Sitting Fair   Static Standing Fair -   Dynamic Standing Poor +   Ambulatory Poor +   Activity Tolerance   Activity Tolerance Patient tolerated treatment well   Medical Staff Made Aware Seen with OT 2* pt's medical complexity and multiple comorbidities  PT focus on transfers, gait, and balance  Nurse Made Aware ok to see per RN   Assessment   Prognosis Guarded   Problem List Decreased strength;Decreased endurance; Impaired balance;Decreased mobility; Decreased cognition;Decreased safety awareness   Assessment Pt is a 81 y o  male seen for PT evaluation s/p admit [facility]22[facility]  Pt was admitted with a primary dx of: severe aortic stenosis  Pt is POD #1 s/p TAVR  PT now consulted for assessment of mobility and d/c needs  Pt with Up with assistance, Ambulate, PT evaluation orders  Pts current comorbidities effecting treatment include: BPH, carotid stenosis, depression, DM, GERD, hx of TIA, HTN, pulmonary fibrosis, splenomegaly   Pts current clinical presentation is Unstable/ Unpredictable (high complexity) due to Ongoing medical management for primary dx, Increased reliance on more restrictive AD compared to baseline, Decreased activity tolerance compared to baseline, Fall risk, Increased assistance needed from caregiver at current time, Ongoing telemetry monitoring, Cog status, s/p surgical intervention  Pt questionable historian 2* cog, but reports being I with ADLs and ambulating with SPC at baseline  Pt lives alone in a trailer with 3 GAURANG  Upon evaluation, pt currently is requiring modA for bed mobility; Chantelle for transfers and Chantelle for ambulation 140 ft w/ RW  Pt presents at PT eval functioning below baseline and currently w/ overall mobility deficits 2* to: BLE weakness, impaired balance, decreased endurance, gait deviations, pain, decreased activity tolerance compared to baseline, decreased functional mobility tolerance compared to baseline, fall risk, decreased cognition  Pt currently at a fall risk 2* to impairments listed above  Pt will continue to benefit from skilled acute PT interventions to address stated impairments; to maximize functional mobility; for ongoing pt/ family training; and DME needs  At conclusion of PT session pt returned back in chair and chair alarm engaged with phone and call bell within reach  Pt denies any further questions at this time  The patient's AM-PAC Basic Mobility Inpatient Short Form Raw Score is 17, Standardized Score is 39 67  A standardized score less than 42 9 suggests the patient may benefit from discharge to post-acute rehabilitation services  Please also refer to the recommendation of the Physical Therapist for safe discharge planning  Recommend rehab pending progress upon hospital D/C  Barriers to Discharge Decreased caregiver support   Goals   Patient Goals to feel better   [facility] Expiration Date 07/03/21   Short Term Goal #1 In 10 days pt will be able to: 1  Demonstrate ability to perform all aspects of bed mobility independently to increase functional independence  2  Perform functional transfers at mod I level to facilitate safe return to previous living environment    3   Ambulate 300 ft with LRAD at mod I level with stable vitals to improve safety with household distances and reduce fall risk  4  Climb 3 steps with HR independently to simulate entrance to home  5  Improve LE strength grades by 1 to increase ease of functional mobility with transfers and gait  6  Pt will demonstrate improved balance by one grade order to decrease risk of falls  PT Treatment Day 0   Plan   Treatment/Interventions Functional transfer training;LE strengthening/ROM; Elevations; Therapeutic exercise; Endurance training;Equipment eval/education; Bed mobility;Gait training;Spoke to nursing;Spoke to case management;OT   PT Frequency Other (Comment)  (4-6x/wk)   Recommendation   PT Discharge Recommendation Post acute rehabilitation services  (pending progress)   Equipment Recommended Walker   AM-PAC Basic Mobility Inpatient   Turning in Bed Without Bedrails 3   Lying on Back to Sitting on Edge of Flat Bed 2   Moving Bed to Chair 3   Standing Up From Chair 3   Walk in Room 3   Climb 3-5 Stairs 3   Basic Mobility Inpatient Raw Score 17   Basic Mobility Standardized Score 39 67   Modified Steven Scale   Modified [place] Scale 4       Justice Swain, PT, DPT

## 2021-06-23 NOTE — CONSULTS
Consultation - Cardiology   Nolvia Sims 81 y o  male MRN: [id number]  Unit/Bed#: [facility] 426-01 Encounter: [id number]    Assessment/Plan     Principal Problem: Aortic stenosis, severe  Active Problems:    Benign prostatic hyperplasia without lower urinary tract symptoms    Bilateral carotid artery stenosis    Pure hypercholesterolemia    Controlled type 2 diabetes mellitus without complication, without long-term current use of insulin (HCC)    Pulmonary fibrosis (HCC)    Minor depression    Thrombocytopenia (HCC)    Neurologic gait dysfunction    Benign prostatic hyperplasia with nocturia    S/P TAVR (transcatheter aortic valve replacement)    LBBB (left bundle branch block)    Anemia      Assessment/Plan    1  Severe symptomatic aortic stenosis  Status post transfemoral TAVR  Postop day 1  EKG- NSR LBBB QRS 146ms ( 152 pre op)  Telemetry- NSR  Hemodynamically stable  Febrile overnight, no leukocytosis, normal lactic acid  BC pending  Afebrile currently   Post procedure TTE    2  HTN- normotensive on no medical therapy  IV Cardene weaned off    3  HLD-atorvastatin 10 mg  Cholesterol 147/triglyceride 103/HDL 62/LDL 64    4  Thrombocytopenia-status post 1 unit platelets    History of Present Illness   Physician Requesting Consult: Deonna Dupree MD  Reason for Consult / Principal Problem: S/P TAVR    HPI: Nolvia Sims is a 81y o  year old male with severe AS status post transfemoral TAVR with 29 mm Giron Britney 3 bioprosthetic valve via transfemoral approach yesterday  Postprocedure REBECCA no significant paravalvular leak  Additionally he has HLD, type 2 diabetes, normal LV function, CVD, former tobacco abuse, TIA, HTN, LBBB  Temp to 104 2 last PM   Per CT surgery groin soft without pseudoaneurysm  IV cardene off overnight  Mild hematoma right groin  Preop catheterization- no coronary artery disease  He is followed by Dr Toyin Carmona JOE with near syncope    Lorenzo to symptomatic aortic stenosis and referred to CT surgery for TAVR  Inpatient consult to Cardiology  Consult performed by: ADALBERTO Dumont  Consult ordered by: Author JUDY Guzman          Review of Systems   Constitutional: Positive for activity change and fever  HENT: Negative  Eyes: Negative  Respiratory: Negative for shortness of breath  Cardiovascular: Negative for chest pain, palpitations and leg swelling  Gastrointestinal: Negative  Endocrine: Negative  Genitourinary: Negative  Musculoskeletal: Negative  Skin: Negative  Allergic/Immunologic: Negative  Neurological: Negative  Hematological: Negative  Psychiatric/Behavioral: Negative  All other systems reviewed and are negative        Historical Information   Past Medical History:   Diagnosis Date    BPH (benign prostatic hyperplasia)     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     Carotid stenosis, asymptomatic, left     ~22% LICA    Depression (emotion) 11/16/2018    Diabetes mellitus ([facility] [address] )     type 2, non-insulin dependent    Dysphagia     Esophageal stricture     s/p dilation    Former tobacco use     GERD (gastroesophageal reflux disease)     History of basal cell carcinoma excision     History of Helicobacter pylori infection     History of shingles     History of TIA (transient ischemic attack)     multiple, no residual sx    Hyperlipidemia     Hypertension     Personal history of colonic polyps     Post herpetic neuralgia     Pulmonary fibrosis (HCC)     mild    Severe aortic stenosis     Splenomegaly     mild     Past Surgical History:   Procedure Laterality Date    ANAL SPHINCTEROTOMY      BASAL CELL CARCINOMA EXCISION      CARDIAC CATHETERIZATION      cardiac catheterization    CHOLECYSTECTOMY      EGD      OPEN REDUCTION SHOULDER DISLOCATION      ROTATOR CUFF REPAIR Left      Social History     Substance and Sexual Activity   Alcohol Use Yes    Comment: occasional     Social History     Substance and Sexual Activity   Drug Use No     Social History     Tobacco Use   Smoking Status Former Smoker   Smokeless Tobacco Former User    Quit date: 1/1/1976     Family History:   Family History   Problem Relation Age of Onset    Depression Mother     Hyperlipidemia Mother     Substance Abuse Neg Hx     Colon cancer Neg Hx     Colon polyps Neg Hx        Meds/Allergies   current meds:   Current Facility-Administered Medications   Medication Dose Route Frequency    acetaminophen (TYLENOL) rectal suppository 650 mg  650 mg Rectal Q4H PRN    acetaminophen (TYLENOL) tablet 650 mg  650 mg Oral Q4H PRN    atorvastatin (LIPITOR) tablet 10 mg  10 mg Oral Daily    bisacodyl (DULCOLAX) rectal suppository 10 mg  10 mg Rectal Daily PRN    chlorhexidine (PERIDEX) 0 12 % oral rinse 15 mL  15 mL Mouth/Throat BID    docusate sodium (COLACE) capsule 100 mg  100 mg Oral BID    glimepiride (AMARYL) tablet 1 mg  1 mg Oral Daily    insulin lispro (HumaLOG) 100 units/mL subcutaneous injection 1-5 Units  1-5 Units Subcutaneous TID AC    insulin lispro (HumaLOG) 100 units/mL subcutaneous injection 1-5 Units  1-5 Units Subcutaneous HS    melatonin tablet 6 mg  6 mg Oral HS    mupirocin (BACTROBAN) 2 % nasal ointment 1 application  1 application Nasal Y26P [facility]    ondansetron (ZOFRAN) injection 4 mg  4 mg Intravenous Q6H PRN    pantoprazole (PROTONIX) EC tablet 40 mg  40 mg Oral Early Morning    polyethylene glycol (MIRALAX) packet 17 g  17 g Oral Daily    and PTA meds:    Medications Prior to Admission   Medication    aspirin (ECOTRIN LOW STRENGTH) 81 mg EC tablet    atorvastatin (LIPITOR) 10 mg tablet    glimepiride (AMARYL) 2 mg tablet    mupirocin (BACTROBAN) 2 % nasal ointment    omeprazole (PriLOSEC) 40 MG capsule    True Metrix Blood Glucose Test test strip     No Known Allergies    Objective   Vitals: Blood pressure 114/59, pulse 76, temperature 98 7 °F (37 1 °C), temperature source Oral, resp   rate 16, height 5' 6" (1 676 m), weight 74 9 kg (165 lb 2 oz), SpO2 96 %  Orthostatic Blood Pressures      Most Recent Value   Blood Pressure  114/59 filed at 06/23/2021 1054   Patient Position - Orthostatic VS  Lying filed at 06/23/2021 1054            Intake/Output Summary (Last 24 hours) at 6/23/2021 1121  Last data filed at 6/23/2021 1000  Gross per 24 hour   Intake 2409 17 ml   Output 3000 ml   Net -590 83 ml       Invasive Devices     Central Venous Catheter Line            CVC Central Lines 06/22/21 Triple 1 day          Peripheral Intravenous Line            Peripheral IV 06/22/21 Left Wrist 1 day                Physical Exam: /59 (BP Location: Left arm)   Pulse 76   Temp 98 7 °F (37 1 °C) (Oral)   Resp 16   Ht 5' 6" (1 676 m)   Wt 74 9 kg (165 lb 2 oz)   SpO2 96%   BMI 26 65 kg/m²   General Appearance:    Alert, cooperative, no distress, appears stated age   Head:    Normocephalic, no scleral icterus   Eyes:    PERRL   Nose:   Nares normal, septum midline, mucosa normal, no drainage    Throat:   Lips, mucosa, and tongue normal   Neck:   Supple, symmetrical, trachea midline     no carotid bruit or JVD   Back:     Symmetric   Lungs:     Clear to auscultation bilaterally, respirations unlabored   Chest Wall:    No tenderness or deformity    Heart:    Regular rate and rhythm, S1 and S2 normal, no murmur, rub   or gallop   Abdomen:     Soft, non-tender, bowel sounds active all four quadrants,     no masses, no organomegaly   Extremities:   Extremities normal, atraumatic, no cyanosis or edema   Pulses:   2+ and symmetric all extremities   Skin:   Skin color, texture, turgor normal, no rashes or lesions   Neurologic:   Alert and oriented to person place and time   No focal deficits       Lab Results:   Recent Results (from the past 72 hour(s))   Fingerstick Glucose (POCT)    Collection Time: 06/22/21  7:58 AM   Result Value Ref Range    POC Glucose 142 (H) 65 - 140 mg/dl   POCT Blood Gas (CG8+)    Collection Time: 06/22/21  9:24 AM   Result Value Ref Range    ph, Matthieu ISTAT 7 271 (L) 7 300 - 7 400    pCO2, Matthieu i-STAT 56 9 (H) 42 0 - 50 0 mm HG    pO2, Matthieu i-STAT 68 0 (H) 35 0 - 45 0 mm HG    BE, i-STAT -1 -2 - 3 mmol/L    HCO3, Matthieu i-STAT 26 2 24 0 - 30 0 mmol/L    CO2, i-STAT 28 21 - 32 mmol/L    O2 Sat, i-STAT 90 (H) 60 - 85 %    SODIUM, I-STAT 144 136 - 145 mmol/l    Potassium, i-STAT 3 9 3 5 - 5 3 mmol/L    Calcium, Ionized i-STAT 1 28 1 12 - 1 32 mmol/L    Hct, i-STAT 33 (L) 36 5 - 49 3 %    Hgb, i-STAT 11 2 (L) 12 0 - 17 0 g/dl    Glucose, i-STAT 123 65 - 140 mg/dl    Specimen Type VENOUS    POCT activated clotting time    Collection Time: 06/22/21  9:24 AM   Result Value Ref Range    Activated Clotting Time, i-STAT 138 (H) 89 - 137 sec    Specimen Type VENOUS    POCT activated clotting time    Collection Time: 06/22/21 10:09 AM   Result Value Ref Range    Activated Clotting Time, i-STAT 305 (H) 89 - 137 sec    Specimen Type ARTERIAL    POCT Blood Gas (CG8+)    Collection Time: 06/22/21 10:10 AM   Result Value Ref Range    pH, Art i-STAT 7 363 7 350 - 7 450    pCO2, Art i-STAT 43 5 36 0 - 44 0 mm HG    pO2, ART i-STAT 189 0 (H) 75 0 - 129 0 mm HG    BE, i-STAT -1 -2 - 3 mmol/L    HCO3, Art i-STAT 24 7 22 0 - 28 0 mmol/L    CO2, i-STAT 26 21 - 32 mmol/L    O2 Sat, i-STAT 100 (H) 60 - 85 %    SODIUM, I-STAT 141 136 - 145 mmol/l    Potassium, i-STAT 3 8 3 5 - 5 3 mmol/L    Calcium, Ionized i-STAT 1 19 1 12 - 1 32 mmol/L    Hct, i-STAT 30 (L) 36 5 - 49 3 %    Hgb, i-STAT 10 2 (L) 12 0 - 17 0 g/dl    Glucose, i-STAT 113 65 - 140 mg/dl    Specimen Type ARTERIAL    POCT Blood Gas (CG8+)    Collection Time: 06/22/21 10:22 AM   Result Value Ref Range    pH, Art i-STAT 7 355 7 350 - 7 450    pCO2, Art i-STAT 41 0 36 0 - 44 0 mm HG    pO2, ART i-STAT 179 0 (H) 75 0 - 129 0 mm HG    BE, i-STAT -3 (L) -2 - 3 mmol/L    HCO3, Art i-STAT 22 9 22 0 - 28 0 mmol/L    CO2, i-STAT 24 21 - 32 mmol/L    O2 Sat, i-STAT 100 (H) 60 - 85 %    SODIUM, I-STAT 143 136 - 145 mmol/l    Potassium, i-STAT 3 6 3 5 - 5 3 mmol/L    Calcium, Ionized i-STAT 1 14 1 12 - 1 32 mmol/L    Hct, i-STAT 28 (L) 36 5 - 49 3 %    Hgb, i-STAT 9 5 (L) 12 0 - 17 0 g/dl    Glucose, i-STAT 103 65 - 140 mg/dl    Specimen Type ARTERIAL    POCT activated clotting time    Collection Time: 06/22/21 10:22 AM   Result Value Ref Range    Activated Clotting Time, i-STAT 110 89 - 137 sec    Specimen Type ARTERIAL    ECG 12 lead    Collection Time: 06/22/21 10:41 AM   Result Value Ref Range    Ventricular Rate 64 BPM    Atrial Rate 64 BPM    WA Interval 183 ms    QRSD Interval 146 ms    QT Interval 454 ms    QTC Interval 469 ms    P [place] 54 degrees    QRS Axis 217 degrees    T Wave Axis 3 degrees   Hemoglobin and hematocrit, blood    Collection Time: 06/22/21 10:42 AM   Result Value Ref Range    Hemoglobin 11 3 (L) 12 0 - 17 0 g/dL    Hematocrit 33 5 (L) 36 5 - 49 3 %   Platelet count    Collection Time: 06/22/21 10:42 AM   Result Value Ref Range    Platelets 59 (L) 620 - 390 Thousands/uL    MPV 9 9 8 9 - 12 7 fL   Basic metabolic panel    Collection Time: 06/22/21 10:43 AM   Result Value Ref Range    Sodium 143 136 - 145 mmol/L    Potassium 4 1 3 5 - 5 3 mmol/L    Chloride 112 (H) 100 - 108 mmol/L    CO2 23 21 - 32 mmol/L    ANION GAP 8 4 - 13 mmol/L    BUN 15 5 - 25 mg/dL    Creatinine 1 02 0 60 - 1 30 mg/dL    Glucose 108 65 - 140 mg/dL    Glucose, Fasting 108 (H) 65 - 99 mg/dL    Calcium 8 5 8 3 - 10 1 mg/dL    eGFR 67 ml/min/1 73sq m   Fingerstick Glucose (POCT)    Collection Time: 06/22/21 10:43 AM   Result Value Ref Range    POC Glucose 110 65 - 140 mg/dl   Fingerstick Glucose (POCT)    Collection Time: 06/22/21  1:17 PM   Result Value Ref Range    POC Glucose 123 65 - 140 mg/dl   Fingerstick Glucose (POCT)    Collection Time: 06/22/21  4:36 PM   Result Value Ref Range    POC Glucose 156 (H) 65 - 140 mg/dl   Fingerstick Glucose (POCT)    Collection Time: 06/22/21  5:45 PM   Result Value Ref Range    POC Glucose 165 (H) 65 - 140 mg/dl   Fingerstick Glucose (POCT)    Collection Time: 06/22/21  9:23 PM   Result Value Ref Range    POC Glucose 155 (H) 65 - 140 mg/dl   Blood culture    Collection Time: 06/22/21 11:37 PM    Specimen: Arm, Left; Blood   Result Value Ref Range    Blood Culture Received in Microbiology Lab  Culture in Progress  Blood culture    Collection Time: 06/22/21 11:38 PM    Specimen: Arm, Right; Blood   Result Value Ref Range    Blood Culture Received in Microbiology Lab  Culture in Progress      Basic metabolic panel    Collection Time: 06/22/21 11:39 PM   Result Value Ref Range    Sodium 139 136 - 145 mmol/L    Potassium 3 7 3 5 - 5 3 mmol/L    Chloride 105 100 - 108 mmol/L    CO2 28 21 - 32 mmol/L    ANION GAP 6 4 - 13 mmol/L    BUN 15 5 - 25 mg/dL    Creatinine 1 23 0 60 - 1 30 mg/dL    Glucose 143 (H) 65 - 140 mg/dL    Calcium 8 2 (L) 8 3 - 10 1 mg/dL    eGFR 53 ml/min/1 73sq m   LD,Blood    Collection Time: 06/22/21 11:39 PM   Result Value Ref Range     81 - 234 U/L   CK (with reflex to MB)    Collection Time: 06/22/21 11:39 PM   Result Value Ref Range    Total  39 - 308 U/L   CBC    Collection Time: 06/22/21 11:40 PM   Result Value Ref Range    WBC 5 80 4 31 - 10 16 Thousand/uL    RBC 3 61 (L) 3 88 - 5 62 Million/uL    Hemoglobin 11 5 (L) 12 0 - 17 0 g/dL    Hematocrit 33 9 (L) 36 5 - 49 3 %    MCV 94 82 - 98 fL    MCH 31 9 26 8 - 34 3 pg    MCHC 33 9 31 4 - 37 4 g/dL    RDW 12 8 11 6 - 15 1 %    Platelets 61 (L) 312 - 390 Thousands/uL    MPV 9 5 8 9 - 12 7 fL   Lactic acid, plasma    Collection Time: 06/22/21 11:43 PM   Result Value Ref Range    LACTIC ACID 1 7 0 5 - 2 0 mmol/L   Basic Metabolic Panel -AM POD #1    Collection Time: 06/23/21  4:47 AM   Result Value Ref Range    Sodium 138 136 - 145 mmol/L    Potassium 4 3 3 5 - 5 3 mmol/L    Chloride 106 100 - 108 mmol/L    CO2 28 21 - 32 mmol/L    ANION GAP 4 4 - 13 mmol/L    BUN 16 5 - 25 mg/dL    Creatinine 1 19 0 60 - 1 30 mg/dL    Glucose 136 65 - 140 mg/dL    Calcium 8 4 8 3 - 10 1 mg/dL    eGFR 55 ml/min/1 73sq m   CBC-AM POD #1    Collection Time: 06/23/21  4:47 AM   Result Value Ref Range    WBC 8 22 4 31 - 10 16 Thousand/uL    RBC 3 65 (L) 3 88 - 5 62 Million/uL    Hemoglobin 11 7 (L) 12 0 - 17 0 g/dL    Hematocrit 34 6 (L) 36 5 - 49 3 %    MCV 95 82 - 98 fL    MCH 32 1 26 8 - 34 3 pg    MCHC 33 8 31 4 - 37 4 g/dL    RDW 12 8 11 6 - 15 1 %    Platelets 55 (L) 285 - 390 Thousands/uL    MPV 9 7 8 9 - 12 7 fL   Magnesium -AM POD #1    Collection Time: 06/23/21  4:47 AM   Result Value Ref Range    Magnesium 1 8 1 6 - 2 6 mg/dL   Urinalysis with microscopic    Collection Time: 06/23/21  5:08 AM   Result Value Ref Range    Clarity, UA Clear     Color, UA Yellow     Specific [place], UA 1 020 1 003 - 1 030    pH, UA 6 5 4 5, 5 0, 5 5, 6 0, 6 5, 7 0, 7 5, 8 0    Glucose, UA Negative Negative mg/dl    Ketones, UA Negative Negative mg/dl    Blood, UA Moderate (A) Negative    Protein, UA Negative Negative mg/dl    Nitrite, UA Negative Negative    Bilirubin, UA Negative Negative    Urobilinogen, UA 0 2 0 2, 1 0 E U /dl E U /dl    Leukocytes, UA Negative Negative    WBC, UA 2-4 None Seen, 2-4 /hpf    RBC, UA 30-50 (A) None Seen, 2-4 /hpf    Hyaline Casts, UA None Seen None Seen /lpf    Bacteria, UA None Seen None Seen, Occasional /hpf    Epithelial Cells None Seen None Seen, Occasional /hpf   Fingerstick Glucose (POCT)    Collection Time: 06/23/21  5:35 AM   Result Value Ref Range    POC Glucose 161 (H) 65 - 140 mg/dl   Prepare Leukoreduced Platelet Pheresis (1 pheresis product = 6-8 pooled units): 1 Product    Collection Time: 06/23/21  5:55 AM   Result Value Ref Range    Unit Product Code W2173P30     Unit Number [id number]     Unit ABO A     Unit [facility] POS     Unit Dispense Status Presumed Trans    Prepare Leukoreduced RBC: 4 Units, Leukoreduced    Collection Time: 06/23/21  6:52 AM   Result Value Ref Range    Unit Product Code G5530L20 Unit Number [id number]     Unit ABO A     Unit RH POS     Crossmatch Compatible     Unit Dispense Status Return to [facility]     Unit Product Code Q3109W90     Unit Number [id number]     Unit ABO A     Unit [facility] POS     Crossmatch Compatible     Unit Dispense Status Return to [facility]     Unit Product Code N9942F53     Unit Number [id number]     Unit ABO A     Unit RH POS     Crossmatch Compatible     Unit Dispense Status Return to [facility]     Unit Product Code J6658P66     Unit Number [id number]     Unit ABO A     Unit RH POS     Crossmatch Compatible     Unit Dispense Status Return to [facility]    Fingerstick Glucose (POCT)    Collection Time: 06/23/21 10:54 AM   Result Value Ref Range    POC Glucose 227 (H) 65 - 140 mg/dl     CORONARY CIRCULATION:  Left main: Normal   LAD: Normal   Circumflex: Normal     Imaging: I have personally reviewed pertinent reports  EKG: NSR LBBB      Code Status: Level 1 - Full Code  Advance Directive and Living Will:      Power of :    POLST:      Counseling / Coordination of Care  Total floor / unit time spent today 25 minutes  Greater than 50% of total time was spent with the patient and / or family counseling and / or coordination of care

## 2021-06-23 NOTE — PROGRESS NOTES
Progress Note - Cardiothoracic Surgery   Bea Keith 81 y o  male MRN: [id number]  Unit/Bed#: [facility] 426-01 Encounter: [id number]    Aortic stenosis, Non-Rheumatic  S/P transfemoral transcatheter aortic valve replacement; POD # 1    24 Hour Events: Febrile overnight, Tmax 104 2, likely secondary to 1U platelets and hematoma (remains stable), no leukocytosis  Episodes of diastolic hypotension, in 67'J, no episodes of tachycardia  Cardene off  No other events  Denies CP, SOB  Tolerating diet  Denies TTP at hematoma or calf tenderness       Medications:   Scheduled Meds:  Current Facility-Administered Medications   Medication Dose Route Frequency Provider Last Rate    acetaminophen  650 mg Rectal Q4H PRN Rosmery Bolanos PA-C      acetaminophen  650 mg Oral Q4H PRN Rosmery Bolanos PA-C      atorvastatin  10 mg Oral Daily Rosmery Bolanos PA-C      bisacodyl  10 mg Rectal Daily PRN Rosmery Bolanos PA-C      cefazolin  2,000 mg Intravenous Q8H Rosmery Bolanos PA-C 2,000 mg (06/23/21 0446)    chlorhexidine  15 mL Mouth/Throat BID Rosmery Bolanos PA-C      glimepiride  1 mg Oral Daily Margot Pruitt      insulin lispro  1-5 Units Subcutaneous TID AC Rosmery Bolanos PA-C      insulin lispro  1-5 Units Subcutaneous HS Rosmery Bolanos PA-C      lactated ringers  100 mL/hr Intravenous Continuous Philip Messing, CRNA      melatonin  6 mg Oral HS Charmayne Poli, PA-C      mupirocin  1 application Nasal D59X [facility] Rosmery Bolanos PA-C      niCARdipine  1-15 mg/hr Intravenous Titrated Rosmery Bolanos PA-C Stopped (06/22/21 1553)    ondansetron  4 mg Intravenous Q6H PRN Rosmery Bolanos PA-C      pantoprazole  40 mg Oral Early Morning Rosmery Bolanos PA-C      polyethylene glycol  17 g Oral Daily Rosmery Bolanos PA-C       Continuous Infusions:lactated ringers, 100 mL/hr  niCARdipine, 1-15 mg/hr, Last Rate: Stopped (06/22/21 1553)      PRN Meds:   acetaminophen    acetaminophen    bisacodyl    ondansetron    Vitals:   Vitals: 06/23/21 0247 06/23/21 0300 06/23/21 0400 06/23/21 0600   BP: (!) 116/47 (!) 118/48 (!) 115/46    BP Location: Left arm Left arm Left arm    Pulse: 74 82 78    Resp: 18 20     Temp: (!) 101 1 °F (38 4 °C)      TempSrc: Oral      SpO2: 96% 96% 96%    Weight:    74 9 kg (165 lb 2 oz)   Height:       BP: 115/54 (24hrs  140-110 / 40-65)    Telemetry: NSR; Heart Rate: 82    Hemodynamics:       Respiratory:   SpO2 Activity: SpO2 Activity: At Rest; 2 LPM    Intake/Output:   I/O       06/21 0701 - 06/22 0700 06/22 0701 - 06/23 0700    P  O   360    I V  (mL/kg)  2809 2 (37 5)    Blood  300    Total Intake(mL/kg)  3469 2 (46 3)    Urine (mL/kg/hr)  2950    Blood  50    Total Output  3000    Net  +469 2                Weights:   Weight (last 2 days)     Date/Time   Weight    06/23/21 0600   74 9 (165 12)    06/22/21 0751   75 1 (165 56)            Admit weight: at admit weight    Results:   Results from last 7 days   Lab Units 06/23/21  0447 06/22/21  2340 06/22/21  1042 06/17/21  0831   WBC Thousand/uL 8 22 5 80  --  4 17*   HEMOGLOBIN g/dL 11 7* 11 5* 11 3* 12 6   HEMATOCRIT % 34 6* 33 9* 33 5* 37 3   PLATELETS Thousands/uL 55* 61* 59* 69*     Results from last 7 days   Lab Units 06/23/21  0447 06/22/21  2339 06/22/21  1043 06/22/21  1022   POTASSIUM mmol/L 4 3 3 7 4 1  --    CHLORIDE mmol/L 106 105 112*  --    CO2 mmol/L 28 28 23  --    CO2, I-STAT mmol/L  --   --   --  24   BUN mg/dL 16 15 15  --    CREATININE mg/dL 1 19 1 23 1 02  --    GLUCOSE, ISTAT mg/dl  --   --   --  103   CALCIUM mg/dL 8 4 8 2* 8 5  --      Results from last 7 days   Lab Units 06/17/21  0831   INR  1 13     Point of care glucose: 123- 165    Studies:  CXR: lines in correct placement, no ptx/effusion    EKG: NSR, stable LBBB    I have personally reviewed pertinent films in PACS    Invasive Lines/Tubes:  Invasive Devices     Central Venous Catheter Line            CVC Central Lines 06/22/21 Triple Right Internal jugular 1 day    CVC Central Lines 06/22/21 Triple <1 day          Peripheral Intravenous Line            Peripheral IV 06/22/21 Left Wrist <1 day          Drain            Urethral Catheter Non-latex;Straight-tip; Temperature probe 16 Fr  <1 day                Physical Exam:    HEENT/NECK:  Normocephalic  Atraumatic  No jugular venous distention  Cardiac: Regular rate and rhythm and No murmurs/rubs/gallops  Pulmonary:  Breath sounds clear bilaterally and No rales/rhonchi/wheezes  Abdomen:  Non-tender, Non-distended and Normal bowel sounds  Incisions: Groin puncture sites dressed with sterile dressing  Mild hematoma present and stable with minimal erythema  No drainage  Extremities: Extremities warm/dry, DP pulses 2+ bilaterally and No edema B/L  Neuro: Alert and oriented X 3 and No focal deficits  Skin: Warm/Dry, without rashes or lesions  Assessment:  Principal Problem: Aortic stenosis, severe  Active Problems:    Benign prostatic hyperplasia without lower urinary tract symptoms    Bilateral carotid artery stenosis    Pure hypercholesterolemia    Controlled type 2 diabetes mellitus without complication, without long-term current use of insulin (HCC)    Pulmonary fibrosis (HCC)    Minor depression    Thrombocytopenia (HCC)    Neurologic gait dysfunction    Benign prostatic hyperplasia with nocturia    S/P TAVR (transcatheter aortic valve replacement)       Aortic stenosis, Non-Rheumatic  S/P transfemoral transcatheter aortic valve replacement; POD # 1    Plan:    1  Cardiac:   NSR; HR/BP well-controlled this AM, overnight hypotension  On no HTN meds at home  Maintain central IV access today for medication   Hold ASA/Plavix  Consult cardiology for postoperative medical management  Follow up transthoracic echocardiogram today  Continue Statin therapy  Hold DVT prophylaxis    2  Pulmonary:   Extubated  CXR findings: lines in correct placement, no ptx/effusion  Acute post-op pulmonary insufficiency;  Requiring 2 Liters via nasal cannula, secondary to poor inspiratory effort secondary to pain  Continue incentive spirometry/coughing/deep breathing exercises  Wean supplemental oxygen as tolerated for saturation > 90%    3  Renal:   Intake/Output net: +469 mL/24 hours  Post op Creatinine stable; Follow up labs prn    4  Neuro:  Neurologically intact; No active issues  Incisional pain well-controlled; Continue prn Tylenol    5  GI:  Tolerating TLC 2 3 gm sodium diet/Diabetic diet  Maintain 1800 mL daily fluid restriction   Continue daily stool softeners and prn suppository  Continue GI prophylaxis    6  Endo:   Continue home glimepiride  History of diabetes; Continue pre-op regimen with additional sliding scale coverage    7  Hematology:    Post-operative blood count acceptable; Trend prn and Thrombocytopenia   hgb 11 5 (preop 11 3)   Platelets 55 (from 68,41, preop 69), received 1 U platelets yesterday  8    Disposition:   Gerontology consultation ordered for routine assessment of TAVR patient over 72years old  Transfer from step down to telemetry today, PT/OT consulted for recommendations regarding home vs  rehab when medically cleared for discharge    VTE Pharmacologic Prophylaxis: Reason for no pharmacologic prophylaxis thrombocytopenia  VTE Mechanical Prophylaxis: sequential compression device    Bedside rounds completed with CORI Becerra    Plan of care discussed with bedside nurse    SIGNATURE: Aury Suazo PA-C  DATE: June 23, 2021  TIME: 7:00 AM

## 2021-06-23 NOTE — PROGRESS NOTES
Called to the patient's bedside due to fever 104 (taken rectally)  Patient is postop day 0 status post transfemoral TAVR  Vitals:    06/22/21 2117 06/22/21 2200 06/22/21 2230 06/22/21 2238   BP:  (!) 113/41 109/52 109/52   BP Location:  Left arm Left arm Right arm   Pulse: 84 92 104 100   Resp: 18   18   Temp:    (!) 102 8 °F (39 3 °C)   TempSrc:    Oral   SpO2: 96% 96% 96% 94%   Weight:       Height:         Patient was noted to be in bed and sleepy  He does wake and answer questions but is more lethargic than baseline  His heart rate had increased to the low 100s  Patient feels obviously warm but not diaphoretic  Rapid rate with regular rhythm       Impression:  Fever of 104  Postop day 0 status post transfemoral TAVR    Plan:  500 mL isolate bolus  Stat labs including lactic, CK, LDH, BMP, CBC  Blood cultures and urinalysis  Rectal tylenol and ice packs in groins and axilla  Recheck temp in 1 hour    This was discussed with Dr George Garza and Dr Jeovany Browning time 32 mins    Lanny Pereyra PA-C  Department of Critical Care  Available in Critical access hospital

## 2021-06-24 NOTE — PHYSICAL THERAPY NOTE
Physical Therapy Treatment Note    Patient's Name: Ambar Cornell  : 21 0927   PT Last Visit   PT Visit Date 21   Note Type   Note Type Treatment   Pain Assessment   Pain Assessment Tool Pain Assessment not indicated - pt denies pain   Restrictions/Precautions   Weight Bearing Precautions Per Order No   Other Precautions Cognitive; Chair Alarm; Bed Alarm;Multiple lines;Telemetry; Fall Risk   General   Chart Reviewed Yes   Family/Caregiver Present No   Cognition   Overall Cognitive Status Impaired   Arousal/Participation Alert; Cooperative   Attention Attends with cues to redirect   Memory Decreased recall of precautions   Following Commands Follows one step commands with increased time or repetition   Comments Pt pleasant and cooperative, but tangential at times requiring frequent redirection   Bed Mobility   Additional Comments Seated OOB upon arrival, not assessed   Transfers   Sit to Stand 4  Minimal assistance   Additional items Assist x 1; Increased time required;Verbal cues   Stand to Sit 4  Minimal assistance   Additional items Assist x 1; Increased time required;Verbal cues   Additional Comments Transfers with RW  VCs for proper hand placement with descent to chair   Ambulation/Elevation   Gait pattern Excessively slow; Short stride;Decreased foot clearance   Gait Assistance   (CGA)   Additional items Assist x 1;Verbal cues   Assistive Device Rolling walker   Distance 70ft x2 with seated rest break in between    Balance   Static Sitting Fair +   Dynamic Sitting Fair   Static Standing Fair -   Dynamic Standing Poor +   Ambulatory Poor +   Activity Tolerance   Activity Tolerance Patient limited by fatigue   Nurse Made Aware ok to see per RN   Exercises   Knee AROM Long Arc Quad Sitting;15 reps;AROM; Bilateral   Ankle Pumps Sitting;15 reps;AROM; Bilateral  (heel raise)   Marching Sitting;15 reps;AROM; Bilateral   Assessment   Prognosis Good   Problem List Decreased strength;Decreased endurance; Impaired balance;Decreased mobility; Decreased cognition;Decreased safety awareness   Assessment Pt seen for PT session today with a focus on transfers, gait, endurance, and therex  Pt is making steady progress towards mobility goals  At this time, pt is able to perform transfers with Chantelle and ambulate household distances with CGA  Ambulated shorter distances this session, as pt may need PPM placement--> did not want to have pt push himself too hard  Cleared by RN for mobility, pt vitals all WNL throughout session  Educated pt on seated therex, which pt was able to demonstrate with good technique  Pt would benefit from continued acute skilled PT to address above deficits  Continuing to recommend rehab upon d/c at this time, as pt has limited social support and still requires some assistance with mobility  Barriers to Discharge Decreased caregiver support   Goals   Patient Goals to go home   STG Expiration Date 07/03/21   PT Treatment Day 1   Plan   Treatment/Interventions Functional transfer training;LE strengthening/ROM; Elevations; Therapeutic exercise; Endurance training;Bed mobility;Gait training;Equipment eval/education;Spoke to nursing;Spoke to case management;OT   Progress Progressing toward goals   PT Frequency Other (Comment)  (4-6x/wk)   Recommendation   PT Discharge Recommendation Post acute rehabilitation services  (pendiong progress)   Equipment Recommended [address] [facility] Recommended Wheeled walker   Change/add to [employer]?  No   AM-PAC Basic Mobility Inpatient   Turning in Bed Without Bedrails 3   Lying on Back to Sitting on Edge of Flat Bed 3   Moving Bed to Chair 3   Standing Up From Chair 3   Walk in Room 3   Climb 3-5 Stairs 3   Basic Mobility Inpatient Raw Score 18   Basic Mobility Standardized Score 41 05       Keely Osborne, PT, DPT

## 2021-06-24 NOTE — PROGRESS NOTES
Progress Note - Cardiology   Fredrick Litten 81 y o  male MRN: [id number]  Unit/Bed#: [facility] 426-01 Encounter: [id number]        Principal Problem: Aortic stenosis, severe  Active Problems:    Benign prostatic hyperplasia without lower urinary tract symptoms    Bilateral carotid artery stenosis    Pure hypercholesterolemia    Controlled type 2 diabetes mellitus without complication, without long-term current use of insulin (HCC)    Pulmonary fibrosis (HCC)    Minor depression    Thrombocytopenia (HCC)    Neurologic gait dysfunction    Benign prostatic hyperplasia with nocturia    S/P TAVR (transcatheter aortic valve replacement)    LBBB (left bundle branch block)    Anemia        Assessment/Plan     1  Severe symptomatic aortic stenosis  Status post transfemoral TAVR  Postop day 2   On no AP due to thrombocytopenia  EKG- NSR LBBB ( chronic)  QRS 146ms ( 152 pre op)  Telemetry- NSR-this am mildly  Tachycardic- EKG reviewed by cardiologist, not junctional but SVT  To start low dose BB  BP a bit softer this am    Febrile 1st post op day, resolved  no leukocytosis, normal lactic acid  BC pending  Post procedure TTE noted     Patient does have sense of SOB this am along with the tachycardia  Lungs are clear  Not hypoxic      2  HTN- normotensive on no medical therapy yesterday, today- S BP however now hovering around 100 along with low-grade tachycardia       3  HLD-atorvastatin 10 mg  Cholesterol 147/triglyceride 103/HDL 62/LDL 64     4  Thrombocytopenia-status post 1 unit platelets  No response    5  Fever-1st day postop, resolved  Blood cultures no growth to date    Subjective/Objective   Chief Complaint/Subjective    Patient felt some shortness of breath this morning  Was informed of tachycardia  No chest pain or pressure  Feeling better now  Walked without any shortness of breath according to the patient  Was placed on 2 L for comfort per RN    EKG reviewed with CT surgery and cardiologist   Collins Beck with son, updated    Son feels a lot of this is anxiety      Vitals: BP 99/70   Pulse (!) 111   Temp 97 6 °F (36 4 °C)   Resp 20   Ht 5' 6" (1 676 m)   Wt 76 7 kg (169 lb 1 5 oz)   SpO2 94%   BMI 27 29 kg/m²     Vitals:    06/23/21 0600 06/24/21 0600   Weight: 74 9 kg (165 lb 2 oz) 76 7 kg (169 lb 1 5 oz)     Orthostatic Blood Pressures      Most Recent Value   Blood Pressure  99/70 filed at 06/24/2021 1005   Patient Position - Orthostatic VS  Lying filed at 06/23/2021 1448            Intake/Output Summary (Last 24 hours) at 6/24/2021 1107  Last data filed at 6/24/2021 0546  Gross per 24 hour   Intake 1120 ml   Output 1150 ml   Net -30 ml       Invasive Devices     Central Venous Catheter Line            CVC Central Lines 06/22/21 Triple 2 days          Peripheral Intravenous Line            Peripheral IV 06/22/21 Left Wrist 2 days                Current Facility-Administered Medications   Medication Dose Route Frequency    acetaminophen (TYLENOL) rectal suppository 650 mg  650 mg Rectal Q4H PRN    acetaminophen (TYLENOL) tablet 650 mg  650 mg Oral Q4H PRN    atorvastatin (LIPITOR) tablet 10 mg  10 mg Oral Daily    bisacodyl (DULCOLAX) rectal suppository 10 mg  10 mg Rectal Daily PRN    chlorhexidine (PERIDEX) 0 12 % oral rinse 15 mL  15 mL Mouth/Throat BID    docusate sodium (COLACE) capsule 100 mg  100 mg Oral BID    glimepiride (AMARYL) tablet 1 mg  1 mg Oral Daily    insulin lispro (HumaLOG) 100 units/mL subcutaneous injection 1-5 Units  1-5 Units Subcutaneous TID AC    insulin lispro (HumaLOG) 100 units/mL subcutaneous injection 1-5 Units  1-5 Units Subcutaneous HS    melatonin tablet 6 mg  6 mg Oral HS    metoprolol tartrate (LOPRESSOR) tablet 25 mg  25 mg Oral Q12H WILDER    mupirocin (BACTROBAN) 2 % nasal ointment 1 application  1 application Nasal W55G [address]    ondansetron (ZOFRAN) injection 4 mg  4 mg Intravenous Q6H PRN    pantoprazole (PROTONIX) EC tablet 40 mg  40 mg Oral Early Morning    polyethylene glycol (MIRALAX) packet 17 g  17 g Oral Daily         Physical Exam: BP 99/70   Pulse (!) 111   Temp 97 6 °F (36 4 °C)   Resp 20   Ht 5' 6" (1 676 m)   Wt 76 7 kg (169 lb 1 5 oz)   SpO2 94%   BMI 27 29 kg/m²     General Appearance:    Alert, cooperative, no distress, appears stated age   Head:    Normocephalic, no scleral icterus   Eyes:    PERRL   Nose:   Nares normal, septum midline, no drainage    Throat:   Lips, mucosa, and tongue normal   Neck:   Supple, symmetrical, trachea midline,              Lungs:     Clear to auscultation bilaterally, respirations unlabored   Chest Wall:    No tenderness or deformity    Heart:    Regular rate and rhythm, S1 and S2 normal, no murmur       Extremities:   Extremities normal, atraumatic, no cyanosis or edema       Skin:   Skin warm   Neurologic:   Alert and oriented to person place and time, no focal deficits                 Lab Results:   Recent Results (from the past 72 hour(s))   Fingerstick Glucose (POCT)    Collection Time: 06/22/21  7:58 AM   Result Value Ref Range    POC Glucose 142 (H) 65 - 140 mg/dl   POCT Blood Gas (CG8+)    Collection Time: 06/22/21  9:24 AM   Result Value Ref Range    ph, Matthieu ISTAT 7 271 (L) 7 300 - 7 400    pCO2, Matthieu i-STAT 56 9 (H) 42 0 - 50 0 mm HG    pO2, Matthieu i-STAT 68 0 (H) 35 0 - 45 0 mm HG    BE, i-STAT -1 -2 - 3 mmol/L    HCO3, Matthieu i-STAT 26 2 24 0 - 30 0 mmol/L    CO2, i-STAT 28 21 - 32 mmol/L    O2 Sat, i-STAT 90 (H) 60 - 85 %    SODIUM, I-STAT 144 136 - 145 mmol/l    Potassium, i-STAT 3 9 3 5 - 5 3 mmol/L    Calcium, Ionized i-STAT 1 28 1 12 - 1 32 mmol/L    Hct, i-STAT 33 (L) 36 5 - 49 3 %    Hgb, i-STAT 11 2 (L) 12 0 - 17 0 g/dl    Glucose, i-STAT 123 65 - 140 mg/dl    Specimen Type VENOUS    POCT activated clotting time    Collection Time: 06/22/21  9:24 AM   Result Value Ref Range    Activated Clotting Time, i-STAT 138 (H) 89 - 137 sec    Specimen Type VENOUS    POCT activated clotting time    Collection Time: 06/22/21 10:09 AM   Result Value Ref Range    Activated Clotting Time, i-STAT 305 (H) 89 - 137 sec    Specimen Type ARTERIAL    POCT Blood Gas (CG8+)    Collection Time: 06/22/21 10:10 AM   Result Value Ref Range    pH, Art i-STAT 7 363 7 350 - 7 450    pCO2, Art i-STAT 43 5 36 0 - 44 0 mm HG    pO2, ART i-STAT 189 0 (H) 75 0 - 129 0 mm HG    BE, i-STAT -1 -2 - 3 mmol/L    HCO3, Art i-STAT 24 7 22 0 - 28 0 mmol/L    CO2, i-STAT 26 21 - 32 mmol/L    O2 Sat, i-STAT 100 (H) 60 - 85 %    SODIUM, I-STAT 141 136 - 145 mmol/l    Potassium, i-STAT 3 8 3 5 - 5 3 mmol/L    Calcium, Ionized i-STAT 1 19 1 12 - 1 32 mmol/L    Hct, i-STAT 30 (L) 36 5 - 49 3 %    Hgb, i-STAT 10 2 (L) 12 0 - 17 0 g/dl    Glucose, i-STAT 113 65 - 140 mg/dl    Specimen Type ARTERIAL    POCT Blood Gas (CG8+)    Collection Time: 06/22/21 10:22 AM   Result Value Ref Range    pH, Art i-STAT 7 355 7 350 - 7 450    pCO2, Art i-STAT 41 0 36 0 - 44 0 mm HG    pO2, ART i-STAT 179 0 (H) 75 0 - 129 0 mm HG    BE, i-STAT -3 (L) -2 - 3 mmol/L    HCO3, Art i-STAT 22 9 22 0 - 28 0 mmol/L    CO2, i-STAT 24 21 - 32 mmol/L    O2 Sat, i-STAT 100 (H) 60 - 85 %    SODIUM, I-STAT 143 136 - 145 mmol/l    Potassium, i-STAT 3 6 3 5 - 5 3 mmol/L    Calcium, Ionized i-STAT 1 14 1 12 - 1 32 mmol/L    Hct, i-STAT 28 (L) 36 5 - 49 3 %    Hgb, i-STAT 9 5 (L) 12 0 - 17 0 g/dl    Glucose, i-STAT 103 65 - 140 mg/dl    Specimen Type ARTERIAL    POCT activated clotting time    Collection Time: 06/22/21 10:22 AM   Result Value Ref Range    Activated Clotting Time, i-STAT 110 89 - 137 sec    Specimen Type ARTERIAL    ECG 12 lead    Collection Time: 06/22/21 10:41 AM   Result Value Ref Range    Ventricular Rate 64 BPM    Atrial Rate 64 BPM    HI Interval 183 ms    QRSD Interval 146 ms    QT Interval 454 ms    QTC Interval 469 ms    P [place] 54 degrees    QRS Axis 217 degrees    T Wave Axis 3 degrees   Hemoglobin and hematocrit, blood    Collection Time: 06/22/21 10:42 AM   Result Value Ref Range    Hemoglobin 11 3 (L) 12 0 - 17 0 g/dL    Hematocrit 33 5 (L) 36 5 - 49 3 %   Platelet count    Collection Time: 06/22/21 10:42 AM   Result Value Ref Range    Platelets 59 (L) 290 - 390 Thousands/uL    MPV 9 9 8 9 - 12 7 fL   Basic metabolic panel    Collection Time: 06/22/21 10:43 AM   Result Value Ref Range    Sodium 143 136 - 145 mmol/L    Potassium 4 1 3 5 - 5 3 mmol/L    Chloride 112 (H) 100 - 108 mmol/L    CO2 23 21 - 32 mmol/L    ANION GAP 8 4 - 13 mmol/L    BUN 15 5 - 25 mg/dL    Creatinine 1 02 0 60 - 1 30 mg/dL    Glucose 108 65 - 140 mg/dL    Glucose, Fasting 108 (H) 65 - 99 mg/dL    Calcium 8 5 8 3 - 10 1 mg/dL    eGFR 67 ml/min/1 73sq m   Fingerstick Glucose (POCT)    Collection Time: 06/22/21 10:43 AM   Result Value Ref Range    POC Glucose 110 65 - 140 mg/dl   Fingerstick Glucose (POCT)    Collection Time: 06/22/21  1:17 PM   Result Value Ref Range    POC Glucose 123 65 - 140 mg/dl   Fingerstick Glucose (POCT)    Collection Time: 06/22/21  4:36 PM   Result Value Ref Range    POC Glucose 156 (H) 65 - 140 mg/dl   Fingerstick Glucose (POCT)    Collection Time: 06/22/21  5:45 PM   Result Value Ref Range    POC Glucose 165 (H) 65 - 140 mg/dl   Fingerstick Glucose (POCT)    Collection Time: 06/22/21  9:23 PM   Result Value Ref Range    POC Glucose 155 (H) 65 - 140 mg/dl   Blood culture    Collection Time: 06/22/21 11:37 PM    Specimen: Arm, Left; Blood   Result Value Ref Range    Blood Culture No Growth at 24 hrs  Blood culture    Collection Time: 06/22/21 11:38 PM    Specimen: Arm, Right; Blood   Result Value Ref Range    Blood Culture No Growth at 24 hrs      Basic metabolic panel    Collection Time: 06/22/21 11:39 PM   Result Value Ref Range    Sodium 139 136 - 145 mmol/L    Potassium 3 7 3 5 - 5 3 mmol/L    Chloride 105 100 - 108 mmol/L    CO2 28 21 - 32 mmol/L    ANION GAP 6 4 - 13 mmol/L    BUN 15 5 - 25 mg/dL    Creatinine 1 23 0 60 - 1 30 mg/dL    Glucose 143 (H) 65 - 140 mg/dL    Calcium 8 2 (L) 8 3 - 10 1 mg/dL    eGFR 53 ml/min/1 73sq m   LD,Blood    Collection Time: 06/22/21 11:39 PM   Result Value Ref Range     81 - 234 U/L   CK (with reflex to MB)    Collection Time: 06/22/21 11:39 PM   Result Value Ref Range    Total  39 - 308 U/L   CBC    Collection Time: 06/22/21 11:40 PM   Result Value Ref Range    WBC 5 80 4 31 - 10 16 Thousand/uL    RBC 3 61 (L) 3 88 - 5 62 Million/uL    Hemoglobin 11 5 (L) 12 0 - 17 0 g/dL    Hematocrit 33 9 (L) 36 5 - 49 3 %    MCV 94 82 - 98 fL    MCH 31 9 26 8 - 34 3 pg    MCHC 33 9 31 4 - 37 4 g/dL    RDW 12 8 11 6 - 15 1 %    Platelets 61 (L) 453 - 390 Thousands/uL    MPV 9 5 8 9 - 12 7 fL   Lactic acid, plasma    Collection Time: 06/22/21 11:43 PM   Result Value Ref Range    LACTIC ACID 1 7 0 5 - 2 0 mmol/L   Basic Metabolic Panel -AM POD #1    Collection Time: 06/23/21  4:47 AM   Result Value Ref Range    Sodium 138 136 - 145 mmol/L    Potassium 4 3 3 5 - 5 3 mmol/L    Chloride 106 100 - 108 mmol/L    CO2 28 21 - 32 mmol/L    ANION GAP 4 4 - 13 mmol/L    BUN 16 5 - 25 mg/dL    Creatinine 1 19 0 60 - 1 30 mg/dL    Glucose 136 65 - 140 mg/dL    Calcium 8 4 8 3 - 10 1 mg/dL    eGFR 55 ml/min/1 73sq m   CBC-AM POD #1    Collection Time: 06/23/21  4:47 AM   Result Value Ref Range    WBC 8 22 4 31 - 10 16 Thousand/uL    RBC 3 65 (L) 3 88 - 5 62 Million/uL    Hemoglobin 11 7 (L) 12 0 - 17 0 g/dL    Hematocrit 34 6 (L) 36 5 - 49 3 %    MCV 95 82 - 98 fL    MCH 32 1 26 8 - 34 3 pg    MCHC 33 8 31 4 - 37 4 g/dL    RDW 12 8 11 6 - 15 1 %    Platelets 55 (L) 186 - 390 Thousands/uL    MPV 9 7 8 9 - 12 7 fL   Magnesium -AM POD #1    Collection Time: 06/23/21  4:47 AM   Result Value Ref Range    Magnesium 1 8 1 6 - 2 6 mg/dL   Urinalysis with microscopic    Collection Time: 06/23/21  5:08 AM   Result Value Ref Range    Clarity, UA Clear     Color, UA Yellow     Specific Raymond, UA 1 020 1 003 - 1 030    pH, UA 6 5 4 5, 5 0, 5 5, 6 0, 6 5, 7 0, 7 5, 8 0    Glucose, UA Negative Negative mg/dl    Ketones, UA Negative Negative mg/dl    Blood, UA Moderate (A) Negative    Protein, UA Negative Negative mg/dl    Nitrite, UA Negative Negative    Bilirubin, UA Negative Negative    Urobilinogen, UA 0 2 0 2, 1 0 E U /dl E U /dl    Leukocytes, UA Negative Negative    WBC, UA 2-4 None Seen, 2-4 /hpf    RBC, UA 30-50 (A) None Seen, 2-4 /hpf    Hyaline Casts, UA None Seen None Seen /lpf    Bacteria, UA None Seen None Seen, Occasional /hpf    Epithelial Cells None Seen None Seen, Occasional /hpf   Fingerstick Glucose (POCT)    Collection Time: 06/23/21  5:35 AM   Result Value Ref Range    POC Glucose 161 (H) 65 - 140 mg/dl   Prepare Leukoreduced Platelet Pheresis (1 pheresis product = 6-8 pooled units): 1 Product    Collection Time: 06/23/21  5:55 AM   Result Value Ref Range    Unit Product Code Q9703B99     Unit Number [id number]     Unit ABO A     Unit [facility] POS     Unit Dispense Status Presumed Trans    Prepare Leukoreduced RBC: 4 Units, Leukoreduced    Collection Time: 06/23/21  6:52 AM   Result Value Ref Range    Unit Product Code M2418A86     Unit Number [id number]     Unit ABO A     Unit RH POS     Crossmatch Compatible     Unit Dispense Status Return to [facility]     Unit Product Code N6045L21     Unit Number [id number]     Unit ABO A     Unit RH POS     Crossmatch Compatible     Unit Dispense Status Return to [facility]     Unit Product Code I5668T81     Unit Number [id number]     Unit ABO A     Unit RH POS     Crossmatch Compatible     Unit Dispense Status Return to [facility]     Unit Product Code V5248Y20     Unit Number [id number]     Unit ABO A     Unit RH POS     Crossmatch Compatible     Unit Dispense Status Return to Inv    Fingerstick Glucose (POCT)    Collection Time: 06/23/21 10:54 AM   Result Value Ref Range    POC Glucose 227 (H) 65 - 140 mg/dl   Fingerstick Glucose (POCT)    Collection Time: 06/23/21  4:07 PM   Result Value Ref Range    POC Glucose 142 (H) 65 - 140 mg/dl   Fingerstick Glucose (POCT)    Collection Time: 06/23/21  9:16 PM   Result Value Ref Range    POC Glucose 125 65 - 140 mg/dl   Basic metabolic panel    Collection Time: 06/24/21  4:58 AM   Result Value Ref Range    Sodium 135 (L) 136 - 145 mmol/L    Potassium 4 0 3 5 - 5 3 mmol/L    Chloride 102 100 - 108 mmol/L    CO2 28 21 - 32 mmol/L    ANION GAP 5 4 - 13 mmol/L    BUN 16 5 - 25 mg/dL    Creatinine 1 12 0 60 - 1 30 mg/dL    Glucose 138 65 - 140 mg/dL    Calcium 9 0 8 3 - 10 1 mg/dL    eGFR 60 ml/min/1 73sq m   CBC (With Platelets)    Collection Time: 06/24/21  4:58 AM   Result Value Ref Range    WBC 6 65 4 31 - 10 16 Thousand/uL    RBC 3 76 (L) 3 88 - 5 62 Million/uL    Hemoglobin 12 0 12 0 - 17 0 g/dL    Hematocrit 35 0 (L) 36 5 - 49 3 %    MCV 93 82 - 98 fL    MCH 31 9 26 8 - 34 3 pg    MCHC 34 3 31 4 - 37 4 g/dL    RDW 12 8 11 6 - 15 1 %    Platelets 47 (LL) 755 - 390 Thousands/uL    MPV 10 6 8 9 - 12 7 fL   Fingerstick Glucose (POCT)    Collection Time: 06/24/21  5:40 AM   Result Value Ref Range    POC Glucose 130 65 - 140 mg/dl   ECG 12 lead    Collection Time: 06/24/21  6:02 AM   Result Value Ref Range    Ventricular Rate 114 BPM    Atrial Rate 110 BPM    NJ Interval  ms    QRSD Interval 134 ms    QT Interval 356 ms    QTC Interval 490 ms    P Axis  degrees    QRS Axis -50 degrees    T Wave Axis 117 degrees     Imaging: I have personally reviewed pertinent reports  EKG: SVT      Counseling / Coordination of Care  Total time spent today 30 minutes  Greater than 50% of total time was spent with the patient and / or family counseling and / or coordination of care

## 2021-06-24 NOTE — PROGRESS NOTES
Progress Note - Cardiothoracic Surgery   Jose Daniel Jain 81 y o  male MRN: [id number]  Unit/Bed#: [facility] 426-01 Encounter: [id number]    Aortic stenosis, Non-Rheumatic  S/P transfemoral transcatheter aortic valve replacement; POD # 2    24 Hour Events: EKG with old LBBB and suggested accelerated junctional rhythm/SVT rate of 100 -120  Did not sleep well overnight and fatigued this morning  Some nausea well  No chest pain, no palpitations       Medications:   Scheduled Meds:  Current Facility-Administered Medications   Medication Dose Route Frequency Provider Last Rate    acetaminophen  650 mg Rectal Q4H PRN Merline Doles, PA-C      acetaminophen  650 mg Oral Q4H PRN Merline Doles, PA-C      atorvastatin  10 mg Oral Daily Merline Doles, PA-C      bisacodyl  10 mg Rectal Daily PRN Merline Doles, PA-C      chlorhexidine  15 mL Mouth/Throat BID Merline Doles, PA-C      docusate sodium  100 mg Oral BID Merline Doles, PA-C      glimepiride  1 mg Oral Daily Merline Doles, [place]      insulin lispro  1-5 Units Subcutaneous TID AC Merline Doles, PA-C      insulin lispro  1-5 Units Subcutaneous HS Merline Doles, PA-C      melatonin  6 mg Oral HS Merline Doles, PA-C      mupirocin  1 application Nasal Q42V [address] Merline Doles, PA-C      ondansetron  4 mg Intravenous Q6H PRN Merline Doles, PA-C      pantoprazole  40 mg Oral Early Morning Merline Doles, PA-C      polyethylene glycol  17 g Oral Daily Merline Doles, PA-C       Continuous Infusions:   PRN Meds:   acetaminophen    acetaminophen    bisacodyl    ondansetron    Vitals:   Vitals:    06/24/21 0327 06/24/21 0600 06/24/21 0655 06/24/21 0725   BP: 131/77  96/62 118/72   BP Location:       Pulse: 96  (!) 116 (!) 124   Resp: 18   20   Temp: 99 3 °F (37 4 °C)  98 1 °F (36 7 °C) 97 6 °F (36 4 °C)   TempSrc:       SpO2: 93%  97% 95%   Weight:  76 7 kg (169 lb 1 5 oz)     Height:           Telemetry: EKG with LBBB and ?junctional rhythm; Heart Rate: 110    Respiratory:   SpO2: SpO2: 95 %; Room Air    Intake/Output:   I/O       06/22 0701 - 06/23 0700 06/23 0701 - 06/24 0700 06/24 0701 - 06/25 0700    P  O  360 1460     I V  (mL/kg) 2809 2 (37 5)      Blood 300      Total Intake(mL/kg) 3469 2 (46 3) 1460 (19)     Urine (mL/kg/hr) 2950 1275 (0 7)     Blood 50      Total Output 3000 1275     Net +469 2 +185            Unmeasured Urine Occurrence  2 x         Weights:   Weight (last 2 days)     Date/Time   Weight    06/24/21 0600   76 7 (169 09)    06/23/21 0600   74 9 (165 12)    06/22/21 0751   75 1 (165 56)            Results:   Results from last 7 days   Lab Units 06/24/21  0458 06/23/21  0447 06/22/21  2340   WBC Thousand/uL 6 65 8 22 5 80   HEMOGLOBIN g/dL 12 0 11 7* 11 5*   HEMATOCRIT % 35 0* 34 6* 33 9*   PLATELETS Thousands/uL 47* 55* 61*     Results from last 7 days   Lab Units 06/24/21  0458 06/23/21  0447 06/22/21  2339 06/22/21  1022   SODIUM mmol/L 135* 138 139  --    POTASSIUM mmol/L 4 0 4 3 3 7  --    CHLORIDE mmol/L 102 106 105  --    CO2 mmol/L 28 28 28  --    CO2, I-STAT mmol/L  --   --   --  24   BUN mg/dL 16 16 15  --    CREATININE mg/dL 1 12 1 19 1 23  --    GLUCOSE, ISTAT mg/dl  --   --   --  103   CALCIUM mg/dL 9 0 8 4 8 2*  --      Results from last 7 days   Lab Units 06/17/21  0831   INR  1 13     Point of care glucose: 125-227    Studies:  Echocardiogram yesterday  LEFT VENTRICLE: Size was normal  Systolic function was normal  Ejection fraction was estimated to be 55 %  There were no regional wall motion abnormalities  Wall thickness was mildly increased  DOPPLER: Doppler parameters were consistent  with abnormal left ventricular relaxation (grade 1 diastolic dysfunction)      VENTRICULAR SEPTUM: There was dyssynergic motion  There was moderate   These changes are consistent with LBBB      RIGHT VENTRICLE: The size was normal  Systolic function was normal  Wall thickness was normal      LEFT ATRIUM: Size was normal      RIGHT ATRIUM: Size was normal      MITRAL VALVE: Valve structure was normal  There was normal leaflet separation  DOPPLER: The transmitral velocity was within the normal range  There was no evidence for stenosis  There was mild regurgitation      AORTIC VALVE: A 29mm ES3 TAVR bioprosthesis was present  It exhibited normal function  DOPPLER: There was no significant regurgitation  There was no significant perivalvular regurgitation      TRICUSPID VALVE: The valve structure was normal  There was normal leaflet separation  DOPPLER: The transtricuspid velocity was within the normal range  There was no evidence for stenosis  There was mild regurgitation      PULMONIC VALVE: Leaflets exhibited normal thickness, no calcification, and normal cuspal separation  DOPPLER: The transpulmonic velocity was within the normal range  There was trace regurgitation      PERICARDIUM: There was no pericardial effusion  The pericardium was normal in appearance      AORTA: The root exhibited normal size      SYSTEMIC VEINS: IVC: The inferior vena cava was normal in size  I have personally reviewed pertinent reports  Invasive Lines/Tubes:  Invasive Devices     Central Venous Catheter Line            CVC Central Lines 06/22/21 Triple 1 day          Peripheral Intravenous Line            Peripheral IV 06/22/21 Left Wrist 1 day                Physical Exam:    HEENT/NECK:  Normocephalic  Atraumatic  No jugular venous distention  Cardiac: tachycardia rate in the 100s   Pulmonary:  Breath sounds clear bilaterally  Abdomen:  Non-tender, Non-distended and Normal bowel sounds  Incisions: Groin puncture sites clean, dry, and intact without hematoma  Extremities: Extremities warm/dry, DP pulses 2+ bilaterally and 1+ edema B/L  Neuro: Alert and oriented X 3  Skin: Warm/Dry, without rashes or lesions  Assessment:  Principal Problem:     Aortic stenosis, severe  Active Problems:    Benign prostatic hyperplasia without lower urinary tract symptoms    Bilateral carotid artery stenosis    Pure hypercholesterolemia    Controlled type 2 diabetes mellitus without complication, without long-term current use of insulin (HCC)    Pulmonary fibrosis (HCC)    Minor depression    Thrombocytopenia (HCC)    Neurologic gait dysfunction    Benign prostatic hyperplasia with nocturia    S/P TAVR (transcatheter aortic valve replacement)    LBBB (left bundle branch block)    Anemia       Aortic stenosis, Non-Rheumatic  S/P transfemoral transcatheter aortic valve replacement; POD # 2    Plan:    1  Cardiac: BP stable, EP consulted this morning, not on any po cardiac meds until cleared  2  D/Wcardiology SVT give BB   ASA and Plavix held due to thrombocytopenia   Maintain central IV access today for IV access  Continue DVT prophylaxis with SCDs bilaterally     3  Pulmonary:   Good Room air oxygen saturation; Continue incentive spirometry/Coughing/Deep breathing exercises    4  Renal:   Intake/Output net: even  mL/24 hours  No diuretics / Cr stable     5  Neuro: no issues     6  GI:  Tolerating TLC 2 3 gm sodium diet  Maintain 1800 mL daily fluid restriction   Continue stool softeners and prn suppository  Continue GI prophylaxis    7  Endo: on home po medications     7    Hematology: Hgb stable at 12, preoperative thrombocytopenia count 59k and now downtrending after today at 47K, daily monitor     8  Disposition:      Pending above plan     VTE Pharmacologic Prophylaxis: Sequential compression device (Venodyne)   VTE Mechanical Prophylaxis: sequential compression device    Collaborative rounds completed with CORI Heath    Plan of care discussed with bedside nurse    SIGNATURE: Valentina Shah PA-C  DATE: June 24, 2021  TIME: 7:48 AM

## 2021-06-24 NOTE — PLAN OF CARE
Problem: PHYSICAL THERAPY ADULT  Goal: Performs mobility at highest level of function for planned discharge setting  See evaluation for individualized goals  Description: Treatment/Interventions: Functional transfer training, LE strengthening/ROM, Elevations, Therapeutic exercise, Endurance training, Equipment eval/education, Bed mobility, Gait training, Spoke to nursing, Spoke to case management, OT  Equipment Recommended: Maria Elena George       See flowsheet documentation for full assessment, interventions and recommendations  Outcome: Progressing  Note: Prognosis: Good  Problem List: Decreased strength, Decreased endurance, Impaired balance, Decreased mobility, Decreased cognition, Decreased safety awareness  Assessment: Pt seen for PT session today with a focus on transfers, gait, endurance, and therex  Pt is making steady progress towards mobility goals  At this time, pt is able to perform transfers with Chantelle and ambulate household distances with CGA  Ambulated shorter distances this session, as pt may need PPM placement--> did not want to have pt push himself too hard  Cleared by RN for mobility, pt vitals all WNL throughout session  Educated pt on seated therex, which pt was able to demonstrate with good technique  Pt would benefit from continued acute skilled PT to address above deficits  Continuing to recommend rehab upon d/c at this time, as pt has limited social support and still requires some assistance with mobility  Barriers to Discharge: Decreased caregiver support        PT Discharge Recommendation: Post acute rehabilitation services (pendiong progress)          See flowsheet documentation for full assessment

## 2021-06-25 NOTE — PROGRESS NOTES
Progress Note - Cardiothoracic Surgery   Dino Cameron 81 y o  male MRN: [id number]  Unit/Bed#: [facility] 426-01 Encounter: [id number]    Aortic stenosis, Non-Rheumatic  S/P transfemoral transcatheter aortic valve replacement; POD # 3    24 Hour Events: Made NPO this morning from EP overnight  Had rhythm concerns including, SVT, 1st AVB with bradycardia rate of 56    He has no complaints this morning  There's confusion with the patient and his son about his NPO status       Medications:   Scheduled Meds:  Current Facility-Administered Medications   Medication Dose Route Frequency Provider Last Rate    acetaminophen  650 mg Rectal Q4H PRN Merary Clos, PA-C      acetaminophen  650 mg Oral Q4H PRN Merary Clos, PA-C      atorvastatin  10 mg Oral Daily Merary Clos, PA-C      bisacodyl  10 mg Rectal Daily PRN Merary Clos, PA-C      chlorhexidine  15 mL Mouth/Throat BID Merary Salazar, PA-C      glimepiride  1 mg Oral Daily [place], [place]      insulin lispro  1-5 Units Subcutaneous TID AC Merary Salazar, PA-TORRI      insulin lispro  1-5 Units Subcutaneous HS Merary Marie, PA-TORRI      melatonin  6 mg Oral HS Merary Marie, PA-TORRI      metoprolol tartrate  25 mg Oral Q12H Rhona Melvin MD      mupirocin  1 application Nasal [address] [facility] [place], [place]      ondansetron  4 mg Intravenous Q6H PRN Merary Marie, PA-TORRI      pantoprazole  40 mg Oral Early Morning [facility] Marie, PA-C      polyethylene glycol  17 g Oral Daily Merary Clos, [place]      senna-docusate sodium  1 tablet Oral BID Soha Orf Sarah Delgadillo PA-C       Continuous Infusions:   PRN Meds:   acetaminophen    acetaminophen    bisacodyl    ondansetron    Vitals:   Vitals:    06/24/21 2259 06/25/21 0238 06/25/21 0538 06/25/21 0702   BP: (!) 109/45 (!) 99/49  100/50   BP Location: Right arm      Pulse: 60 60  59   Resp: 17 18  18   Temp: 98 4 °F (36 9 °C) 97 9 °F (36 6 °C)  97 9 °F (36 6 °C)   TempSrc: Oral      SpO2: 94% 92%  96%   Weight:   76 kg (167 lb 8 8 oz)    Height:           Telemetry: 1st AVB rate of 56 on telemetry    Respiratory:   SpO2: SpO2: 96 %; Room Air    Intake/Output:   I/O       06/23 0701 - 06/24 0700 06/24 0701 - 06/25 0700 06/25 0701 - 06/26 0700    P  O  1460 640     I V  (mL/kg)       Blood       Total Intake(mL/kg) 1460 (19) 640 (8 4)     Urine (mL/kg/hr) 1275 (0 7) 1100 (0 6)     Blood       Total Output 1275 1100     Net +185 -460            Unmeasured Urine Occurrence 2 x 1 x           Weights:   Weight (last 2 days)     Date/Time   Weight    06/25/21 0538   76 (167 55)    06/24/21 0600   76 7 (169 09)    06/23/21 0600   74 9 (165 12)            Results:   Results from last 7 days   Lab Units 06/25/21  0455 06/24/21  0458 06/23/21  0447   WBC Thousand/uL 4 96 6 65 8 22   HEMOGLOBIN g/dL 10 9* 12 0 11 7*   HEMATOCRIT % 32 3* 35 0* 34 6*   PLATELETS Thousands/uL 52* 47* 55*     Results from last 7 days   Lab Units 06/25/21  0455 06/24/21  0458 06/23/21  0447 06/22/21  1022   POTASSIUM mmol/L 4 0 4 0 4 3  --    CHLORIDE mmol/L 103 102 106  --    CO2 mmol/L 28 28 28  --    CO2, I-STAT mmol/L  --   --   --  24   BUN mg/dL 24 16 16  --    CREATININE mg/dL 1 16 1 12 1 19  --    GLUCOSE, ISTAT mg/dl  --   --   --  103   CALCIUM mg/dL 8 7 9 0 8 4  --          Point of care glucose: 125-184    Invasive Lines/Tubes:  Invasive Devices     Central Venous Catheter Line            CVC Central Lines 06/22/21 Triple 2 days          Peripheral Intravenous Line            Peripheral IV 06/22/21 Left Wrist 2 days                Physical Exam:    HEENT/NECK:  Normocephalic  Atraumatic  No jugular venous distention      Cardiac: Regular rate and rhythm  Pulmonary:  Breath sounds clear bilaterally  Abdomen:  Non-tender, Non-distended and Normal bowel sounds  Incisions: Groin puncture sites clean, dry, and intact without hematoma eccymosis in the groin is stable, groin site is soft   Extremities: Extremities warm/dry and DP pulses 2+ bilaterally  Neuro: Alert and oriented X 3  Skin: Warm/Dry, without rashes or lesions  Assessment:  Principal Problem: Aortic stenosis, severe  Active Problems:    Benign prostatic hyperplasia without lower urinary tract symptoms    Bilateral carotid artery stenosis    Pure hypercholesterolemia    Controlled type 2 diabetes mellitus without complication, without long-term current use of insulin (HCC)    Pulmonary fibrosis (HCC)    Minor depression    Thrombocytopenia (HCC)    Neurologic gait dysfunction    Benign prostatic hyperplasia with nocturia    S/P TAVR (transcatheter aortic valve replacement)    LBBB (left bundle branch block)    Anemia       Aortic stenosis, Non-Rheumatic  S/P transfemoral transcatheter aortic valve replacement; POD # 3    Plan:    1  Cardiac:   Above mentioned rhythm variations now on BB with long 1st AVB contacted EP to discuss with patient possible PPM needs  Maintain central IV access today for today  Patient has been held of his ASA and Plavix for thrombocytopenia now uptrending, yesterday lowest   Continue Statin therapy  Continue DVT prophylaxis    2  Pulmonary:   Good Room air oxygen saturation; Continue incentive spirometry/Coughing/Deep breathing exercises    3  Renal:   Intake/Output net: -140 mL/24 hours  Post op Creatinine stable; Follow up labs prn    4  Neuro:  Neurologically intact; No active issues  Incisional pain well-controlled; Continue prn Tylenol    5  GI:  NPO for now  Maintain 1800 mL daily fluid restriction   Continue stool softeners and prn suppository  Continue GI prophylaxis    6  Endo:   History of diabetes; Continue home DM regimen, with supplemental subcutaneous insulin therapy    7  Hematology:   Post-operative blood count acceptable; Trend prn    8    Disposition:  Gerontology consultation already completed for routine assessment of TAVR patient over 72years old  Anticipate discharge to home when medically ready / son at bedside this morning      VTE Pharmacologic Prophylaxis: Sequential compression device (Venodyne)   VTE Mechanical Prophylaxis: sequential compression device    Collaborative rounds completed with CORI Correa    Plan of care discussed with bedside nurse    SIGNATURE: Aravind Jama PA-C  DATE: June 25, 2021  TIME: 8:16 AM

## 2021-06-25 NOTE — ANESTHESIA PREPROCEDURE EVALUATION
OCCUPATIONAL THERAPY EVALUATION/DISCHARGE  Patient: Citlali Vyas (89 y.o. female)  Date: 8/29/2019  Primary Diagnosis: Status post left knee replacement [Z96.652]  Procedure(s) (LRB):  LEFT TOTAL KNEE REVISION ARTHROPLASTY, RIGHT KNEE INJECTION (Left) 1 Day Post-Op   Precautions: WBAT , contact      ASSESSMENT  Based on the objective data described below, the patient presents at supervision to independent level with bed mobility, functional transfers, toileting and LB self-care. No AE needed for LB dressing. Pt with h/o of knee multiple surgeries and has all DME.  works from home. Pt will have 24/7 assistance if needed. No further recommendations at this time. Feel pt is safe for discharge once medically cleared. Current Level of Function (ADLs/self-care): supervision to independent    Functional Outcome Measure: The patient scored 90/100 on the Barthel outcome measure which is indicative of 1-19% impairment. Other factors to consider for discharge: none noted     PLAN :  Recommend with staff: OOB for meals and ambulate to bathroom with RW    Recommendation for discharge: (in order for the patient to meet his/her long term goals)  No skilled occupational therapy/ follow up rehabilitation needs identified at this time.     This discharge recommendation:  A follow-up discussion with the attending provider and/or case management is planned    Equipment recommendations for successful discharge: none       SUBJECTIVE:   Patient agreeable to work with therapy    OBJECTIVE DATA SUMMARY:   HISTORY:   Past Medical History:   Diagnosis Date    ADHD (attention deficit hyperactivity disorder)     Arthritis     OSTEO    Chronic pain     YAHIR KNEES, LOWER BACK, RT SHOULDER & NECK    GERD (gastroesophageal reflux disease)     Graves disease     NO LONGER AN ISSUE    Hematuria 10/9/2018    HTN (hypertension)     CONTROLLED WITH MEDS    Hypothyroidism 2018    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    OCD Procedure:  CARDIAC EPS/PACER IMPLANT    Relevant Problems   CARDIO   (+) Aortic stenosis, severe   (+) LBBB (left bundle branch block)   (+) Pure hypercholesterolemia   (+) S/P TAVR (transcatheter aortic valve replacement)      ENDO   (+) Controlled type 2 diabetes mellitus without complication, without long-term current use of insulin (HCC)      GI/HEPATIC   (+) Pharyngoesophageal dysphagia      /RENAL   (+) Benign prostatic hyperplasia without lower urinary tract symptoms      HEMATOLOGY   (+) Anemia   (+) Thrombocytopenia (HCC)      NEURO/PSYCH   (+) Minor depression        Physical Exam    Airway    Mallampati score: I  TM Distance: >3 FB  Neck ROM: full     Dental       Cardiovascular      Pulmonary      Other Findings        Anesthesia Plan  ASA Score- 4     Anesthesia Type- IV sedation with anesthesia with ASA Monitors  Additional Monitors:   Airway Plan:           Plan Factors-    Chart reviewed  Patient summary reviewed  Patient is not a current smoker  Patient did not smoke on day of surgery  Induction- intravenous  Postoperative Plan-     Informed Consent- Anesthetic plan and risks discussed with patient  I personally reviewed this patient with the CRNA  Discussed and agreed on the Anesthesia Plan with the CRNA  Phi Lennon (obsessive compulsive disorder)     Other ill-defined conditions(799.89)     graves    Psychiatric disorder     depression. RECENTLY LOST HER SON ON 20 ON HER BIRTHDAY.  Seizure ([address]     Seizures ([address]  &     REACTIVE TO HYPOGLYCEMIA / ALSO FROM FLASHING LIGHTS     Past Surgical History:   Procedure Laterality Date    DELIVERY       HX ABDOMINOPLASTY      HX ADENOIDECTOMY  1982    HX  SECTION      HX  SECTION  2006    HX CHOLECYSTECTOMY      HX GASTRIC BYPASS          HX GI  1993    CHOLEYCYSTECTOMY    HX ORTHOPAEDIC Right     CARPEL TUNNEL    HX ORTHOPAEDIC Left     CARPEL TUNNEL    HX ORTHOPAEDIC Left     ULNA SHORTENING    HX ORTHOPAEDIC Right 2011    COLLAR BONE SURGERY     HX ORTHOPAEDIC Left     LEFT KNEE REPLACEMENT    HX ORTHOPAEDIC Left     LEFT KNEE INCISION FOR INFECTION & REPAIR    HX ORTHOPAEDIC Left     LEFT KNEE REMOVAL OF PROSTHESIS & INSERTION OF SPACER, REPAIR OF INCISION    HX TONSILLECTOMY  1975       Prior Level of Function/Environment/Context: mod I using cane  Expanded or extensive additional review of patient history:   Home Situation  Home Environment: Private residence  # Steps to Enter: 5  Rails to Enter: Yes  One/Two Story Residence: Two story  Living Alone: No  Support Systems: Family member(s)  Patient Expects to be Discharged to[de-identified] Private residence  Current DME Used/Available at Home: [address]:  Cognitive/Behavioral Status:  Neurologic State: Alert  Orientation Level: Oriented X4  Cognition: Appropriate decision making        Safety/Judgement: Awareness of environment    Skin: intact    Edema: none noted    Hearing: Auditory  Auditory Impairment: None    Vision/Perceptual:                                Corrective Lenses: Glasses    Range of Motion:    AROM: Within functional limits                         Strength:    Strength:  Within functional limits Coordination: Tone & Sensation:                              Balance:  Sitting: Intact  Standing: Intact; With support  Standing - Static: Good  Standing - Dynamic : Constant support; Fair    Functional Mobility and Transfers for ADLs:  Bed Mobility:  Rolling: Supervision  Supine to Sit: Supervision  Sit to Supine: Supervision  Scooting: Supervision    Transfers:  Sit to Stand: Supervision  Stand to Sit: Supervision  Bed to Chair: Supervision  Bathroom Mobility: Supervision/set up  Toilet Transfer : Supervision    ADL Assessment:  Feeding: Independent    Oral Facial Hygiene/Grooming: Independent    Bathing: Modified independent; Additional time    Upper Body Dressing: Independent    Lower Body Dressing: Modified independent; Additional time    Toileting: Supervision                ADL Intervention and task modifications:                                     Cognitive Retraining  Safety/Judgement: Awareness of environment    Therapeutic Exercise:     Functional Measure:  Barthel Index:    Bathin  Bladder: 10  Bowels: 10  Groomin  Dressing: 10  Feeding: 10  Mobility: 10  Stairs: 5  Toilet Use: 10  Transfer (Bed to Chair and Back): 15  Total: 90/100        The Barthel ADL Index: Guidelines  1. The index should be used as a record of what a patient does, not as a record of what a patient could do. 2. The main aim is to establish degree of independence from any help, physical or verbal, however minor and for whatever reason. 3. The need for supervision renders the patient not independent. 4. A patient's performance should be established using the best available evidence. Asking the patient, friends/relatives and nurses are the usual sources, but direct observation and common sense are also important. However direct testing is not needed. 5. Usually the patient's performance over the preceding 24-48 hours is important, but occasionally longer periods will be relevant.   6. Middle categories imply that the patient supplies over 50 per cent of the effort. 7. Use of aids to be independent is allowed. Dorcas Guadararma., Barthel, D.W. (8935). Functional evaluation: the Barthel Index. [address] (14)2. [facility] SOLANGE Newell, Governor Ashia., Ria Speed., [place], [address] (1999). Measuring the change indisability after inpatient rehabilitation; comparison of the responsiveness of the Barthel Index and Functional [place] Measure. Journal of Neurology, Neurosurgery, and Psychiatry, 66(4), 000-234. Nicole Rinaldi, NCLINT.A, LEONARDO Duff, & Wiley Galloway MLEE ANN. (2004.) Assessment of post-stroke quality of life in cost-effectiveness studies: The usefulness of the Barthel Index and the EuroQoL-5D. Quality of Life Research, 15, 339-68       Occupational Therapy Evaluation Charge Determination   History Examination Decision-Making   LOW Complexity : Brief history review  LOW Complexity : 1-3 performance deficits relating to physical, cognitive , or psychosocial skils that result in activity limitations and / or participation restrictions  LOW Complexity : No comorbidities that affect functional and no verbal or physical assistance needed to complete eval tasks       Based on the above components, the patient evaluation is determined to be of the following complexity level: LOW   Pain Rating:  No c/o pain    Activity Tolerance:   Good  Please refer to the flowsheet for vital signs taken during this treatment.     After treatment patient left in no apparent distress:    Sitting in chair and Call bell within reach    COMMUNICATION/EDUCATION:   The patients plan of care was discussed with: Physical Therapist.    Thank you for this referral.  Kat Dougherty, DEBBIER/L  Time Calculation: 16 mins

## 2021-06-25 NOTE — DISCHARGE INSTRUCTIONS
Please refer to post device implantation discharge instructions and restrictions below and your device booklet/temporary card  Keep incision dry for one week  Do not use lotions, powders, ointments, or creams on the incision  Leave outer bandage in place for one week  The bandage is water proof  You may shower with it as long as it is fully adhered to your skin  If the bandage appears to be coming off or if there is an open gap in the bandage to the area of your incision, then please keep the whole area dry for the remaining week  After one week, please remove the bandage by gently pulling all edges away from the center and slowly remove it from your skin  Do not quickly rip off the bandage  No overhead reaching, pushing, or pulling with your left arm for 6 weeks  No lifting greater than 10 lbs with your left arm for 6 weeks  No driving for 48 hours  Please call the office if you notice redness, swelling, bleeding, or drainage from incision or if you develop fevers  AFTER PACEMAKER CARE:    If you have any questions, please call [phone] to speak with a nurse (8:30am-4pm, or [phone] after hours)  For appointments, please call [phone]  WHAT YOU SHOULD KNOW:   A pacemaker is a small, battery-powered device that is placed under your skin in your upper chest area with wires placed through a vein that lead directly into the heart  It helps regulate your heart rate and prevent your heart from beating too slowly  AFTER YOU LEAVE:     Medicines:     · Pain medicine: You may need medicine to take away or decrease pain  ¨ Learn how to take your medicine  Ask what medicine and how much you should take  Be sure you know how, when, and how often to take it  Usually Over the counter pain medicine is sufficient to control pain (Acetominophen or Ibuprofen) Ask your doctor if you may take these   If this does not control your pain, narcotic pain killers may be prescribed, please call if you need prescription  ¨ Do not wait until the pain is severe before you take your medicine  Tell caregivers if your pain does not decrease  ¨ Pain medicine can make you dizzy or sleepy  Prevent falls by calling someone when you get out of bed or if you need help  Take your medicine as directed  Call your healthcare provider if you think your medicine is not helping or if you have side effects  Tell him if you are allergic to any medicine  Follow up with your cardiologist after your procedure: You will need a follow-up visit approximately 2 weeks after you leave the hospital  Your cardiologist will check your wound and make sure that your pacemaker is working correctly  Follow the instructions to check your pacemaker: Your cardiologist or primary healthcare provider will check your pacemaker and the battery regularly  He will use a computer to check your pacemaker over the telephone or wireless device which will be given to you  Pacemaker batteries usually last 8 to 10 years  The pacemaker unit will be replaced when the battery gets low  This is a simpler procedure than the original one to implant your pacemaker  Wound care:  Keep your incision dry for one week  Do not use lotions/powders/creams on incision  Leave outer bandage in place for 1 week - it is water proof, and as long as it is fully adhered to your skin you may shower with it  If it appears as though the bandage is coming off and/or there is any communication to the area of device incision, please then keep the whole area dry for the remaining week  After 1 week, please remove by pulling all edges away from the center of the bandage  Please call the office if you notice redness, swelling, bleeding, or drainage from incision or if you develop fevers  Activity:   · Arm movement and lifting:  Be careful using the arm on the side of your pacemaker   Do not move your arm for the first 24 hours after your procedure  Do not  lift your arm above your shoulder or lift more than 10 pounds for one month after your procedure  Avoid pushing, pulling, or repetitive arm movements for one month  This helps the leads stay in place and helps your wound heal  Ask your caregiver when you can drive after your procedure  You may move your arm side to side without lifting above your shoulder, and do not need to wear a sling at home  · Driving: you are ok to drive 48 hours after pacemaker is implanted   · Sports:  Ask your caregiver when it is okay to play tennis, golf, basketball, or any sport that requires you to lift your arms  Do not play full contact sports, such as football, that could damage your pacemaker  Ask your cardiologist or primary healthcare provider how much and what kinds of physical activity are safe for you  Living with a pacemaker:   · Tell all caregivers you have a pacemaker: This includes surgeons, radiologists, and medical technicians  You may want to wear a medical alert ID bracelet or necklace that states that you have a pacemaker  · Carry your pacemaker ID card: Make sure you receive a pacemaker ID card  Carry it with you at all times  It lists important information about your pacemaker  Show it to airport security if you travel  · Avoid electrical interference:  Avoid welding equipment and other equipment with large magnets or electric fields  These things could interfere with how your pacemaker works  Use your cell phone on the ear opposite from your pacemaker  Do not carry your cell phone in your shirt pocket over your chest      · Some Pacemakers are MRI safe  Ask you doctor if it is safe to proceed with MRI and let the radiologist and staff know you have a pacemaker  · Do not touch the skin around your pacemaker: This can cause damage to the lead wires or move the pacemaker unit from where it should be      Contact your cardiologist or primary healthcare provider if:   · The area around your pacemaker has increasing amount of pain after surgery  The pain should improve over first few days after implantation  · The skin around your stitches has increasing redness, swelling, or has drainage  This may mean that you have an infection  · You have a fever  · You have chills, a cough, and feel weak or achy  These are also signs of infection  · Your feet or ankles are more swollen than your baseline  · Your Heart rate is less than 50 beats per minute     Seek care immediately if:   · Your bandage becomes soaked with blood  · Your pacemaker is swelling rapidly    · Your stitches open up  · You feel your heart suddenly beating very slowly or quickly  · You become too weak or dizzy to stand, or you pass out  · Your arm or leg feels warm, tender, and painful  It may look swollen and red  · You have chest pain that does not go away with rest or medicine  · You feel lightheaded, short of breath, and have chest pain  · You cough up blood  © 2014 [address] is for End User's use only and may not be sold, redistributed or otherwise used for commercial purposes  All illustrations and images included in CareNotes® are the copyrighted property of A D A M , Inc  or Adal Taylor  The above information is an  only  It is not intended as medical advice for individual conditions or treatments  Talk to your doctor, nurse or pharmacist before following any medical regimen to see if it is safe and effective for you

## 2021-06-25 NOTE — PROCEDURES
PPM - His lead in LPF region    History and physical were reviewed  Patient was examined and history was reviewed  No change in patient's condition Since history and physical has been completed        The pre- operative diagnosis:  Severe aortic stenosis, post TAVR,  LBBB,  milliseconds  Prolonged first-degree AV block,  milliseconds  Impending complete heart block    Hyperlipidemia  Type 2 diabetes mellitus          Postoperative diagnosis:  Severe aortic stenosis, post TAVR,  LBBB,  milliseconds  Prolonged first-degree AV block,  milliseconds  Impending complete heart    Hyperlipidemia  Type 2 diabetes mellitus            Procedure:  Dual chamber PPM  RA lead   RV lead - His lead - with LPF capture         Surgeon: [address] [place] [address] Drive -none    Specimens - none    Estimated blood loss- 10 ml    Findings-none    Complications none    Anesthesia-  Anesthesia by anaesthesiologist , local lidocaine by myself      Details of the device                Description of procedure: The patient was seen before the procedure  The details of the procedure was explained and patient agreed to the same  Appropriate consent was signed  The patient was brought to the electrophysiologic laboratory  Proper time out was done  Sterile dressing and draping was done  Local lidocaine was infiltrated, In the infraclavicular region at the site of device implant  Initial incision was made by a sharp number 10 blade  Thereafter electrocautery and blunt dissection was used to form a prepectoral pocket  Appropriate hemostasis was taken care of      20 mL of radiocontrast, followed by 20 mL of saline, was injected in a peripheral vein on the side of the implant  Under direct visualization, the axillary vein was  Punctured,extra-thoracic  A single guidewire was inserted, and taking all the way to the inferior vena cava to confirm that it is on the right side   We also confirmed by the left anterior oblique view that the wire is in the right side  Thereafter a second access was obtained using the fluoroscopic image of the venogram as a roadmap  Wire was once again taken to the inferior vena cava, and position checked in [place] views To confirm the appropriate position  A 7F sheath passed over first wire  The atrial lead was placed in the ventricle for back up pacing if needed  A 7F sheath was passed over the second wire  A His sheath and lead was passed through the sheath  Mapping of His bundle done  Then went distally and inferiorly and left posterior fascicle (LPF) was mapped   Position of the lead was confirmed in both SIMS and [place] views Appropriate sensing and thresholds were noted  Lead was screwed in LPF position with non selective capture  The sheath was slit and removed  Once again the lead was tested for appropriate sensing, impedance and threshold  The lead was tied down to the prepectoral fascia and muscle  The patient was paced from the LPF  for second part of procedure       The atrial lead in the ventricle was pulled back into the atrium  This is a passive lead  The stylet was withdrawn and the lead was maneuvered into the right atrial appendage  Appropriate sensing and thresholds were noted  The sheath was removed  The lead was sutured to the pectoral muscle and fascia      The pocket was washed with large amounts of antibiotic saline  Appropriate hemostasis was taken care of  The lead was connected to the generator  The generator and leads were placed inside the pocket  The generator was tied down  Thereafter the device was interrogated and proper sensing and thresholds through the device were confirmed  An antibiotic pouch was left in the pocket  Patient has a platelet count of [phone]      The pocket was closed in 3 separate layers with intermittent sutures  Dressing was done with Steri-Strips, Aquacell        Summary of the procedure:   The patient came in to the laboratory for dual -chamber device placement   The device has been placed and patient tolerated the procedure well

## 2021-06-25 NOTE — ANESTHESIA POSTPROCEDURE EVALUATION
Post-Op Assessment Note    CV Status:  Stable  Pain Score: 0    Pain management: adequate     Mental Status:  Awake and alert   Hydration Status:  Stable   PONV Controlled:  None   Airway Patency:  Patent and adequate      Post Op Vitals Reviewed: Yes      Staff: CRNA, Anesthesiologist         No complications documented      BP   127/54   Temp      Pulse  59   Resp   16   SpO2   100%

## 2021-06-26 NOTE — DISCHARGE SUMMARY
Discharge Summary - Cardiothoracic Surgery   Delmis Ramos 81 y o  male MRN: [id number]  [facility] En[facility]: [id number]    Admission Date: 6/22/2021     Discharge Date: 06/26/21    Admitting Diagnosis: Aortic stenosis, severe [I35 0]    Primary Discharge Diagnosis:   Aortic stenosis, Non-Rheumatic  S/P transfemoral transcatheter aortic valve replacement;    Secondary Discharge Diagnosis:   CAD, CHF, NSR W/LBBB, HTN, HLD, DM II (oral, A1c 5 9%), Depression, Dysphagia, GERD, BPH, gait Dysfunction, Near Syncope, Shingles, Skin Cancer, S/P Anal Sphincterotomy    Attending: CORI Arevalo  Consulting Physician(s):   Cardiology  EP    Procedures Performed:   Procedure(s):  REPLACEMENT AORTIC VALVE TRANSCATHETER (TAVR) TRANSFEMORAL W/ 29MM PATEL PANCHO S3 ULTRA VALVE(ACCESS ON LEFT)  TRANSESOPHAGEAL ECHOCARDIOGRAM (REBECCA)     Hospital Course:   6/22: Elective admission for TF TAVR via L approach  Extubated and transferred to PACU supported with no gtts  DP pulses 2+ b/l  Restarted on home glimepiride  ECG shows NSR w/ stable LBBB  Mild hematoma, hgb 11 3 (12 6), plts 59k (preop 69k), gave 1 plts  & hold AC  Currently on cardene @ 5  Ordered pseudoaneursym study- negative  6/23: Febrile overnight, Tmax 104 2, likely secondary to 1U platelets and hematoma (remains stable), no leukocytosis  Episodes of diastolic hypotension, in 44'Y, no episodes of tachycardia  Cardene off  NSR, rate 82  EKG with stable LBBB  92% on 2L  Hgb stable 11 5 (preop 11 3)  Platelets 55 (from 61)  Continue to hold ASA/plavix, subQ heparin  Transfer to tele  6/24: SVT this morning rate of 120 - start metoprolol 25 mg bid  BP good  Plt 47k today  CBC tomorrow  6/25:  Long 1st AVB on EKG, made NPO last evening  PPM today  Plt to 52k  Will resume BB post PPM placement  6/26: In NSR rate of 62 /46 no BB due to hypotension  No diuretics needs with euvolemia    Will not be placed on ASA or Plavix at this time due to thrombocytopenia  CBC in 1 week and if plt back to 60k baseline then can start ASA 81mg daily at that time  Stable for d/c to home with [facility]  Condition at Discharge:   good     Discharge Physical Exam:    Please see the documented physical exam from this morning's progress note for details  Discharge Data:  Results from last 7 days   Lab Units 06/26/21  0457 06/25/21 0455 06/24/21  0458   WBC Thousand/uL 3 47* 4 96 6 65   HEMOGLOBIN g/dL 10 4* 10 9* 12 0   HEMATOCRIT % 30 8* 32 3* 35 0*   PLATELETS Thousands/uL 53* 52* 47*     Results from last 7 days   Lab Units 06/26/21  0457 06/25/21  0455 06/24/21  0458 06/22/21  1022   POTASSIUM mmol/L 3 9 4 0 4 0  --    CHLORIDE mmol/L 106 103 102  --    CO2 mmol/L 27 28 28  --    CO2, I-STAT mmol/L  --   --   --  24   BUN mg/dL 20 24 16  --    CREATININE mg/dL 1 07 1 16 1 12  --    GLUCOSE, ISTAT mg/dl  --   --   --  103   CALCIUM mg/dL 8 3 8 7 9 0  --            Discharge instructions/Information to patient and family:   See after visit summary for information provided to patient and family  Dafne Cuello was educated on restrictions regarding driving and lifting, and techniques of proper incisional care  They were specifically counselled on signs and symptoms of an incisional infection, and advised to contact our service immediately should they develop fevers, sweats, chill, redness or drainage at the site of any incisions  Provisions for Follow-Up Care:  See after visit summary for information related to follow-up care and any pertinent home health orders  Disposition:  Home    Planned Readmission:   No    Discharge Medications:  See after visit summary for reconciled discharge medications provided to patient and family  Dafne Cuello was provided contact information and scheduled a follow up appointment with CORI Alanis    Additionally, follow up appointments have been scheduled for their primary care physician and primary cardiologist   Contact information was provided  Dawit Min was counseled on the importance of avoiding tobacco products  As with all patients whom have undergone open heart surgery, tobacco cessation medication was contraindicated at the time of discharge  ACE/ARB was Contraindicated secondary to hypotension    Beta Blocker was Contraindicated secondary to hypotension      The patient was informed that following their postoperative surgical evaluation, they will be referred to outpatient cardiac rehabilitation  They were counseled that this program is run by specialists who will help them safely strengthen their heart and prevent more heart disease  Cardiac rehabilitation will include exercise, relaxation, stress management, and heart-healthy nutrition  Caregivers will also check to make sure their medication regimen is working  During this admission, the patient was questioned on their use of tobacco, alcohol, and illicit/non-prescription drug use in the  previous 24 months  Dawit Min states that they have not used any of these substances in this time frame  I spent 30 minutes discharging the patient  This time was spent on the day of discharge  I had direct contact with the patient on the day of discharge  Additional documentation is required if more than 30 minutes were spent on discharge       SIGNATURE: Margot Simpson  DATE: June 26, 2021  TIME: 12:06 PM

## 2021-06-26 NOTE — PHYSICAL THERAPY NOTE
Physical Therapy Progress Note     06/26/21 0930   PT Last Visit   PT Visit Date 06/26/21   Note Type   Note Type Treatment   Pain Assessment   Pain Assessment Tool Pain Assessment not indicated - pt denies pain   Pain Score No Pain   Restrictions/Precautions   Other Precautions Cognitive;Telemetry   Subjective   Subjective The pt  states that he is looking for his son  He declined any stair trial reporting that he has no need as he will be fine  Transfers   Sit to Stand 7  Independent   Stand to Sit 7  Independent   Ambulation/Elevation   Gait pattern Excessively slow; Inconsistent dilshad   Gait Assistance 5  Supervision   Assistive Device None   Distance 125 feet  Stair Management Assistance Not tested  (Pt  declined any stair trial or need for one )   Balance   Static Sitting Good   Dynamic Sitting Fair +   Static Standing Fair +   Ambulatory Fair   Activity Tolerance   Activity Tolerance Patient tolerated treatment well   Assessment   Prognosis Good   Problem List Decreased safety awareness;Decreased mobility; Decreased endurance; Impaired balance   Assessment The pt  was found ambulating by himself in the hallway with no device  He was able to readily perform multiple turns, dynamic head movements, and manuever around obstacles  He perseverated on finding his son, but he was redirectable  The pt  declined any stair trial as noted above  From a mobility perspective he is progressing well, but will defer to occupational therapy for cognition recommendations  Barriers to Discharge Decreased caregiver support   Goals   Patient Goals To find his son and to go home  STG Expiration Date 07/03/21   PT Treatment Day 2   Plan   Treatment/Interventions Functional transfer training;LE strengthening/ROM; Therapeutic exercise;Elevations; Endurance training;Cognitive reorientation;Patient/family training;Bed mobility;Gait training   Progress Progressing toward goals   PT Frequency   (4-6x a week )   Recommendation   PT Discharge Recommendation   (Defer to OT for cognition  )   AM-PAC Basic Mobility Inpatient   Turning in Bed Without Bedrails 3   Lying on Back to Sitting on Edge of Flat Bed 3   Moving Bed to Chair 3   Standing Up From Chair 3   Walk in Room 3   Climb 3-5 Stairs 3   Basic Mobility Inpatient Raw Score 18   Basic Mobility Standardized Score 41 05     An AM-PAC Basic Mobility standardized score less than 42 9 suggests the patient may benefit from discharge to post-acute rehab services      Bg Samaniego, PTA

## 2021-06-26 NOTE — CASE MANAGEMENT
Pt is cleared for d/c by Cardiothoracic Surgery JUDY De Jesus  [address] notified of pt's d/c order  Pt is accepted for services by [facility] for his aftercare plan  The pt and his son Bhumika Johnson were both informed of d/c  Son will transport pt home later this day, pickup time TBD  IMM signed by pt on 6/25/21  No chart copy required  CM to follow

## 2021-06-26 NOTE — PLAN OF CARE
[address] [facility] THERAPY  [facility]  DAILY NOTE    TIME   SLP Individual Minutes  Time In: 3115  Time Out: 0930  Minutes: 25  Timed Code Treatment Minutes: 25 Minutes       Date: 2021  Patient Name: Mine Stratton      CSN: [id number]   : 1942  (69 y.o.)  Gender: male   Referring Physician:  Venus Holman MD  Diagnosis: Bilateral Subdural Hematomas  Secondary Diagnosis: cognitive deficits   Precautions: Fall risk  Current Diet: Regular solids with thin liquids   Swallowing Strategies: Standard Universal Swallow Precautions  Date of Last MBS/FEES: Not Applicable    Pain:  No pain reported. Subjective:  Upon arrival to room, pt sleeping in recliner. Pt easily awakened to name. Pt was agreeable to participate in skilled speech therapy session this date. Pt with noted with increased fatigue throughout session and required min-mod verbal cues to maintain adequate alertness during task this date. Pt reported he did not sleep well last night d/t care flight arriving multiple times throughout the night. Short-Term Goals:  SHORT TERM GOAL #1:  Goal 1: Pt will complete moderately complex recall and working memory tasks with 80% accuracy, min cues for improved retention of pertinent medical and safety information. INTERVENTIONS: Did not address this date d/t focus on other goals     Prior Session   ST completed review of STM strategies using the acronym WRAP--Write it down, Repeat it, Associate it, and Pictures it. ST then provided pt with 4 new units of information (Federico Flores, [address]). Pt required mod verbal cues to implement memory strategies - write it down, repeat it, associate it     Immediate Recall: 4/4 independently   Delayed Recall (15 minutes): 4/4 independently without visual aid   *Good immediate and delayed recall of 4 new units of information following implementation of memory strategies.       Pt was prompted to say \"ping\" Problem: PHYSICAL THERAPY ADULT  Goal: Performs mobility at highest level of function for planned discharge setting  See evaluation for individualized goals  Description: Treatment/Interventions: Functional transfer training, LE strengthening/ROM, Elevations, Therapeutic exercise, Endurance training, Equipment eval/education, Bed mobility, Gait training, Spoke to nursing, Spoke to case management, OT  Equipment Recommended: Kirsten De Souza       See flowsheet documentation for full assessment, interventions and recommendations  6/26/2021 1456 by Bryanna Munoz PTA  Outcome: Progressing  Note: Prognosis: Good  Problem List: Decreased safety awareness, Decreased mobility, Decreased endurance, Impaired balance  Assessment: The pt  was found ambulating by himself in the hallway with no device  He was able to readily perform multiple turns, dynamic head movements, and manuever around obstacles  He perseverated on finding his son, but he was redirectable  The pt  declined any stair trial as noted above  From a mobility perspective he is progressing well, but will defer to occupational therapy for cognition recommendations  Barriers to Discharge: Decreased caregiver support        PT Discharge Recommendation:  (Defer to OT for cognition )          See flowsheet documentation for full assessment       6/26/2021 1456 by Bryanna Munzo PTA  Reactivated for red suit and \"pong\" for black suit. Pt independently identified \"ping\" or \"pong\" correctly 38/52 cards, and completed task in 3:56 minutes. *decreased working memory and sustained attention noted during task this date. *utilized visual aid frequently to identify ping vs pong        SHORT TERM GOAL #2:  Goal 2: Pt will complete verbal/visual reasoning and sequencing tasks with 75% accuracy, mod cues for improved thought organization and insight to functional impications of deficits. INTERVENTIONS: Did not directly address d/t focus on other goals     SHORT TERM GOAL #3:  Goal 3: Pt will complete functional attention tasks (selective, divided, alternating) with no more than 3 errors/redirections within 10 minutes to permit safe return to IADLs, including driving, as appropriate. INTERVENTIONS: Did not directly address d/t focus on other goals     SHORT TERM GOAL #4:  Goal 4: Pt will complete functional problem solving and executive functioning tasks with 75% accuracy, mod cues for improved safety awareness and successful return to daily routines. INTERVENTIONS:  Money Management - [facility]  3/5 mod cues, 2/5 max cues     *Poor success with task this date  *SLOW processing and scanning of items on menu  *Pt benefited from [address] [place] [address] Dr saying \"#7\" vs \"Chicken and Broccoli\" to improve scanning efficiency. *Pt required assistance with organization of information and set up in order to complete math computation. *Pt required mod-max verbal and visual cues to complete math computation via pen and paper. Simple Subtraction:   5/6 independently, 1/6 max verbal & visual cues     *Pt with good success with basic subtraction problems with teens and single digits; however, pt required max verbal and visual cues to complete basic math computation via pen paper for 23 - 5. *Pt would benefit from further completion of basic addition and subtraction of money amounts.        Long-Term Goals:  Timeframe for Long-term Goals: 4 weeks    LONG TERM GOAL #1:  Goal 1: Pt will improve cognitive skills to a supervision level of function or better to permit safe and successful return to home and daily responsibilities at discharge. Comprehension: 5 - Patient understands basic needs (hungry/hot/pain)  Expression: 5 - Expresses basic ideas/needs only (hungry/hot/pain)  Social Interaction: 5 - Patient is appropriate with supervision/cues  Problem Solving: 3 - Patient solves simple/routine tasks 50%-74%  Memory: 4 - Patient remembers 75-90%+ of the time       EDUCATION:  Learner: Patient  Education:  Reviewed results and recommendations of this evaluation, Reviewed ST goals and Plan of Care and Reviewed recommendations for follow-up  Evaluation of Education: Demonstrates with assistance and Needs further instruction    ASSESSMENT/PLAN:  Activity Tolerance:  Patient tolerance of  treatment: fair - fatigued  Assessment/Plan: Patient progressing toward established goals. Continues to require skilled care of licensed speech pathologist to progress toward achievement of established goals and plan of care.    Plan for Next Session: verbal/visual reasoning and organization      [facility]) [address]

## 2021-06-26 NOTE — PROGRESS NOTES
Progress Note - Cardiothoracic Surgery   Bea Keith 81 y o  male MRN: [id number]  Unit/Bed#: [facility] 426-01 Encounter: [id number]    Aortic stenosis, Non-Rheumatic  S/P transfemoral transcatheter aortic valve replacement; POD # 4 and POD 1 s/p PPM    24 Hour Events: No events overnight  No complaints    300 feet ambulated yesterday  Medications:   Scheduled Meds:  Current Facility-Administered Medications   Medication Dose Route Frequency Provider Last Rate    acetaminophen  650 mg Rectal Q4H PRN Rosmery Bolanos PA-C      acetaminophen  650 mg Oral Q4H PRN Rosmery Bolanos PA-C      atorvastatin  10 mg Oral Daily Rosmery Bolanos PA-C      bisacodyl  10 mg Rectal Daily PRN Rosmery Bolanos PA-C      chlorhexidine  15 mL Mouth/Throat BID Rosmery Bolanos PA-C      glimepiride  1 mg Oral Daily Margot Pruitt      insulin lispro  1-5 Units Subcutaneous TID AC Rosmery Bolanos PA-C      insulin lispro  1-5 Units Subcutaneous HS Rosmery Bolanos PA-C      melatonin  6 mg Oral HS Rosmery Bolanos PA-C      metoprolol tartrate  25 mg Oral Q12H Katlyn Ma MD      mupirocin  1 application Nasal [id number] [address] Rosmery Luis [place], [place]      ondansetron  4 mg Intravenous Q6H PRN Rosmery Bolanos PA-C      pantoprazole  40 mg Oral Early Morning Rosmery Bolanos PA-C      polyethylene glycol  17 g Oral Daily Margot Pruitt      senna-docusate sodium  1 tablet Oral BID Cate Vera PA-C       Continuous Infusions:   PRN Meds:   acetaminophen    acetaminophen    bisacodyl    ondansetron    Vitals:   Vitals:    06/25/21 2245 06/26/21 0236 06/26/21 0600 06/26/21 0659   BP: (!) 102/46 (!) 103/48  (!) 104/49   Pulse: 65   62   Resp: 18 18     Temp: 97 8 °F (36 6 °C) 98 4 °F (36 9 °C)  97 9 °F (36 6 °C)   TempSrc:       SpO2: 93%   94%   Weight:   76 2 kg (167 lb 15 9 oz)    Height:           Telemetry: NSR; Heart Rate: 62    Respiratory:   SpO2: SpO2: 94 %;  Room Air    Intake/Output:   I/O       06/24 0701 - 06/25 0700 06/25 0701 - 06/26 0700 06/26 0701 - 06/27 0700    P  O  640 770     I V  (mL/kg)  100 (1 3)     Total Intake(mL/kg) 640 (8 4) 870 (11 4)     Urine (mL/kg/hr) 1100 (0 6) 975 (0 5)     Total Output 1100 975     Net -460 -105            Unmeasured Urine Occurrence 1 x            Weights:   Weight (last 2 days)     Date/Time   Weight    06/26/21 0600   76 2 (167 99)    06/25/21 0538   76 (167 55)    06/24/21 0600   76 7 (169 09)            Results:   Results from last 7 days   Lab Units 06/26/21  0457 06/25/21  0455 06/24/21  0458   WBC Thousand/uL 3 47* 4 96 6 65   HEMOGLOBIN g/dL 10 4* 10 9* 12 0   HEMATOCRIT % 30 8* 32 3* 35 0*   PLATELETS Thousands/uL 53* 52* 47*     Results from last 7 days   Lab Units 06/26/21 0457 06/25/21 0455 06/24/21  0458 06/22/21  1022   POTASSIUM mmol/L 3 9 4 0 4 0  --    CHLORIDE mmol/L 106 103 102  --    CO2 mmol/L 27 28 28  --    CO2, I-STAT mmol/L  --   --   --  24   BUN mg/dL 20 24 16  --    CREATININE mg/dL 1 07 1 16 1 12  --    GLUCOSE, ISTAT mg/dl  --   --   --  103   CALCIUM mg/dL 8 3 8 7 9 0  --        Invasive Lines/Tubes:  Invasive Devices     Central Venous Catheter Line            CVC Central Lines 06/22/21 Triple 3 days          Peripheral Intravenous Line            Peripheral IV 06/25/21 Left Antecubital <1 day                Physical Exam:    HEENT/NECK:  Normocephalic  Atraumatic  No jugular venous distention  Cardiac: Regular rate and rhythm  Pulmonary:  Breath sounds clear bilaterally  Abdomen:  Non-tender, Non-distended and Normal bowel sounds  Incisions: Groin puncture sites clean, dry, and intact without hematoma and Pacemaker incision dressed with Acticoat  No erythema or drainage  Extremities: Extremities warm/dry, DP pulses 2+ bilaterally and Trace edema B/L  Neuro: Alert and oriented X 3  Skin: Warm/Dry, without rashes or lesions  Assessment:  Principal Problem:     Aortic stenosis, severe  Active Problems:    Benign prostatic hyperplasia without lower urinary tract symptoms    Bilateral carotid artery stenosis    Pure hypercholesterolemia    Controlled type 2 diabetes mellitus without complication, without long-term current use of insulin (HCC)    Pulmonary fibrosis (HCC)    Minor depression    Thrombocytopenia (HCC)    Neurologic gait dysfunction    Benign prostatic hyperplasia with nocturia    S/P TAVR (transcatheter aortic valve replacement)    LBBB (left bundle branch block)    Anemia       Aortic stenosis, Non-Rheumatic  S/P transfemoral transcatheter aortic valve replacement; POD # 4    Plan:    1  Cardiac:   NSR; HR/BP well-controlled  Possibly trial Lopressor, 12 5mg PO BID  Central IV access no longer required; Remove central venous catheter today  Thrombocytopenia resolving back to patient baseline of 60k, will ask Romeo to resume an antiplt today ? ASA or Plavix   Continue Statin therapy  Continue DVT prophylaxis    2  Pulmonary:   Good Room air oxygen saturation; Continue incentive spirometry/Coughing/Deep breathing exercises    3  Renal:   Intake/Output net: -105 mL/24 hours  Post op Creatinine stable; Follow up labs prn    4  Neuro:  Neurologically intact; No active issues  Incisional pain well-controlled; Continue prn Tylenol    5  GI:  Tolerating TLC 2 3 gm sodium diet  Maintain 1800 mL daily fluid restriction   Continue stool softeners and prn suppository  Continue GI prophylaxis    6  Endo:   History of diabetes; Continue home DM regimen, with supplemental subcutaneous insulin therapy    7  Hematology:   Post-operative blood count acceptable; Trend prn    8    Disposition:  Gerontology consultation already completed for routine assessment of TAVR patient over 72years old  Follow daily PT/OT recommendations regarding home vs  rehab when medically cleared for discharge    VTE Pharmacologic Prophylaxis: Sequential compression device (Venodyne)   VTE Mechanical Prophylaxis: sequential compression device    Collaborative rounds completed with Vidya Foster Star Wilkerson    Plan of care discussed with bedside nurse    SIGNATURE: Richard Saldaña PA-C  DATE: June 26, 2021  TIME: 7:55 AM

## 2021-06-28 NOTE — ED PROVIDER NOTES
History  Chief Complaint   Patient presents with    Numbness     Patient presents to the ER with left leg numbness  Patient had his aortic valve replaced last Tuesday, a pacemaker placed on Friday and had left leg numbness starting last evening  80 yo male w/ hx of AS s/p TAVR on 6/22 at [facility] presents to the Emergency Department for evaluation of L groin swelling and ecchymosis  Pt feels this just began this AM, however chart review reveals that he underwent a pseudoaneurysm US post operatively on 6/22, negative  Pt also notes paresthesias to the L lateral thigh  No difficulty ambulating, back pain, abd pain, hematuria or fevers  Prior to Admission Medications   Prescriptions Last Dose Informant Patient Reported? Taking?    True Metrix Blood Glucose Test test strip 6/27/2021 at Unknown time Self No Yes   Sig: Testing 1 time daily DX:E11 9   acetaminophen (TYLENOL) 325 mg tablet 6/27/2021 at Unknown time  No Yes   Sig: Take 2 tablets (650 mg total) by mouth every 4 (four) hours as needed for fever (temperature greater than 101 F)   atorvastatin (LIPITOR) 10 mg tablet 6/27/2021 at Unknown time  No Yes   Sig: Take 1 tablet (10 mg total) by mouth daily   docusate sodium (COLACE) 100 mg capsule 6/27/2021 at Unknown time  No Yes   Sig: Take 1 capsule (100 mg total) by mouth 2 (two) times a day   glimepiride (AMARYL) 2 mg tablet 6/27/2021 at Unknown time Self No Yes   Sig: TAKE 1 TABLET BY MOUTH DAILY WITH BREAKFAST   Patient taking differently: 1/2 tab daily   omeprazole (PriLOSEC) 40 MG capsule 6/27/2021 at Unknown time Self No Yes   Sig: Take 1 capsule (40 mg total) by mouth daily      Facility-Administered Medications: None       Past Medical History:   Diagnosis Date    BPH (benign prostatic hyperplasia)     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     Carotid stenosis, asymptomatic, left     ~30% LICA    Depression (emotion) 11/16/2018    Diabetes mellitus ([facility] [address] )     type 2, non-insulin dependent    Dysphagia     Esophageal stricture     s/p dilation    Former tobacco use     GERD (gastroesophageal reflux disease)     History of basal cell carcinoma excision     History of Helicobacter pylori infection     History of shingles     History of TIA (transient ischemic attack)     multiple, no residual sx    Hyperlipidemia     Hypertension     Personal history of colonic polyps     Post herpetic neuralgia     Pulmonary fibrosis (HCC)     mild    Severe aortic stenosis     Splenomegaly     mild       Past Surgical History:   Procedure Laterality Date    ANAL SPHINCTEROTOMY      BASAL CELL CARCINOMA EXCISION      CARDIAC CATHETERIZATION      cardiac catheterization    CHOLECYSTECTOMY      EGD      OPEN REDUCTION SHOULDER DISLOCATION      CT ECHO TRANSESOPHAG R-T 2D W/PRB IMG ACQUISJ I&R N/A 6/22/2021    Procedure: TRANSESOPHAGEAL ECHOCARDIOGRAM (REBECCA); Surgeon: Dave Marrero MD;  Location: [facility];  Service: Cardiac Surgery    CT REPLACE AORTIC VALVE OPENFEMORAL ARTERY APPROACH N/A 6/22/2021    Procedure: REPLACEMENT AORTIC VALVE TRANSCATHETER (TAVR) TRANSFEMORAL W/ 29MM PATEL PANCHO S3 ULTRA VALVE(ACCESS ON LEFT); Surgeon: Dave Marrero MD;  Location[facility];  Service: Cardiac Surgery    ROTATOR CUFF REPAIR Left        Family History   Problem Relation Age of Onset    Depression Mother     Hyperlipidemia Mother     Substance Abuse Neg Hx     Colon cancer Neg Hx     Colon polyps Neg Hx      I have reviewed and agree with the history as documented      E-Cigarette/Vaping    E-Cigarette Use Never User      E-Cigarette/Vaping Substances    Nicotine No     THC No     CBD No     Flavoring No      Social History     Tobacco Use    Smoking status: Former Smoker    Smokeless tobacco: Former User     Quit date: 1/1/1976   Vaping Use    Vaping Use: Never used   Substance Use Topics    Alcohol use: Yes     Comment: occasional    Drug use: No       Review of Systems Constitutional: Negative for chills, diaphoresis and fever  Eyes: Negative for visual disturbance  Respiratory: Negative for cough and shortness of breath  Cardiovascular: Negative for chest pain and palpitations  Gastrointestinal: Negative for abdominal pain, diarrhea, nausea and vomiting  Genitourinary: Negative for dysuria, flank pain and frequency  Musculoskeletal: Negative for arthralgias and myalgias  Skin: Positive for color change  Negative for rash and wound  Allergic/Immunologic: Negative for immunocompromised state  Neurological: Negative for dizziness and light-headedness  Hematological: Does not bruise/bleed easily  Psychiatric/Behavioral: Negative for confusion  The patient is not nervous/anxious  Physical Exam  Physical Exam  Vitals and nursing note reviewed  Constitutional:       Appearance: He is well-developed  HENT:      Head: Normocephalic and atraumatic  Mouth/Throat:      Mouth: Mucous membranes are moist    Eyes:      Conjunctiva/sclera: Conjunctivae normal    Cardiovascular:      Rate and Rhythm: Normal rate and regular rhythm  Heart sounds: No murmur heard  Pulmonary:      Effort: Pulmonary effort is normal  No respiratory distress  Breath sounds: Normal breath sounds  Abdominal:      Palpations: Abdomen is soft  Tenderness: There is no abdominal tenderness  Musculoskeletal:      Cervical back: Neck supple  Comments: Marked ecchymosis to L groin with central small hematoma, non pulsatile   Skin:     General: Skin is warm and dry  Capillary Refill: Capillary refill takes less than 2 seconds  Neurological:      General: No focal deficit present  Mental Status: He is alert and oriented to person, place, and time     Psychiatric:         Mood and Affect: Mood normal          Behavior: Behavior normal          Vital Signs  ED Triage Vitals [06/28/21 0954]   Temperature Pulse Respirations Blood Pressure SpO2   97 8 °F (36 6 °C) 76 18 142/64 99 %      Temp Source Heart Rate Source Patient Position - Orthostatic VS BP Location FiO2 (%)   Oral Monitor Lying Right arm --      Pain Score       No Pain           Vitals:    06/28/21 0954 06/28/21 1030   BP: 142/64 121/56   Pulse: 76 74   Patient Position - Orthostatic VS: Lying          Visual Acuity      ED Medications  Medications - No data to display    Diagnostic Studies  Results Reviewed     None                 VAS pseudoaneurysm study    (Results Pending)              Procedures  Procedures         ED Course                                           MDM  Number of Diagnoses or Management Options  Hematoma of groin, initial encounter: new and requires workup  S/P TAVR (transcatheter aortic valve replacement)  Diagnosis management comments: US negative for pseudo  Educated patient that this is anticipated post operative bruising, advised ice and elevation       Amount and/or Complexity of Data Reviewed  Tests in the radiology section of CPT®: ordered and reviewed  Review and summarize past medical records: yes  Independent visualization of images, tracings, or specimens: yes        Disposition  Final diagnoses:   Hematoma of groin, initial encounter   S/P TAVR (transcatheter aortic valve replacement)     Time reflects when diagnosis was documented in both MDM as applicable and the Disposition within this note     Time User Action Codes Description Comment    6/28/2021 10:53 AM Leafy Risk Add [S30 1XXA] Hematoma of groin, initial encounter     6/28/2021 10:54 AM Leafy Risk Add [Z95 2] S/P TAVR (transcatheter aortic valve replacement)       ED Disposition     ED Disposition Condition Date/Time Comment    Discharge Stable Mon Jun 28, 2021 10:53 AM Favio Freitas discharge to home/self care              Follow-up Information     Follow up With Specialties Details Why [address] Ivonne MD Cardiothoracic Surgery Schedule an appointment as soon as possible for a visit [address]            Discharge Medication List as of 6/28/2021 10:54 AM      CONTINUE these medications which have NOT CHANGED    Details   acetaminophen (TYLENOL) 325 mg tablet Take 2 tablets (650 mg total) by mouth every 4 (four) hours as needed for fever (temperature greater than 101 F), Starting Sat 6/26/2021, OTC      atorvastatin (LIPITOR) 10 mg tablet Take 1 tablet (10 mg total) by mouth daily, Starting Wed 6/16/2021, Normal      docusate sodium (COLACE) 100 mg capsule Take 1 capsule (100 mg total) by mouth 2 (two) times a day, Starting Sat 6/26/2021, Normal      glimepiride (AMARYL) 2 mg tablet TAKE 1 TABLET BY MOUTH DAILY WITH BREAKFAST, Normal      omeprazole (PriLOSEC) 40 MG capsule Take 1 capsule (40 mg total) by mouth daily, Starting Wed 6/2/2021, Normal      True Metrix Blood Glucose Test test strip Testing 1 time daily DX:E11 9, Normal           No discharge procedures on file      PDMP Review       Value Time User    PDMP Reviewed  Yes 6/26/2021 11:42 AM Rudy Jay PA-C          ED Provider  Electronically Signed by           Ilana Elias PA-C  06/28/21 5761

## 2021-07-01 NOTE — TELEPHONE ENCOUNTER
PO date= 6/22  D/C date= 6/26    Patient doing well, states his CP is much improved since prior to procedure  Denies SOB  Denies worsening hematoma at groin  Denies fever, chills, or drainage at groin site  Occasional lightheaded, denies syncope  Patient had lab work performed today, will f/u on platelets  If platelets back to 15Q then recommend starting ASA 81 mg daily, continue to hold plavix  Staying active with ambulation  Abiding by diet restriction  Reminded of upcoming appointments  Educated on when to call office/return to ED  All questions answered

## 2021-07-02 NOTE — PATIENT INSTRUCTIONS
Recommend over the counter probiotics:   Ex  Align, or [employer]  Recommend vitamin D 3- 2000 IU daily  Recommend saline nasal flushes

## 2021-07-02 NOTE — TELEPHONE ENCOUNTER
Patient called with lab results  Platelets continue to rise (69k; at baseline)  Told to resume 81mg ASA for recent TAVR  Continue to hold plavix until further notice       Benton SeatNinja, JUDY  7/2/2021  11:45 AM

## 2021-07-02 NOTE — PROGRESS NOTES
Assessment/Plan:    JAMI-   Recent cardiothoracic surgery,  S/p transfemoral transcatheter aortic valve replacement  Patient has pacemaker, and plans to start  Cardiac rehab Monday, July 5th  No acute complaints except  Discomfort from pacemaker  Encouraged patient to   Deep breath frequently, and monitor activity due to recent  Surgery  Call office with any concerns, and f/u in 4 weeks  With Dr Steven Salazar  Also to f/u with cardiac surgeon/specialists  Problem List Items Addressed This Visit     None            Subjective:      Patient ID: Darrion Urbina is a 81 y o  male  Patient presents for [facility] after heart surgery and pacemaker placement  Patient currently has home health nurse, and plans to start cardiac rehab, starting Monday, July 5th  Presents with son  Energy "allright", appetite good, has some SOB, worse with exercise  Using cane, for support  Had cold feet, trouble with circulation  Unable to drive  Review of Systems   Constitutional: Positive for fatigue  Negative for appetite change, chills, diaphoresis and fever  Fatigue improving  HENT: Positive for postnasal drip and rhinorrhea  Negative for congestion, ear pain and sore throat  Has allergy sx  Respiratory: Positive for shortness of breath  Negative for cough and chest tightness  Cardiovascular: Positive for chest pain  At sight of pacemaker, worse with pressure  Gastrointestinal: Positive for diarrhea  Negative for abdominal pain, constipation, nausea and vomiting  Started losing control of bowels, this morning  Musculoskeletal: Positive for gait problem  Negative for arthralgias, back pain, myalgias, neck pain and neck stiffness  Has to use cane to ambulate  Neurological: Negative for weakness  Objective:      /68   Pulse 81   Wt 76 2 kg (168 lb)   BMI 27 12 kg/m²          Physical Exam  Vitals and nursing note reviewed  Constitutional:       General: He is not in acute distress  Appearance: Normal appearance  He is not ill-appearing, toxic-appearing or diaphoretic  Cardiovascular:      Rate and Rhythm: Normal rate and regular rhythm  Pulses: Normal pulses  Heart sounds: Normal heart sounds  Pulmonary:      Effort: Pulmonary effort is normal  No respiratory distress  Breath sounds: Normal breath sounds  No stridor  No wheezing or rhonchi  Abdominal:      General: Bowel sounds are normal  There is no distension  Palpations: Abdomen is soft  There is no mass  Tenderness: There is no abdominal tenderness  There is no right CVA tenderness, left CVA tenderness, guarding or rebound  Hernia: No hernia is present  Musculoskeletal:         General: No swelling, tenderness, deformity or signs of injury  Right lower leg: No edema  Left lower leg: No edema  Neurological:      General: No focal deficit present  Mental Status: He is alert and oriented to person, place, and time  Cranial Nerves: No cranial nerve deficit  Sensory: No sensory deficit  Motor: Weakness present  Coordination: Coordination normal       Comments: Uses cane for support  Psychiatric:         Mood and Affect: Mood normal          Behavior: Behavior normal          Thought Content:  Thought content normal          Judgment: Judgment normal

## 2021-07-05 NOTE — TELEPHONE ENCOUNTER
Maryruth Pallas, Occupational Therapist from [facility], called requesting a call back from office at their best convenience, regarding pt's orders that need to be signed

## 2021-07-06 NOTE — TELEPHONE ENCOUNTER
Maureen Claire, occupational therapist, is with pt now at his home  Pt has a new device implant on 6/25/2021  He complains of an occasional "bee sting in the chest" by the device, which has been going on   He believes it is the bandage, but Maureen Claire does not  The bandage is still on and the visiting nurse is coming in today to check on him   I have a message to have the nurse call me back       Pt sent a transmission just now, asked Samira to watch for it  Per Ayesha Merrill, transmission rec'd-will write up and send to Dr Marylen Askew  I will wait for the phone call from the visiting nurse

## 2021-07-06 NOTE — TELEPHONE ENCOUNTER
See call, not sure if these are done electronically or by paper  They need you to sign off on OT orders

## 2021-07-12 NOTE — PROGRESS NOTES
Results for orders placed or performed in visit on 07/12/21   Cardiac EP device report    Narrative    MDT DUAL PM/ ACTIVE SYSTEM IS MRI CONDITIONAL  DEVICE INTERROGATED IN THE [place] OFFICE  BATTERY VOLTAGE ADEQUATE  (10 6 YRS) AP 4%  99%  ALL LEAD PARAMETERS WITHIN NORMAL LIMITS  NO SIGNIFICANT HIGH RATE EPISODES  NO PROGRAMMING CHANGES MADE TO DEVICE PARAMETERS  NORMAL DEVICE FUNCTION  WOUND CHECK: INCISION CLEAN AND DRY WITH EDGES APPROXIMATED; STAPLES REMOVED; WOUND CARE AND RESTRICTIONS REVIEWED WITH PATIENT

## 2021-07-16 NOTE — PROGRESS NOTES
Cardiology  Hospital Failure   Follow Up Office Visit Note -     Patricia Sorenson   81 y o    male   MRN: [id number]  [place] [id number] [facility] [address] [facility] [address] [phone]    PCP: Sharon Fonseca MD  Cardiologist : Dr Nydia Lopes              Summary of Recommendations   Low-sodium diet, Heart failure education as below  Antibiotic dental prophylaxis  Has a follow up CBC ordered 8/20  Follow up will be scheduled with Dr Nydia Lopes 6-8 weeks      Impression/plan  Severe AS, S/P left TF TAVR, adm 6/22-6/26/21  · Postop groin hematoma  Postop CHB; S/p MDT dual chamber , MRI compatible permanent pacemaker 6/25/21 (His lead in LPF region)   · Interrogation 7/12/21 AP 4%  99%  No significant high rates  CAD, nonobstructive disease by left heart catheterization 40% ostial RCA  On statin  Restart ASA 81  Thrombocytopenia  postop  CBC 1 week after surgery  If platelet above 60 kg, recommended aspirin 81 mg daily, per CT surgery discharge instructions  7/1 was 69K  Hypertension, essential  /66  Hyperlipidemia  On atorvastatin 10 mg/d  November 2020:  LDL 64, HDL 62, T see 147  Type 2 diabetes mellitus  Hemoglobin A1c 5 9  On Amaryl  Pulmonary fibrosis  Bilateral carotid artery stenosis  Left bundle branch block  Cardiac testing  · TTE February 2019 aortic stenosis,peak velocity across the valve of 2 75 m/sec with a mean gradient of 19 4 mmHg  · Lexiscan Myoview May 2019 no ischemia   TTE April 2021 preserved LV function  Mild LVH  Mild TR  Mild MR  Mild dilatation of the ascending aorta  Aortic stenosis, mean gradient 26 mm Hg  Peak velocity 3 4 m/sec was noted   cardiac catheterization 5/17/21  --  The coronary circulation is right dominant  --  Left main: Normal   --  LAD: Normal   --  Circumflex: Normal   --  Ostial RCA: There was a 40 % stenosis  IMPRESSIONS: There is no significant coronary artery disease    RECOMMENDATIONS Consultation be obtained for aortic valve replacement  --TTE 6/23/21  EF 55%  No RWMA  Grade 1 DD  Mild MR  There is a 29 mm bioprosthetic TAVR exhibiting normal function  No significant perivalvular regurgitation  Peak gradient 17 mean gradient 10  Mild TR                  HPI:   Janet Link has a hx of aortic stenosis hyperlipidemia and diabetes mellitus  He is a former tobacco user  Recently, he established care 4/30/21, with cardiologist Dr Gaye Posadas  He noted dyspnea on exertion and symptoms of near-syncope  Cardiac catheterization was pursued  This demonstrated a 40% stenosis of the ostial RCA otherwise normal coronary arteries  He was referred to CT surgery for candidacy for TAVR  A preoperative cardiac catheterization showed 40% stenosis of the ostial RCA otherwise normal coronary arteries  Adm 6/22-6/26/21 electively admitted for TAVR -completed via L TF approach  Postop developed thrombocytopenia  Did have SVT started on metoprolol 25 mg b i d   Long first-degree AV block on his EKG  Per EP:  left bundle-branch rhythm with no discernible P wave  Underwent permanent pacemaker June 25th  Beta-blocker resumed   Not requiring diuretics  Was not placed on aspirin nor Plavix given thrombocytopenia  Recommended CBC in 1 week and if platelet count or above 60 K, then aspirin 81 mg daily could be started  Discharged home with referral to [facility] visiting nurses  Discharge weight :  169 lb  Discharge creatinine:  1 07  Discharge diuretics:  None    ER Adm 6/28/21 lateral thigh numbness/ lateral ecchymoisis  Post op groin hematoma  US neg for pseudo    7/20/21  Hospital follow-up after TAVR  He had a groin hematoma that has improved  He had pacemaker  His pacemaker site is well healed  He had interrogation that is satisfactory  F/U Lives alone   He has a first alert button; still some JOE  occ indigestion  He is fairly sedentary  He denies chest pain  He is walking safely, with a cane    He is compliant with his medications  His weight is stable  We discussed the importance of antibiotic prophylaxis prior to dental work  He tells me he never stop the aspirin he has been on 81 mg daily  His last labs showed satisfactory platelet count of 69 Anne  He has got blood work due again in a month, for stability  102/66    Wt Readings from Last 3 Encounters:   07/20/21 78 kg (172 lb)   07/02/21 76 2 kg (168 lb)   06/28/21 72 6 kg (160 lb)     I have spent 25 minutes with Patient  today in which greater than 50% of this time was spent in counseling/coordination of care regarding Intructions for management, Patient and family education, Importance of tx compliance and Risk factor reductions  Assessment  Diagnoses and all orders for this visit:    Hospital discharge follow-up    S/P TAVR (transcatheter aortic valve replacement)  -     amoxicillin (AMOXIL) 500 MG tablet; Take 4 tablets (2,000 mg total) by mouth once as needed (30-60 min pirior to dental work) for up to 1 dose  -     aspirin (ECOTRIN LOW STRENGTH) 81 mg EC tablet;  Take 1 tablet (81 mg total) by mouth daily    Coronary artery disease involving native heart without angina pectoris, unspecified vessel or lesion type    Pure hypercholesterolemia    Thrombocytopenia (HCC)    LBBB (left bundle branch block)    Pulmonary fibrosis (HCC)    Aortic stenosis, severe    Bilateral carotid artery stenosis        Past Medical History:   Diagnosis Date    BPH (benign prostatic hyperplasia)     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     Carotid stenosis, asymptomatic, left     ~77% LICA    Depression (emotion) 11/16/2018    Diabetes mellitus ([facility] [address] )     type 2, non-insulin dependent    Dysphagia     Esophageal stricture     s/p dilation    Former tobacco use     GERD (gastroesophageal reflux disease)     History of basal cell carcinoma excision     History of Helicobacter pylori infection     History of shingles     History of TIA (transient ischemic mupirocin (BACTROBAN) 2 % ointment, , Disp: , Rfl:     Social History     Socioeconomic History    Marital status:      Spouse name: Not on file    Number of children: Not on file    Years of education: Not on file    Highest education level: Not on file   Occupational History    Occupation: retired   Tobacco Use    Smoking status: Former Smoker    Smokeless tobacco: Former User     Quit date: 1/1/1976   Vaping Use    Vaping Use: Never used   Substance and Sexual Activity    Alcohol use: Yes     Comment: occasional    Drug use: No    Sexual activity: Not Currently   Other Topics Concern    Not on file   Social History Narrative    Not on file     Social Determinants of Health     Financial Resource Strain:     Difficulty of Paying Living Expenses:    Food Insecurity:     Worried About [address] [facility] in the Last Year:     [address] in the Last Year:    Transportation Needs: No Transportation Needs    Lack of Transportation (Medical): No    Lack of Transportation (Non-Medical): No   Physical Activity:     Days of Exercise per Week:     Minutes of Exercise per Session:    Stress:     Feeling of Stress :    Social Connections:     Frequency of Communication with Friends and Family:     Frequency of Social Gatherings with Friends and Family:     Attends Christian Services:     Active Member of Clubs or Organizations:     Attends Club or Organization Meetings:     Marital Status:    Intimate Partner Violence:     Fear of Current or Ex-Partner:     Emotionally Abused:     Physically Abused:     Sexually Abused:        Family History   Problem Relation Age of Onset    Depression Mother     Hyperlipidemia Mother     Substance Abuse Neg Hx     Colon cancer Neg Hx     Colon polyps Neg Hx        Physical Exam    Vitals: Blood pressure 102/66, pulse 58, height 5' 6" (1 676 m), weight 78 kg (172 lb), SpO2 95 %     Wt Readings from Last 3 Encounters:   07/20/21 78 kg (172 lb) 21 76 2 kg (168 lb)   21 72 6 kg (160 lb)         Labs & Results:  Lab Results   Component Value Date    WBC 3 98 (L) 2021    HGB 11 7 (L) 2021    HCT 35 4 (L) 2021    MCV 95 2021    PLT 69 (L) 2021     No results found for: BNP  No components found for: CHEM    Results for orders placed during the hospital encounter of 21    Echo complete with contrast if indicated    Narrative  [address]  [facility] [place], [address] [facility]  [phone]    Transthoracic Echocardiogram  2D, M-mode, Doppler, and Color Doppler    Study date:  2021    Patient: Jamar Serrano  MR number: [id number]  Account number: [de-identified]  : 1935  Age: 81 years  Gender: Male  Status: Inpatient  Location: Bedside  Height: 66 in  Weight: 164 6 lb  BP: 103/ 54 mmHg    Indications: TAVR (1 Day)  Diagnoses: Z95 2 - Presence of prosthetic heart valve    Sonographer:  Hank Proctor RDCS  Primary Physician:  Alyssa Sanchez MD  Referring Physician:  Pee Woods PA-C  Group:  [facility]  Interpreting Physician:  Trude Fothergill, MD    SUMMARY    LEFT VENTRICLE:  Systolic function was normal  Ejection fraction was estimated to be 55 %  There were no regional wall motion abnormalities  Wall thickness was mildly increased  Doppler parameters were consistent with abnormal left ventricular relaxation (grade 1 diastolic dysfunction)  VENTRICULAR SEPTUM:  There was dyssynergic motion  There was moderate  These changes are consistent with LBBB  MITRAL VALVE:  There was mild regurgitation  AORTIC VALVE:  A 29mm ES3 TAVR bioprosthesis was present  It exhibited normal function  There was no significant regurgitation  There was no significant perivalvular regurgitation  Valve peak gradient was 17 mmHg  Valve mean gradient was 10 mmHg  Aortic valve area was 1 8 cmï¾² by the continuity equation      TRICUSPID VALVE:  There was mild regurgitation  HISTORY: PRIOR HISTORY: Hyperlipidemia  Type 2 diabetes  S/p TAVR  LBBB  Anemia  Carotid stenosis  GERD  Aortic stenosis  Hypertension  Former smoker  PROCEDURE: The procedure was performed at the bedside  This was a routine study  The transthoracic approach was used  The study included complete 2D imaging, M-mode, complete spectral Doppler, and color Doppler  The heart rate was 72 bpm,  at the start of the study  Images were obtained from the parasternal, apical, subcostal, and suprasternal notch acoustic windows  Echocardiographic views were limited due to lung interference  Image quality was adequate  LEFT VENTRICLE: Size was normal  Systolic function was normal  Ejection fraction was estimated to be 55 %  There were no regional wall motion abnormalities  Wall thickness was mildly increased  DOPPLER: Doppler parameters were consistent  with abnormal left ventricular relaxation (grade 1 diastolic dysfunction)  VENTRICULAR SEPTUM: There was dyssynergic motion  There was moderate  These changes are consistent with LBBB  RIGHT VENTRICLE: The size was normal  Systolic function was normal  Wall thickness was normal     LEFT ATRIUM: Size was normal     RIGHT ATRIUM: Size was normal     MITRAL VALVE: Valve structure was normal  There was normal leaflet separation  DOPPLER: The transmitral velocity was within the normal range  There was no evidence for stenosis  There was mild regurgitation  AORTIC VALVE: A 29mm ES3 TAVR bioprosthesis was present  It exhibited normal function  DOPPLER: There was no significant regurgitation  There was no significant perivalvular regurgitation  TRICUSPID VALVE: The valve structure was normal  There was normal leaflet separation  DOPPLER: The transtricuspid velocity was within the normal range  There was no evidence for stenosis  There was mild regurgitation      PULMONIC VALVE: Leaflets exhibited normal thickness, no calcification, and normal cuspal separation  DOPPLER: The transpulmonic velocity was within the normal range  There was trace regurgitation  PERICARDIUM: There was no pericardial effusion  The pericardium was normal in appearance  AORTA: The root exhibited normal size  SYSTEMIC VEINS: IVC: The inferior vena cava was normal in size  MEASUREMENT TABLES    DOPPLER MEASUREMENTS  Aortic valve   (Reference normals)  Peak gradient   17 mmHg   (--)  Mean gradient   10 mmHg   (--)  Valve area, cont   1 8 cmï¾²   (--)    SYSTEM MEASUREMENT TABLES    2D  %FS: 36 54 %  Ao Diam: 3 85 cm  Ao asc: 3 89 cm  EDV(Teich): 114 64 ml  EF(Teich): 66 11 %  ESV(Teich): 38 85 ml  IVSd: 1 04 cm  LA Area: 13 58 cm2  LA Diam: 4 04 cm  LVEDV MOD A4C: 127 8 ml  LVEF MOD A4C: 47 72 %  LVESV MOD A4C: 66 81 ml  LVIDd: 4 93 cm  LVIDs: 3 13 cm  LVLd A4C: 8 52 cm  LVLs A4C: 7 57 cm  LVOT Diam: 2 cm  LVPWd: 1 08 cm  RA Area: 15 61 cm2  RVIDd: 3 71 cm  SV MOD A4C: 60 99 ml  SV(Teich): 75 79 ml    CW  AV Env  Ti: 268 1 ms  AV VTI: 39 5 cm  AV Vmax: 2 06 m/s  AV Vmean: 1 47 m/s  AV maxP 06 mmHg  AV meanP 83 mmHg    MM  TAPSE: 2 14 cm    PW  MARIO ALBERTO (VTI): 1 56 cm2  MARIO ALBERTO Vmax: 1 32 cm2  AVAI (VTI): 0 cm2/m2  AVAI Vmax: 0 cm2/m2  E' Sept: 0 05 m/s  E/E' Sept: 17 04  LVOT Env  Ti: 314 8 ms  LVOT VTI: 19 68 cm  LVOT Vmax: 0 87 m/s  LVOT Vmean: 0 63 m/s  LVOT maxPG: 3 06 mmHg  LVOT meanP 81 mmHg  LVSI Dopp: 33 54 ml/m2  LVSV Dopp: 61 72 ml  MV A Geronimo: 1 3 m/s  MV Dec Dewey: 4 92 m/s2  MV DecT: 185 14 ms  MV E Geronimo: 0 91 m/s  MV E/A Ratio: 0 7  MV PHT: 53 69 ms  MVA By PHT: 4 1 cm2    [employer] Accredited [facility]    Prepared and electronically signed by    Willam Hong MD  Signed 2021 13:13:17    No results found for this or any previous visit  This note was completed in part utilizing m-modal fluency direct voice recognition software     Grammatical errors, random word insertion, spelling mistakes, and incomplete sentences may be an occasional consequence of the system secondary to software limitations, ambient noise and hardware issues  At the time of dictation, efforts were made to edit, clarify and /or correct errors  Please read the chart carefully and recognize, using context, where substitutions have occurred    If you have any questions or concerns about the context, text or information contained within the body of this dictation, please contact myself, the provider, for further clarification

## 2021-07-20 NOTE — LETTER
July 20, 2021     Katherin Sullivan MD  [facility]  [address] [address]    Patient: Samaria Bolaños   YOB: 1935   Date of Visit: 7/20/2021       Dear Dr Cuello Lobe:    Thank you for referring Eric Thompson to me for evaluation  Below are my notes for this consultation  If you have questions, please do not hesitate to call me  I look forward to following your patient along with you  Sincerely,        ADALBERTO Camara        CC: MD Roman Meza, [address]  7/20/2021  2:45 PM  Sign when Signing Visit  Cardiology  Hospital Failure   Follow Up Office Visit Note -     Samaria Bolaños   81 y o    male   MRN: [id number]  [facility]  [address] [facility] [address] [facility] [address] [address] [facility] [place] [address]  [phone]    PCP: Katherin Sullivan MD  Cardiologist : Dr Joey Sebastian              Summary of Recommendations   Low-sodium diet, Heart failure education as below  Antibiotic dental prophylaxis  Has a follow up CBC ordered 8/20  Follow up will be scheduled with Dr Joey Sebastian 6-8 weeks      Impression/plan  Severe AS, S/P left TF TAVR, adm 6/22-6/26/21  · Postop groin hematoma  Postop CHB; S/p MDT dual chamber , MRI compatible permanent pacemaker 6/25/21 (His lead in LPF region)   · Interrogation 7/12/21 AP 4%  99%  No significant high rates  CAD, nonobstructive disease by left heart catheterization 40% ostial RCA  On statin  Restart ASA 81  Thrombocytopenia  postop  CBC 1 week after surgery  If platelet above 60 kg, recommended aspirin 81 mg daily, per CT surgery discharge instructions  7/1 was 69K  Hypertension, essential  /66  Hyperlipidemia  On atorvastatin 10 mg/d  November 2020:  LDL 64, HDL 62, T see 147  Type 2 diabetes mellitus  Hemoglobin A1c 5 9  On Amaryl    Pulmonary fibrosis  Bilateral carotid artery stenosis  Left bundle branch block  Cardiac testing  · TTE February 2019 aortic stenosis,peak velocity across the valve of 2 75 m/sec with a mean gradient of 19 4 mmHg  · Lexiscan Myoview May 2019 no ischemia   TTE April 2021 preserved LV function  Mild LVH  Mild TR  Mild MR  Mild dilatation of the ascending aorta  Aortic stenosis, mean gradient 26 mm Hg  Peak velocity 3 4 m/sec was noted   cardiac catheterization 5/17/21  --  The coronary circulation is right dominant  --  Left main: Normal   --  LAD: Normal   --  Circumflex: Normal   --  Ostial RCA: There was a 40 % stenosis  IMPRESSIONS: There is no significant coronary artery disease  RECOMMENDATIONS Consultation be obtained for aortic valve replacement  --TTE 6/23/21  EF 55%  No RWMA  Grade 1 DD  Mild MR  There is a 29 mm bioprosthetic TAVR exhibiting normal function  No significant perivalvular regurgitation  Peak gradient 17 mean gradient 10  Mild TR                  HPI:   Chiquis Parsons has a hx of aortic stenosis hyperlipidemia and diabetes mellitus  He is a former tobacco user  Recently, he established care 4/30/21, with cardiologist Dr Valdez Milder  He noted dyspnea on exertion and symptoms of near-syncope  Cardiac catheterization was pursued  This demonstrated a 40% stenosis of the ostial RCA otherwise normal coronary arteries  He was referred to CT surgery for candidacy for TAVR  A preoperative cardiac catheterization showed 40% stenosis of the ostial RCA otherwise normal coronary arteries  Adm 6/22-6/26/21 electively admitted for TAVR -completed via L TF approach  Postop developed thrombocytopenia  Did have SVT started on metoprolol 25 mg b i d   Long first-degree AV block on his EKG  Per EP:  left bundle-branch rhythm with no discernible P wave  Underwent permanent pacemaker June 25th  Beta-blocker resumed   Not requiring diuretics  Was not placed on aspirin nor Plavix given thrombocytopenia  Recommended CBC in 1 week and if platelet count or above 60 K, then aspirin 81 mg daily could be started    Discharged home with referral to [address] [place] [address] visiting nurses  Discharge weight :  169 lb  Discharge creatinine:  1 07  Discharge diuretics:  None    ER Adm 6/28/21 lateral thigh numbness/ lateral ecchymoisis  Post op groin hematoma  US neg for pseudo    7/20/21  Hospital follow-up after TAVR  He had a groin hematoma that has improved  He had pacemaker  His pacemaker site is well healed  He had interrogation that is satisfactory  F/U Lives alone   He has a first alert button; still some JOE  occ indigestion  He is fairly sedentary  He denies chest pain  He is walking safely, with a cane  He is compliant with his medications  His weight is stable  We discussed the importance of antibiotic prophylaxis prior to dental work  He tells me he never stop the aspirin he has been on 81 mg daily  His last labs showed satisfactory platelet count of 69 Anne  He has got blood work due again in a month, for stability  102/66    Wt Readings from Last 3 Encounters:   07/20/21 78 kg (172 lb)   07/02/21 76 2 kg (168 lb)   06/28/21 72 6 kg (160 lb)     I have spent 25 minutes with Patient  today in which greater than 50% of this time was spent in counseling/coordination of care regarding Intructions for management, Patient and family education, Importance of tx compliance and Risk factor reductions  Assessment  Diagnoses and all orders for this visit:    Hospital discharge follow-up    S/P TAVR (transcatheter aortic valve replacement)  -     amoxicillin (AMOXIL) 500 MG tablet; Take 4 tablets (2,000 mg total) by mouth once as needed (30-60 min pirior to dental work) for up to 1 dose  -     aspirin (ECOTRIN LOW STRENGTH) 81 mg EC tablet;  Take 1 tablet (81 mg total) by mouth daily    Coronary artery disease involving native heart without angina pectoris, unspecified vessel or lesion type    Pure hypercholesterolemia    Thrombocytopenia (HCC)    LBBB (left bundle branch block)    Pulmonary fibrosis (HCC)    Aortic stenosis, severe    Bilateral carotid artery stenosis        Past Medical History:   Diagnosis Date    BPH (benign prostatic hyperplasia)     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     Carotid stenosis, asymptomatic, left     ~61% LICA    Depression (emotion) 11/16/2018    Diabetes mellitus ([facility] [address] )     type 2, non-insulin dependent    Dysphagia     Esophageal stricture     s/p dilation    Former tobacco use     GERD (gastroesophageal reflux disease)     History of basal cell carcinoma excision     History of Helicobacter pylori infection     History of shingles     History of TIA (transient ischemic attack)     multiple, no residual sx    Hyperlipidemia     Hypertension     Personal history of colonic polyps     Post herpetic neuralgia     Pulmonary fibrosis (HCC)     mild    Severe aortic stenosis     Splenomegaly     mild       Review of Systems   Constitutional: Negative for chills  Cardiovascular: Positive for dyspnea on exertion  Negative for chest pain, claudication, cyanosis, irregular heartbeat, leg swelling, near-syncope, orthopnea, palpitations, paroxysmal nocturnal dyspnea and syncope  Respiratory: Negative for cough and shortness of breath  Gastrointestinal: Negative for heartburn and nausea  Neurological: Negative for dizziness, focal weakness, headaches, light-headedness and weakness  All other systems reviewed and are negative  No Known Allergies        Current Outpatient Medications:     acetaminophen (TYLENOL) 325 mg tablet, Take 2 tablets (650 mg total) by mouth every 4 (four) hours as needed for fever (temperature greater than 101 F), Disp: , Rfl: 0    atorvastatin (LIPITOR) 10 mg tablet, Take 1 tablet (10 mg total) by mouth daily, Disp: 90 tablet, Rfl: 3    glimepiride (AMARYL) 2 mg tablet, TAKE 1 TABLET BY MOUTH DAILY WITH BREAKFAST (Patient taking differently: 1/2 tab daily), Disp: 90 tablet, Rfl: 3    omeprazole (PriLOSEC) 40 MG capsule, Take 1 capsule (40 mg total) by mouth daily, Disp: 30 capsule, Rfl: 5    True Metrix Blood Glucose Test test strip, Testing 1 time daily DX:E11 9, Disp: 100 each, Rfl: 5    amoxicillin (AMOXIL) 500 MG tablet, Take 4 tablets (2,000 mg total) by mouth once as needed (30-60 min pirior to dental work) for up to 1 dose, Disp: 4 tablet, Rfl: 1    aspirin (ECOTRIN LOW STRENGTH) 81 mg EC tablet, Take 1 tablet (81 mg total) by mouth daily, Disp: , Rfl:     docusate sodium (COLACE) 100 mg capsule, Take 1 capsule (100 mg total) by mouth 2 (two) times a day (Patient not taking: Reported on 7/2/2021), Disp: 10 capsule, Rfl: 0    mupirocin (BACTROBAN) 2 % ointment, , Disp: , Rfl:     Social History     Socioeconomic History    Marital status:      Spouse name: Not on file    Number of children: Not on file    Years of education: Not on file    Highest education level: Not on file   Occupational History    Occupation: retired   Tobacco Use    Smoking status: Former Smoker    Smokeless tobacco: Former User     Quit date: 1/1/1976   Vaping Use    Vaping Use: Never used   Substance and Sexual Activity    Alcohol use: Yes     Comment: occasional    Drug use: No    Sexual activity: Not Currently   Other Topics Concern    Not on file   Social History Narrative    Not on file     Social Determinants of Health     Financial Resource Strain:     Difficulty of Paying Living Expenses:    Food Insecurity:     Worried About [address] in the Last Year:     [address] in the Last Year:    Transportation Needs: No Transportation Needs    Lack of Transportation (Medical): No    Lack of Transportation (Non-Medical):  No   Physical Activity:     Days of Exercise per Week:     Minutes of Exercise per Session:    Stress:     Feeling of Stress :    Social Connections:     Frequency of Communication with Friends and Family:     Frequency of Social Gatherings with Friends and Family:     Attends [employer] Services:     Active Member of Clubs or Organizations:     Attends Club or Organization Meetings:     Marital Status:    Intimate Partner Violence:     Fear of Current or Ex-Partner:     Emotionally Abused:     Physically Abused:     Sexually Abused:        Family History   Problem Relation Age of Onset    Depression Mother     Hyperlipidemia Mother     Substance Abuse Neg Hx     Colon cancer Neg Hx     Colon polyps Neg Hx        Physical Exam    Vitals: Blood pressure 102/66, pulse 58, height 5' 6" (1 676 m), weight 78 kg (172 lb), SpO2 95 %  Wt Readings from Last 3 Encounters:   21 78 kg (172 lb)   21 76 2 kg (168 lb)   21 72 6 kg (160 lb)         Labs & Results:  Lab Results   Component Value Date    WBC 3 98 (L) 2021    HGB 11 7 (L) 2021    HCT 35 4 (L) 2021    MCV 95 2021    PLT 69 (L) 2021     No results found for: BNP  No components found for: CHEM    Results for orders placed during the hospital encounter of 21    Echo complete with contrast if indicated    Narrative  [address]  [facility], [address] [facility]  [phone]    Transthoracic Echocardiogram  2D, M-mode, Doppler, and Color Doppler    Study date:  2021    Patient: Gely Zamora  MR number: [id number]  Account number: [de-identified]  : 1935  Age: 81 years  Gender: Male  Status: Inpatient  Location: Bedside  Height: 66 in  Weight: 164 6 lb  BP: 103/ 54 mmHg    Indications: TAVR (1 Day)  Diagnoses: Z95 2 - Presence of prosthetic heart valve    Sonographer:  Leandro Poole RDCS  Primary Physician:  Olya Pineda MD  Referring Physician:  Diego Lozoya PA-C  Group:  [facility]  Interpreting Physician:  Efrain Lindquist MD    SUMMARY    LEFT VENTRICLE:  Systolic function was normal  Ejection fraction was estimated to be 55 %  There were no regional wall motion abnormalities  Wall thickness was mildly increased    Doppler parameters were consistent with abnormal left ventricular relaxation (grade 1 diastolic dysfunction)  VENTRICULAR SEPTUM:  There was dyssynergic motion  There was moderate  These changes are consistent with LBBB  MITRAL VALVE:  There was mild regurgitation  AORTIC VALVE:  A 29mm ES3 TAVR bioprosthesis was present  It exhibited normal function  There was no significant regurgitation  There was no significant perivalvular regurgitation  Valve peak gradient was 17 mmHg  Valve mean gradient was 10 mmHg  Aortic valve area was 1 8 cmï¾² by the continuity equation  TRICUSPID VALVE:  There was mild regurgitation  HISTORY: PRIOR HISTORY: Hyperlipidemia  Type 2 diabetes  S/p TAVR  LBBB  Anemia  Carotid stenosis  GERD  Aortic stenosis  Hypertension  Former smoker  PROCEDURE: The procedure was performed at the bedside  This was a routine study  The transthoracic approach was used  The study included complete 2D imaging, M-mode, complete spectral Doppler, and color Doppler  The heart rate was 72 bpm,  at the start of the study  Images were obtained from the parasternal, apical, subcostal, and suprasternal notch acoustic windows  Echocardiographic views were limited due to lung interference  Image quality was adequate  LEFT VENTRICLE: Size was normal  Systolic function was normal  Ejection fraction was estimated to be 55 %  There were no regional wall motion abnormalities  Wall thickness was mildly increased  DOPPLER: Doppler parameters were consistent  with abnormal left ventricular relaxation (grade 1 diastolic dysfunction)  VENTRICULAR SEPTUM: There was dyssynergic motion  There was moderate  These changes are consistent with LBBB  RIGHT VENTRICLE: The size was normal  Systolic function was normal  Wall thickness was normal     LEFT ATRIUM: Size was normal     RIGHT ATRIUM: Size was normal     MITRAL VALVE: Valve structure was normal  There was normal leaflet separation  DOPPLER: The transmitral velocity was within the normal range   There was no evidence for stenosis  There was mild regurgitation  AORTIC VALVE: A 29mm ES3 TAVR bioprosthesis was present  It exhibited normal function  DOPPLER: There was no significant regurgitation  There was no significant perivalvular regurgitation  TRICUSPID VALVE: The valve structure was normal  There was normal leaflet separation  DOPPLER: The transtricuspid velocity was within the normal range  There was no evidence for stenosis  There was mild regurgitation  PULMONIC VALVE: Leaflets exhibited normal thickness, no calcification, and normal cuspal separation  DOPPLER: The transpulmonic velocity was within the normal range  There was trace regurgitation  PERICARDIUM: There was no pericardial effusion  The pericardium was normal in appearance  AORTA: The root exhibited normal size  SYSTEMIC VEINS: IVC: The inferior vena cava was normal in size  MEASUREMENT TABLES    DOPPLER MEASUREMENTS  Aortic valve   (Reference normals)  Peak gradient   17 mmHg   (--)  Mean gradient   10 mmHg   (--)  Valve area, cont   1 8 cmï¾²   (--)    SYSTEM MEASUREMENT TABLES    2D  %FS: 36 54 %  Ao Diam: 3 85 cm  Ao asc: 3 89 cm  EDV(Teich): 114 64 ml  EF(Teich): 66 11 %  ESV(Teich): 38 85 ml  IVSd: 1 04 cm  LA Area: 13 58 cm2  LA Diam: 4 04 cm  LVEDV MOD A4C: 127 8 ml  LVEF MOD A4C: 47 72 %  LVESV MOD A4C: 66 81 ml  LVIDd: 4 93 cm  LVIDs: 3 13 cm  LVLd A4C: 8 52 cm  LVLs A4C: 7 57 cm  LVOT Diam: 2 cm  LVPWd: 1 08 cm  RA Area: 15 61 cm2  RVIDd: 3 71 cm  SV MOD A4C: 60 99 ml  SV(Teich): 75 79 ml    CW  AV Env  Ti: 268 1 ms  AV VTI: 39 5 cm  AV Vmax: 2 06 m/s  AV Vmean: 1 47 m/s  AV maxP 06 mmHg  AV meanP 83 mmHg    MM  TAPSE: 2 14 cm    PW  MARIO ALBERTO (VTI): 1 56 cm2  MARIO ALBERTO Vmax: 1 32 cm2  AVAI (VTI): 0 cm2/m2  AVAI Vmax: 0 cm2/m2  E' Sept: 0 05 m/s  E/E' Sept: 17 04  LVOT Env  Ti: 314 8 ms  LVOT VTI: 19 68 cm  LVOT Vmax: 0 87 m/s  LVOT Vmean: 0 63 m/s  LVOT maxPG: 3 06 mmHg  LVOT meanP 81 mmHg  LVSI Dopp: 33 54 ml/m2  LVSV Dopp: 61 72 ml  MV A Geronimo: 1 3 m/s  MV Dec Uinta: 4 92 m/s2  MV DecT: 185 14 ms  MV E Geronimo: 0 91 m/s  MV E/A Ratio: 0 7  MV PHT: 53 69 ms  MVA By PHT: 4 1 cm2    [employer] Accredited Echocardiography Laboratory    Prepared and electronically signed by    Maureen Avitia MD  Signed 23-Jun-2021 13:13:17    No results found for this or any previous visit  This note was completed in part utilizing m-modal fluency direct voice recognition software  Grammatical errors, random word insertion, spelling mistakes, and incomplete sentences may be an occasional consequence of the system secondary to software limitations, ambient noise and hardware issues  At the time of dictation, efforts were made to edit, clarify and /or correct errors  Please read the chart carefully and recognize, using context, where substitutions have occurred    If you have any questions or concerns about the context, text or information contained within the body of this dictation, please contact myself, the provider, for further clarification

## 2021-07-22 PROBLEM — Z48.89 ENCOUNTER FOR POSTOPERATIVE CARE: Status: ACTIVE | Noted: 2021-01-01

## 2021-07-22 NOTE — PROGRESS NOTES
POST OP FOLLOW UP VISIT S/P TAVR    Procedure: S/P transfemoral transcatheter aortic valve replacement #29 mm Giron PANCHO 3 Ultra bioprosthesis via left femoralapproach, performed on 6/22/21  History: Lyndon Jones is a 81y o  year old male who presents to our office today for routine follow up care from transfemoral transcatheter aortic valve replacement  Patient tolerated procedure well  Post op he developed left groin hematoma (h/o thrombocytopenia)  Duplex neg for pseudoaneurysm  Aspirin and Plavix held  ECG with LBBB and long 1st degree AVB  Underwent PPM on POD # 3  Patient discharged home on POD # 4  F/U out patient CBC with plt ct 69- Aspirin resumed  Today patient reports he is doing well  He is ambulatory and independent  He reports improvement in his symptoms  Much loess SOB  He denies chest pain,lightheadedness, weight gain or edema  His left groin hematoma is resolved  No new bruising or bleeding  PPM site healed        Review of System:     History obtained from chart review and the patient  General ROS: negative  Psychological ROS: negative  Ophthalmic ROS: negative  ENT ROS: negative  Allergy and Immunology ROS: negative  Hematological and Lymphatic ROS: negative  Endocrine ROS: negative  Respiratory ROS: no cough, shortness of breath, or wheezing  Cardiovascular ROS: no chest pain or dyspnea on exertion  Gastrointestinal ROS: no abdominal pain, change in bowel habits, or black or bloody stools  Genito-Urinary ROS: no dysuria, trouble voiding, or hematuria  Musculoskeletal ROS: negative  Neurological ROS: no TIA or stroke symptoms  Dermatological ROS: negative    Vital Signs:     Vitals:    07/22/21 1256 07/22/21 1259   BP: 127/68 125/61   BP Location: Left arm Right arm   Patient Position: Sitting    Cuff Size: Standard    Pulse: 75    Resp: 18    SpO2: 99%    Weight: 77 8 kg (171 lb 9 6 oz)    Height: 5' 6" (1 676 m)        Home Medications:     Prior to Admission medications Medication Sig Start Date End Date Taking? Authorizing Provider   acetaminophen (TYLENOL) 325 mg tablet Take 2 tablets (650 mg total) by mouth every 4 (four) hours as needed for fever (temperature greater than 101 F) 6/26/21   Imelda Thoedore PA-C   amoxicillin (AMOXIL) 500 MG tablet Take 4 tablets (2,000 mg total) by mouth once as needed (30-60 min pirior to dental work) for up to 1 dose 7/20/21 7/20/22  ADALBERTO Epstein   aspirin (ECOTRIN LOW STRENGTH) 81 mg EC tablet Take 1 tablet (81 mg total) by mouth daily 7/20/21   ADALBERTO Epstein   atorvastatin (LIPITOR) 10 mg tablet Take 1 tablet (10 mg total) by mouth daily 6/16/21   Steven Hartley MD   docusate sodium (COLACE) 100 mg capsule Take 1 capsule (100 mg total) by mouth 2 (two) times a day  Patient not taking: Reported on 7/2/2021 6/26/21   Imelda Theodore PA-C   glimepiride (AMARYL) 2 mg tablet TAKE 1 TABLET BY MOUTH DAILY WITH BREAKFAST  Patient taking differently: 1/2 tab daily 9/3/20   Aida Brown PA-C   mupirocin (BACTROBAN) 2 % ointment  6/16/21   Historical Provider, MD   omeprazole (PriLOSEC) 40 MG capsule Take 1 capsule (40 mg total) by mouth daily 6/2/21   Ciera Cortez MD   True Metrix Blood Glucose Test test strip Testing 1 time daily DX:E11 9 4/21/21   Steven Hartley MD       Physical Exam:    General: Alert,orienetd, well developed, no acute distress  HEENT/NECK:  PERRLA  No jugular venous distention  Cardiac:Regular rate and rhythm, No murmurs rubs or gallops  Pulmonary:Breath sounds clear bilaterally  Abdomen:  Non-tender, Non-distended  Positive bowel sounds  Upper extremities: 2+ radial pulses; brisk capillary refill  Lower extremities: Extremities warm/dry  Left femoral pulse 1+, no hematoma; PT/DP pulses 1+ bilaterally  No edema B/L  Incisions: Inguinal puncture site is clean, dry, and intact  PPM site healed  Musculoskeletal: MAEE, no deficits, stable gait with cane  Neuro: Alert and oriented X 3    Sensation is grossly intact  No focal deficits  Skin: Warm/Dry, without rashes or lesions  Lab Results:     Results from last 7 days   Lab Units 07/22/21  1128   WBC Thousand/uL 4 06*   HEMOGLOBIN g/dL 12 1   HEMATOCRIT % 37 0   PLATELETS Thousands/uL 58*     Results from last 7 days   Lab Units 07/22/21  1128   POTASSIUM mmol/L 4 0   CHLORIDE mmol/L 106   CO2 mmol/L 25   BUN mg/dL 17   CREATININE mg/dL 1 18   CALCIUM mg/dL 8 8       Imaging Studies:     Transthoracic Echocardiogram: today- prelim  TAVR well seated, normal function, no significant PVL, AWAIT FINAL REPORT     EKG: pending    I have personally reviewed pertinent films in PACS    TAVR evaluation Assessment:     Sourav  [place] [address]: I    Assessment:   Aortic stenosis, Non-Rheumatic  S/P transfemoral transcatheter aortic valve replacement #29 ES3 Ultra bioprosthesis    Vella Sever is making good progress in their post-op recovery  They are at NYHA functional class I  Left groin incision is well healed  Weight and VS are stable  Recent echocardiogram demonstrates TAVR with normal function and no PVL  AWAIT FINAL REPORT  ECG pending; BMP WNL & CBC with thrombocytopenia (plt ct 58)   Plan:   Medications reviewed with patient  INSTRUCTED PATIENT TO STOP ASPIRIN-  PLT CT TODAY 62- H/O CHRONIC THROMBOCYTOPENIA  Benefits of participating in cardiac rehabilitation discussed with patient and they are cleared to proceed with the program  May resume driving and all normal activities but advised not to left his left arm overhead for an additional 2 weeks due to PPM implantation  Vella Sever will return for one year follow-up in our office with repeat echocardiogram, ECG, CBC & BMP  Our office will contact patient to schedule appointment  They have been advised to maintain routine follow up with their cardiologist and PCP for ongoing medical care     Patient was comfortable with our recommendations and their questions were answered to their satisfaction      Routine referral to gastroenterology for colonoscopy screening was not indicated, as the patient is over 77years old    Jori Gibson  7/22/21  1:23 PM

## 2021-07-22 NOTE — LETTER
July 22, 2021     MD Vida Greene  [address] [id number]    Patient: Franny Bradley   YOB: 1935   Date of Visit: 7/22/2021       Dear Dr Antoine Moyer:    Thank you for referring Lety Stein to me for evaluation  Below are my notes for this consultation  If you have questions, please do not hesitate to call me  I look forward to following your patient along with you  Sincerely,        Rebekah Vuong MD        CC: No Recipients  ADALBERTO Broussard  7/22/2021  1:36 PM  Attested   POST OP FOLLOW UP VISIT S/P TAVR    Procedure: S/P transfemoral transcatheter aortic valve replacement #29 mm Giron PNACHO 3 Ultra bioprosthesis via left femoralapproach, performed on 6/22/21  History: Franny Bradley is a 81y o  year old male who presents to our office today for routine follow up care from transfemoral transcatheter aortic valve replacement  Patient tolerated procedure well  Post op he developed left groin hematoma (h/o thrombocytopenia)  Duplex neg for pseudoaneurysm  Aspirin and Plavix held  ECG with LBBB and long 1st degree AVB  Underwent PPM on POD # 3  Patient discharged home on POD # 4  F/U out patient CBC with plt ct 69- Aspirin resumed  Today patient reports he is doing well  He is ambulatory and independent  He reports improvement in his symptoms  Much loess SOB  He denies chest pain,lightheadedness, weight gain or edema  His left groin hematoma is resolved  No new bruising or bleeding  PPM site healed        Review of System:     History obtained from chart review and the patient  General ROS: negative  Psychological ROS: negative  Ophthalmic ROS: negative  ENT ROS: negative  Allergy and Immunology ROS: negative  Hematological and Lymphatic ROS: negative  Endocrine ROS: negative  Respiratory ROS: no cough, shortness of breath, or wheezing  Cardiovascular ROS: no chest pain or dyspnea on exertion  Gastrointestinal ROS: no abdominal pain, change in bowel habits, or black or bloody stools  Genito-Urinary ROS: no dysuria, trouble voiding, or hematuria  Musculoskeletal ROS: negative  Neurological ROS: no TIA or stroke symptoms  Dermatological ROS: negative    Vital Signs:     Vitals:    07/22/21 1256 07/22/21 1259   BP: 127/68 125/61   BP Location: Left arm Right arm   Patient Position: Sitting    Cuff Size: Standard    Pulse: 75    Resp: 18    SpO2: 99%    Weight: 77 8 kg (171 lb 9 6 oz)    Height: 5' 6" (1 676 m)        Home Medications:     Prior to Admission medications    Medication Sig Start Date End Date Taking? Authorizing Provider   acetaminophen (TYLENOL) 325 mg tablet Take 2 tablets (650 mg total) by mouth every 4 (four) hours as needed for fever (temperature greater than 101 F) 6/26/21   Jennifer Donaldson PA-C   amoxicillin (AMOXIL) 500 MG tablet Take 4 tablets (2,000 mg total) by mouth once as needed (30-60 min pirior to dental work) for up to 1 dose 7/20/21 7/20/22  ADALBERTO Huff   aspirin (ECOTRIN LOW STRENGTH) 81 mg EC tablet Take 1 tablet (81 mg total) by mouth daily 7/20/21   ADALBERTO Huff   atorvastatin (LIPITOR) 10 mg tablet Take 1 tablet (10 mg total) by mouth daily 6/16/21   Felipe Barrett MD   docusate sodium (COLACE) 100 mg capsule Take 1 capsule (100 mg total) by mouth 2 (two) times a day  Patient not taking: Reported on 7/2/2021 6/26/21   Jennifer Donaldson PA-C   glimepiride (AMARYL) 2 mg tablet TAKE 1 TABLET BY MOUTH DAILY WITH BREAKFAST  Patient taking differently: 1/2 tab daily 9/3/20   Davis Butt PA-C   mupirocin (BACTROBAN) 2 % ointment  6/16/21   Historical Provider, MD   omeprazole (PriLOSEC) 40 MG capsule Take 1 capsule (40 mg total) by mouth daily 6/2/21   Jet Tierney MD   True Metrix Blood Glucose Test test strip Testing 1 time daily DX:E11 9 4/21/21   Felipe Barrett MD       Physical Exam:    General: Alert,orienetd, well developed, no acute distress  HEENT/NECK:  PERRLA  No jugular venous distention  Cardiac:Regular rate and rhythm, No murmurs rubs or gallops  Pulmonary:Breath sounds clear bilaterally  Abdomen:  Non-tender, Non-distended  Positive bowel sounds  Upper extremities: 2+ radial pulses; brisk capillary refill  Lower extremities: Extremities warm/dry  Left femoral pulse 1+, no hematoma; PT/DP pulses 1+ bilaterally  No edema B/L  Incisions: Inguinal puncture site is clean, dry, and intact  PPM site healed  Musculoskeletal: MAEE, no deficits, stable gait with cane  Neuro: Alert and oriented X 3  Sensation is grossly intact  No focal deficits  Skin: Warm/Dry, without rashes or lesions  Lab Results:     Results from last 7 days   Lab Units 07/22/21  1128   WBC Thousand/uL 4 06*   HEMOGLOBIN g/dL 12 1   HEMATOCRIT % 37 0   PLATELETS Thousands/uL 58*     Results from last 7 days   Lab Units 07/22/21  1128   POTASSIUM mmol/L 4 0   CHLORIDE mmol/L 106   CO2 mmol/L 25   BUN mg/dL 17   CREATININE mg/dL 1 18   CALCIUM mg/dL 8 8       Imaging Studies:     Transthoracic Echocardiogram: today- prelim  TAVR well seated, normal function, no significant PVL, AWAIT FINAL REPORT     EKG: pending    I have personally reviewed pertinent films in PACS    TAVR evaluation Assessment:     Sourav Golden 122: I    Assessment:   Aortic stenosis, Non-Rheumatic  S/P transfemoral transcatheter aortic valve replacement #29 ES3 Ultra bioprosthesis    [facility] is making good progress in their post-op recovery  They are at NYHA functional class I  Left groin incision is well healed  Weight and VS are stable  Recent echocardiogram demonstrates TAVR with normal function and no PVL  AWAIT FINAL REPORT  ECG pending; BMP WNL & CBC with thrombocytopenia (plt ct 58)   Plan:   Medications reviewed with patient  INSTRUCTED PATIENT TO STOP ASPIRIN-  PLT CT TODAY 62- H/O CHRONIC THROMBOCYTOPENIA      Benefits of participating in cardiac rehabilitation discussed with patient and they are cleared to proceed with the program  May resume driving and all normal activities but advised not to left his left arm overhead for an additional 2 weeks due to PPM implantation  Favio Freitas will return for one year follow-up in our office with repeat echocardiogram, ECG, CBC & BMP  Our office will contact patient to schedule appointment  They have been advised to maintain routine follow up with their cardiologist and PCP for ongoing medical care  Patient was comfortable with our recommendations and their questions were answered to their satisfaction  Routine referral to gastroenterology for colonoscopy screening was not indicated, as the patient is over 77years old    Jori Truong  7/22/21  1:23 PM  Attestation signed by Pravin Cabrales MD at 7/22/2021  3:15 PM:  Patient seen and evaluated with Jori Wells / JUDY  I agree with the above assessment and plan with the following additions  The patient is an 52-year-old man 1 month status post TAVR  He is recovering well without complications  He has chronic thrombocytopenia, platelet count today is 58,000  Plan:  Cardiac rehab  Follow up with cardiology and PCP  In lieu of a significant thrombocytopenia I would recommend to hold aspirin  Follow up in valve center in 1 year    This note was completed in part utilizing [employer] direct voice recognition software  Grammatical errors, random word insertion, spelling mistakes, and incomplete sentences may be an occasional consequence of the system secondary to software limitations, ambient noise and hardware issues  At the time of dictation, efforts were made to edit, clarify and /or correct errors  Please read the chart carefully and recognize, using context, where substitutions have occurred  If you have any questions or concerns about the context, text or information contained within the body of this dictation, please contact myself, the provider, for further clarification

## 2021-08-09 NOTE — PROGRESS NOTES
Cardiac Rehabilitation Plan of Care   Initial Care Plan          Today's date: 2021   # of Exercise Sessions Completed: 1-evaluation  Patient name: Estell Boast      : 1935  Age: 81 y o  MRN: [id number]  Referring Physician: Prince Lozada DO  Cardiologist: Dr Estrellita Roger  Provider: Celia  Clinician: Jairo Lopez MS, CEP      Dx: TAVR  Date of onset: 2021      SUMMARY OF PROGRESS:  Mr Danis Dave is here today for his cardiac rehabilitation evaluation after recent TAVR  He reports he is doing fine with recovery process  His main complaint is his B/L knee pain and is unsure what he will be able to do as far as regular exercise due to pain  His main goal for his program is to be able to increase overall body strength and endurance to be able to walk further distance and be able to remain independent with ADLs  He would also like to improve shortness of breath he has with exertion  He reports he lives by himself and needs to be able to care for himself and get himself to appointments  He reports he has limited social support  His main support is his son who does not live very close by, but he does help as he can  He reports losing his wife due to illness and is very tearful when talking about her  He reports some depression (PHQ-9= ) due to being alone and does not know whaty his purpose of living is right now  Will offer support to patient  He completed NS ETT today reaching 1 8 METs at 9 min on NS  He tolerated exercise well with stable hemodynamic response to exercise  Telemetry showed pacing  Will plan to start exercise at 1 5-2 0 METs and increase exercise times and intensities as tolerated by patient over first 30 days of rehab  He is in agreement to attend rehab sessions 3x/week for 12 weeks, or 36 sessions starting 8/10/2021      Medication compliance: Yes   Comments: Pt reports to be compliant with medications  Fall Risk: Moderate   Comments: Patient uses walking assist device (walker/cane/rollator)    EKG Interpretation: NSR, paced      EXERCISE ASSESSMENT and PLAN    Current Exercise Program in Rehab:       Frequency: 3 days/week Supplement with home exercise 2+ days/wk as tolerated       Minutes: 30-40         METS: 1 5-2 0            HR: 30 beats above resting   RPE: 4-6         Modalities: UBE, NuStep and Recumbent bike      Exercise Progression 30 Day Goals :    Frequency: 3 days/week of cardiac rehab     Supplement with home exercise 2+ days/wk as tolerated    Minutes: 40                            >150 mins/wk of moderate intensity exercise   METS: 2 0   HR: 30 beats above resting    RPE: 4-6   Modalities: UBE, NuStep and Recumbent bike    Strength trainin-3 days / week  12-15 repetitions  1-2 sets per modality   Will be added following 2-3 weeks of monitored exercise sessions   Modalities: Pull Downs, Lateral Raise, Arm Extension, Arm Curl and Front Raises    Home Exercise: none    Goals: 10% improvement in functional capacity - based on max METs achieved in fitness assessment, Reduced dyspnea with physical activity  0-1/10, improved DASI score by 10%, Increase in exercise capacity by 40% - based on peak METs tolerated in cardiac rehab exercise session, Exercise 5 days/wk, >150mins/wk of moderate intensity exercise, Resume ADLs with increased strength, Attend Rehab regularly, Decrease sitting time and Start a walking program    Progression Toward Goals:  Reviewed Pt goals and determined plan of care    Education: benefit of exercise for CAD risk factors, home exercise guidelines, AHA guidelines to achieve >150 mins/wk of moderate exercise, RPE scale and class: Risk Factors for Heart Disease   Plan:education on home exercise guidelines, home exercise 30+ mins 2 days opposite CR and Education class: Risk Factors for Heart Disease  Readiness to change: Preparation:  (Getting ready to change)       NUTRITION ASSESSMENT AND PLAN    Weight control:    Starting weight: 171 lb   Current weight:     Waist circumference:    Startin 5"   Current:      Diabetes: T2D  A1c: 6 0    last measured: 2021    Lipid management: Discussed diet and lipid management and Last lipid profile 2020  Chol 147    HDL 62  LDL 64     Reports eating mostly frozen dinners as he does not cook for himself  Goals:LDL <100, HDL >40, TRG <150, CHOL <200, choose lean meat (93-95%), eliminate processed meats, increase intake of fish, shellfish, increase intake of meatless meals, eat 3 or more servings of whole grains a day, Eat 4-5 cups of fruits and vegetables daily, eliminate butter, use fat-free dressings/younger or seldom use and daily saturated fat intake <7%/13g   Consider Meals on Wheels for healthier options    Progression Toward Goals: Reviewed Pt goals and determined plan of care    Education: heart healthy eating  low sodium diet  nutrition for Improved BG control  target goal for A1c <7 0  exercise and diabetes management   education class: Heart Healthy Eating  education class:  Label Reading  Plan: Education class: Reading Food Labels and Education Class: Heart Healthy Eating  Readiness to change: Contemplation:  (Acknowledging that there is a problem but not yet ready or sure of wanting to make a change)      PSYCHOSOCIAL ASSESSMENT AND PLAN    Emotional:  Depression assessment:  PHQ-9 = 5-9 = Mild Depression            Anxiety measure:  ROLAND-7 = 5-9 = Mild anxiety  Self-reported stress level:  3  Social support: Good -son, but lives a distance away  Reports helps as he can and would not know what to do without him      Goals:  Reduce perceived stress to 1-3/10, improved [facility] QOL < 27, PHQ-9 - reduced severity by one level, Feelings in [place] Score < 3, Physical Fitness in [place] Score < 3, Social Support in [place] Score < 3, Daily Activity in [place] Score < 3, Social Activities in [place] Score < 3, Pain in [place] Score < 3, Overall Health in [place] Score < 3, Quality of Life in [facility] Score < 3 , Change in Health in [place] Score < 3 , Increased interest in doing things, improved sleep, Improved appetite, improved concentration, improved positive thoughts of well being, increased energy and Feel less anxious    Progression Toward Goals: Reviewed Pt goals and determined plan of care    Education: signs/sxs of depression, benefits of a positive support system, stress management techniques, depression and CAD and benefits of mental health counseling  Plan: Class: Stress and Your Health, Class: Relaxation, PHQ-9 >5 will refer to MD, Refer to Tani Berry, Practice relaxation techniques, Enjoy a hobby and Keep a positive mindset  Readiness to change: Preparation:  (Getting ready to change)       OTHER CORE COMPONENTS     Tobacco:   Social History     Tobacco Use   Smoking Status Former Smoker   Smokeless Tobacco Former User    Quit date: 1976       Tobacco Use Intervention:   N/A:  Patient is a non-smoker     Anginal Symptoms:  None   NTG use: No prescription    Blood pressure:    Restin/62   Exercise: 132/68    Goals: consistent BP < 130/80, reduced dietary sodium <2300mg, moderate intensity exercise >150 mins/wk and medication compliance    Progression Toward Goals: Reviewed Pt goals and determined plan of care    Education:  understanding high blood pressure and it's relationship to CAD and low sodium diet and HTN  Plan: Class: Understanding Heart Disease and Class: Common Heart Medications  Readiness to change: Action:  (Changing behavior)-reports BP has always been normal

## 2021-08-09 NOTE — PROGRESS NOTES
Kelley Poon        Dear Dr Dr Aiyana Pope,    Emotional well-being and depression is addressed in the Cardiac  rehab evaluation  To assess the severity of depression, patients are given the PHQ-9 Depression Questionnaire  This is to inform you that your patient Jagjit Madrigal scored a 8 which is interpreted as 5-9 = Mild Depression  Your patient was provided contact information for [facility] and has been encouraged to enroll in [employer]  A repeat PHQ -9 will be administered in 30 days to assess improvement  Thank you for your support of cardiopulmonary rehab      Sincerely,      Tammie Gordon MS, CEP

## 2021-08-09 NOTE — PROGRESS NOTES
CARDIAC REHAB ASSESSMENT    Today's date: 2021  Patient name: Lyndon Jones     : 1935       MRN: [id number]  PCP: Tamra Duncan MD  Referring Physician: Matilda Pierce DO  Cardiologist: Dr Chandler Montesinos  Surgeon: Dr Jessica Hayward  Dx: TAVR    Date of onset: 2021  Cultural needs: none    Weight:    Wt Readings from Last 1 Encounters:   21 77 8 kg (171 lb 9 6 oz)      Height:   Ht Readings from Last 1 Encounters:   21 5' 6" (1 676 m)     Medical History:   Past Medical History:   Diagnosis Date    BPH (benign prostatic hyperplasia)     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     Carotid stenosis, asymptomatic, left     ~70% LICA    Depression (emotion) 2018    Diabetes mellitus ([facility] [address] )     type 2, non-insulin dependent    Dysphagia     Esophageal stricture     s/p dilation    Former tobacco use     GERD (gastroesophageal reflux disease)     History of basal cell carcinoma excision     History of Helicobacter pylori infection     History of shingles     History of TIA (transient ischemic attack)     multiple, no residual sx    Hyperlipidemia     Hypertension     Personal history of colonic polyps     Post herpetic neuralgia     Pulmonary fibrosis (HCC)     mild    Severe aortic stenosis     Splenomegaly     mild         Physical Limitations: B/L knee pain    Fall Risk: Moderate   Comments: Patient uses walking assist device (walker/cane/rollator)    Anginal Equivalent: None/denies angina   NTG use: No prescription    Risk Factors   Cholesterol: No  Smoking: Former user  HTN: No  DM: No  Obesity: No   Inactivity: Yes  Stress:  perceived  stress: 3/10   Stressors:minimal stress   Goals for Stress Management:Practice Relaxation Techniques, Exercise and Keep a positive mindset    Family History:  Family History   Problem Relation Age of Onset    Depression Mother     Hyperlipidemia Mother     Substance Abuse Neg Hx     Colon cancer Neg Hx     Colon polyps Neg Hx Allergies: Patient has no known allergies  ETOH:   Social History     Substance and Sexual Activity   Alcohol Use Yes    Comment: occasional         Current Medications:   Current Outpatient Medications   Medication Sig Dispense Refill    acetaminophen (TYLENOL) 325 mg tablet Take 2 tablets (650 mg total) by mouth every 4 (four) hours as needed for fever (temperature greater than 101 F)  0    amoxicillin (AMOXIL) 500 MG tablet Take 4 tablets (2,000 mg total) by mouth once as needed (30-60 min pirior to dental work) for up to 1 dose 4 tablet 1    aspirin (ECOTRIN LOW STRENGTH) 81 mg EC tablet Take 1 tablet (81 mg total) by mouth daily      atorvastatin (LIPITOR) 10 mg tablet Take 1 tablet (10 mg total) by mouth daily 90 tablet 3    docusate sodium (COLACE) 100 mg capsule Take 1 capsule (100 mg total) by mouth 2 (two) times a day (Patient not taking: Reported on 7/2/2021) 10 capsule 0    glimepiride (AMARYL) 2 mg tablet TAKE 1 TABLET BY MOUTH DAILY WITH BREAKFAST (Patient not taking: TAKE 1 TABLET BY MOUTH DAILY WITH BREAKFAST) 90 tablet 3    mupirocin (BACTROBAN) 2 % ointment  (Patient not taking: Reported on 7/2/2021)      omeprazole (PriLOSEC) 40 MG capsule Take 1 capsule (40 mg total) by mouth daily 30 capsule 5    True Metrix Blood Glucose Test test strip Testing 1 time daily DX:E11 9 100 each 5     No current facility-administered medications for this visit  Functional Status Prior to Diagnosis for Treatment   Occupation: retired  Recreation: none  ADLs: No limitations  [place]: No limitations  Exercise: no regular exercise-limited by knee pain  Other: had been to PT and did balance exercises prior to TAVR    Current Functional Status  Occupation: retired  Recreation: none  ADLs:Capable of performing light to moderate ADLs limited by Orthopedic limitations   [place]: No limitations  Exercise: no regular exercise  Other: n/a    Patient Specific Goals:   Increase overall body strength and endurance to be able to walk further distances and remain independent with ADLs  Short Term Program Goals: increased strength improved energy/stamina with ADLs exercise 120-150 mins/wk    Long Term Goals: increased maximal walking duration  increased intial training workload  Improved [facility] Activity Status score  Improved functional capacity  Improved Quality of Life - [facility] score reduced    Ability to reach goals/rehabilitation potential:  Very Good     Projected return to function: 12 weeks  Objective tests: sub-max NuStep ETT      Nutritional   Reviewed details of Rate your Plate  Discussed key elements of heart healthy eating  Reviewed patient goals for dietary modifications and their clinical implications  Reviewed most recent lipid profile       Goals for dietary modification: choose lean cuts of meat  poultry without the skin  low fat ground meat and poultry  eliminate processed meats  reduce portions of meat to 3 oz  increase fish intake  more meatless meals  low fat dairy   reduced fat cheese  increase whole grains  increase fruits and vegetables  eliminate butter  low sodium  improved snack choices  more nuts/seeds  reduce sweets/frozen desserts  heathier choices while dining out      Emotional/Social  Patient reports feelings of depression   Reports sufficient emotional support    Marital status:     Domestic Violence Screening: No    Comments: n/a

## 2021-08-30 NOTE — TELEPHONE ENCOUNTER
Called and spoke to pt to cx his appt for today with Donna Montano due to provider being out, pt states he does not want to r/s, says he had heart surgery and made this appt before he knew he was going to die

## 2021-09-07 NOTE — PROGRESS NOTES
Cardiac Rehabilitation Plan of Care   30 Day Reassessment          Today's date: 2021   # of Exercise Sessions Completed: 10  Patient name: Mirlande Jimenez      : 1935  Age: 81 y o  MRN: [id number]  Referring Physician: Mike Fabian DO  Cardiologist: Dr Courtney Zarate  Provider: Celia  Clinician: Lyn Zamora MS, CEP      Dx: TAVR  Date of onset: 2021      SUMMARY OF PROGRESS:  Mr Shazia Perez has started his cardiac rehabilitation program after recent TAVR  He has been doing well overall with his rehab program and has attended 10 sessions over the past 30 days  He attends regularly and tolerates exercise well  He has B/L knee pain that limits some exercise, but he reports the exercise is actually making the knees feel better  He is currently completing 35-40 min of aerobic exercise, and also a light strength training program  He has stable hemodynamic response to exercise  Telemetry shows NSR  His main goal for his program is to be able to increase overall body strength and endurance to be able to walk further distance and be able to remain independent with ADLs  He would also like to improve shortness of breath he has with exertion  He reports he lives by himself and needs to be able to care for himself and get himself to appointments  He reports he is on target with his goals at 30 days  He is noticing the benefits of exercise and enjoys coming to his rehab sessions  He feels stronger and is not as short of breath with exercise at rehab  He reports he has limited social support  His main support is his son who does not live very close by, but he does help as he can  He reports losing his wife due to illness and is very tearful when talking about her  He reports some depression (PHQ-9=8 ) due to being alone and does not know whaty his purpose of living is right now  No change from initial  Will continue to offer support to patient  Denies any plans to harm himself    Will continue to progress exercise times and intensities over next 30 days of rehab  Medication compliance: Yes   Comments: Pt reports to be compliant with medications  Fall Risk: Moderate   Comments: Patient uses walking assist device (walker/cane/rollator)    EKG Interpretation: NSR, paced      EXERCISE ASSESSMENT and PLAN    Current Exercise Program in Rehab:       Frequency: 3 days/week Supplement with home exercise 2+ days/wk as tolerated       Minutes: 35-40         METS: 1 5-2 0            HR: 30 beats above resting   RPE: 4-6         Modalities: UBE, NuStep and Recumbent bike      Exercise Progression 30 Day Goals :    Frequency: 3 days/week of cardiac rehab     Supplement with home exercise 2+ days/wk as tolerated    Minutes: 40-45                            >150 mins/wk of moderate intensity exercise   METS: 2 0-2 5   HR: 30 beats above resting    RPE: 4-6   Modalities: UBE, NuStep and Recumbent bike    Strength trainin-3 days / week  12-15 repetitions  1-2 sets per modality   Will be added following 2-3 weeks of monitored exercise sessions   Modalities: Pull Downs, Lateral Raise, Arm Extension, Arm Curl and Front Raises    Home Exercise: none    Goals: 10% improvement in functional capacity - based on max METs achieved in fitness assessment, Reduced dyspnea with physical activity  0-1/10, improved DASI score by 10%, Increase in exercise capacity by 40% - based on peak METs tolerated in cardiac rehab exercise session, Exercise 5 days/wk, >150mins/wk of moderate intensity exercise, Resume ADLs with increased strength, Attend Rehab regularly, Decrease sitting time and Start a walking program    Progression Toward Goals:  Pt is progressing and showing improvement  toward the following goals:  attending cardiac rehab sessions regularly 3x/week, increasing METs levels with exercise, increasing overall strength and endurance with exericse, improving shortness of breath with exercise          Education: benefit of exercise for CAD risk factors, home exercise guidelines, AHA guidelines to achieve >150 mins/wk of moderate exercise, RPE scale and class: Risk Factors for Heart Disease   Plan:education on home exercise guidelines, home exercise 30+ mins 2 days opposite CR and Education class: Risk Factors for Heart Disease  Readiness to change: action      NUTRITION ASSESSMENT AND PLAN    Weight control:    Starting weight: 171 lb   Current weight:   171 lb  Waist circumference:    Startin 5"   Current:      Diabetes: T2D  A1c: 6 0    last measured: 2021    Lipid management: Discussed diet and lipid management and Last lipid profile 2020  Chol 147    HDL 62  LDL 64     Reports eating mostly frozen dinners as he does not cook for himself  Goals:LDL <100, HDL >40, TRG <150, CHOL <200, choose lean meat (93-95%), eliminate processed meats, increase intake of fish, shellfish, increase intake of meatless meals, eat 3 or more servings of whole grains a day, Eat 4-5 cups of fruits and vegetables daily, eliminate butter, use fat-free dressings/younger or seldom use and daily saturated fat intake <7%/13g   Consider Meals on Wheels for healthier options    Progression Toward Goals: Pt is progressing and showing improvement  toward the following goals:  no plan for change in diet          Education: heart healthy eating  low sodium diet  nutrition for Improved BG control  target goal for A1c <7 0  exercise and diabetes management   education class: Heart Healthy Eating  education class:  Label Reading  Plan: Education class: Reading Food Labels and Education Class: Heart Healthy Eating  Readiness to change: Contemplation:  (Acknowledging that there is a problem but not yet ready or sure of wanting to make a change)      PSYCHOSOCIAL ASSESSMENT AND PLAN    Emotional:  Depression assessment:  PHQ-9 = 5-9 = Mild Depression            Anxiety measure:  ROLAND-7 = 5-9 = Mild anxiety  Self-reported stress level:  3  Social support: Good -son, but lives a distance away  Reports helps as he can and would not know what to do without him  Goals:  Reduce perceived stress to 1-3/10, improved [facility] QOL < 27, PHQ-9 - reduced severity by one level, Feelings in [facility] Score < 3, Physical Fitness in [facility] Score < 3, Social Support in [facility] Score < 3, Daily Activity in [facility] Score < 3, Social Activities in [facility] Score < 3, Pain in [facility] Score < 3, Overall Health in [facility] Score < 3, Quality of Life in [facility] Score < 3 , Change in Health in [place] Score < 3 , Increased interest in doing things, improved sleep, Improved appetite, improved concentration, improved positive thoughts of well being, increased energy and Feel less anxious    Progression Toward Goals: Pt has not made progress toward the following goals: continues to reports feelings of depression-mostly related to loss of his wife  Says he does not know why he is still around and unsure of what his purpose it at this time  PHQ-9=8         Education: signs/sxs of depression, benefits of a positive support system, stress management techniques, depression and CAD and benefits of mental health counseling  Plan: Class: Stress and Your Health, Class: Relaxation, PHQ-9 >5 will refer to MD, Refer to Tani Berry, Practice relaxation techniques, Enjoy a hobby and Keep a positive mindset  Readiness to change: Preparation:  (Getting ready to change)       OTHER CORE COMPONENTS     Tobacco:   Social History     Tobacco Use   Smoking Status Former Smoker   Smokeless Tobacco Former User    Quit date: 1976       Tobacco Use Intervention:   N/A:  Patient is a non-smoker     Anginal Symptoms:  None   NTG use: No prescription    Blood pressure:    Restin//72   Exercise: 110//72    Goals: consistent BP < 130/80, reduced dietary sodium <2300mg, moderate intensity exercise >150 mins/wk and medication compliance    Progression Toward Goals: Pt is progressing and showing improvement  toward the following goals:  BP stable withing normal resting range          Education:  understanding high blood pressure and it's relationship to CAD and low sodium diet and HTN  Plan: Class: Understanding Heart Disease and Class: Common Heart Medications  Readiness to change: Action:  (Changing behavior)-reports BP has always been normal

## 2021-09-23 PROBLEM — I35.0 AORTIC STENOSIS, SEVERE: Status: RESOLVED | Noted: 2019-04-04 | Resolved: 2021-01-01

## 2021-09-23 PROBLEM — R13.14 PHARYNGOESOPHAGEAL DYSPHAGIA: Status: RESOLVED | Noted: 2019-08-26 | Resolved: 2021-01-01

## 2021-09-23 NOTE — PATIENT INSTRUCTIONS
Medicare Preventive Visit Patient Instructions  Thank you for completing your Welcome to Medicare Visit or Medicare Annual Wellness Visit today  Your next wellness visit will be due in one year (9/24/2022)  The screening/preventive services that you may require over the next 5-10 years are detailed below  Some tests may not apply to you based off risk factors and/or age  Screening tests ordered at today's visit but not completed yet may show as past due  Also, please note that scanned in results may not display below  Preventive Screenings:  Service Recommendations Previous Testing/Comments   Colorectal Cancer Screening  · Colonoscopy    · Fecal Occult Blood Test (FOBT)/Fecal Immunochemical Test (FIT)  · Fecal DNA/Cologuard Test  · Flexible Sigmoidoscopy Age: 54-65 years old   Colonoscopy: every 10 years (May be performed more frequently if at higher risk)  OR  FOBT/FIT: every 1 year  OR  Cologuard: every 3 years  OR  Sigmoidoscopy: every 5 years  Screening may be recommended earlier than age 48 if at higher risk for colorectal cancer  Also, an individualized decision between you and your healthcare provider will decide whether screening between the ages of 74-80 would be appropriate   Colonoscopy: 01/17/2019  FOBT/FIT: Not on file  Cologuard: Not on file  Sigmoidoscopy: Not on file    Screening Not Indicated     Prostate Cancer Screening Individualized decision between patient and health care provider in men between ages of 53-78   Medicare will cover every 12 months beginning on the day after your 50th birthday PSA: No results in last 5 years     Screening Not Indicated     Hepatitis C Screening Once for adults born between [birth year] and 1965  More frequently in patients at high risk for Hepatitis C Hep C Antibody: Not on file        Diabetes Screening 1-2 times per year if you're at risk for diabetes or have pre-diabetes Fasting glucose: 108 mg/dL   A1C: 6 0 %    Screening Not Indicated  History Diabetes Cholesterol Screening Once every 5 years if you don't have a lipid disorder  May order more often based on risk factors  Lipid panel: 11/04/2020    Screening Not Indicated  History Lipid Disorder      Other Preventive Screenings Covered by Medicare:  1  Abdominal Aortic Aneurysm (AAA) Screening: covered once if your at risk  You're considered to be at risk if you have a family history of AAA or a male between the age of 73-68 who smoking at least 100 cigarettes in your lifetime  2  Lung Cancer Screening: covers low dose CT scan once per year if you meet all of the following conditions: (1) Age 50-69; (2) No signs or symptoms of lung cancer; (3) Current smoker or have quit smoking within the last 15 years; (4) You have a tobacco smoking history of at least 30 pack years (packs per day x number of years you smoked); (5) You get a written order from a healthcare provider  3  Glaucoma Screening: covered annually if you're considered high risk: (1) You have diabetes OR (2) Family history of glaucoma OR (3)  aged 48 and older OR (3)  American aged 72 and older  3  Osteoporosis Screening: covered every 2 years if you meet one of the following conditions: (1) Have a vertebral abnormality; (2) On glucocorticoid therapy for more than 3 months; (3) Have primary hyperparathyroidism; (4) On osteoporosis medications and need to assess response to drug therapy  5  HIV Screening: covered annually if you're between the age of 12-76  Also covered annually if you are younger than 13 and older than 72 with risk factors for HIV infection  For pregnant patients, it is covered up to 3 times per pregnancy      Immunizations:  Immunization Recommendations   Influenza Vaccine Annual influenza vaccination during flu season is recommended for all persons aged >= 6 months who do not have contraindications   Pneumococcal Vaccine (Prevnar and Pneumovax)  * Prevnar = PCV13  * Pneumovax = PPSV23 Adults 25-60 years old: 1-3 doses may be recommended based on certain risk factors  Adults 72 years old: Prevnar (PCV13) vaccine recommended followed by Pneumovax (PPSV23) vaccine  If already received PPSV23 since turning 65, then PCV13 recommended at least one year after PPSV23 dose  Hepatitis B Vaccine 3 dose series if at intermediate or high risk (ex: diabetes, end stage renal disease, liver disease)   Tetanus (Td) Vaccine - COST NOT COVERED BY MEDICARE PART B Following completion of primary series, a booster dose should be given every 10 years to maintain immunity against tetanus  Td may also be given as tetanus wound prophylaxis  Tdap Vaccine - COST NOT COVERED BY MEDICARE PART B Recommended at least once for all adults  For pregnant patients, recommended with each pregnancy  Shingles Vaccine (Shingrix) - COST NOT COVERED BY MEDICARE PART B  2 shot series recommended in those aged 48 and above     Health Maintenance Due:      Topic Date Due    Colorectal Cancer Screening  01/17/2022     Immunizations Due:      Topic Date Due    DTaP,Tdap,and Td Vaccines (1 - Tdap) 05/21/1956    Influenza Vaccine (1) 09/01/2021     Advance Directives   What are advance directives? Advance directives are legal documents that state your wishes and plans for medical care  These plans are made ahead of time in case you lose your ability to make decisions for yourself  Advance directives can apply to any medical decision, such as the treatments you want, and if you want to donate organs  What are the types of advance directives? There are many types of advance directives, and each state has rules about how to use them  You may choose a combination of any of the following:  · Living will: This is a written record of the treatment you want  You can also choose which treatments you do not want, which to limit, and which to stop at a certain time  This includes surgery, medicine, IV fluid, and tube feedings     · Durable power of  for [facility]): This is a written record that states who you want to make healthcare choices for you when you are unable to make them for yourself  This person, called a proxy, is usually a family member or a friend  You may choose more than 1 proxy  · Do not resuscitate (DNR) order:  A DNR order is used in case your heart stops beating or you stop breathing  It is a request not to have certain forms of treatment, such as CPR  A DNR order may be included in other types of advance directives  · Medical directive: This covers the care that you want if you are in a coma, near death, or unable to make decisions for yourself  You can list the treatments you want for each condition  Treatment may include pain medicine, surgery, blood transfusions, dialysis, IV or tube feedings, and a ventilator (breathing machine)  · Values history: This document has questions about your views, beliefs, and how you feel and think about life  This information can help others choose the care that you would choose  Why are advance directives important? An advance directive helps you control your care  Although spoken wishes may be used, it is better to have your wishes written down  Spoken wishes can be misunderstood, or not followed  Treatments may be given even if you do not want them  An advance directive may make it easier for your family to make difficult choices about your care  Fall Prevention    Fall prevention  includes ways to make your home and other areas safer  It also includes ways you can move more carefully to prevent a fall  Health conditions that cause changes in your blood pressure, vision, or muscle strength and coordination may increase your risk for falls  Medicines may also increase your risk for falls if they make you dizzy, weak, or sleepy  Fall prevention tips:   · Stand or sit up slowly  · Use assistive devices as directed  · Wear shoes that fit well and have soles that       · Wear a personal alarm  · Stay active  · Manage your medical conditions  Home Safety Tips:  · Add items to prevent falls in the bathroom  · Keep paths clear  · Install bright lights in your home  · Keep items you use often on shelves within reach  · Paint or place reflective tape on the edges of your stairs  Weight Management   Why it is important to manage your weight:  Being overweight increases your risk of health conditions such as heart disease, high blood pressure, type 2 diabetes, and certain types of cancer  It can also increase your risk for osteoarthritis, sleep apnea, and other respiratory problems  Aim for a slow, steady weight loss  Even a small amount of weight loss can lower your risk of health problems  How to lose weight safely:  A safe and healthy way to lose weight is to eat fewer calories and get regular exercise  You can lose up about 1 pound a week by decreasing the number of calories you eat by 500 calories each day  Healthy meal plan for weight management:  A healthy meal plan includes a variety of foods, contains fewer calories, and helps you stay healthy  A healthy meal plan includes the following:  · Eat whole-grain foods more often  A healthy meal plan should contain fiber  Fiber is the part of grains, fruits, and vegetables that is not broken down by your body  Whole-grain foods are healthy and provide extra fiber in your diet  Some examples of whole-grain foods are whole-wheat breads and pastas, oatmeal, brown rice, and bulgur  · Eat a variety of vegetables every day  Include dark, leafy greens such as spinach, kale, rodney greens, and mustard greens  Eat yellow and orange vegetables such as carrots, sweet potatoes, and winter squash  · Eat a variety of fruits every day  Choose fresh or canned fruit (canned in its own juice or light syrup) instead of juice  Fruit juice has very little or no fiber  · Eat low-fat dairy foods    Drink fat-free (skim) milk or 1% milk  Eat fat-free yogurt and low-fat cottage cheese  Try low-fat cheeses such as mozzarella and other reduced-fat cheeses  · Choose meat and other protein foods that are low in fat  Choose beans or other legumes such as split peas or lentils  Choose fish, skinless poultry (chicken or turkey), or lean cuts of red meat (beef or pork)  Before you cook meat or poultry, cut off any visible fat  · Use less fat and oil  Try baking foods instead of frying them  Add less fat, such as margarine, sour cream, regular salad dressing and mayonnaise to foods  Eat fewer high-fat foods  Some examples of high-fat foods include french fries, doughnuts, ice cream, and cakes  · Eat fewer sweets  Limit foods and drinks that are high in sugar  This includes candy, cookies, regular soda, and sweetened drinks  Exercise:  Exercise at least 30 minutes per day on most days of the week  Some examples of exercise include walking, biking, dancing, and swimming  You can also fit in more physical activity by taking the stairs instead of the elevator or parking farther away from stores  Ask your healthcare provider about the best exercise plan for you  © Copyright [employer] 2018 Information is for End User's use only and may not be sold, redistributed or otherwise used for commercial purposes   All illustrations and images included in CareNotes® are the copyrighted property of A [employer] M [employer]  or [address] [place] [address] [employer]

## 2021-09-23 NOTE — PROGRESS NOTES
Assessment and Plan:     Problem List Items Addressed This Visit        Endocrine    Controlled type 2 diabetes mellitus without complication, without long-term current use of insulin (HCC)    Relevant Medications    glimepiride (AMARYL) 2 mg tablet      Other Visit Diagnoses     Medicare annual wellness visit, subsequent    -  Primary           Preventive health issues were discussed with patient, and age appropriate screening tests were ordered as noted in patient's After Visit Summary  Personalized health advice and appropriate referrals for health education or preventive services given if needed, as noted in patient's After Visit Summary       History of Present Illness:     Patient presents for Medicare Annual Wellness visit    Patient Care Team:  Mercy Morris MD as PCP - General (Internal Medicine)  Lucio Echavarria MD (Colon and Rectal Surgery)     Problem List:     Patient Active Problem List   Diagnosis    Benign prostatic hyperplasia without lower urinary tract symptoms    Bilateral carotid artery stenosis    Pure hypercholesterolemia    Aortic stenosis, severe    Controlled type 2 diabetes mellitus without complication, without long-term current use of insulin (HCC)    Trigger ring finger of right hand    Pharyngoesophageal dysphagia    Arthropathy of right knee    Pulmonary fibrosis ([address] )    Minor depression    Thrombocytopenia ([address] )    Esophageal stricture    Neurologic gait dysfunction    Benign prostatic hyperplasia with nocturia    S/P TAVR (transcatheter aortic valve replacement)    LBBB (left bundle branch block)    Anemia    Encounter for postoperative care      Past Medical and Surgical History:     Past Medical History:   Diagnosis Date    BPH (benign prostatic hyperplasia)     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     Carotid stenosis, asymptomatic, left     ~48% LICA    Depression (emotion) 11/16/2018    Diabetes mellitus ([address] )     type 2, non-insulin dependent    Dysphagia     Esophageal stricture     s/p dilation    Former tobacco use     GERD (gastroesophageal reflux disease)     History of basal cell carcinoma excision     History of Helicobacter pylori infection     History of shingles     History of TIA (transient ischemic attack)     multiple, no residual sx    Hyperlipidemia     Hypertension     Personal history of colonic polyps     Post herpetic neuralgia     Pulmonary fibrosis (HCC)     mild    Severe aortic stenosis     Splenomegaly     mild     Past Surgical History:   Procedure Laterality Date    ANAL SPHINCTEROTOMY      BASAL CELL CARCINOMA EXCISION      CARDIAC CATHETERIZATION      cardiac catheterization    CHOLECYSTECTOMY      EGD      OPEN REDUCTION SHOULDER DISLOCATION      MT ECHO TRANSESOPHAG R-T 2D W/PRB IMG ACQUISJ I&R N/A 6/22/2021    Procedure: TRANSESOPHAGEAL ECHOCARDIOGRAM (REBECCA); Surgeon: Chantelle Acosta MD;  [facility];  Service: Cardiac Surgery    MT REPLACE AORTIC VALVE OPENFEMORAL ARTERY APPROACH N/A 6/22/2021    Procedure: REPLACEMENT AORTIC VALVE TRANSCATHETER (TAVR) TRANSFEMORAL W/ 29MM PATEL PANCHO S3 ULTRA VALVE(ACCESS ON LEFT); Surgeon: Chantelle Acosta MD;  [facility];  Service: Cardiac Surgery    ROTATOR CUFF REPAIR Left       Family History:     Family History   Problem Relation Age of Onset    Depression Mother     Hyperlipidemia Mother     Substance Abuse Neg Hx     Colon cancer Neg Hx     Colon polyps Neg Hx       Social History:     Social History     Socioeconomic History    Marital status:       Spouse name: None    Number of children: None    Years of education: None    Highest education level: None   Occupational History    Occupation: retired   Tobacco Use    Smoking status: Former Smoker    Smokeless tobacco: Former User     Quit date: 1/1/1976   Vaping Use    Vaping Use: Never used   Substance and Sexual Activity    Alcohol use: Yes     Comment: occasional    Drug use: No    Sexual activity: Not Currently   Other Topics Concern    None   Social History Narrative    None     Social Determinants of Health     Financial Resource Strain:     Difficulty of Paying Living Expenses:    Food Insecurity:     Worried About Running Out of Food in the Last Year:     Ran Out of Food in the Last Year:    Transportation Needs: No Transportation Needs    Lack of Transportation (Medical): No    Lack of Transportation (Non-Medical):  No   Physical Activity:     Days of Exercise per Week:     Minutes of Exercise per Session:    Stress:     Feeling of Stress :    Social Connections:     Frequency of Communication with Friends and Family:     Frequency of Social Gatherings with Friends and Family:     Attends Confucianism Services:     Active Member of Clubs or Organizations:     Attends Club or Organization Meetings:     Marital Status:    Intimate Partner Violence:     Fear of Current or Ex-Partner:     Emotionally Abused:     Physically Abused:     Sexually Abused:       Medications and Allergies:     Current Outpatient Medications   Medication Sig Dispense Refill    acetaminophen (TYLENOL) 325 mg tablet Take 2 tablets (650 mg total) by mouth every 4 (four) hours as needed for fever (temperature greater than 101 F)  0    amoxicillin (AMOXIL) 500 MG tablet Take 4 tablets (2,000 mg total) by mouth once as needed (30-60 min pirior to dental work) for up to 1 dose 4 tablet 1    atorvastatin (LIPITOR) 10 mg tablet Take 1 tablet (10 mg total) by mouth daily 90 tablet 3    docusate sodium (COLACE) 100 mg capsule Take 1 capsule (100 mg total) by mouth 2 (two) times a day 10 capsule 0    glimepiride (AMARYL) 2 mg tablet Take 0 5 tablets (1 mg total) by mouth daily with breakfast      omeprazole (PriLOSEC) 40 MG capsule Take 1 capsule (40 mg total) by mouth daily 30 capsule 5    True Metrix Blood Glucose Test test strip Testing 1 time daily DX:E11 9 100 each 5     No current facility-administered medications for this visit  No Known Allergies   Immunizations:     Immunization History   Administered Date(s) Administered    INFLUENZA 11/02/1998, 08/22/2011, 10/08/2012, 09/24/2013, 10/10/2014, 08/18/2015, 08/15/2016, 08/28/2017, 08/27/2018, 11/01/2019    Influenza, high dose seasonal 0 7 mL 10/10/2019, 10/02/2020    Pneumococcal 11/02/1998    Pneumococcal Conjugate 13-Valent 02/06/2015    Pneumococcal Polysaccharide PPV23 07/18/2007    SARS-CoV-2 / COVID-19 mRNA IM (Frederic Ryder) 01/29/2021, 02/25/2021    Td (adult), Unspecified 07/18/2001, 06/09/2015    Zoster 09/03/2016, 10/02/2016      Health Maintenance:         Topic Date Due    Colorectal Cancer Screening  01/17/2022         Topic Date Due    DTaP,Tdap,and Td Vaccines (1 - Tdap) 05/21/1956    Influenza Vaccine (1) 09/01/2021      Medicare Health Risk Assessment:     /74   Pulse 71   Ht 5' 6" (1 676 m)   Wt 78 kg (172 lb)   SpO2 97%   BMI 27 76 kg/m²      Julius Roach is here for his Subsequent Wellness visit  Health Risk Assessment:   Patient rates overall health as fair  Patient feels that their physical health rating is slightly worse  Patient is dissatisfied with their life  Eyesight was rated as slightly worse  Hearing was rated as same  Patient feels that their emotional and mental health rating is same  Patients states they are sometimes angry  Patient states they are often unusually tired/fatigued  Pain experienced in the last 7 days has been some  Patient's pain rating has been 5/10  Patient states that he has experienced no weight loss or gain in last 6 months  Pain in hands  Depression Screening:   PHQ-2 Score: 4  PHQ-9 Score: 7      Fall Risk Screening:    In the past year, patient has experienced: history of falling in past year    Number of falls: 2 or more  Injured during fall?: No    Feels unsteady when standing or walking?: No    Worried about falling?: No      Home Safety:  Patient does not have trouble with stairs inside or outside of their home  Patient has working smoke alarms and has working carbon monoxide detector  Home safety hazards include: none  Nutrition:   Current diet is Regular  Medications:   Patient is currently taking over-the-counter supplements  OTC medications include: see medication list  Patient is able to manage medications  Activities of Daily Living (ADLs)/Instrumental Activities of Daily Living (IADLs):   Walk and transfer into and out of bed and chair?: Yes  Dress and groom yourself?: Yes    Bathe or shower yourself?: Yes    Feed yourself? Yes  Do your laundry/housekeeping?: Yes  Manage your money, pay your bills and track your expenses?: Yes  Make your own meals?: Yes    Do your own shopping?: Yes    Previous Hospitalizations:   Any hospitalizations or ED visits within the last 12 months?: Yes    How many hospitalizations have you had in the last year?: 1-2    Advance Care Planning:   Living will: Yes    Durable POA for healthcare:  Yes    Advanced directive: Yes      Comments: Exercise-cardiac rehab    Vaccines up to date        Cognitive Screening:   Provider or family/friend/caregiver concerned regarding cognition?: No    PREVENTIVE SCREENINGS      Cardiovascular Screening:    General: Screening Not Indicated and History Lipid Disorder      Diabetes Screening:     General: Screening Not Indicated and History Diabetes      Colorectal Cancer Screening:     General: Screening Not Indicated      Prostate Cancer Screening:    General: Screening Not Indicated      Osteoporosis Screening:    General: Screening Not Indicated      Abdominal Aortic Aneurysm (AAA) Screening:    Risk factors include: tobacco use        Lung Cancer Screening:     General: Screening Not Indicated      Hepatitis C Screening:    General: Screening Current    Screening, Brief Intervention, and Referral to Treatment (SBIRT)    Screening  Typical number of drinks in a day: 0    Single Item Drug Screening:  How often have you used an illegal drug (including marijuana) or a prescription medication for non-medical reasons in the past year? never    Single Item Drug Screen Score: 0  Interpretation: Negative screen for possible drug use disorder      Carmen Yadav MD

## 2021-09-23 NOTE — PROGRESS NOTES
Assessment/Plan:       Diagnoses and all orders for this visit:    Controlled type 2 diabetes mellitus without complication, without long-term current use of insulin (HCC)  -     glimepiride (AMARYL) 2 mg tablet; Take 0 5 tablets (1 mg total) by mouth daily with breakfast  -     CBC; Future  -     Comprehensive metabolic panel; Future  -     Hemoglobin A1C; Future    S/P TAVR (transcatheter aortic valve replacement)    Minor depression    Pure hypercholesterolemia  -     Lipid Panel with Direct LDL reflex; Future    Seasonal allergic rhinitis, unspecified trigger  -     fluticasone (FLONASE) 50 mcg/act nasal spray; 1 spray into each nostril daily          All of the above diagnoses have been assessed  Additional COMMENTS/PLAN: doing well  See back in 4 months  Will prior to do a point of care hemoglobin A1c at that time      Subjective:      Patient ID: Franny Bradley is a 81 y o  male  HPI     DM-this is under good control  Does monitor sugars,    Hyperlipidemia- The patient is compliant with diet and taking meds  The patient understands the efficacy of the treatment in vascular prevention  Low platelets- no bleeing-off ASA  S/p TAVR-no longer with JOE  The following portions of the patient's history were revised and updated as appropriate: Problem list, allergies, med list, FH, SH, Past medical and surgical histories      Current Outpatient Medications   Medication Sig Dispense Refill    acetaminophen (TYLENOL) 325 mg tablet Take 2 tablets (650 mg total) by mouth every 4 (four) hours as needed for fever (temperature greater than 101 F)  0    amoxicillin (AMOXIL) 500 MG tablet Take 4 tablets (2,000 mg total) by mouth once as needed (30-60 min pirior to dental work) for up to 1 dose 4 tablet 1    atorvastatin (LIPITOR) 10 mg tablet Take 1 tablet (10 mg total) by mouth daily 90 tablet 3    docusate sodium (COLACE) 100 mg capsule Take 1 capsule (100 mg total) by mouth 2 (two) times a day 10 capsule 0    glimepiride (AMARYL) 2 mg tablet Take 0 5 tablets (1 mg total) by mouth daily with breakfast      omeprazole (PriLOSEC) 40 MG capsule Take 1 capsule (40 mg total) by mouth daily 30 capsule 5    True Metrix Blood Glucose Test test strip Testing 1 time daily DX:E11 9 100 each 5    fluticasone (FLONASE) 50 mcg/act nasal spray 1 spray into each nostril daily 15 8 mL 5     No current facility-administered medications for this visit  Review of Systems   Psychiatric/Behavioral:        Depression is still active-does not want meds  All other systems reviewed and are negative  Objective:    /74   Pulse 71   Ht 5' 6" (1 676 m)   Wt 78 kg (172 lb)   SpO2 97%   BMI 27 76 kg/m²     BP Readings from Last 3 Encounters:   09/23/21 122/74   07/22/21 125/61   07/20/21 102/66                  Wt Readings from Last 3 Encounters:   09/23/21 78 kg (172 lb)   07/22/21 77 8 kg (171 lb 9 6 oz)   07/20/21 78 kg (172 lb)         Physical Exam  Vitals reviewed  Constitutional:       Appearance: Normal appearance  Neck:      Vascular: No carotid bruit  Cardiovascular:      Rate and Rhythm: Normal rate and regular rhythm  Pulmonary:      Effort: Pulmonary effort is normal       Breath sounds: Normal breath sounds  Abdominal:      General: Abdomen is flat  Bowel sounds are normal       Palpations: Abdomen is soft  Musculoskeletal:      Right lower leg: No edema  Left lower leg: No edema  Skin:     Comments: Skin tear left arm  Neurological:      Mental Status: He is alert  No visits with results within 2 Week(s) from this visit     Latest known visit with results is:   Hospital Outpatient Visit on 07/22/2021   Component Date Value Ref Range Status    Ventricular Rate 07/22/2021 64  BPM Final    Atrial Rate 07/22/2021 64  BPM Final    NE Interval 07/22/2021 186  ms Final    QRSD Interval 07/22/2021 148  ms Final    QT Interval 07/22/2021 468  ms Final    QTC Interval 07/22/2021 482  ms Final    P Axis 07/22/2021 -6  degrees Final    QRS Axis 07/22/2021 136  degrees Final    T Wave [place] 07/22/2021 100  degrees Final     BMI Counseling: Body mass index is 27 76 kg/m²  The BMI is above normal  Nutrition recommendations include reducing portion sizes  Romayne Cozier, MD    Some or all of this note was generated with a voice recognition dictation system and therefore my contain grammatical or spelling errors

## 2021-11-21 PROBLEM — N18.9 ACUTE KIDNEY INJURY SUPERIMPOSED ON CKD (HCC): Status: ACTIVE | Noted: 2021-01-01

## 2021-11-21 PROBLEM — G93.40 ENCEPHALOPATHY: Status: ACTIVE | Noted: 2021-01-01

## 2021-11-21 PROBLEM — I63.9 CVA (CEREBRAL VASCULAR ACCIDENT) (HCC): Status: ACTIVE | Noted: 2021-01-01

## 2021-11-21 PROBLEM — Z95.0 PACEMAKER: Status: ACTIVE | Noted: 2021-01-01

## 2021-11-21 PROBLEM — N17.9 ACUTE KIDNEY INJURY SUPERIMPOSED ON CKD (HCC): Status: ACTIVE | Noted: 2021-01-01

## 2021-11-21 PROBLEM — Z78.9 UNABLE TO CARE FOR SELF: Status: ACTIVE | Noted: 2021-01-01

## 2021-11-21 PROBLEM — K85.90 ACUTE PANCREATITIS: Status: ACTIVE | Noted: 2021-01-01

## 2021-11-21 PROBLEM — K56.7 ILEUS (HCC): Status: ACTIVE | Noted: 2021-01-01

## 2021-11-21 PROBLEM — R65.10 SIRS (SYSTEMIC INFLAMMATORY RESPONSE SYNDROME) (HCC): Status: ACTIVE | Noted: 2021-01-01

## 2021-11-21 PROBLEM — K74.60 CIRRHOSIS (HCC): Status: ACTIVE | Noted: 2021-01-01

## 2021-11-21 PROBLEM — E87.2 LACTIC ACIDOSIS: Status: ACTIVE | Noted: 2021-01-01

## 2021-11-21 PROBLEM — E87.0 HYPERNATREMIA: Status: ACTIVE | Noted: 2021-01-01

## 2021-11-22 PROBLEM — F32.A DEPRESSION WITH SUICIDAL IDEATION: Status: ACTIVE | Noted: 2021-01-01

## 2021-11-22 PROBLEM — R45.851 SUICIDAL IDEATIONS: Status: ACTIVE | Noted: 2021-01-01

## 2021-11-22 PROBLEM — G92.9 TOXIC ENCEPHALOPATHY: Status: ACTIVE | Noted: 2021-01-01

## 2021-11-22 PROBLEM — R45.851 DEPRESSION WITH SUICIDAL IDEATION: Status: ACTIVE | Noted: 2021-01-01

## 2021-11-23 PROBLEM — R50.9 FEVER: Status: ACTIVE | Noted: 2021-01-01

## 2021-11-24 PROBLEM — A41.9 SEVERE SEPSIS (HCC): Status: ACTIVE | Noted: 2021-01-01

## 2021-11-24 PROBLEM — R78.81 BACTEREMIA: Status: ACTIVE | Noted: 2021-01-01

## 2021-11-24 PROBLEM — R65.20 SEVERE SEPSIS (HCC): Status: ACTIVE | Noted: 2021-01-01

## 2021-11-26 PROBLEM — F32.A MINOR DEPRESSION: Status: RESOLVED | Noted: 2020-07-07 | Resolved: 2021-01-01

## 2021-11-26 PROBLEM — M65.341 TRIGGER RING FINGER OF RIGHT HAND: Status: RESOLVED | Noted: 2019-06-13 | Resolved: 2021-01-01

## 2021-11-26 PROBLEM — R26.9 NEUROLOGIC GAIT DYSFUNCTION: Status: RESOLVED | Noted: 2020-11-09 | Resolved: 2021-01-01

## 2021-11-26 PROBLEM — Z48.89 ENCOUNTER FOR POSTOPERATIVE CARE: Status: RESOLVED | Noted: 2021-01-01 | Resolved: 2021-01-01

## 2021-11-26 PROBLEM — R45.851 SUICIDAL IDEATIONS: Status: RESOLVED | Noted: 2021-01-01 | Resolved: 2021-01-01

## 2021-11-26 PROBLEM — I44.7 LBBB (LEFT BUNDLE BRANCH BLOCK): Status: RESOLVED | Noted: 2021-01-01 | Resolved: 2021-01-01

## 2021-11-26 PROBLEM — E78.00 PURE HYPERCHOLESTEROLEMIA: Status: RESOLVED | Noted: 2018-12-10 | Resolved: 2021-01-01

## 2021-11-26 PROBLEM — K22.2 ESOPHAGEAL STRICTURE: Status: RESOLVED | Noted: 2020-07-07 | Resolved: 2021-01-01

## 2021-11-26 PROBLEM — G92.9 TOXIC ENCEPHALOPATHY: Status: RESOLVED | Noted: 2021-01-01 | Resolved: 2021-01-01

## 2021-11-26 PROBLEM — I65.23 BILATERAL CAROTID ARTERY STENOSIS: Status: RESOLVED | Noted: 2018-12-10 | Resolved: 2021-01-01

## 2021-11-26 PROBLEM — M17.11 ARTHROPATHY OF RIGHT KNEE: Status: RESOLVED | Noted: 2020-03-05 | Resolved: 2021-01-01

## 2021-11-26 PROBLEM — D64.9 ANEMIA: Status: RESOLVED | Noted: 2021-01-01 | Resolved: 2021-01-01

## 2021-11-26 PROBLEM — Z95.0 PACEMAKER: Status: RESOLVED | Noted: 2021-01-01 | Resolved: 2021-01-01

## 2021-11-26 PROBLEM — N40.0 BENIGN PROSTATIC HYPERPLASIA WITHOUT LOWER URINARY TRACT SYMPTOMS: Status: RESOLVED | Noted: 2018-12-10 | Resolved: 2021-01-01

## 2021-11-26 PROBLEM — E87.0 HYPERNATREMIA: Status: RESOLVED | Noted: 2021-01-01 | Resolved: 2021-01-01

## 2021-11-26 PROBLEM — R35.1 BENIGN PROSTATIC HYPERPLASIA WITH NOCTURIA: Status: RESOLVED | Noted: 2020-11-09 | Resolved: 2021-01-01

## 2021-11-26 PROBLEM — N40.1 BENIGN PROSTATIC HYPERPLASIA WITH NOCTURIA: Status: RESOLVED | Noted: 2020-11-09 | Resolved: 2021-01-01

## 2021-11-26 PROBLEM — E87.2 LACTIC ACIDOSIS: Status: RESOLVED | Noted: 2021-01-01 | Resolved: 2021-01-01

## 2021-11-29 ENCOUNTER — TELEPHONE (OUTPATIENT)
Dept: FAMILY MEDICINE CLINIC | Facility: HOSPITAL | Age: 86
End: 2021-11-29

## 2021-11-29 PROBLEM — F43.21 ADJUSTMENT DISORDER WITH DEPRESSED MOOD: Status: ACTIVE | Noted: 2021-11-29

## 2021-11-29 PROBLEM — F06.30 MOOD DISORDER DUE TO A GENERAL MEDICAL CONDITION: Status: ACTIVE | Noted: 2021-11-29

## 2021-11-30 ENCOUNTER — APPOINTMENT (OUTPATIENT)
Dept: CARDIAC REHAB | Facility: HOSPITAL | Age: 86
End: 2021-11-30
Payer: MEDICARE

## 2022-02-11 NOTE — NURSING NOTE
Patient was provided valve card from chart in addition to aquaseal removal education  Rooming Tab(CC,VS,Pt Hx,Fall Screen)  Chief Complaint   Patient presents with   • Diarrhea       Subjective   Pramod Trent is a 71-year-old male who presents today for diarrhea and abdominal pain.    Abdominal pain and diarrhea  The patient complains of abdominal pain, located to the right of his umbilicus, since his COVID infection. He then recently developed diarrhea and notes an episode of bloating a couple days ago. He notes the pain has improved, although he still has intermittent pain. He did take Azo with some benefit. The patient denies any blood or mucus in his stool. He denies any vomiting or dysuria.      COVID-19  He reports that he had COVID-19 on 2021 and took vitamin D, vitamin B12, and multivitamins for this. He continues to have headaches and body aches from it.      Anxiety  He has not been taking the fluoxetine. He denies any irritability, but did insult his superior at [employer] while quitting. Since starting his new job, he states he has been very quiet and is less stressed.      Degenerative disc disease  He is taking diclofenac once daily and tramadol twice daily as needed. He reports pain in his thumb from carpal tunnel but notes only these two medications together help.    Alcoholism  While sick, he reports he cut back to 2 beers daily and returned to [employer].     He quit working at [employer] and now works at [employer]. He is in the process of applying for Medicare.     I have reviewed and updated his medications, medical history and problem list during today's office visit.     Patient Care Team:  Shanel Bautista MD as PCP - General (Internal Medicine)    Problem List Tab  Medications Tab  Synopsis Tab  Chart Review Tab  Care Everywhere Tab  Immunizations Tab  Patient History Tab    Social History     Tobacco Use   • Smoking status: Former Smoker     Packs/day: 1.00     Years: 30.00     Pack years: 30.00     Types: Cigarettes     Quit date:      Years since quittin.1   •  "Smokeless tobacco: Former User     Types: Chew   Substance Use Topics   • Alcohol use: Yes       Review of Systems    Objective     Rooming Tab(CC,VS,Pt Hx,Fall Screen)  /88 (BP Location: Left arm, Patient Position: Sitting, Cuff Size: Adult)   Pulse 74   Temp 97.1 °F (36.2 °C) (Temporal)   Resp 16   Ht 182.9 cm (72.01\")   Wt 80.6 kg (177 lb 9.6 oz)   SpO2 98%   BMI 24.08 kg/m²     Body mass index is 24.08 kg/m².    Physical Exam  Vitals and nursing note reviewed.   Constitutional:       Appearance: Normal appearance. He is well-developed.   HENT:      Head: Normocephalic and atraumatic.      Right Ear: Tympanic membrane normal.      Left Ear: Tympanic membrane normal.      Nose: No rhinorrhea.      Mouth/Throat:      Pharynx: No posterior oropharyngeal erythema.   Eyes:      Pupils: Pupils are equal, round, and reactive to light.   Cardiovascular:      Rate and Rhythm: Normal rate and regular rhythm.      Pulses: Normal pulses.      Heart sounds: Normal heart sounds. No murmur heard.      Pulmonary:      Effort: Pulmonary effort is normal.      Breath sounds: Normal breath sounds.   Abdominal:      General: Bowel sounds are normal. There is no distension.      Palpations: Abdomen is soft.      Tenderness: There is abdominal tenderness.   Musculoskeletal:         General: No tenderness.      Cervical back: Normal range of motion and neck supple.   Skin:     Capillary Refill: Capillary refill takes less than 2 seconds.   Neurological:      Mental Status: He is alert and oriented to person, place, and time.   Psychiatric:         Mood and Affect: Mood normal.         Behavior: Behavior normal.          Statin Choice Calculator  Data Reviewed:         The data below has been reviewed by Shanel Bautista MD on 02/11/2022.          Assessment/Plan   Order Review Tab  Health Maintenance Tab  Patient Plan/Order Tab  Diagnoses and all orders for this visit:    1. Functional diarrhea (Primary)  - We will " treat empirically for an infection as he does not currently have insurance.     2. DDD (degenerative disc disease), cervical  - Continue current medications.   -     traMADol (ULTRAM) 50 MG tablet; Take 1 tablet by mouth Every 12 (Twelve) Hours As Needed for Moderate Pain .  Dispense: 50 tablet; Refill: 2    3. Essential hypertension    4. Anxiety    5. Alcoholism (HCC)  Comments:  cut back on alcohol to 2/say and back in [place]    6. Right lower quadrant abdominal pain  - We will check a urinalysis. If that is normal, we will empirically treat for an infection as he does not currently have insurance.   -     POC Urinalysis Dipstick, Automated    Other orders  -     metroNIDAZOLE (Flagyl) 500 MG tablet; Take 1 tablet by mouth 3 (Three) Times a Day.  Dispense: 21 tablet; Refill: 0        Wrapup Tab  No follow-ups on file.       They were informed of the diagnosis and treatment plan and directed to f/u for any further problems or concerns.      Patient's Body mass index is 24.08 kg/m². indicating that he is overweight (BMI 25-29.9). Patient's (Body mass index is 24.08 kg/m².) indicates that they are overweight with health conditions that include none . Weight is improving with treatment. BMI is is above average; no BMI management plan is appropriate. We discussed portion control and increasing exercise. .    No alcohol while on flagyl    Transcribed from ambient dictation for Shanel Bautista MD by Rupal Harrell.  02/12/22   14:14 EST    Patient verbalized consent to the visit recording.  I have personally performed the services described in this document as transcribed by the above individual, and it is both accurate and complete.  Shanel Bautista MD  2/22/2022  21:58 EST

## 2024-09-29 NOTE — PROGRESS NOTES
ASSESSMENT/PLAN:    Assessment:   B/L thumb CMC DJD  R ring TF resolved    Plan:   Depo Medrol injxs provided to b/l thumb CMCs today   Pt encouraged to keep track of length of benefit    Follow Up:  3  month(s)      _____________________________________________________  CHIEF COMPLAINT:  Chief Complaint   Patient presents with    Left Hand - Follow-up    Right Hand - Follow-up         SUBJECTIVE:  Del Bhat is a 81 y o  male who presents for follow up regarding b/l thumbs  Received steroid injxs at last visit which did provide good relief, but unfortunately for only 7-10 days  He continues to have pain and difficulty with gripping  Does use his comfort cools as needed  Patient has also received 2 steroid injxs previously for R ring TF  States this has resolved and no major issues here today      PAST MEDICAL HISTORY:  Past Medical History:   Diagnosis Date    Aortic stenosis     Bacterial infection due to Helicobacter pylori     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     Colon polyp     Depression (emotion) 11/16/2018    GERD (gastroesophageal reflux disease)     Hyperlipidemia     Hypertension     Post herpetic neuralgia     Shingles        PAST SURGICAL HISTORY:  Past Surgical History:   Procedure Laterality Date    ANAL SPHINCTEROTOMY      BASAL CELL CARCINOMA EXCISION      CHOLECYSTECTOMY      OPEN REDUCTION SHOULDER DISLOCATION      ROTATOR CUFF REPAIR         FAMILY HISTORY:  Family History   Problem Relation Age of Onset    Depression Mother     Hyperlipidemia Mother     Substance Abuse Neg Hx     Colon cancer Neg Hx     Colon polyps Neg Hx        SOCIAL HISTORY:  Social History     Tobacco Use    Smoking status: Former Smoker    Smokeless tobacco: Former User     Quit date: 1/1/1976   Substance Use Topics    Alcohol use: No    Drug use: No       MEDICATIONS:    Current Outpatient Medications:     aspirin (ECOTRIN LOW STRENGTH) 81 mg EC tablet, Take 1 tablet (81 mg total) by mouth Pt. Verbalized understanding of discharge instructions. Pt. Left in no signs of distress.    daily, Disp: , Rfl:     atorvastatin (LIPITOR) 10 mg tablet, Take 1 tablet (10 mg total) by mouth daily, Disp: 90 tablet, Rfl: 3    diclofenac potassium (CATAFLAM) 50 mg tablet, Take 1 tablet (50 mg total) by mouth 2 (two) times a day as needed (Neck spasm), Disp: 30 tablet, Rfl: 0    escitalopram (LEXAPRO) 10 mg tablet, Take 1 tablet (10 mg total) by mouth daily, Disp: 30 tablet, Rfl: 3    methocarbamol (ROBAXIN) 500 mg tablet, Take 1 tablet (500 mg total) by mouth 2 (two) times a day as needed for muscle spasms, Disp: 30 tablet, Rfl: 0    ALLERGIES:  Allergies   Allergen Reactions    Metformin Diarrhea       REVIEW OF SYSTEMS:  Pertinent items are noted in HPI  A comprehensive review of systems was negative  LABS:  HgA1c:   Lab Results   Component Value Date    HGBA1C 6 9 (H) 11/22/2019     BMP:   Lab Results   Component Value Date    GLUCOSE 150 (H) 04/07/2014    CALCIUM 9 2 11/22/2019     04/07/2014    K 3 9 11/22/2019    CO2 28 11/22/2019     (H) 11/22/2019    BUN 25 11/22/2019    CREATININE 1 27 11/22/2019           _____________________________________________________  PHYSICAL EXAMINATION:  Vital signs: There were no vitals taken for this visit    General: well developed and well nourished, alert, oriented times 3 and appears comfortable  Psychiatric: Normal  HEENT: Trachea Midline, No torticollis  Cardiovascular: No discernable arrhythmia  Pulmonary: No wheezing or stridor  Skin: No masses, erythema, lacerations, fluctation, ulcerations  Neurovascular: Sensation Intact to the Median, Ulnar, Radial Nerve, Motor Intact to the Median, Ulnar, Radial Nerve and Pulses Intact    MUSCULOSKELETAL EXAMINATION:  LEFT SIDE:  CMC: No Instability, Positive shuck, Positive grind and Positive tendnerness CMC  RIGHT SIDE:  CMC: No Instability, Positive shuck, Positive grind and Positive tendnerness CMC    _____________________________________________________  STUDIES REVIEWED:  No Studies to review      PROCEDURES PERFORMED:  Small joint arthrocentesis: R thumb CMC  Date/Time: 1/6/2020 11:09 AM  Consent given by: patient  Supporting Documentation  Indications: pain   Procedure Details  Location: thumb - R thumb CMC  Needle size: 25 G  Ultrasound guidance: no  Approach: volar  Medications administered: 0 5 mL lidocaine 1 %; 0 05 mL methylPREDNISolone acetate 40 mg/mL    Patient tolerance: patient tolerated the procedure well with no immediate complications  Dressing:  Sterile dressing applied    Small joint arthrocentesis: L thumb CMC  Date/Time: 1/6/2020 11:09 AM  Consent given by: patient  Timeout: Immediately prior to procedure a time out was called to verify the correct patient, procedure, equipment, support staff and site/side marked as required   Supporting Documentation  Indications: pain   Procedure Details  Location: thumb - L thumb CMC  Needle size: 25 G  Ultrasound guidance: no  Approach: volar  Medications administered: 0 5 mL lidocaine 1 %; 0 05 mL methylPREDNISolone acetate 40 mg/mL    Patient tolerance: patient tolerated the procedure well with no immediate complications  Dressing:  Sterile dressing applied            Scribe Attestation    I,:   Skyler Barron PA-C am acting as a scribe while in the presence of the attending physician :        I,:   Keren Mac MD personally performed the services described in this documentation    as scribed in my presence :

## 2024-11-02 NOTE — OP NOTE
OPERATIVE REPORT  PATIENT NAME: Dariel Hamlin    :  1935  MRN: [id number]  Pt Location: HYBRID OR 2    SURGERY DATE: 2021      Co-Surgeons: Bran Ewing DO; Joshua Ledbetter MD    ASSISTANT: Yasmine Claudio MD      PREOPERATIVE DIAGNOSIS Symptomatic severe aortic stenosis  POSTOPERATIVE DIAGNOSIS Symptomatic severe aortic stenosis  PROCEDURE Transcatheter aortic valve replacement with a 29 mm Giron PANCHO 3 bioprosthetic valve via a percutaneous left transfemoral approach  ANESTHESIA Dr Norma Hui, general endotracheal anesthesia with transesophageal echocardiogram guidance  After informed consent was obtained, patient brought to hybrid operating room in fasting state  Time out was performed  Using modified seldinger technique sheaths were placed in the left   femoral artery and vein respectively  The left   femoral artery was also used for placement of delivery sheath  The vessel was accessed using modified seldinger technique  Using fluoroscopy a temporary wire was advanced into RV apex through transfemoral sheath  The coplanar view was obtained using pigtail catheter placed in ascending aorta with subsequent ascending aortography  The TAVR delivery sheath was ulltimately advanced over a stiff wire  Next the 29 mm Giron PANCHO 3  valve was implanted using rapid pacing with fluoro and REBECCA guidance  There was no significant paravalvular leak appreciated post implant  The sheaths were removed and hemostasis obtained by manual compression of the venous sheath  The  arterial sheath used for  Pigtail catheter insertion and ascending aortography was closed with an Proglide system  The TAVR delivery sheath was removed and percutaneous closure was obtained using Preclose technique with two Proglide devices deployed pre sheath insertion  Patient left the hybrid operating room in stable condition  Impression    Successful 29mm Giron PANCHO 3 transcatheter aortic valve replacement         SIGNATURE: Bobbi Carmichael,   DATE: June 22, 2021  TIME: 10:32 AM No.

## (undated) DEVICE — INTENDED FOR TISSUE SEPARATION, AND OTHER PROCEDURES THAT REQUIRE A SHARP SURGICAL BLADE TO PUNCTURE OR CUT.: Brand: BARD-PARKER ® CARBON RIB-BACK BLADES

## (undated) DEVICE — GLOVE SRG BIOGEL ECLIPSE 7.5

## (undated) DEVICE — DRESSING ALLEVYN LIFE SACRAL 6.75 X 6.5 IN

## (undated) DEVICE — SUT SILK 0 CT-1 30 IN 424H

## (undated) DEVICE — HEAVY DUTY TABLE COVER: Brand: CONVERTORS

## (undated) DEVICE — 3M™ TEGADERM™ TRANSPARENT FILM DRESSING FRAME STYLE, 1626W, 4 IN X 4-3/4 IN (10 CM X 12 CM), 50/CT 4CT/CASE: Brand: 3M™ TEGADERM™

## (undated) DEVICE — ADHESIVE SKIN HIGH VISCOSITY EXOFIN 1ML

## (undated) DEVICE — GLOVE INDICATOR PI UNDERGLOVE SZ 8 BLUE

## (undated) DEVICE — 3000CC GUARDIAN II: Brand: GUARDIAN

## (undated) DEVICE — CARDIOVASCULAR SPLIT DRAPE: Brand: CONVERTORS

## (undated) DEVICE — PAD GROUNDING ADULT

## (undated) DEVICE — DEFIB ADULT ELECTRODE CARDINAL

## (undated) DEVICE — TRAY FOLEY 16FR SURESTEP TEMP SENS URIMETER STAT LOK

## (undated) DEVICE — BETHLEHEM MAJOR GENERAL PACK: Brand: CARDINAL HEALTH

## (undated) DEVICE — CARDIO PERI-GROIN: Brand: CONVERTORS

## (undated) DEVICE — GAUZE SPONGES,USP TYPE VII GAUZE, 12 PLY: Brand: CURITY

## (undated) DEVICE — Device

## (undated) DEVICE — X-RAY DETECTABLE SPONGES,16 PLY: Brand: VISTEC

## (undated) DEVICE — THERMOFLECT BLANKET, L, 25EA                               TS THERMOFLECT BLANKET, 48" X 84", SILVER, 5/BG, 5 BG/CS NW: Brand: THERMOFLECT

## (undated) DEVICE — MICROPUNCTURE INTRODUCER SET SILHOUETTE TRANSITIONLESS PUSH-PLUS DESIGN - STIFFENED CANNULA WITH NITINOL WIRE GUIDE: Brand: MICROPUNCTURE